# Patient Record
Sex: FEMALE | Race: WHITE | NOT HISPANIC OR LATINO | Employment: FULL TIME | URBAN - METROPOLITAN AREA
[De-identification: names, ages, dates, MRNs, and addresses within clinical notes are randomized per-mention and may not be internally consistent; named-entity substitution may affect disease eponyms.]

---

## 2016-06-30 LAB — HM PAP SMEAR: NEGATIVE

## 2016-07-20 LAB — HM MAMMOGRAM: NORMAL

## 2017-02-24 ENCOUNTER — OFFICE VISIT (OUTPATIENT)
Dept: ENDOCRINOLOGY | Age: 47
End: 2017-02-24

## 2017-02-24 VITALS
HEIGHT: 62 IN | DIASTOLIC BLOOD PRESSURE: 76 MMHG | BODY MASS INDEX: 40.19 KG/M2 | WEIGHT: 218.4 LBS | SYSTOLIC BLOOD PRESSURE: 124 MMHG

## 2017-02-24 DIAGNOSIS — E11.9 TYPE 2 DIABETES MELLITUS WITHOUT COMPLICATION, WITHOUT LONG-TERM CURRENT USE OF INSULIN (HCC): ICD-10-CM

## 2017-02-24 DIAGNOSIS — E04.9 GOITER, NON-TOXIC: Primary | ICD-10-CM

## 2017-02-24 DIAGNOSIS — I25.10 ARTERIOSCLEROSIS OF CORONARY ARTERY: ICD-10-CM

## 2017-02-24 DIAGNOSIS — E55.9 VITAMIN D DEFICIENCY: Primary | ICD-10-CM

## 2017-02-24 DIAGNOSIS — E78.2 MIXED HYPERLIPIDEMIA: ICD-10-CM

## 2017-02-24 DIAGNOSIS — E25.0: ICD-10-CM

## 2017-02-24 DIAGNOSIS — E27.8 ADRENAL NODULE (HCC): ICD-10-CM

## 2017-02-24 DIAGNOSIS — E55.9 VITAMIN D DEFICIENCY: ICD-10-CM

## 2017-02-24 DIAGNOSIS — E04.2 MULTINODULAR GOITER: ICD-10-CM

## 2017-02-24 DIAGNOSIS — I10 ESSENTIAL HYPERTENSION: ICD-10-CM

## 2017-02-24 PROBLEM — E27.9 ADRENAL NODULE: Status: ACTIVE | Noted: 2017-02-24

## 2017-02-24 PROCEDURE — 99214 OFFICE O/P EST MOD 30 MIN: CPT | Performed by: INTERNAL MEDICINE

## 2017-02-24 RX ORDER — CANAGLIFLOZIN 100 MG/1
TABLET, FILM COATED ORAL
Qty: 30 TABLET | Refills: 5 | Status: SHIPPED | OUTPATIENT
Start: 2017-02-24 | End: 2017-11-07

## 2017-02-24 RX ORDER — ERGOCALCIFEROL 1.25 MG/1
50000 CAPSULE ORAL 2 TIMES WEEKLY
Qty: 26 CAPSULE | Refills: 3 | Status: SHIPPED | OUTPATIENT
Start: 2017-02-27 | End: 2018-08-23 | Stop reason: SDUPTHER

## 2017-02-24 RX ORDER — ROSUVASTATIN CALCIUM 20 MG/1
20 TABLET, COATED ORAL NIGHTLY
Qty: 30 TABLET | Refills: 5 | Status: SHIPPED | OUTPATIENT
Start: 2017-02-24 | End: 2017-12-20 | Stop reason: SDUPTHER

## 2017-02-24 NOTE — PROGRESS NOTES
Subjective   Carina Zamudio is a 46 y.o. female is here for a follow-up for Prediabetes, goiter and hyperlipidemia. No lab review. Patient is concerned with exhaustion.     History of Present Illness this is a 46-year-old female known patient with the type II diabetes multinodular goiter left thyroid nodule dyslipidemia as well as known coronary and peripheral vascular disease and vitamin D deficiency.  She is complaining of pronounced fatigue however she has had no vitamin D for several weeks.    The following portions of the patient's history were reviewed and updated as appropriate: allergies, current medications, past family history, past medical history, past social history, past surgical history and problem list.    Review of Systems   Constitutional: Positive for fatigue.   HENT: Negative.    Eyes: Negative.    Respiratory: Negative.    Cardiovascular: Negative.    Gastrointestinal: Negative.    Endocrine: Negative.    Genitourinary: Negative.    Musculoskeletal: Negative.    Skin: Negative.    Allergic/Immunologic: Negative.    Neurological: Negative.    Hematological: Negative.    Psychiatric/Behavioral: Negative.        Objective   Physical Exam   Constitutional: She is oriented to person, place, and time. She appears well-developed and well-nourished. No distress.   Tired looking   HENT:   Head: Normocephalic and atraumatic.   Right Ear: External ear normal.   Left Ear: External ear normal.   Nose: Nose normal.   Mouth/Throat: Oropharynx is clear and moist. No oropharyngeal exudate.   Eyes: Conjunctivae and EOM are normal. Pupils are equal, round, and reactive to light. Right eye exhibits no discharge. Left eye exhibits no discharge. No scleral icterus.   Neck: Normal range of motion. Neck supple. No JVD present. No tracheal deviation present. Thyromegaly present.   Stable goiter.  It moves freely with swallowing and its nontender.   Cardiovascular: Normal rate, regular rhythm, normal heart sounds and  intact distal pulses.  Exam reveals no gallop and no friction rub.    No murmur heard.  Pulmonary/Chest: Effort normal and breath sounds normal. No stridor. No respiratory distress. She has no wheezes. She has no rales. She exhibits no tenderness.   Abdominal: Soft. Bowel sounds are normal. She exhibits no distension and no mass. There is no tenderness. There is no rebound and no guarding. No hernia.   Musculoskeletal: Normal range of motion. She exhibits no edema, tenderness or deformity.   Lymphadenopathy:     She has no cervical adenopathy.   Neurological: She is alert and oriented to person, place, and time. She has normal reflexes. She displays normal reflexes. No cranial nerve deficit. She exhibits normal muscle tone. Coordination normal.   Skin: Skin is warm and dry. No rash noted. She is not diaphoretic. No erythema. No pallor.   Psychiatric: She has a normal mood and affect. Her behavior is normal. Judgment and thought content normal.   Nursing note and vitals reviewed.        Assessment/Plan   Diagnoses and all orders for this visit:    Goiter, non-toxic  -     T3, Free  -     T4 & TSH ([facility])  -     T4, Free  -     Uric Acid  -     Vitamin D 25 Hydroxy  -     Thyroglobulin With Anti-TG  -     Comprehensive Metabolic Panel  -     C-Peptide  -     Follicle Stimulating Hormone  -     Hemoglobin A1c  -     Lipid Panel  -     Luteinizing Hormone  -     ACTH  -     Cortisol  -     DHEA-Sulfate  -     Estradiol  -     Prolactin  -     US Thyroid; Future    Multinodular goiter  -     T3, Free  -     T4 & TSH ([facility])  -     T4, Free  -     Uric Acid  -     Vitamin D 25 Hydroxy  -     Thyroglobulin With Anti-TG  -     Comprehensive Metabolic Panel  -     C-Peptide  -     Follicle Stimulating Hormone  -     Hemoglobin A1c  -     Lipid Panel  -     Luteinizing Hormone  -     ACTH  -     Cortisol  -     DHEA-Sulfate  -     Estradiol  -     Prolactin    Adrenal congenital hyperplasia  -     T3, Free  -     T4 & TSH  ([facility])  -     T4, Free  -     Uric Acid  -     Vitamin D 25 Hydroxy  -     Thyroglobulin With Anti-TG  -     Comprehensive Metabolic Panel  -     C-Peptide  -     Follicle Stimulating Hormone  -     Hemoglobin A1c  -     Lipid Panel  -     Luteinizing Hormone  -     ACTH  -     Cortisol  -     DHEA-Sulfate  -     Estradiol  -     Prolactin    Mixed hyperlipidemia  -     T3, Free  -     T4 & TSH ([facility])  -     T4, Free  -     Uric Acid  -     Vitamin D 25 Hydroxy  -     Thyroglobulin With Anti-TG  -     Comprehensive Metabolic Panel  -     C-Peptide  -     Follicle Stimulating Hormone  -     Hemoglobin A1c  -     Lipid Panel  -     Luteinizing Hormone  -     ACTH  -     Cortisol  -     DHEA-Sulfate  -     Estradiol  -     Prolactin    Essential hypertension  -     T3, Free  -     T4 & TSH ([facility])  -     T4, Free  -     Uric Acid  -     Vitamin D 25 Hydroxy  -     Thyroglobulin With Anti-TG  -     Comprehensive Metabolic Panel  -     C-Peptide  -     Follicle Stimulating Hormone  -     Hemoglobin A1c  -     Lipid Panel  -     Luteinizing Hormone  -     ACTH  -     Cortisol  -     DHEA-Sulfate  -     Estradiol  -     Prolactin    Arteriosclerosis of coronary artery  -     T3, Free  -     T4 & TSH ([facility])  -     T4, Free  -     Uric Acid  -     Vitamin D 25 Hydroxy  -     Thyroglobulin With Anti-TG  -     Comprehensive Metabolic Panel  -     C-Peptide  -     Follicle Stimulating Hormone  -     Hemoglobin A1c  -     Lipid Panel  -     Luteinizing Hormone  -     ACTH  -     Cortisol  -     DHEA-Sulfate  -     Estradiol  -     Prolactin    Type 2 diabetes mellitus without complication, without long-term current use of insulin  -     T3, Free  -     T4 & TSH ([facility])  -     T4, Free  -     Uric Acid  -     Vitamin D 25 Hydroxy  -     Thyroglobulin With Anti-TG  -     Comprehensive Metabolic Panel  -     C-Peptide  -     Follicle Stimulating Hormone  -     Hemoglobin A1c  -     Lipid Panel  -     Luteinizing  Hormone  -     ACTH  -     Cortisol  -     DHEA-Sulfate  -     Estradiol  -     Prolactin    Adrenal nodule  -     CT Abdomen With & Without Contrast; Future    Other orders  -     INVOKANA 100 MG tablet; 1 tablet every morning  -     vitamin D (ERGOCALCIFEROL) 50574 UNITS capsule capsule; Take 1 capsule by mouth 2 (Two) Times a Week.               In summary I saw and examined this the 46-year-old female for above-mentioned problems.  I reviewed her laboratory evaluations of last year and at this point we will go ahead and order an extensive laboratory evaluation as well as CT of her abdomen for follow-up of her adrenal nodule as well as an ultrasound of her thyroid.  As soon as the results come back we will go ahead and call for any possible modifications.  I will see her in 6 months or sooner if needed with laboratory evaluation prior to her office visit.

## 2017-02-25 LAB — 25(OH)D3+25(OH)D2 SERPL-MCNC: 18.7 NG/ML (ref 30–100)

## 2017-02-27 LAB
ACTH PLAS-MCNC: 10.1 PG/ML (ref 7.2–63.3)
ALBUMIN SERPL-MCNC: 4.2 G/DL (ref 3.5–5.2)
ALBUMIN/GLOB SERPL: 1.7 G/DL
ALP SERPL-CCNC: 47 U/L (ref 39–117)
ALT SERPL-CCNC: 19 U/L (ref 1–33)
AST SERPL-CCNC: 19 U/L (ref 1–32)
BILIRUB SERPL-MCNC: <0.2 MG/DL (ref 0.1–1.2)
BUN SERPL-MCNC: 12 MG/DL (ref 6–20)
BUN/CREAT SERPL: 18.8 (ref 7–25)
C PEPTIDE SERPL-MCNC: 7.5 NG/ML (ref 1.1–4.4)
CALCIUM SERPL-MCNC: 9.2 MG/DL (ref 8.6–10.5)
CHLORIDE SERPL-SCNC: 102 MMOL/L (ref 98–107)
CHOLEST SERPL-MCNC: 173 MG/DL (ref 0–200)
CO2 SERPL-SCNC: 26.4 MMOL/L (ref 22–29)
CORTIS SERPL-MCNC: 7.6 UG/DL
CREAT SERPL-MCNC: 0.64 MG/DL (ref 0.57–1)
DHEA-S SERPL-MCNC: 62 UG/DL (ref 41.2–243.7)
ESTRADIOL SERPL-MCNC: 37.6 PG/ML
FSH SERPL-ACNC: 37.6 MIU/ML
GLOBULIN SER CALC-MCNC: 2.5 GM/DL
GLUCOSE SERPL-MCNC: 103 MG/DL (ref 65–99)
HBA1C MFR BLD: 6.55 % (ref 4.8–5.6)
HDLC SERPL-MCNC: 54 MG/DL (ref 40–60)
LDLC SERPL CALC-MCNC: 89 MG/DL (ref 0–100)
LH SERPL-ACNC: 28 MIU/ML
POTASSIUM SERPL-SCNC: 4 MMOL/L (ref 3.5–5.2)
PROLACTIN SERPL-MCNC: 13.1 NG/ML (ref 4.8–23.3)
PROT SERPL-MCNC: 6.7 G/DL (ref 6–8.5)
SODIUM SERPL-SCNC: 142 MMOL/L (ref 136–145)
T3FREE SERPL-MCNC: 3 PG/ML (ref 2–4.4)
T4 FREE SERPL-MCNC: 1.27 NG/DL (ref 0.93–1.7)
T4 SERPL-MCNC: 7.98 MCG/DL (ref 4.5–11.7)
THYROGLOB AB SERPL-ACNC: <1 IU/ML (ref 0–0.9)
THYROGLOB SERPL-MCNC: 19.7 NG/ML (ref 1.5–38.5)
TRIGL SERPL-MCNC: 151 MG/DL (ref 0–150)
TSH SERPL DL<=0.005 MIU/L-ACNC: 1.42 MIU/ML (ref 0.27–4.2)
URATE SERPL-MCNC: 4.7 MG/DL (ref 2.4–5.7)
VLDLC SERPL CALC-MCNC: 30.2 MG/DL (ref 5–40)

## 2017-03-01 ENCOUNTER — TELEPHONE (OUTPATIENT)
Dept: ENDOCRINOLOGY | Age: 47
End: 2017-03-01

## 2017-03-02 ENCOUNTER — TELEPHONE (OUTPATIENT)
Dept: ENDOCRINOLOGY | Age: 47
End: 2017-03-02

## 2017-03-06 DIAGNOSIS — E27.8 ADRENAL NODULE (HCC): Primary | ICD-10-CM

## 2017-03-06 DIAGNOSIS — E25.0: ICD-10-CM

## 2017-03-07 ENCOUNTER — HOSPITAL ENCOUNTER (OUTPATIENT)
Dept: ULTRASOUND IMAGING | Facility: HOSPITAL | Age: 47
Discharge: HOME | End: 2017-03-07
Attending: INTERNAL MEDICINE | Admitting: INTERNAL MEDICINE

## 2017-03-07 ENCOUNTER — HOSPITAL ENCOUNTER (OUTPATIENT)
Dept: CT IMAGING | Facility: HOSPITAL | Age: 47
Discharge: HOME | End: 2017-03-07
Attending: INTERNAL MEDICINE

## 2017-03-07 DIAGNOSIS — E27.8 ADRENAL NODULE (HCC): ICD-10-CM

## 2017-03-07 DIAGNOSIS — E04.9 GOITER, NON-TOXIC: ICD-10-CM

## 2017-03-07 LAB — CREAT BLDA-MCNC: 0.6 MG/DL (ref 0.6–1.3)

## 2017-03-07 PROCEDURE — 74170 CT ABD WO CNTRST FLWD CNTRST: CPT

## 2017-03-07 PROCEDURE — 82565 ASSAY OF CREATININE: CPT

## 2017-03-07 PROCEDURE — 76536 US EXAM OF HEAD AND NECK: CPT

## 2017-03-07 PROCEDURE — 0 IOPAMIDOL 61 % SOLUTION: Performed by: INTERNAL MEDICINE

## 2017-03-07 RX ADMIN — IOPAMIDOL 85 ML: 612 INJECTION, SOLUTION INTRAVENOUS at 09:20

## 2017-04-10 RX ORDER — CARVEDILOL 3.12 MG/1
TABLET ORAL
Qty: 60 TABLET | Refills: 11 | Status: SHIPPED | OUTPATIENT
Start: 2017-04-10 | End: 2018-04-23 | Stop reason: SDUPTHER

## 2017-04-10 RX ORDER — LOSARTAN POTASSIUM 50 MG/1
TABLET ORAL
Qty: 30 TABLET | Refills: 11 | Status: SHIPPED | OUTPATIENT
Start: 2017-04-10 | End: 2017-11-08 | Stop reason: HOSPADM

## 2017-04-13 ENCOUNTER — TELEPHONE (OUTPATIENT)
Dept: INTERNAL MEDICINE | Facility: CLINIC | Age: 47
End: 2017-04-13

## 2017-04-13 NOTE — TELEPHONE ENCOUNTER
Ms. Zamudio wanted to know if she should take her Metformin along with her Invokana?    Please advise

## 2017-04-24 ENCOUNTER — TELEPHONE (OUTPATIENT)
Dept: INTERNAL MEDICINE | Facility: CLINIC | Age: 47
End: 2017-04-24

## 2017-04-24 ENCOUNTER — OFFICE VISIT (OUTPATIENT)
Dept: INTERNAL MEDICINE | Facility: CLINIC | Age: 47
End: 2017-04-24

## 2017-04-24 VITALS
DIASTOLIC BLOOD PRESSURE: 74 MMHG | BODY MASS INDEX: 39.38 KG/M2 | SYSTOLIC BLOOD PRESSURE: 128 MMHG | HEART RATE: 72 BPM | HEIGHT: 62 IN | WEIGHT: 214 LBS

## 2017-04-24 DIAGNOSIS — S80.811A CAT SCRATCH OF RIGHT LOWER LEG, INITIAL ENCOUNTER: Primary | ICD-10-CM
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
## 2017-04-24 PROCEDURE — 99212 OFFICE O/P EST SF 10 MIN: CPT | Performed by: INTERNAL MEDICINE

## 2017-04-24 RX ORDER — CIPROFLOXACIN 500 MG/1
500 TABLET, FILM COATED ORAL 2 TIMES DAILY
Qty: 20 TABLET | Refills: 0 | Status: SHIPPED | OUTPATIENT
Start: 2017-04-24 | End: 2017-05-02

## 2017-04-24 NOTE — TELEPHONE ENCOUNTER
Pt would like Rx for Keflex. On Friday pt was scratched by her cat on her Right thigh area and is a bit infected now.  Pt says she has cat scratch fever and does not feel great.  She will also need a refill on Diflucan as well.  Please send to [facility] [address] [place] [address] - [facility] [address] AT [facility] [address] & [address] [facility] [phone] [facility] [phone]    Thank you.

## 2017-04-24 NOTE — PROGRESS NOTES
Subjective   Carina Zamudio is a 46 y.o. female.     History of Present Illness she was scratched by her cat 3 days ago.  Today she noticed 2 small areas of erythema surrounding the 2 linear wound areas.  They are not painful or pruritic.  There has been no drainage.  She denies any fever or chills although she has been fatigued and having sweats at night.  She has not had any cough or dysuria.  She denied any diarrhea or vomiting although she has been somewhat nauseated.        Review of Systems   Constitutional: Positive for diaphoresis and fatigue. Negative for activity change, appetite change, chills and fever.   Respiratory: Negative for cough and shortness of breath.    Cardiovascular: Negative for chest pain and leg swelling.   Gastrointestinal: Negative for abdominal pain, diarrhea, nausea and vomiting.   Genitourinary: Negative for dysuria.   Musculoskeletal: Negative for arthralgias and myalgias.   Neurological: Negative for dizziness, weakness and headaches.       Objective   Physical Exam   Constitutional: She is oriented to person, place, and time. Vital signs are normal. She appears well-developed and well-nourished. She is active. She does not appear ill.   Eyes: Conjunctivae are normal.   Cardiovascular: Normal rate, regular rhythm, S1 normal and S2 normal.  Exam reveals no S3 and no S4.    No murmur heard.  Pulmonary/Chest: No tachypnea. No respiratory distress. She has no decreased breath sounds. She has no wheezes. She has no rhonchi. She has no rales.   Abdominal: Soft. Normal appearance and bowel sounds are normal. She exhibits no abdominal bruit and no mass. There is no hepatosplenomegaly. There is no tenderness.       Vascular Status -  Her exam exhibits no right foot edema. Her exam exhibits no left foot edema.  Lymphadenopathy:        Right: No inguinal adenopathy present.   Neurological: She is alert and oriented to person, place, and time. Gait normal.   Skin:        Psychiatric: She has  a normal mood and affect. Her speech is normal and behavior is normal. Judgment and thought content normal. Cognition and memory are normal.       Assessment/Plan assessment #1 cat scratch-nothing to suggest cat scratch fever although more mundane infection is always a possibility.    Plan #1 Cipro 500 mg by mouth twice a day for 10 days.  She will call if symptoms do not resolve.

## 2017-05-02 ENCOUNTER — OFFICE VISIT (OUTPATIENT)
Dept: INTERNAL MEDICINE | Facility: CLINIC | Age: 47
End: 2017-05-02

## 2017-05-02 ENCOUNTER — HOSPITAL ENCOUNTER (OUTPATIENT)
Dept: CT IMAGING | Facility: HOSPITAL | Age: 47
Discharge: HOME | End: 2017-05-02
Admitting: NURSE PRACTITIONER

## 2017-05-02 VITALS
BODY MASS INDEX: 39.87 KG/M2 | TEMPERATURE: 98.4 F | HEART RATE: 71 BPM | SYSTOLIC BLOOD PRESSURE: 136 MMHG | DIASTOLIC BLOOD PRESSURE: 90 MMHG | WEIGHT: 218 LBS | OXYGEN SATURATION: 97 %

## 2017-05-02 DIAGNOSIS — R11.0 NAUSEA: ICD-10-CM

## 2017-05-02 DIAGNOSIS — R10.31 RIGHT LOWER QUADRANT ABDOMINAL PAIN: Primary | ICD-10-CM

## 2017-05-02 LAB
ALBUMIN SERPL-MCNC: 3.52 G/DL (ref 3.4–4.6)
ALBUMIN/GLOB SERPL: 1.1 G/DL
ALP SERPL-CCNC: 51 U/L (ref 46–116)
ALT SERPL W P-5'-P-CCNC: 24 U/L (ref 14–59)
ANION GAP SERPL CALCULATED.3IONS-SCNC: 12 MMOL/L
AST SERPL-CCNC: 17 U/L (ref 7–37)
B-HCG UR QL: NEGATIVE
BACTERIA UR QL AUTO: ABNORMAL /HPF
BASOPHILS # BLD AUTO: 0.04 10*3/MM3 (ref 0–0.2)
BASOPHILS NFR BLD AUTO: 0.4 % (ref 0–2)
BILIRUB SERPL-MCNC: 0.3 MG/DL (ref 0.2–1)
BILIRUB UR QL STRIP: NEGATIVE
BUN BLD-MCNC: 12 MG/DL (ref 6–22)
BUN/CREAT SERPL: 19 (ref 7–25)
CALCIUM SPEC-SCNC: 8.4 MG/DL (ref 8.6–10.5)
CHLORIDE SERPL-SCNC: 103 MMOL/L (ref 95–107)
CLARITY UR: CLEAR
CO2 SERPL-SCNC: 26 MMOL/L (ref 23–32)
COLOR UR: YELLOW
CREAT BLD-MCNC: 0.63 MG/DL (ref 0.55–1.02)
CREAT BLDA-MCNC: 0.7 MG/DL (ref 0.6–1.3)
DEPRECATED RDW RBC AUTO: 42.7 FL (ref 37–54)
EOSINOPHIL # BLD AUTO: 0.08 10*3/MM3 (ref 0–0.7)
EOSINOPHIL NFR BLD AUTO: 0.8 % (ref 0–5)
ERYTHROCYTE [DISTWIDTH] IN BLOOD BY AUTOMATED COUNT: 12.8 % (ref 11.5–15)
GFR SERPL CREATININE-BSD FRML MDRD: 101 ML/MIN/1.73
GLOBULIN UR ELPH-MCNC: 3.2 GM/DL
GLUCOSE BLD-MCNC: 124 MG/DL (ref 70–100)
GLUCOSE UR STRIP-MCNC: ABNORMAL MG/DL
HCT VFR BLD AUTO: 48.3 % (ref 34.1–44.9)
HGB BLD-MCNC: 16.3 G/DL (ref 11.2–15.7)
HGB UR QL STRIP.AUTO: ABNORMAL
HYALINE CASTS UR QL AUTO: ABNORMAL /LPF
INTERNAL NEGATIVE CONTROL: NEGATIVE
INTERNAL POSITIVE CONTROL: POSITIVE
KETONES UR QL STRIP: NEGATIVE
LEUKOCYTE ESTERASE UR QL STRIP.AUTO: NEGATIVE
LYMPHOCYTES # BLD AUTO: 2.74 10*3/MM3 (ref 0.8–7)
LYMPHOCYTES NFR BLD AUTO: 26.9 % (ref 10–60)
Lab: NORMAL
MCH RBC QN AUTO: 31.5 PG (ref 26–34)
MCHC RBC AUTO-ENTMCNC: 33.7 G/DL (ref 31–37)
MCV RBC AUTO: 93.4 FL (ref 80–100)
MONOCYTES # BLD AUTO: 0.83 10*3/MM3 (ref 0–1)
MONOCYTES NFR BLD AUTO: 8.2 % (ref 0–13)
NEUTROPHILS # BLD AUTO: 6.49 10*3/MM3 (ref 1–11)
NEUTROPHILS NFR BLD AUTO: 63.7 % (ref 30–85)
NITRITE UR QL STRIP: NEGATIVE
PH UR STRIP.AUTO: 6.5 [PH] (ref 5–8)
PLATELET # BLD AUTO: 258 10*3/MM3 (ref 150–450)
PMV BLD AUTO: 9.4 FL (ref 6–12)
POTASSIUM BLD-SCNC: 4.2 MMOL/L (ref 3.3–5.3)
PROT SERPL-MCNC: 6.7 G/DL (ref 6.3–8.4)
PROT UR QL STRIP: ABNORMAL
RBC # BLD AUTO: 5.17 10*6/MM3 (ref 3.93–5.22)
RBC # UR: ABNORMAL /HPF
REF LAB TEST METHOD: ABNORMAL
SODIUM BLD-SCNC: 141 MMOL/L (ref 136–145)
SP GR UR STRIP: 1.02 (ref 1–1.03)
SQUAMOUS #/AREA URNS HPF: ABNORMAL /HPF
UROBILINOGEN UR QL STRIP: ABNORMAL
WBC NRBC COR # BLD: 10.18 10*3/MM3 (ref 5–10)
WBC UR QL AUTO: ABNORMAL /HPF

## 2017-05-02 PROCEDURE — 80053 COMPREHEN METABOLIC PANEL: CPT | Performed by: NURSE PRACTITIONER

## 2017-05-02 PROCEDURE — 81025 URINE PREGNANCY TEST: CPT | Performed by: NURSE PRACTITIONER

## 2017-05-02 PROCEDURE — 74177 CT ABD & PELVIS W/CONTRAST: CPT

## 2017-05-02 PROCEDURE — 82565 ASSAY OF CREATININE: CPT

## 2017-05-02 PROCEDURE — 0 IOPAMIDOL 61 % SOLUTION: Performed by: NURSE PRACTITIONER

## 2017-05-02 PROCEDURE — 81001 URINALYSIS AUTO W/SCOPE: CPT | Performed by: NURSE PRACTITIONER

## 2017-05-02 PROCEDURE — 85025 COMPLETE CBC W/AUTO DIFF WBC: CPT | Performed by: NURSE PRACTITIONER

## 2017-05-02 PROCEDURE — 99214 OFFICE O/P EST MOD 30 MIN: CPT | Performed by: NURSE PRACTITIONER

## 2017-05-02 RX ADMIN — IOPAMIDOL 85 ML: 612 INJECTION, SOLUTION INTRAVENOUS at 12:32

## 2017-08-10 DIAGNOSIS — E78.2 MIXED HYPERLIPIDEMIA: Primary | ICD-10-CM

## 2017-08-10 DIAGNOSIS — E11.9 TYPE 2 DIABETES MELLITUS WITHOUT COMPLICATION, WITHOUT LONG-TERM CURRENT USE OF INSULIN (HCC): ICD-10-CM

## 2017-08-10 DIAGNOSIS — E27.8 ADRENAL NODULE (HCC): ICD-10-CM

## 2017-08-10 DIAGNOSIS — E04.2 MULTINODULAR GOITER: ICD-10-CM

## 2017-09-10 ENCOUNTER — RESULTS ENCOUNTER (OUTPATIENT)
Dept: ENDOCRINOLOGY | Age: 47
End: 2017-09-10

## 2017-09-10 DIAGNOSIS — E04.2 MULTINODULAR GOITER: ICD-10-CM

## 2017-09-10 DIAGNOSIS — E27.8 ADRENAL NODULE (HCC): ICD-10-CM

## 2017-09-10 DIAGNOSIS — E11.9 TYPE 2 DIABETES MELLITUS WITHOUT COMPLICATION, WITHOUT LONG-TERM CURRENT USE OF INSULIN (HCC): ICD-10-CM

## 2017-09-10 DIAGNOSIS — E78.2 MIXED HYPERLIPIDEMIA: ICD-10-CM

## 2017-10-25 ENCOUNTER — OFFICE VISIT (OUTPATIENT)
Dept: CARDIOLOGY | Facility: CLINIC | Age: 47
End: 2017-10-25

## 2017-10-25 VITALS
WEIGHT: 223 LBS | HEIGHT: 62 IN | BODY MASS INDEX: 41.04 KG/M2 | DIASTOLIC BLOOD PRESSURE: 68 MMHG | SYSTOLIC BLOOD PRESSURE: 120 MMHG | HEART RATE: 73 BPM

## 2017-10-25 DIAGNOSIS — I25.119 CORONARY ARTERY DISEASE INVOLVING NATIVE CORONARY ARTERY OF NATIVE HEART WITH ANGINA PECTORIS (HCC): Primary | ICD-10-CM

## 2017-10-25 PROCEDURE — 93000 ELECTROCARDIOGRAM COMPLETE: CPT | Performed by: INTERNAL MEDICINE

## 2017-10-25 PROCEDURE — 99213 OFFICE O/P EST LOW 20 MIN: CPT | Performed by: INTERNAL MEDICINE

## 2017-10-25 NOTE — PROGRESS NOTES
Subjective:     Encounter Date:10/25/2017      Patient ID: Carina Zamudio is a 47 y.o. female.    Chief Complaint: CAD    History of Present Illness    Dear Dr. Null,     I had the pleasure of seeing your patient in cardiac followup today.  As you well know, she is a addie 47-year-old woman with history of smoking and coronary artery disease.  She has a past history of stroke.  We looked for a patent foramen ovale but could not find any.      She had an inferior STEMI in 2016 and was treated for a subtotally occluded circumflex.      She comes in for her yearly followup.  Since I have last seen her, she reports being under a lot of stress.  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  She has gained 20 pounds over the past year.  She is now using a CPAP which she uses faithfully.      She does not have any angina similar to her myocardial infarction pain.  She does have some epigastric tightness that she feels when she is under stress.      She is active but she is not doing routine exercise.          Review of Systems   All other systems reviewed and are negative.        ECG 12 Lead  Date/Time: 10/25/2017 11:57 AM  Performed by: NIMCO BROWN  Authorized by: NIMCO BROWN   Comparison: compared with previous ECG   Similar to previous ECG  Rhythm: sinus rhythm  BPM: 73  Clinical impression: normal ECG               Objective:     Physical Exam   Constitutional: She is oriented to person, place, and time. She appears well-developed and well-nourished.   HENT:   Head: Normocephalic and atraumatic.   Neck: Normal range of motion. Neck supple.   Cardiovascular: Normal rate, regular rhythm and normal heart sounds.    Pulmonary/Chest: Effort normal and breath sounds normal.   Abdominal: Soft. Bowel sounds are normal.   Musculoskeletal: Normal range of motion.   Neurological: She is alert and oriented to person, place, and time.   Skin: Skin is warm and dry.   Psychiatric: She has a normal mood and affect. Her  behavior is normal. Thought content normal.   Vitals reviewed.      Lab Review:       Assessment:          Diagnosis Plan   1. Coronary artery disease involving native coronary artery of native heart with angina pectoris            Plan:       It was a pleasure to see your patient in cardiac followup today.  She is doing well from the cardiac standpoint with a good medical regimen.  I have asked her to try to work on her stress as well as her diet, exercise and weight.  She knows she has to try to quit smoking but has not done so yet.      She does have some epigastric tightness which could represent an anginal equivalent.  I have asked her to perform an exercise nuclear stress test to assess this further.  She will see me again in one year or sooner if symptoms warrant.      Coronary Artery Disease  Assessment  • The patient has CCS class II - angina only during vigorous physical activity  • The patient has stable angina     Plan  • Lifestyle modifications discussed include adhering to a heart healthy diet, avoidance of tobacco products, maintenance of a healthy weight, medication compliance, regular exercise and regular monitoring of cholesterol and blood pressure    Subjective - Objective  • There has been a previous stent procedure using LIZZIE  • Current antiplatelet therapy includes aspirin 81 mg and clopidogrel 75 mg

## 2017-11-07 ENCOUNTER — APPOINTMENT (OUTPATIENT)
Dept: CARDIOLOGY | Facility: HOSPITAL | Age: 47
End: 2017-11-07
Attending: INTERNAL MEDICINE

## 2017-11-07 ENCOUNTER — APPOINTMENT (OUTPATIENT)
Dept: WOMENS IMAGING | Facility: HOSPITAL | Age: 47
End: 2017-11-07

## 2017-11-07 ENCOUNTER — APPOINTMENT (OUTPATIENT)
Dept: NUCLEAR MEDICINE | Facility: HOSPITAL | Age: 47
End: 2017-11-07

## 2017-11-07 ENCOUNTER — OFFICE VISIT (OUTPATIENT)
Dept: INTERNAL MEDICINE | Facility: CLINIC | Age: 47
End: 2017-11-07

## 2017-11-07 ENCOUNTER — HOSPITAL ENCOUNTER (OUTPATIENT)
Facility: HOSPITAL | Age: 47
Setting detail: OBSERVATION
Discharge: HOME | End: 2017-11-08
Attending: INTERNAL MEDICINE | Admitting: INTERNAL MEDICINE

## 2017-11-07 VITALS
BODY MASS INDEX: 41.96 KG/M2 | HEART RATE: 82 BPM | SYSTOLIC BLOOD PRESSURE: 130 MMHG | DIASTOLIC BLOOD PRESSURE: 76 MMHG | HEIGHT: 62 IN | WEIGHT: 228 LBS

## 2017-11-07 DIAGNOSIS — G47.33 OBSTRUCTIVE SLEEP APNEA SYNDROME: ICD-10-CM

## 2017-11-07 DIAGNOSIS — R06.09 DYSPNEA ON EXERTION: ICD-10-CM

## 2017-11-07 DIAGNOSIS — I10 ESSENTIAL HYPERTENSION: ICD-10-CM

## 2017-11-07 DIAGNOSIS — E78.2 MIXED HYPERLIPIDEMIA: ICD-10-CM

## 2017-11-07 DIAGNOSIS — E11.9 TYPE 2 DIABETES MELLITUS WITHOUT COMPLICATION, WITHOUT LONG-TERM CURRENT USE OF INSULIN (HCC): ICD-10-CM

## 2017-11-07 DIAGNOSIS — I25.10 ARTERIOSCLEROSIS OF CORONARY ARTERY: Primary | ICD-10-CM

## 2017-11-07 DIAGNOSIS — I63.9 CEREBELLAR INFARCTION (HCC): ICD-10-CM
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
## 2017-11-07 PROBLEM — S80.811A CAT SCRATCH OF RIGHT LOWER LEG: Status: RESOLVED | Noted: 2017-04-24 | Resolved: 2017-11-07

## 2017-11-07 LAB
ALBUMIN SERPL-MCNC: 3.8 G/DL (ref 3.5–5.2)
ALBUMIN/GLOB SERPL: 1.2 G/DL
ALP SERPL-CCNC: 53 U/L (ref 39–117)
ALT SERPL W P-5'-P-CCNC: 15 U/L (ref 1–33)
ANION GAP SERPL CALCULATED.3IONS-SCNC: 13.8 MMOL/L
APTT PPP: 24.8 SECONDS (ref 22.7–35.4)
AST SERPL-CCNC: 16 U/L (ref 1–32)
BACTERIA UR QL AUTO: ABNORMAL /HPF
BILIRUB SERPL-MCNC: 0.4 MG/DL (ref 0.1–1.2)
BILIRUB UR QL STRIP: NEGATIVE
BUN BLD-MCNC: 8 MG/DL (ref 6–20)
BUN/CREAT SERPL: 13.3 (ref 7–25)
CALCIUM SPEC-SCNC: 9.1 MG/DL (ref 8.6–10.5)
CHLORIDE SERPL-SCNC: 101 MMOL/L (ref 98–107)
CLARITY UR: CLEAR
CO2 SERPL-SCNC: 24.2 MMOL/L (ref 22–29)
COLOR UR: YELLOW
CREAT BLD-MCNC: 0.6 MG/DL (ref 0.57–1)
D DIMER PPP FEU-MCNC: 0.28 MCGFEU/ML (ref 0–0.49)
DEPRECATED RDW RBC AUTO: 45.7 FL (ref 37–54)
ERYTHROCYTE [DISTWIDTH] IN BLOOD BY AUTOMATED COUNT: 13.3 % (ref 11.7–13)
GFR SERPL CREATININE-BSD FRML MDRD: 107 ML/MIN/1.73
GLOBULIN UR ELPH-MCNC: 3.2 GM/DL
GLUCOSE BLD-MCNC: 121 MG/DL (ref 65–99)
GLUCOSE UR STRIP-MCNC: NEGATIVE MG/DL
HCT VFR BLD AUTO: 48.5 % (ref 35.6–45.5)
HGB BLD-MCNC: 16.3 G/DL (ref 11.9–15.5)
HGB UR QL STRIP.AUTO: NEGATIVE
HYALINE CASTS UR QL AUTO: ABNORMAL /LPF
INR PPP: 0.98 (ref 0.9–1.1)
KETONES UR QL STRIP: NEGATIVE
LEUKOCYTE ESTERASE UR QL STRIP.AUTO: NEGATIVE
MCH RBC QN AUTO: 32.1 PG (ref 26.9–32)
MCHC RBC AUTO-ENTMCNC: 33.6 G/DL (ref 32.4–36.3)
MCV RBC AUTO: 95.7 FL (ref 80.5–98.2)
NITRITE UR QL STRIP: NEGATIVE
NT-PROBNP SERPL-MCNC: 189.5 PG/ML (ref 5–450)
PH UR STRIP.AUTO: 6 [PH] (ref 5–8)
PLATELET # BLD AUTO: 201 10*3/MM3 (ref 140–500)
PMV BLD AUTO: 10 FL (ref 6–12)
POTASSIUM BLD-SCNC: 3.7 MMOL/L (ref 3.5–5.2)
PROT SERPL-MCNC: 7 G/DL (ref 6–8.5)
PROT UR QL STRIP: ABNORMAL
PROTHROMBIN TIME: 12.6 SECONDS (ref 11.7–14.2)
RBC # BLD AUTO: 5.07 10*6/MM3 (ref 3.9–5.2)
RBC # UR: ABNORMAL /HPF
REF LAB TEST METHOD: ABNORMAL
SODIUM BLD-SCNC: 139 MMOL/L (ref 136–145)
SP GR UR STRIP: 1.01 (ref 1–1.03)
SQUAMOUS #/AREA URNS HPF: ABNORMAL /HPF
TROPONIN T SERPL-MCNC: <0.01 NG/ML (ref 0–0.03)
UROBILINOGEN UR QL STRIP: ABNORMAL
WBC NRBC COR # BLD: 8.72 10*3/MM3 (ref 4.5–10.7)
WBC UR QL AUTO: ABNORMAL /HPF

## 2017-11-07 PROCEDURE — 25010000002 PERFLUTREN (DEFINITY) 8.476 MG IN SODIUM CHLORIDE 0.9 % 10 ML INJECTION: Performed by: INTERNAL MEDICINE

## 2017-11-07 PROCEDURE — 93005 ELECTROCARDIOGRAM TRACING: CPT | Performed by: INTERNAL MEDICINE

## 2017-11-07 PROCEDURE — 85027 COMPLETE CBC AUTOMATED: CPT | Performed by: INTERNAL MEDICINE

## 2017-11-07 PROCEDURE — 93010 ELECTROCARDIOGRAM REPORT: CPT | Performed by: INTERNAL MEDICINE

## 2017-11-07 PROCEDURE — 84484 ASSAY OF TROPONIN QUANT: CPT | Performed by: INTERNAL MEDICINE

## 2017-11-07 PROCEDURE — 93306 TTE W/DOPPLER COMPLETE: CPT | Performed by: INTERNAL MEDICINE

## 2017-11-07 PROCEDURE — 93306 TTE W/DOPPLER COMPLETE: CPT

## 2017-11-07 PROCEDURE — 71020 CHG CHEST X-RAY 2 VW: CPT | Performed by: RADIOLOGY

## 2017-11-07 PROCEDURE — G0378 HOSPITAL OBSERVATION PER HR: HCPCS

## 2017-11-07 PROCEDURE — 99220 PR INITIAL OBSERVATION CARE/DAY 70 MINUTES: CPT | Performed by: INTERNAL MEDICINE

## 2017-11-07 PROCEDURE — 80053 COMPREHEN METABOLIC PANEL: CPT | Performed by: INTERNAL MEDICINE

## 2017-11-07 PROCEDURE — 99214 OFFICE O/P EST MOD 30 MIN: CPT | Performed by: INTERNAL MEDICINE

## 2017-11-07 PROCEDURE — 81001 URINALYSIS AUTO W/SCOPE: CPT | Performed by: INTERNAL MEDICINE

## 2017-11-07 PROCEDURE — 71020 XR CHEST PA AND LATERAL: CPT | Performed by: INTERNAL MEDICINE

## 2017-11-07 PROCEDURE — 83880 ASSAY OF NATRIURETIC PEPTIDE: CPT | Performed by: INTERNAL MEDICINE

## 2017-11-07 PROCEDURE — 85379 FIBRIN DEGRADATION QUANT: CPT | Performed by: INTERNAL MEDICINE

## 2017-11-07 PROCEDURE — 85610 PROTHROMBIN TIME: CPT | Performed by: INTERNAL MEDICINE

## 2017-11-07 PROCEDURE — 85730 THROMBOPLASTIN TIME PARTIAL: CPT | Performed by: INTERNAL MEDICINE

## 2017-11-07 RX ORDER — ROSUVASTATIN CALCIUM 20 MG/1
20 TABLET, COATED ORAL NIGHTLY
Status: DISCONTINUED | OUTPATIENT
Start: 2017-11-07 | End: 2017-11-08 | Stop reason: HOSPADM

## 2017-11-07 RX ORDER — CLOPIDOGREL BISULFATE 75 MG/1
75 TABLET ORAL DAILY
Status: DISCONTINUED | OUTPATIENT
Start: 2017-11-07 | End: 2017-11-08 | Stop reason: HOSPADM

## 2017-11-07 RX ORDER — LOSARTAN POTASSIUM 50 MG/1
50 TABLET ORAL
Status: DISCONTINUED | OUTPATIENT
Start: 2017-11-07 | End: 2017-11-08

## 2017-11-07 RX ORDER — CARVEDILOL 3.12 MG/1
3.12 TABLET ORAL EVERY 12 HOURS SCHEDULED
Status: DISCONTINUED | OUTPATIENT
Start: 2017-11-07 | End: 2017-11-08 | Stop reason: HOSPADM

## 2017-11-07 RX ORDER — ASPIRIN 325 MG
162 TABLET ORAL DAILY
Status: DISCONTINUED | OUTPATIENT
Start: 2017-11-07 | End: 2017-11-08 | Stop reason: HOSPADM

## 2017-11-07 RX ADMIN — ROSUVASTATIN CALCIUM 20 MG: 20 TABLET, FILM COATED ORAL at 20:00

## 2017-11-07 RX ADMIN — ASPIRIN 162 MG: 325 TABLET ORAL at 20:00

## 2017-11-07 RX ADMIN — PERFLUTREN 3 ML: 6.52 INJECTION, SUSPENSION INTRAVENOUS at 19:56

## 2017-11-07 RX ADMIN — CARVEDILOL 3.12 MG: 3.12 TABLET, FILM COATED ORAL at 20:00

## 2017-11-07 NOTE — H&P
"Problem list #1 coronary artery disease-status post STEMI (August 2015), status post coronary artery balloon angioplasty with stent placement to circumflex artery #2 hypertension #3 hypercholesterolemia #4 adult-onset diabetes mellitus #5 obstructive sleep apnea #6 multinodular goiter #7 history of cerebellar infarct #8 cervical spine and lumbar spine degenerative joint and degenerative disc disease #9 congenital adrenal hyperplasia    Subjective -the patient sustained an inferior wall myocardial infarction in August 2015.  Emergent cardiac catheterization she was found to have a 99% circumflex stenosis which was relieved with angioplasty and stent placement.  At that time ejection fraction was normal at 65%.  She had done well until the last 5 days.  She began noticing dyspnea with relatively mild exertion as well as 2 episodes of PND and associated weight gain and swelling in the ankles.  For the past month she has had \"numbness\" below the left breast with episodes lasting anywhere from 1 minute to an hour.  There have been no associated symptoms.  She denied any radiation of the pain.  Due to this symptomatology she was admitted for further evaluation and treatment.    She has a history of hypertension recently treated with losartan as well as Coreg.  She has hypercholesterolemia treated with Crestor and diabetes mellitus treated with metformin.  She has obstructive sleep apnea and uses CPAP on a regular basis.  Unfortunately she is still smoking one half to one pack of cigarettes per day.  She is on Plavix as well as aspirin 81 mg by mouth daily since her angioplasty.  She has a remote history of cerebellar infarct without residual.    Review systems shows no recent headache, dizziness, or focal neurologic symptoms.  She denies any cough or wheezing and there has been no anginal quality chest pain.  She denies any abdominal pain, dysphagia, change in bowel habit, melanotic stool, rectal bleeding, or " dysuria.    Objective -she is afebrile.  Blood pressure 130/76   pulse 82  respiratory rate 16    Examination of the skin shows to be warm and dry without rash petechiae or ecchymoses.  Examination of the eye show the sclera to be clear and the conjunctiva to be pink.  Funduscopic exam showed sharp disks and normal vessels.  Examination of the neck showed thyromegaly with the plan one and a quarter times normal size.  There were no dominant nodules.  There were no carotid bruits.  Examination of oropharynx shows no buccal lingual or pharyngeal lesions.  Auscultation of the lungs showed them to be clear without rales rhonchi or wheezes.  Cardiac exam showed a regular rhythm.  S1 and S2 normal.  There was no S3-S4 rub click or murmur.  Examination of the abdomen shows to be soft and flat without masses again organomegaly tenderness or bruit.  Examination of the lower extremity shows no pretibial edema.  DP pulses are 2+ bilaterally.  Neurologic exam on mental status testing shows the patient to be alert cooperative appropriate and oriented.  Speech was normal to articulation and content.  Cranial nerves are grossly symmetric and intact.  Cerebellar exam shows normal gait.    In office chest x-ray showed borderline cardiomegaly unchanged.  EKG was normal.  O2 sat was 97% at rest and 95% post exertion.  Pulse did rise to 111 post exertion.    Assessment #1 coronary artery disease-the concern of course is that her dyspnea with relatively new and more severe onset is a anginal equivalent.  With a normal echocardiogram 2 years ago it is less likely that she has decreased LV function, diastolic dysfunction, or pulmonary hypertension.  Pulmonary embolism is also a consideration however.    Plan #1 CBC, CMP, urinalysis, TSH, lipids, hemoglobin A1c, PT, PTT, BMP, troponin #2 2-D echocardiogram #3 Lexiscan # 4 continue usual medications with the exception of metformin #5 d-dimer

## 2017-11-07 NOTE — PROGRESS NOTES
Gildardo Zamudio is a 47 y.o. female.     History of Present Illness she is here today for yearly visit which includes follow-up of hypertension, diabetes mellitus, coronary artery disease, and hypercholesterolemia.  Over the last month she has had intermittent left submammary no sensation lasting from minutes to an hour.  It does not occur nocturnally.  It is not associated with food ingestion.  There may be occasional nausea.  It is unclear whether or not it is associated with exertion.  She does have a sense of gaseousness and bloating especially postprandial over the last few months.  Over the last 5 days she relates swelling in the ankles as well as PND and shortness of breath with relatively mild to moderate exertion.  She has daily cough with sputum production occasionally yellow in nature which is chronic but it has increased recently.  She is smoking one half to one pack of cigarettes per day.  She has occasionally noticed some wheezing recently She denies any anginal quality chest pain.  She is using CPAP nightly.  She has not had any headache although occasionally she will have scotoma or dizziness which may last from a minute to an hour.  There have been no episodes of diplopia, dysphagia, dysarthria, paresthesias, or focal paresis.  She denied syncope.  She has not had any abdominal pain, melanoma, rectal bleeding, or change in bowel habit.  She has gained 20 pounds in the past year by her history.  She is only taking metformin once per day and she is not taking Invokana at all for her diabetes mellitus.         Review of Systems   Constitutional: Positive for fatigue. Negative for activity change, appetite change and diaphoresis.   HENT: Negative for trouble swallowing.    Respiratory: Positive for cough, shortness of breath and wheezing. Negative for chest tightness.    Cardiovascular: Positive for leg swelling. Negative for chest pain and palpitations.   Gastrointestinal: Negative for  abdominal pain, anal bleeding, blood in stool, constipation, diarrhea, nausea and vomiting.   Genitourinary: Negative for dysuria, flank pain, frequency and hematuria.   Neurological: Positive for dizziness. Negative for syncope, facial asymmetry, speech difficulty, weakness, numbness and headaches.       Objective   Physical Exam   Constitutional: She is oriented to person, place, and time. Vital signs are normal. She appears well-developed and well-nourished. She is active. She does not appear ill.   Eyes: Conjunctivae are normal.   Fundoscopic exam:       The right eye shows no AV nicking, no exudate and no hemorrhage.        The left eye shows no AV nicking, no exudate and no hemorrhage.   Neck: Carotid bruit is not present. Thyromegaly present.       Cardiovascular: Normal rate, regular rhythm, S1 normal and S2 normal.  Exam reveals no S3 and no S4.    No murmur heard.  Pulses:       Dorsalis pedis pulses are 2+ on the right side, and 2+ on the left side.   Pulmonary/Chest: No tachypnea. No respiratory distress. She has no decreased breath sounds. She has no wheezes. She has no rhonchi. She has no rales.   Abdominal: Soft. Normal appearance and bowel sounds are normal. She exhibits no abdominal bruit and no mass. There is no hepatosplenomegaly. There is no tenderness.       Vascular Status -  Her exam exhibits no right foot edema. Her exam exhibits no left foot edema.  Neurological: She is alert and oriented to person, place, and time.   Reflex Scores:       Bicep reflexes are 2+ on the right side.  Psychiatric: She has a normal mood and affect. Her speech is normal and behavior is normal. Judgment and thought content normal. Cognition and memory are normal.       Assessment/Plan cardiac catheterization in August 2015 showed a 99% circumflex stenosis resolved with angioplasty and stenting.  There was a 50% MARCIA stenosis.  Echocardiogram in 2015 showed an ejection fraction of 65-70% with normal right ventricular  pressures.    EKG today is normal.  X-ray is unchanged with borderline cardiomegaly.  O2 sat is 97% at rest and 95% post ambulation.  She is quite dyspneic with this and her pulse rises to 110.    Assessment #1 dyspnea on exertion with PND and weight gain-I am obviously concerned about her cardiac status and one wonders if she is not having anginal equivalent symptomatology.    Plan #1 admit for further evaluation with possible treatment pending results.

## 2017-11-07 NOTE — PLAN OF CARE
Problem: Patient Care Overview (Adult)  Goal: Plan of Care Review  Outcome: Ongoing (interventions implemented as appropriate)    11/07/17 1538   Coping/Psychosocial Response Interventions   Plan Of Care Reviewed With patient   Patient Care Overview   Progress no change   Outcome Evaluation   Outcome Summary/Follow up Plan Pt direct admit from Dr. Null's office with weight gain and soa. Cardiology consulted. Echo to be done tonight and stress test in the morning. No c/o pain or soa since admission.       Goal: Adult Individualization and Mutuality  Outcome: Ongoing (interventions implemented as appropriate)  Goal: Discharge Needs Assessment  Outcome: Ongoing (interventions implemented as appropriate)    Problem: Pain, Acute (Adult)  Goal: Identify Related Risk Factors and Signs and Symptoms  Outcome: Ongoing (interventions implemented as appropriate)  Goal: Acceptable Pain Control/Comfort Level  Outcome: Ongoing (interventions implemented as appropriate)

## 2017-11-07 NOTE — CONSULTS
Patient Name: Carina Zamduio  :1970  47 y.o.    Date of Admission: 2017  Date of Consultation:  17  Encounter Provider: Javier Alcazar MD  Place of Service: [facility] CARDIOLOGY  Referring Provider: Paco Null Jr., MD  Patient Care Team:  Paco Null Jr., MD as PCP - General (Family Medicine)  Paco Null Jr., MD as Consulting Physician (Internal Medicine)      Chief complaint: Dyspnea on exertion    Reason for Consult: SOA and weight gain    History of Present Illness:    Mrs Zamudio is a 47 year old patient of mine with a documented history of CAD with inferior  STEMI  In 2015 with LIZZIE stent placement to left circumflex coronary artery.  She also has a history of a stroke, diabetes, AMY - uses CPAP, and HTN.  She is also a smoker.  I recently saw her in office on 10/25/2017 for follow up and at that time she was complaining of some epigastric tightness that she thought was related to stress. I recommended a nuclear stress for her but per patients request it has not been scheduled.      She was in Dr Null's  office today for her 1 year follow up and had noticed some SOA with exertion, 5 days of LE edema, chronic cough.  She denies any chest pain or syncope.  She has noticed she has gained about 20 pounds in the last year.  She is continuing to smoke one half to one pack per day. He performed an EKG and Xray today in office.  According to his note the EKG was normal and the xray showed borderline cardiomegaly.  Due to her dyspnea on exertion he felt it would be best to admit her for further evaluation.  We have been asked to see her for cardiac recommendations.  A stress test and ECHO has been ordered.    Currently she denies any chest pain but does have occasional tightness in her chest but she thinks its related to her current cold and cough that she has had.  She has some numbness under her left breast that occurs randomly.  She says that  this feeling is much different than the pain she experienced with her MI.  Her main complaint is feeling of bloating and weight gain.  She said she has gained about 5 more pounds since she was seen in my office a few weeks ago.     Troponin is pending  Previous testing:  INDICATIONS FOR PROCEDURE: This is a 45-year-old lady with an inferior STEMI,  brought directly to the catheterization laboratory.      PERFORMING PHYSICIAN: Bharat Cunningham MD     DESCRIPTION OF PROCEDURE: After consent was obtained, access was gained in her  right radial artery using a micropuncture technique. A 6-Lebanese short sheath  was placed without difficulty. An intra-arterial cocktail was given and the  opening ACT was 120 seconds. We went up actually with a JR-4 guiding catheter,  injected the right coronary artery, but that was not the culprit. We then went  up with a JL-3.5 diagnostic catheter and then at the conclusion of the  intervention we did injections of the left ventriculography. That will all be  dictated as one.       Left heart catheterization: LV pressure was 120/20. There was normal LV  systolic function without segmental wall motion abnormality. No mitral  insufficiency or gradient across the aortic valve on pullback.      The left main coronary artery is normal.      The LAD has some mild luminal irregularities in the proximal portion that are  about 20%. The first diagonal has some mild luminal irregularities of 10% in  it.   The circumflex has a 99% blockage in the middle portion. There is an OM1 that  is subtotally occluded right in this lesion and the flow beyond that is CAROL-1.        The RCA is a dominant vessel with 30% proximal and mid disease, diffuse  aneurysmal change distally. The MARCIA has diffuse 50% disease in the proximal  portion of it, and the PDA has a 30% origin lesion in it.      STEMI. Due to this circumflex lesion we are going to proceed on with  intervention in it.       Heparin 9000 units was given.  The ACT was over 300 seconds. She had been  pretreated with 600 mg of Plavix. Initially, we went up with a Voda 3.0 guiding  catheter. I attempted to cross it with a BMW wire, but it would not,  switched  to a whisper wire and it easily crossed and then brought a 2.5 x 15 Trek  balloon down, inflated it at 14 atmospheres, predilating this and then we  brought a 2.75 x 18 Xience Alpine stent and deployed it at 14 atmospheres. I  postdilated the front end of that stent with a 3.0 x 8 NC Trek at 20  atmospheres. There was 0% residual and CAROL-3 flow. There was just a trickle of  flow in the OM1, but it is too small to go after or do anything.      At that point, balloons, wires, and guides were removed. The sheath was removed  on the table and a TR band was applied.      IMPRESSION: Successful angioplasty, drug-eluting stent to the midcircumflex for  an ST-elevation myocardial infarction.      RECOMMENDATIONS: Aggressive risk factor modification. Home in 48 hours.       ECHO from 08/31/2015  Transthoracic Echocardiogram     Indication:  mi  BP:           133/75  HR:                      Findings       Procedure Info:  A complete two dimensional transthoracic echocardiogram with color flow  and Doppler was performed. The study quality is good.       Left Ventricle:  The left ventricular chamber size is normal. There is no left  ventricular hypertrophy. There are multiple wall motion abnormalities.   There is normal left ventricular systolic function. The estimated  ejection fraction is 65-70%. The left ventricular diastolic filling  pattern is normal. The  basal anterior, basal anterolateral, mid  anterior, and  mid anterolateral wall segments are hypokinetic (score  2). Overall wallmotion score index is  1.25      Left Atrium:  The left atrial chamber size is normal. No atrial septal defect is  demonstrated by color Doppler.       Right Ventricle:  The right ventricular chamber size and systolic function are  within  normal limits.      Right Atrium:  The right atrial cavity size is normal.      Aortic Valve:  The aortic valve is trileaflet. There is no evidence of aortic stenosis.  There is no evidence of aortic regurgitation.      Mitral Valve:  The mitral valve structure is grossly normal. There is no evidence of  mitral stenosis. There is a trace of mitral regurgitation.      Tricuspid Valve:  The tricuspid valve appears normal in structure and function. There is a  trace tricuspid regurgitation.  The right ventricular systolic pressure  is calculated at 31 mmHg.       Pulmonic Valve:  The pulmonic valve appears normal in structure and function.      Pericardium:  There is no pericardial effusion.      Aorta:  The aorta appears normal.  There is no dilatation of the ascending  aorta.      Conclusions  The estimated ejection fraction is 65-70%.  The left ventricular diastolic filling pattern is normal.  Past Medical History:   Diagnosis Date   • Abdominal pain, RUQ    • 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     Sept-2012--Sees Dr Serra (Eye)--laser Rx's   • Acute conjunctivitis    • Adrenal cortical adenoma    • Adrenal hypertrophy    • Adrenal nodule    • Anemia    • Anxiety    • Benign essential hypertension    • CAD (coronary artery disease)    • Cellulitis    • Cerebellar infarct    • Chronic bronchitis    • Diabetes mellitus    • DJD (degenerative joint disease), cervical    • DJD (degenerative joint disease), lumbar    • Fatigue    • Health care maintenance    • HPV (human papilloma virus) infection    • Hyperglycemia     Dr. Xie   • Hyperlipemia    • Hypertension    • Liver nodule    • Morbid obesity    • Multiple thyroid nodules    • Myocardial infarction    • Nontoxic multinodular goiter    • AMY (obstructive sleep apnea)    • Preretinal hemorrhage of left eye    • Proteinuria    • Raynaud phenomenon    • Sleep apnea     Uses CPAP   • Stroke    • Superficial phlebitis of leg    • Vision,  loss, sudden    • Vitamin D deficiency        Past Surgical History:   Procedure Laterality Date   • CORONARY STENT PLACEMENT     • ENDOMETRIAL ABLATION  2012   • EYE SURGERY     • TUBAL ABDOMINAL LIGATION  2012         Prior to Admission medications    Medication Sig Start Date End Date Taking? Authorizing Provider   ALPRAZolam (XANAX) 0.5 MG tablet TAKE ONE TABLET BY MOUTH DAILY AS NEEDED 3/10/16  Yes Paco Null Jr., MD   aspirin 81 MG tablet Take 81 mg by mouth Daily. 4/24/13  Yes Historical Provider, MD   B Complex Vitamins (VITAMIN-B COMPLEX) tablet Take 1 tablet by mouth Daily. 4/24/13  Yes Historical Provider, MD   BIOTIN PO Take by mouth. 9/4/15  Yes Historical Provider, MD   carvedilol (COREG) 3.125 MG tablet TAKE ONE TABLET BY MOUTH TWICE A DAY 4/10/17  Yes Javier Alcazar MD   clopidogrel (PLAVIX) 75 MG tablet Take 1 tablet by mouth Daily. 10/24/16  Yes Javier Alcazar MD   Flaxseed, Linseed, (FLAX SEED OIL PO) Take 1 tablet by mouth Daily. 4/24/13  Yes Historical Provider, MD   losartan (COZAAR) 50 MG tablet TAKE ONE TABLET BY MOUTH DAILY 4/10/17  Yes Javier Alcazar MD   metFORMIN (GLUCOPHAGE) 1000 MG tablet Take 1 tablet by mouth 2 (two) times a day. 9/23/16  Yes Paco Null Jr., MD   Multiple Vitamin tablet Take 1 tablet by mouth Daily. 4/24/13  Yes Historical Provider, MD   rosuvastatin (CRESTOR) 20 MG tablet Take 1 tablet by mouth Every Night. 2/24/17 2/24/18 Yes Keagan Fields MD   vitamin D (ERGOCALCIFEROL) 85342 UNITS capsule capsule Take 1 capsule by mouth 2 (Two) Times a Week. 2/27/17   Keagan Fields MD   INVOKANA 100 MG tablet 1 tablet every morning 2/24/17 11/7/17  Keagan Fields MD       No Known Allergies    Social History     Social History   • Marital status:      Spouse name: N/A   • Number of children: N/A   • Years of education: N/A     Social History Main Topics   • Smoking status: Current Every Day Smoker     Packs/day: 1.00   • Smokeless tobacco: Never Used      Comment: caffine  "use   • Alcohol use Yes      Comment: socially   • Drug use: No   • Sexual activity: Yes     Partners: Male     Birth control/ protection: Surgical      Comment: ablation/BTL     Other Topics Concern   • None     Social History Narrative       Family History   Problem Relation Age of Onset   • Lymphoma Father    • Lung cancer Maternal Grandfather    • Heart attack Maternal Grandfather    • Diabetes Maternal Grandfather    • Heart attack Paternal Grandfather    • Diabetes Paternal Grandfather    • Diabetes Paternal Grandmother    • Diabetes Maternal Grandmother    • Breast cancer Neg Hx    • Ovarian cancer Neg Hx    • Uterine cancer Neg Hx    • Colon cancer Neg Hx        REVIEW OF SYSTEMS:   All systems reviewed.  Pertinent positives identified in HPI.  All other systems are negative.      Objective:     Vitals:    11/07/17 1257 11/07/17 2000 11/07/17 2300 11/08/17 0710   BP: 159/89 148/80 162/90 163/91   BP Location: Right arm Right arm Right arm Right arm   Patient Position: Lying Lying Lying Sitting   Pulse: 76 73 66 73   Resp: 16 18 18 20   Temp: 98.2 °F (36.8 °C) 98.6 °F (37 °C) 98.6 °F (37 °C) 98 °F (36.7 °C)   TempSrc: Oral Oral Oral Oral   SpO2: 96% 93% 91%    Weight: 225 lb 12.8 oz (102 kg)      Height: 62\" (157.5 cm)        Body mass index is 41.3 kg/(m^2).    Physical Exam:   Physical Exam   Constitutional: She is oriented to person, place, and time. She appears well-developed and well-nourished.   HENT:   Head: Normocephalic and atraumatic.   Neck: Normal range of motion. Neck supple.   Cardiovascular: Normal rate, regular rhythm and normal heart sounds.    Pulmonary/Chest: Effort normal and breath sounds normal.   Abdominal: Soft. Bowel sounds are normal.   Musculoskeletal: Normal range of motion.   Neurological: She is alert and oriented to person, place, and time.   Skin: Skin is warm and dry.   Psychiatric: She has a normal mood and affect. Her behavior is normal. Thought content normal.   Vitals " reviewed.    Lab Review:       Results from last 7 days  Lab Units 11/07/17  1351   SODIUM mmol/L 139   POTASSIUM mmol/L 3.7   CHLORIDE mmol/L 101   CO2 mmol/L 24.2   BUN mg/dL 8   CREATININE mg/dL 0.60   CALCIUM mg/dL 9.1   BILIRUBIN mg/dL 0.4   ALK PHOS U/L 53   ALT (SGPT) U/L 15   AST (SGOT) U/L 16   GLUCOSE mg/dL 121*       Results from last 7 days  Lab Units 11/08/17  0352 11/07/17  1351   TROPONIN T ng/mL <0.010 <0.010       Results from last 7 days  Lab Units 11/07/17  1351   WBC 10*3/mm3 8.72   HEMOGLOBIN g/dL 16.3*   HEMATOCRIT % 48.5*   PLATELETS 10*3/mm3 201       Results from last 7 days  Lab Units 11/07/17  1351   INR  0.98   APTT seconds 24.8       Telemetry      Results from last 7 days  Lab Units 11/08/17  0352   CHOLESTEROL mg/dL 160   TRIGLYCERIDES mg/dL 97   HDL CHOL mg/dL 51       EKG pending for today        I personally viewed and interpreted the patient's EKG/Telemetry data.        Assessment and Plan:       CAD with prior STEMI.  I ordered a stress test on a recent office visit a few weeks ago.  She now presents with swelling, weight gain, and exertional dyspnea.  Sounds like acute diastolic CHF.  Her BP has been up a bit.    I will check echo and stress test.  David.    Javier Alcazar MD  11/7/17

## 2017-11-08 ENCOUNTER — APPOINTMENT (OUTPATIENT)
Dept: NUCLEAR MEDICINE | Facility: HOSPITAL | Age: 47
End: 2017-11-08

## 2017-11-08 VITALS
HEIGHT: 62 IN | TEMPERATURE: 98 F | BODY MASS INDEX: 41.55 KG/M2 | SYSTOLIC BLOOD PRESSURE: 163 MMHG | RESPIRATION RATE: 20 BRPM | HEART RATE: 73 BPM | WEIGHT: 225.8 LBS | DIASTOLIC BLOOD PRESSURE: 91 MMHG | OXYGEN SATURATION: 91 %

## 2017-11-08 PROBLEM — R06.00 DYSPNEA: Status: ACTIVE | Noted: 2017-11-08

## 2017-11-08 PROBLEM — R06.00 DYSPNEA: Status: RESOLVED | Noted: 2017-11-08 | Resolved: 2017-11-08

## 2017-11-08 LAB
BH CV ECHO MEAS - ACS: 2 CM
BH CV ECHO MEAS - AO MAX PG: 6 MMHG
BH CV ECHO MEAS - AO MEAN PG (FULL): 1 MMHG
BH CV ECHO MEAS - AO MEAN PG: 3 MMHG
BH CV ECHO MEAS - AO ROOT AREA (BSA CORRECTED): 1.5
BH CV ECHO MEAS - AO ROOT AREA: 7.5 CM^2
BH CV ECHO MEAS - AO ROOT DIAM: 3.1 CM
BH CV ECHO MEAS - AO V2 MAX: 121 CM/SEC
BH CV ECHO MEAS - AO V2 MEAN: 79.4 CM/SEC
BH CV ECHO MEAS - AO V2 VTI: 25.9 CM
BH CV ECHO MEAS - AVA(I,A): 3.1 CM^2
BH CV ECHO MEAS - AVA(I,D): 3.1 CM^2
BH CV ECHO MEAS - BSA(HAYCOCK): 2.2 M^2
BH CV ECHO MEAS - BSA: 2 M^2
BH CV ECHO MEAS - BZI_BMI: 41.2 KILOGRAMS/M^2
BH CV ECHO MEAS - BZI_METRIC_HEIGHT: 157.5 CM
BH CV ECHO MEAS - BZI_METRIC_WEIGHT: 102.1 KG
BH CV ECHO MEAS - CONTRAST EF (2CH): 73.2 ML/M^2
BH CV ECHO MEAS - CONTRAST EF 4CH: 56.9 ML/M^2
BH CV ECHO MEAS - EDV(CUBED): 103.8 ML
BH CV ECHO MEAS - EDV(MOD-SP2): 82 ML
BH CV ECHO MEAS - EDV(MOD-SP4): 72 ML
BH CV ECHO MEAS - EDV(TEICH): 102.4 ML
BH CV ECHO MEAS - EF(CUBED): 65.4 %
BH CV ECHO MEAS - EF(MOD-SP2): 73.2 %
BH CV ECHO MEAS - EF(MOD-SP4): 56.9 %
BH CV ECHO MEAS - EF(TEICH): 56.9 %
BH CV ECHO MEAS - ESV(CUBED): 35.9 ML
BH CV ECHO MEAS - ESV(MOD-SP2): 22 ML
BH CV ECHO MEAS - ESV(MOD-SP4): 31 ML
BH CV ECHO MEAS - ESV(TEICH): 44.1 ML
BH CV ECHO MEAS - FS: 29.8 %
BH CV ECHO MEAS - IVS/LVPW: 1
BH CV ECHO MEAS - IVSD: 1.1 CM
BH CV ECHO MEAS - LAT PEAK E' VEL: 7 CM/SEC
BH CV ECHO MEAS - LV DIASTOLIC VOL/BSA (35-75): 35.8 ML/M^2
BH CV ECHO MEAS - LV MASS(C)D: 187.5 GRAMS
BH CV ECHO MEAS - LV MASS(C)DI: 93.3 GRAMS/M^2
BH CV ECHO MEAS - LV MEAN PG: 2 MMHG
BH CV ECHO MEAS - LV SYSTOLIC VOL/BSA (12-30): 15.4 ML/M^2
BH CV ECHO MEAS - LV V1 MEAN: 66.8 CM/SEC
BH CV ECHO MEAS - LV V1 VTI: 23.2 CM
BH CV ECHO MEAS - LVIDD: 4.7 CM
BH CV ECHO MEAS - LVIDS: 3.3 CM
BH CV ECHO MEAS - LVLD AP2: 8.2 CM
BH CV ECHO MEAS - LVLD AP4: 8.2 CM
BH CV ECHO MEAS - LVLS AP2: 5.9 CM
BH CV ECHO MEAS - LVLS AP4: 6.3 CM
BH CV ECHO MEAS - LVOT AREA (M): 3.5 CM^2
BH CV ECHO MEAS - LVOT AREA: 3.5 CM^2
BH CV ECHO MEAS - LVOT DIAM: 2.1 CM
BH CV ECHO MEAS - LVPWD: 1.1 CM
BH CV ECHO MEAS - MED PEAK E' VEL: 5 CM/SEC
BH CV ECHO MEAS - MR MAX PG: 18.8 MMHG
BH CV ECHO MEAS - MR MAX VEL: 217 CM/SEC
BH CV ECHO MEAS - MV A DUR: 99 SEC
BH CV ECHO MEAS - MV A MAX VEL: 103 CM/SEC
BH CV ECHO MEAS - MV DEC SLOPE: 250 CM/SEC^2
BH CV ECHO MEAS - MV DEC TIME: 222 SEC
BH CV ECHO MEAS - MV E MAX VEL: 106 CM/SEC
BH CV ECHO MEAS - MV E/A: 1
BH CV ECHO MEAS - MV MEAN PG: 2 MMHG
BH CV ECHO MEAS - MV P1/2T MAX VEL: 98.9 CM/SEC
BH CV ECHO MEAS - MV P1/2T: 115.9 MSEC
BH CV ECHO MEAS - MV V2 MEAN: 74.2 CM/SEC
BH CV ECHO MEAS - MV V2 VTI: 31.1 CM
BH CV ECHO MEAS - MVA P1/2T LCG: 2.2 CM^2
BH CV ECHO MEAS - MVA(P1/2T): 1.9 CM^2
BH CV ECHO MEAS - MVA(VTI): 2.6 CM^2
BH CV ECHO MEAS - PA ACC SLOPE: 885 CM/SEC^2
BH CV ECHO MEAS - PA ACC TIME: 0.11 SEC
BH CV ECHO MEAS - PA MAX PG (FULL): 0 MMHG
BH CV ECHO MEAS - PA MAX PG: 4 MMHG
BH CV ECHO MEAS - PA PR(ACCEL): 27.7 MMHG
BH CV ECHO MEAS - PA V2 MAX: 100 CM/SEC
BH CV ECHO MEAS - PULM A REVS DUR: 165 SEC
BH CV ECHO MEAS - PULM A REVS VEL: 36.5 CM/SEC
BH CV ECHO MEAS - PULM DIAS VEL: 38.5 CM/SEC
BH CV ECHO MEAS - PULM S/D: 1.5
BH CV ECHO MEAS - PULM SYS VEL: 58.7 CM/SEC
BH CV ECHO MEAS - PVA(V,A): 2 CM^2
BH CV ECHO MEAS - PVA(V,D): 2 CM^2
BH CV ECHO MEAS - QP/QS: 0.5
BH CV ECHO MEAS - RAP SYSTOLE: 8 MMHG
BH CV ECHO MEAS - RV MAX PG: 4 MMHG
BH CV ECHO MEAS - RV MEAN PG: 2 MMHG
BH CV ECHO MEAS - RV V1 MAX: 100 CM/SEC
BH CV ECHO MEAS - RV V1 MEAN: 66.9 CM/SEC
BH CV ECHO MEAS - RV V1 VTI: 20 CM
BH CV ECHO MEAS - RVOT AREA: 2 CM^2
BH CV ECHO MEAS - RVOT DIAM: 1.6 CM
BH CV ECHO MEAS - RVSP: 28.6 MMHG
BH CV ECHO MEAS - SI(AO): 97.3 ML/M^2
BH CV ECHO MEAS - SI(CUBED): 33.8 ML/M^2
BH CV ECHO MEAS - SI(LVOT): 40 ML/M^2
BH CV ECHO MEAS - SI(MOD-SP2): 29.9 ML/M^2
BH CV ECHO MEAS - SI(MOD-SP4): 20.4 ML/M^2
BH CV ECHO MEAS - SI(TEICH): 29 ML/M^2
BH CV ECHO MEAS - SV(AO): 195.5 ML
BH CV ECHO MEAS - SV(CUBED): 67.9 ML
BH CV ECHO MEAS - SV(LVOT): 80.4 ML
BH CV ECHO MEAS - SV(MOD-SP2): 60 ML
BH CV ECHO MEAS - SV(MOD-SP4): 41 ML
BH CV ECHO MEAS - SV(RVOT): 40.2 ML
BH CV ECHO MEAS - SV(TEICH): 58.2 ML
BH CV ECHO MEAS - TAPSE (>1.6): 2 CM2
BH CV ECHO MEAS - TR MAX VEL: 227 CM/SEC
BH CV STRESS BP STAGE 1: NORMAL
BH CV STRESS BP STAGE 2: NORMAL
BH CV STRESS BP STAGE 3: NORMAL
BH CV STRESS DURATION MIN STAGE 1: 3
BH CV STRESS DURATION MIN STAGE 2: 3
BH CV STRESS DURATION MIN STAGE 3: 2
BH CV STRESS DURATION SEC STAGE 1: 0
BH CV STRESS DURATION SEC STAGE 2: 0
BH CV STRESS DURATION SEC STAGE 3: 40
BH CV STRESS GRADE STAGE 1: 10
BH CV STRESS GRADE STAGE 2: 12
BH CV STRESS GRADE STAGE 3: 14
BH CV STRESS HR STAGE 1: 105
BH CV STRESS HR STAGE 2: 123
BH CV STRESS HR STAGE 3: 154
BH CV STRESS METS STAGE 1: 5
BH CV STRESS METS STAGE 2: 7.5
BH CV STRESS METS STAGE 3: 10
BH CV STRESS PROTOCOL 1: NORMAL
BH CV STRESS RECOVERY BP: NORMAL MMHG
BH CV STRESS RECOVERY HR: 91 BPM
BH CV STRESS SPEED STAGE 1: 1.7
BH CV STRESS SPEED STAGE 2: 2.5
BH CV STRESS SPEED STAGE 3: 3.4
BH CV STRESS STAGE 1: 1
BH CV STRESS STAGE 2: 2
BH CV STRESS STAGE 3: 3
BH CV VAS BP RIGHT ARM: NORMAL MMHG
BH CV XLRA - RV BASE: 2.8 CM
BH CV XLRA - TDI S': 12 CM/SEC
CHOLEST SERPL-MCNC: 160 MG/DL (ref 0–200)
E/E' RATIO: 19
HBA1C MFR BLD: 7.47 % (ref 4.8–5.6)
HDLC SERPL-MCNC: 51 MG/DL (ref 40–60)
LDLC SERPL CALC-MCNC: 90 MG/DL (ref 0–100)
LDLC/HDLC SERPL: 1.76 {RATIO}
LEFT ATRIUM VOLUME INDEX: 18 ML/M2
LV EF 2D ECHO EST: 57 %
LV EF NUC BP: 64 %
MAXIMAL PREDICTED HEART RATE: 172 BPM
MAXIMAL PREDICTED HEART RATE: 172 BPM
PERCENT MAX PREDICTED HR: 89.53 %
STRESS BASELINE BP: NORMAL MMHG
STRESS BASELINE HR: 69 BPM
STRESS PERCENT HR: 105 %
STRESS POST ESTIMATED WORKLOAD: 10 METS
STRESS POST EXERCISE DUR MIN: 8 MIN
STRESS POST EXERCISE DUR SEC: 40 SEC
STRESS POST PEAK BP: NORMAL MMHG
STRESS POST PEAK HR: 154 BPM
STRESS TARGET HR: 146 BPM
STRESS TARGET HR: 146 BPM
TRIGL SERPL-MCNC: 97 MG/DL (ref 0–150)
TROPONIN T SERPL-MCNC: <0.01 NG/ML (ref 0–0.03)
TSH SERPL DL<=0.05 MIU/L-ACNC: 1.96 MIU/ML (ref 0.27–4.2)
VLDLC SERPL-MCNC: 19.4 MG/DL (ref 5–40)

## 2017-11-08 PROCEDURE — G0378 HOSPITAL OBSERVATION PER HR: HCPCS

## 2017-11-08 PROCEDURE — 78452 HT MUSCLE IMAGE SPECT MULT: CPT

## 2017-11-08 PROCEDURE — A9500 TC99M SESTAMIBI: HCPCS | Performed by: INTERNAL MEDICINE

## 2017-11-08 PROCEDURE — 84484 ASSAY OF TROPONIN QUANT: CPT | Performed by: INTERNAL MEDICINE

## 2017-11-08 PROCEDURE — 93016 CV STRESS TEST SUPVJ ONLY: CPT | Performed by: INTERNAL MEDICINE

## 2017-11-08 PROCEDURE — 93017 CV STRESS TEST TRACING ONLY: CPT

## 2017-11-08 PROCEDURE — 93018 CV STRESS TEST I&R ONLY: CPT | Performed by: INTERNAL MEDICINE

## 2017-11-08 PROCEDURE — 84443 ASSAY THYROID STIM HORMONE: CPT | Performed by: INTERNAL MEDICINE

## 2017-11-08 PROCEDURE — 99213 OFFICE O/P EST LOW 20 MIN: CPT | Performed by: INTERNAL MEDICINE

## 2017-11-08 PROCEDURE — 99217 PR OBSERVATION CARE DISCHARGE MANAGEMENT: CPT | Performed by: INTERNAL MEDICINE

## 2017-11-08 PROCEDURE — 0 TECHNETIUM SESTAMIBI: Performed by: INTERNAL MEDICINE

## 2017-11-08 PROCEDURE — 78452 HT MUSCLE IMAGE SPECT MULT: CPT | Performed by: INTERNAL MEDICINE

## 2017-11-08 PROCEDURE — 83036 HEMOGLOBIN GLYCOSYLATED A1C: CPT | Performed by: INTERNAL MEDICINE

## 2017-11-08 PROCEDURE — 80061 LIPID PANEL: CPT | Performed by: INTERNAL MEDICINE

## 2017-11-08 RX ORDER — LOSARTAN POTASSIUM 100 MG/1
100 TABLET ORAL
Status: DISCONTINUED | OUTPATIENT
Start: 2017-11-08 | End: 2017-11-08 | Stop reason: HOSPADM

## 2017-11-08 RX ORDER — LOSARTAN POTASSIUM 100 MG/1
100 TABLET ORAL
Qty: 90 TABLET | Refills: 3 | Status: SHIPPED | OUTPATIENT
Start: 2017-11-09 | End: 2018-05-15 | Stop reason: SDUPTHER

## 2017-11-08 RX ORDER — HYDROCHLOROTHIAZIDE 12.5 MG/1
12.5 CAPSULE, GELATIN COATED ORAL
Qty: 90 CAPSULE | Refills: 3 | Status: SHIPPED | OUTPATIENT
Start: 2017-11-09 | End: 2018-05-15 | Stop reason: SDUPTHER

## 2017-11-08 RX ORDER — METFORMIN HYDROCHLORIDE 500 MG/1
1000 TABLET, EXTENDED RELEASE ORAL
Status: DISCONTINUED | OUTPATIENT
Start: 2017-11-08 | End: 2017-11-08 | Stop reason: HOSPADM

## 2017-11-08 RX ORDER — HYDROCHLOROTHIAZIDE 12.5 MG/1
12.5 CAPSULE, GELATIN COATED ORAL
Status: DISCONTINUED | OUTPATIENT
Start: 2017-11-08 | End: 2017-11-08 | Stop reason: HOSPADM

## 2017-11-08 RX ORDER — METFORMIN HYDROCHLORIDE EXTENDED-RELEASE TABLETS 1000 MG/1
1000 TABLET, FILM COATED, EXTENDED RELEASE ORAL
Qty: 90 TABLET | Refills: 3 | Status: SHIPPED | OUTPATIENT
Start: 2017-11-09 | End: 2018-08-02

## 2017-11-08 RX ADMIN — METFORMIN HYDROCHLORIDE 1000 MG: 500 TABLET, EXTENDED RELEASE ORAL at 09:34

## 2017-11-08 RX ADMIN — TECHNETIUM TC-99M SESTAMIBI 1 DOSE: 1 INJECTION INTRAVENOUS at 09:00

## 2017-11-08 RX ADMIN — CLOPIDOGREL 75 MG: 75 TABLET, FILM COATED ORAL at 09:35

## 2017-11-08 RX ADMIN — TECHNETIUM TC-99M SESTAMIBI 1 DOSE: 1 INJECTION INTRAVENOUS at 06:20

## 2017-11-08 RX ADMIN — HYDROCHLOROTHIAZIDE 12.5 MG: 12.5 CAPSULE, GELATIN COATED ORAL at 09:34

## 2017-11-08 RX ADMIN — LOSARTAN POTASSIUM 100 MG: 100 TABLET ORAL at 09:34

## 2017-11-08 RX ADMIN — CARVEDILOL 3.12 MG: 3.12 TABLET, FILM COATED ORAL at 09:35

## 2017-11-08 NOTE — PLAN OF CARE
Problem: Patient Care Overview (Adult)  Goal: Plan of Care Review  Outcome: Ongoing (interventions implemented as appropriate)    11/08/17 0522   Coping/Psychosocial Response Interventions   Plan Of Care Reviewed With patient   Patient Care Overview   Progress no change   Outcome Evaluation   Outcome Summary/Follow up Plan Echo completed this shift. NPO since midnight for stress test this AM. Pt rested well. No complaints. VSS, will continue to monitor.       Goal: Adult Individualization and Mutuality  Outcome: Ongoing (interventions implemented as appropriate)  Goal: Discharge Needs Assessment  Outcome: Ongoing (interventions implemented as appropriate)    Problem: Pain, Acute (Adult)  Goal: Identify Related Risk Factors and Signs and Symptoms  Outcome: Outcome(s) achieved Date Met:  11/08/17  Goal: Acceptable Pain Control/Comfort Level  Outcome: Ongoing (interventions implemented as appropriate)

## 2017-11-08 NOTE — PLAN OF CARE
Problem: Patient Care Overview (Adult)  Goal: Plan of Care Review  Outcome: Outcome(s) achieved Date Met:  11/08/17 11/08/17 1203   Coping/Psychosocial Response Interventions   Plan Of Care Reviewed With patient   Patient Care Overview   Progress improving   Outcome Evaluation   Outcome Summary/Follow up Plan VSS, BP on high side, MD aware, added HCTZ; stress test normal; will d/c home, see orders       Goal: Adult Individualization and Mutuality  Outcome: Outcome(s) achieved Date Met:  11/08/17  Goal: Discharge Needs Assessment  Outcome: Outcome(s) achieved Date Met:  11/08/17    Problem: Pain, Acute (Adult)  Goal: Acceptable Pain Control/Comfort Level  Outcome: Outcome(s) achieved Date Met:  11/08/17

## 2017-11-08 NOTE — DISCHARGE SUMMARY
Problem list #1 coronary artery disease-status post inferior wall myocardial infarction with emergent coronary artery balloon angioplasty and stent placement to the circumflex artery (August 2015) #2 hypertension #3 adult-onset diabetes mellitus #4 hypercholesterolemia #5 obstructive sleep apnea #6 cervical and lumbar spine degenerative joint and degenerative disc disease #7 history of cerebellar infarction #8 congenital adrenal hyperplasia    Subjective -this pleasant 47-year-old woman presented with an inferior wall myocardial infarction in August 2015.  She had emergent angioplasty and stent placement for a 99% circumflex stenosis.  Since that time she had done relatively well without recurrent symptoms.  However over the 5 days prior to this admission she had increasing dyspnea with relatively mild exertion as well as 2 episodes of PND and swelling in the ankles.  She denied any chest discomfort.  Due to concern for left ventricular dysfunction and recurrent coronary artery disease symptomatology she was admitted for further evaluation and treatment.    Past medical history shows that she has diabetes mellitus for which he takes metformin 500 mg once daily although it is ordered twice daily.  She is on Crestor for hypercholesterolemia as well as Coreg and losartan for hypertension.  She has obstructive sleep apnea and she does use CPAP nightly.  She has a remote history of cerebellar infarct fortunately without residual symptoms.  Unfortunately she continues to smoke one half to one pack of cigarettes per day.    Objective -physical exam admission for the patient be well-nourished well-developed pleasant white female in no acute distress.  Blood pressure on admission 159/89   pulse 68   respiratory rate 16 and she was afebrile.  Examination of the skin shows her to be warm and dry without rash petechiae or ecchymoses.  Examination of the eye show the sclera to be clear and the conjunctiva to be pink.   Funduscopic exam showed sharp disks and normal vessels.  Examination of the oropharynx showed no buccal lingual or pharyngeal lesions.  Examination of the neck showed no carotid bruits.  There was no thyromegaly or thyroid nodules.  Auscultation of the lungs showed them to be clear without rales rhonchi or wheezes.  Cardiac exam showed a regular rhythm.  S1 and S2 are normal.  There was no S3-S4 rub click or murmur.  Abdominal exam shows a soft flat abdomen without masses again organomegaly tenderness or bruit.  Examination of lower extremity shows no pretibial edema.  DP pulses were 2+ bilaterally.  Neurologic exam on mental status testing shows the patient be alert cooperative appropriate and oriented.  Speech was normal as to articulation and content.  Motor exam shows 4 over 4 hand grasp bilaterally.  Cranial nerves are grossly symmetric and intact.  Cerebellar exam shows normal gait.  In office chest x-ray was normal.  In office EKG was normal.    On admission all laboratory data proved normal with the exception of hemoglobin A1c.  It was elevated at 7.47.  BNP was normal at 189 and troponin was negative ×2.  Sodium was 139 potassium 3.7 glucose 121 calcium 9.1 BUN 8 creatinine 0.6.  Liver enzymes as well as PT and PTT were normal.  Hematocrit 48.5 hemoglobin 16.3 white count 8000.  D-dimer negative.  Total cholesterol 160 triglycerides 97 HDL cholesterol 51 LDL cholesterol 90.  Echocardiogram showed an ejection fraction of 57% with an RVP of less than 35 mmHg.  The patient underwent Cardiolite stress test which was negative for ischemia.    Assessment #1 dyspnea on exertion-likely related to excessive salt ingestion and fortunately without sign of left ventricular dysfunction or diastolic dysfunction.  There is no sign of renewed significant cardiac ischemia.  #2 hypertension-needs better control #3 diabetes mellitus-needs to improve compliance #4 smoking!    Plan #1 begin hydrochlorothiazide 12.5 mg by mouth  daily #2 increase losartan to 100 mg by mouth daily #3 change metformin to 1000 mg extended release once daily #4 continue aspirin 81 mg by mouth daily, Plavix 75 mg by mouth daily, Coreg 3.125 mg by mouth daily, Crestor 20 mg by mouth daily, Xanax 0.25 mg by mouth daily when necessary anxiety.  Routine follow-up with cardiology in one year and routine follow-up with me in 3 months.  Discussion was undertaken at length about her smoking as well as decreasing salt intake, losing weight, exercising regular, and being more compliant.

## 2017-11-08 NOTE — PROGRESS NOTES
LOS: 0 days   Patient Care Team:  Paco Null Jr., MD as PCP - General (Family Medicine)  Paco Null Jr., MD as Consulting Physician (Internal Medicine)    Chief Complaint: acute diastolic CHF       Interval History: She looks much better this morning.  Swelling has reduced after diuresis.  Breathing is comfortable at rest.  ECHO shows normal EF.  She is scheduled for stress test today.      Objective   Vital Signs  Temp:  [98 °F (36.7 °C)-98.6 °F (37 °C)] 98 °F (36.7 °C)  Heart Rate:  [66-82] 73  Resp:  [16-20] 20  BP: (130-163)/(76-91) 163/91    Intake/Output Summary (Last 24 hours) at 11/08/17 0821  Last data filed at 11/07/17 1440   Gross per 24 hour   Intake                0 ml   Output              500 ml   Net             -500 ml       Comfortable NAD  PERRL, conjunctiva clear  Neck supple, no JVD or thyromegaly appreciated  S1/S2 RRR, no m/r/g  Lungs CTA B, normal effort  Abdomen S/NT/ND (+) BS, no HSM appreciated  Extremities warm, no clubbing, cyanosis, or edema  Normal gait  No visible or palpable skin lesions  A/Ox4, mood and affect appropriate    Results Review:        Results from last 7 days  Lab Units 11/07/17  1351   SODIUM mmol/L 139   POTASSIUM mmol/L 3.7   CHLORIDE mmol/L 101   CO2 mmol/L 24.2   BUN mg/dL 8   CREATININE mg/dL 0.60   GLUCOSE mg/dL 121*   CALCIUM mg/dL 9.1       Results from last 7 days  Lab Units 11/08/17  0352 11/07/17  1351   TROPONIN T ng/mL <0.010 <0.010       Results from last 7 days  Lab Units 11/07/17  1351   WBC 10*3/mm3 8.72   HEMOGLOBIN g/dL 16.3*   HEMATOCRIT % 48.5*   PLATELETS 10*3/mm3 201       Results from last 7 days  Lab Units 11/07/17  1351   INR  0.98   APTT seconds 24.8       Results from last 7 days  Lab Units 11/08/17  0352   CHOLESTEROL mg/dL 160           Results from last 7 days  Lab Units 11/08/17  0352   CHOLESTEROL mg/dL 160   TRIGLYCERIDES mg/dL 97   HDL CHOL mg/dL 51       I reviewed the patient's new clinical results.  I personally viewed  and interpreted the patient's EKG/Telemetry data        Medication Review:     aspirin 162 mg Oral Daily   carvedilol 3.125 mg Oral Q12H   clopidogrel 75 mg Oral Daily   hydrochlorothiazide 12.5 mg Oral Q24H   losartan 100 mg Oral Q24H   metFORMIN ER 1,000 mg Oral Daily With Breakfast   rosuvastatin 20 mg Oral Nightly            Assessment/Plan     Active Problems:    Dyspnea    Acute diastolic CHF.  HCTZ added by Dr. Null.  I would consider beta-blocker as next BP medication if additional BP control is needed.    Javier Alcazar MD  11/08/17  8:21 AM

## 2017-11-08 NOTE — PROGRESS NOTES
Problem list #1 coronary artery disease-status post STEMI and coronary artery balloon angioplasty #2 hypertension #3 diabetes mellitus #4 dyspnea on exertion    Subjective -she is without dyspnea or chest pain this morning.  She still has intermittent numb sensations below the left breast.  There occur both at rest and with mild exertion.  She denies any abdominal pain or overt reflux.    Objective -blood pressure 163/91   pulse 66  respiratory rate 20   she is afebrile    Auscultation of the lungs showed them to be clear without rales rhonchi or wheezes.  Cardiac exam shows a regular rhythm.  S1 and S2 are normal.  There is no S3-S4 rub click or murmur.  Examination of the lower extremity shows no pretibial edema.    2-D echocardiogram shows an ejection fraction of 57% and an RVP of less than 35 mm.  Hemoglobin A1c 7.47.  Total cholesterol 160 triglycerides 97 HDL cholesterol 51 LDL cholesterol 90.  Troponin negative ×2.  PT and PTT normal.  Hematocrit 48.5 hemoglobin 16.3 white count 8000 platelet count 201,000.  Sodium 139 potassium 3.7 glucose 121 calcium 9.1 BUN 8 creatinine 0.6.  Liver enzymes normal.  Urinalysis essentially normal.    Assessment #1 dyspnea on exertion-improved and question related to temporary salt and water overload but without sign of congestive heart failure #2 hypertension-inadequate control #3 diabetes mellitus-inadequate control    Plan #1 Cardiolite stress test today #2 increase metformin to 1000 mg extended release each morning #3 begin hydrochlorothiazide 12.5 mg by mouth daily.

## 2017-11-09 NOTE — PROGRESS NOTES
Case Management Discharge Note         Discharge Placement     No information found        Other:  (Car )    Discharge Codes: 01  Discharge to home

## 2017-11-10 ENCOUNTER — DOCUMENTATION (OUTPATIENT)
Dept: INTERNAL MEDICINE | Facility: CLINIC | Age: 47
End: 2017-11-10

## 2017-11-10 NOTE — PROGRESS NOTES
Prior authorization for Metformin 1000 mg Sig. 1 tab po qam  was started on 11/8/17. PA is currently still in a pending status w/ [employer]

## 2017-11-10 NOTE — TELEPHONE ENCOUNTER
Insurance is requiring Ms. Zamudio to try Glucophage before they will pay for the Fortamet.      Please advise

## 2017-11-17 ENCOUNTER — OFFICE VISIT (OUTPATIENT)
Dept: INTERNAL MEDICINE | Facility: CLINIC | Age: 47
End: 2017-11-17

## 2017-11-17 VITALS
BODY MASS INDEX: 41.22 KG/M2 | HEIGHT: 62 IN | HEART RATE: 96 BPM | WEIGHT: 224 LBS | SYSTOLIC BLOOD PRESSURE: 98 MMHG | DIASTOLIC BLOOD PRESSURE: 50 MMHG

## 2017-11-17 DIAGNOSIS — E11.9 TYPE 2 DIABETES MELLITUS WITHOUT COMPLICATION, WITHOUT LONG-TERM CURRENT USE OF INSULIN (HCC): Primary | ICD-10-CM

## 2017-11-17 DIAGNOSIS — K21.9 GASTROESOPHAGEAL REFLUX DISEASE WITHOUT ESOPHAGITIS: ICD-10-CM

## 2017-11-17 PROCEDURE — 99213 OFFICE O/P EST LOW 20 MIN: CPT | Performed by: INTERNAL MEDICINE

## 2017-11-17 RX ORDER — LANSOPRAZOLE 30 MG/1
30 CAPSULE, DELAYED RELEASE ORAL DAILY
Qty: 90 CAPSULE | Refills: 3 | Status: SHIPPED | OUTPATIENT
Start: 2017-11-17 | End: 2018-05-15 | Stop reason: SDUPTHER

## 2017-11-17 NOTE — PROGRESS NOTES
Subjective   Carina Zamudio is a 47 y.o. female.     History of Present Illness she is here today for follow-up of her recent hospitalization for dyspnea on exertion as well as diabetes mellitus, hypercholesterolemia, and upper abdominal pain.  She has had no further difficulty with dyspnea on exertion or edema.  She has been following her diet much more closely and she is using metformin 500 mg twice a day as directed.  She also has initiated an attempt to stop smoking.  Her only real complaint today is a 2 week history of crampy gaseous upper abdominal pain extending from the left upper quadrant to the right upper quadrant.  It does not awaken her from sleep.  It may last several hours.  It often decreases after food ingestion.  There is no associated nausea and vomiting.  She denies any change in bowel habit.  There has been no melanotic stool or rectal bleeding.        Review of Systems   Constitutional: Negative for activity change, appetite change and fatigue.   HENT: Negative for trouble swallowing.    Respiratory: Negative for cough, chest tightness, shortness of breath and wheezing.    Cardiovascular: Negative for chest pain, palpitations and leg swelling.   Gastrointestinal: Positive for abdominal pain. Negative for anal bleeding, blood in stool, constipation, diarrhea, nausea and vomiting.   Genitourinary: Negative for dysuria, flank pain and hematuria.   Neurological: Negative for dizziness, syncope, weakness and headaches.       Objective   Physical Exam   Constitutional: She is oriented to person, place, and time. She appears well-developed and well-nourished. She is active. She does not appear ill.   Eyes: Conjunctivae are normal.   Cardiovascular: Normal rate, regular rhythm, S1 normal and S2 normal.  Exam reveals no S3 and no S4.    No murmur heard.  Pulmonary/Chest: No tachypnea. No respiratory distress. She has no decreased breath sounds. She has no wheezes. She has no rhonchi. She has no rales.    Abdominal: Soft. Normal appearance and bowel sounds are normal. She exhibits no abdominal bruit and no mass. There is no hepatosplenomegaly. There is tenderness in the right upper quadrant, epigastric area and left upper quadrant.           Vascular Status -  Her exam exhibits no right foot edema. Her exam exhibits no left foot edema.  Neurological: She is alert and oriented to person, place, and time. Gait normal.   Psychiatric: She has a normal mood and affect. Her speech is normal and behavior is normal. Judgment and thought content normal. Cognition and memory are normal.       Assessment/Plan during recent hospitalization Cardiolite stress test was negative for ischemia.  Ejection fraction normal.  Lipids normal.  Hemoglobin A1c 7.4.  Renal function normal.  Gallbladder ultrasound negative in 2014.  CT scan of the abdomen and pelvis negative in 2017.    Assessment #1 coronary artery disease-quiescent #2 hypercholesterolemia-controlled #3 diabetes mellitus-better control necessary and she has undertaken steps to do so #4 upper abdominal pain-question significance.    Plan #1 begin Prevacid 30 mg by mouth each morning.  If symptoms persist I would recommend HIDA scan as well as EGD.

## 2017-11-19 RX ORDER — CLOPIDOGREL BISULFATE 75 MG/1
TABLET ORAL
Qty: 90 TABLET | Refills: 1 | Status: SHIPPED | OUTPATIENT
Start: 2017-11-19 | End: 2018-05-15 | Stop reason: SDUPTHER

## 2017-12-20 DIAGNOSIS — E78.2 MIXED HYPERLIPIDEMIA: ICD-10-CM

## 2017-12-20 RX ORDER — ROSUVASTATIN CALCIUM 20 MG/1
TABLET, COATED ORAL
Qty: 30 TABLET | Refills: 4 | Status: SHIPPED | OUTPATIENT
Start: 2017-12-20 | End: 2018-05-15 | Stop reason: SDUPTHER

## 2018-01-04 ENCOUNTER — TELEPHONE (OUTPATIENT)
Dept: INTERNAL MEDICINE | Facility: CLINIC | Age: 48
End: 2018-01-04

## 2018-01-04 RX ORDER — OSELTAMIVIR PHOSPHATE 75 MG/1
75 CAPSULE ORAL 2 TIMES DAILY
Qty: 10 CAPSULE | Refills: 0 | Status: SHIPPED | OUTPATIENT
Start: 2018-01-04 | End: 2018-02-16

## 2018-01-04 RX ORDER — AZITHROMYCIN 250 MG/1
TABLET, FILM COATED ORAL
Qty: 6 TABLET | Refills: 0 | Status: SHIPPED | OUTPATIENT
Start: 2018-01-04 | End: 2018-02-16

## 2018-01-04 NOTE — TELEPHONE ENCOUNTER
Patient called stating she is in need of a zpak. States she has severe congestion, ear fullness, Low grade fever of 100 mostly at night time. Coughing keeping her awake at night, no taste, no shortness of air yet but chest feels very tight and full. Sputum and mucus yellow. Patient has been using robitussin, ibuprofen, sudafed and benadryl. Hydrating with lots of fluids and water on going for 6 days with no relief. Please advise.    [facility]: [facility]

## 2018-01-28 DIAGNOSIS — R73.03 PRE-DIABETES: ICD-10-CM

## 2018-02-16 ENCOUNTER — OFFICE VISIT (OUTPATIENT)
Dept: INTERNAL MEDICINE | Facility: CLINIC | Age: 48
End: 2018-02-16

## 2018-02-16 VITALS
TEMPERATURE: 98.2 F | WEIGHT: 227 LBS | BODY MASS INDEX: 41.52 KG/M2 | SYSTOLIC BLOOD PRESSURE: 118 MMHG | DIASTOLIC BLOOD PRESSURE: 78 MMHG

## 2018-02-16 DIAGNOSIS — J06.9 ACUTE URI: ICD-10-CM

## 2018-02-16 DIAGNOSIS — H60.502 ACUTE OTITIS EXTERNA OF LEFT EAR, UNSPECIFIED TYPE: Primary | ICD-10-CM

## 2018-02-16 PROCEDURE — 99213 OFFICE O/P EST LOW 20 MIN: CPT | Performed by: NURSE PRACTITIONER

## 2018-02-16 RX ORDER — GUAIFENESIN 600 MG/1
600 TABLET, EXTENDED RELEASE ORAL EVERY 12 HOURS SCHEDULED
Qty: 14 TABLET | Refills: 0
Start: 2018-02-16 | End: 2018-02-23

## 2018-02-16 RX ORDER — FLUTICASONE PROPIONATE 50 MCG
2 SPRAY, SUSPENSION (ML) NASAL DAILY
Qty: 1 BOTTLE | Refills: 1 | Status: SHIPPED | OUTPATIENT
Start: 2018-02-16 | End: 2018-03-18

## 2018-02-16 RX ORDER — DOXYCYCLINE HYCLATE 100 MG/1
100 CAPSULE ORAL 2 TIMES DAILY
Qty: 14 CAPSULE | Refills: 0 | Status: SHIPPED | OUTPATIENT
Start: 2018-02-16 | End: 2018-02-23

## 2018-02-16 NOTE — PATIENT INSTRUCTIONS
"Upper Respiratory Infection, Adult  Most upper respiratory infections (URIs) are a viral infection of the air passages leading to the lungs. A URI affects the nose, throat, and upper air passages. The most common type of URI is nasopharyngitis and is typically referred to as \"the common cold.\"  URIs run their course and usually go away on their own. Most of the time, a URI does not require medical attention, but sometimes a bacterial infection in the upper airways can follow a viral infection. This is called a secondary infection. Sinus and middle ear infections are common types of secondary upper respiratory infections.  Bacterial pneumonia can also complicate a URI. A URI can worsen asthma and chronic obstructive pulmonary disease (COPD). Sometimes, these complications can require emergency medical care and may be life threatening.  What are the causes?  Almost all URIs are caused by viruses. A virus is a type of germ and can spread from one person to another.  What increases the risk?  You may be at risk for a URI if:  · You smoke.  · You have chronic heart or lung disease.  · You have a weakened defense (immune) system.  · You are very young or very old.  · You have nasal allergies or asthma.  · You work in crowded or poorly ventilated areas.  · You work in health care facilities or schools.  What are the signs or symptoms?  Symptoms typically develop 2-3 days after you come in contact with a cold virus. Most viral URIs last 7-10 days. However, viral URIs from the influenza virus (flu virus) can last 14-18 days and are typically more severe. Symptoms may include:  · Runny or stuffy (congested) nose.  · Sneezing.  · Cough.  · Sore throat.  · Headache.  · Fatigue.  · Fever.  · Loss of appetite.  · Pain in your forehead, behind your eyes, and over your cheekbones (sinus pain).  · Muscle aches.  How is this diagnosed?  Your health care provider may diagnose a URI by:  · Physical exam.  · Tests to check that your " symptoms are not due to another condition such as:  ¨ Strep throat.  ¨ Sinusitis.  ¨ Pneumonia.  ¨ Asthma.  How is this treated?  A URI goes away on its own with time. It cannot be cured with medicines, but medicines may be prescribed or recommended to relieve symptoms. Medicines may help:  · Reduce your fever.  · Reduce your cough.  · Relieve nasal congestion.  Follow these instructions at home:  · Take medicines only as directed by your health care provider.  · Gargle warm saltwater or take cough drops to comfort your throat as directed by your health care provider.  · Use a warm mist humidifier or inhale steam from a shower to increase air moisture. This may make it easier to breathe.  · Drink enough fluid to keep your urine clear or pale yellow.  · Eat soups and other clear broths and maintain good nutrition.  · Rest as needed.  · Return to work when your temperature has returned to normal or as your health care provider advises. You may need to stay home longer to avoid infecting others. You can also use a face mask and careful hand washing to prevent spread of the virus.  · Increase the usage of your inhaler if you have asthma.  · Do not use any tobacco products, including cigarettes, chewing tobacco, or electronic cigarettes. If you need help quitting, ask your health care provider.  How is this prevented?  The best way to protect yourself from getting a cold is to practice good hygiene.  · Avoid oral or hand contact with people with cold symptoms.  · Wash your hands often if contact occurs.  There is no clear evidence that vitamin C, vitamin E, echinacea, or exercise reduces the chance of developing a cold. However, it is always recommended to get plenty of rest, exercise, and practice good nutrition.  Contact a health care provider if:  · You are getting worse rather than better.  · Your symptoms are not controlled by medicine.  · You have chills.  · You have worsening shortness of breath.  · You have brown  "or red mucus.  · You have yellow or brown nasal discharge.  · You have pain in your face, especially when you bend forward.  · You have a fever.  · You have swollen neck glands.  · You have pain while swallowing.  · You have white areas in the back of your throat.  Get help right away if:  · You have severe or persistent:  ¨ Headache.  ¨ Ear pain.  ¨ Sinus pain.  ¨ Chest pain.  · You have chronic lung disease and any of the following:  ¨ Wheezing.  ¨ Prolonged cough.  ¨ Coughing up blood.  ¨ A change in your usual mucus.  · You have a stiff neck.  · You have changes in your:  ¨ Vision.  ¨ Hearing.  ¨ Thinking.  ¨ Mood.  This information is not intended to replace advice given to you by your health care provider. Make sure you discuss any questions you have with your health care provider.  Document Released: 06/13/2002 Document Revised: 08/20/2017 Document Reviewed: 03/25/2015  [employer] Interactive Patient Education © 2017 [employer]  Otitis Externa  Otitis externa is an infection of the outer ear canal. The outer ear canal is the area between the outside of the ear and the eardrum. Otitis externa is sometimes called \"swimmer's ear.\"  What are the causes?  This condition may be caused by:  · Swimming in dirty water.  · Moisture in the ear.  · An injury to the inside of the ear.  · An object stuck in the ear.  · A cut or scrape on the outside of the ear.  What increases the risk?  This condition is more likely to develop in swimmers.  What are the signs or symptoms?  The first symptom of this condition is often itching in the ear. Later signs and symptoms include:  · Swelling of the ear.  · Redness in the ear.  · Ear pain. The pain may get worse when you pull on your ear.  · Pus coming from the ear.  How is this diagnosed?  This condition may be diagnosed by examining the ear and testing fluid from the ear for bacteria and funguses.  How is this treated?  This condition may be treated with:  · Antibiotic ear drops. " These are often given for 10-14 days.  · Medicine to reduce itching and swelling.  Follow these instructions at home:  · If you were prescribed antibiotic ear drops, apply them as told by your health care provider. Do not stop using the antibiotic even if your condition improves.  · Take over-the-counter and prescription medicines only as told by your health care provider.  · Keep all follow-up visits as told by your health care provider. This is important.  How is this prevented?  · Keep your ear dry. Use the corner of a towel to dry your ear after you swim or bathe.  · Avoid scratching or putting things in your ear. Doing these things can damage the ear canal or remove the protective wax that lines it, which makes it easier for bacteria and funguses to grow.  · Avoid swimming in lakes, polluted water, or pools that may not have the right amount of chlorine.  · Consider making ear drops and putting 3 or 4 drops in each ear after you swim. Ask your health care provider about how you can make ear drops.  Contact a health care provider if:  · You have a fever.  · After 3 days your ear is still red, swollen, painful, or draining pus.  · Your redness, swelling, or pain gets worse.  · You have a severe headache.  · You have redness, swelling, pain, or tenderness in the area behind your ear.  This information is not intended to replace advice given to you by your health care provider. Make sure you discuss any questions you have with your health care provider.  Document Released: 12/18/2006 Document Revised: 01/24/2017 Document Reviewed: 09/26/2016  [employer] Interactive Patient Education © 2017 [employer]

## 2018-02-16 NOTE — PROGRESS NOTES
"Gildardo Zamudio is a 47 y.o. female.     Earache    There is pain in the left ear. The current episode started 1 to 4 weeks ago. The problem occurs constantly. The problem has been gradually worsening. There has been no fever. The pain is at a severity of 0/10 (aching, burning and stabbing ). The patient is experiencing no pain. Associated symptoms include coughing (varies in production ), ear discharge (\"? feels like it \"), headaches and rhinorrhea (intermittent ). Pertinent negatives include no abdominal pain, hearing loss, sore throat or vomiting. Treatments tried: sudafed, ibuprofen  The treatment provided mild relief.   URI    This is a new problem. The current episode started 1 to 4 weeks ago. There has been no fever. Associated symptoms include congestion, coughing (varies in production ), ear pain, headaches, rhinorrhea (intermittent ), sinus pain and sneezing. Pertinent negatives include no abdominal pain, chest pain, sore throat, vomiting or wheezing. Treatments tried: sudafed, ibuprofen         The following portions of the patient's history were reviewed and updated as appropriate: allergies, current medications, past family history, past medical history, past social history, past surgical history and problem list.    Review of Systems   HENT: Positive for congestion, ear discharge (\"? feels like it \"), ear pain, rhinorrhea (intermittent ), sinus pain and sneezing. Negative for hearing loss and sore throat.    Respiratory: Positive for cough (varies in production ). Negative for wheezing.    Cardiovascular: Negative for chest pain.   Gastrointestinal: Negative for abdominal pain and vomiting.   Allergic/Immunologic: Positive for environmental allergies (seasonal ).   Neurological: Positive for dizziness and headaches.       Objective   Physical Exam   Constitutional: She appears well-developed and well-nourished. She is cooperative. She does not have a sickly appearance. She does not appear " ill.   HENT:   Head: Normocephalic.   Right Ear: Hearing, tympanic membrane and external ear normal. No drainage, swelling or tenderness. No mastoid tenderness. Tympanic membrane is not injected, not scarred, not erythematous and not bulging. Tympanic membrane mobility is normal. No middle ear effusion. No decreased hearing is noted.   Left Ear: Hearing and external ear normal. There is tenderness. No drainage or swelling. No mastoid tenderness. Tympanic membrane is erythematous and bulging. Tympanic membrane is not injected and not scarred. Tympanic membrane mobility is normal. A middle ear effusion is present. No decreased hearing is noted.   Nose: No mucosal edema, rhinorrhea, sinus tenderness or nasal deformity. Right sinus exhibits maxillary sinus tenderness. Right sinus exhibits no frontal sinus tenderness. Left sinus exhibits maxillary sinus tenderness. Left sinus exhibits no frontal sinus tenderness.   Mouth/Throat: Mucous membranes are normal. Normal dentition. Posterior oropharyngeal erythema (mild ) present. No tonsillar exudate.   Eyes: Conjunctivae and lids are normal. Pupils are equal, round, and reactive to light. Right eye exhibits no discharge and no exudate. Left eye exhibits no discharge and no exudate.   Neck: Trachea normal and normal range of motion. No edema present. No thyroid mass and no thyromegaly present.   Cardiovascular: Regular rhythm, normal heart sounds and normal pulses.    No murmur heard.  Pulmonary/Chest: Breath sounds normal. No respiratory distress. She has no decreased breath sounds. She has no wheezes. She has no rhonchi. She has no rales.   Lymphadenopathy:        Head (right side): No submental, no submandibular, no tonsillar, no preauricular (tenderness with palpation ), no posterior auricular and no occipital adenopathy present.        Head (left side): No submental, no submandibular, no tonsillar, no preauricular, no posterior auricular and no occipital adenopathy  present.     She has no cervical adenopathy.   Neurological: She is alert.   Skin: Skin is warm, dry and intact. No cyanosis. Nails show no clubbing.       Assessment/Plan   Carina was seen today for earache and headache.    Diagnoses and all orders for this visit:    Acute otitis externa of left ear, unspecified type  -     neomycin-polymyxin-hydrocortisone (CORTISPORIN) 3.5-63490-4 otic solution; Administer 3 drops into the left ear 4 (Four) Times a Day for 7 days.    Acute URI  Comments:  drink plenty of water; may add flonase and/or mucinex   Orders:  -     doxycycline (VIBRAMYCIN) 100 MG capsule; Take 1 capsule by mouth 2 (Two) Times a Day for 7 days.  -     guaiFENesin (MUCINEX) 600 MG 12 hr tablet; Take 1 tablet by mouth Every 12 (Twelve) Hours for 7 days.  -     fluticasone (FLONASE) 50 MCG/ACT nasal spray; 2 sprays into each nostril Daily for 30 days. Administer 2 sprays in each nostril for each dose.    She will return if worsening of symptoms.

## 2018-04-23 RX ORDER — CARVEDILOL 3.12 MG/1
TABLET ORAL
Qty: 60 TABLET | Refills: 10 | Status: SHIPPED | OUTPATIENT
Start: 2018-04-23 | End: 2018-05-15 | Stop reason: SDUPTHER

## 2018-05-15 DIAGNOSIS — E78.2 MIXED HYPERLIPIDEMIA: ICD-10-CM

## 2018-05-15 RX ORDER — ROSUVASTATIN CALCIUM 20 MG/1
20 TABLET, COATED ORAL DAILY
Qty: 90 TABLET | Refills: 3 | Status: SHIPPED | OUTPATIENT
Start: 2018-05-15 | End: 2018-08-23 | Stop reason: SDUPTHER

## 2018-05-15 RX ORDER — CARVEDILOL 3.12 MG/1
3.12 TABLET ORAL 2 TIMES DAILY
Qty: 180 TABLET | Refills: 3 | Status: SHIPPED | OUTPATIENT
Start: 2018-05-15 | End: 2018-08-23 | Stop reason: SDUPTHER

## 2018-05-15 RX ORDER — ALPRAZOLAM 0.5 MG/1
0.5 TABLET ORAL DAILY PRN
Qty: 30 TABLET | Refills: 4 | OUTPATIENT
Start: 2018-05-15 | End: 2020-04-17 | Stop reason: SDUPTHER

## 2018-05-15 RX ORDER — HYDROCHLOROTHIAZIDE 12.5 MG/1
12.5 CAPSULE, GELATIN COATED ORAL
Qty: 90 CAPSULE | Refills: 3 | Status: SHIPPED | OUTPATIENT
Start: 2018-05-15 | End: 2018-08-23 | Stop reason: SDUPTHER

## 2018-05-15 RX ORDER — CLOPIDOGREL BISULFATE 75 MG/1
75 TABLET ORAL DAILY
Qty: 90 TABLET | Refills: 3 | Status: SHIPPED | OUTPATIENT
Start: 2018-05-15 | End: 2018-08-23 | Stop reason: SDUPTHER

## 2018-05-15 RX ORDER — LANSOPRAZOLE 30 MG/1
30 CAPSULE, DELAYED RELEASE ORAL DAILY
Qty: 90 CAPSULE | Refills: 3 | Status: SHIPPED | OUTPATIENT
Start: 2018-05-15 | End: 2018-08-23 | Stop reason: SDUPTHER

## 2018-05-15 RX ORDER — LOSARTAN POTASSIUM 100 MG/1
100 TABLET ORAL
Qty: 90 TABLET | Refills: 3 | Status: SHIPPED | OUTPATIENT
Start: 2018-05-15 | End: 2018-08-23 | Stop reason: SDUPTHER

## 2018-05-15 NOTE — TELEPHONE ENCOUNTER
Pt asking for prescription for Xanax 0.5 mg, said she has spoken to you about this and is quitting smoking tomorrow.  Please advise.  I will order Karthik and place on your desk.

## 2018-07-06 ENCOUNTER — OFFICE VISIT (OUTPATIENT)
Dept: OBSTETRICS AND GYNECOLOGY | Age: 48
End: 2018-07-06

## 2018-07-06 VITALS
DIASTOLIC BLOOD PRESSURE: 82 MMHG | HEIGHT: 62 IN | SYSTOLIC BLOOD PRESSURE: 124 MMHG | WEIGHT: 221 LBS | BODY MASS INDEX: 40.67 KG/M2

## 2018-07-06 DIAGNOSIS — Z12.11 COLON CANCER SCREENING: ICD-10-CM

## 2018-07-06 DIAGNOSIS — Z13.89 SCREENING FOR BLOOD OR PROTEIN IN URINE: ICD-10-CM

## 2018-07-06 DIAGNOSIS — Z12.4 SCREENING FOR MALIGNANT NEOPLASM OF CERVIX: ICD-10-CM

## 2018-07-06 DIAGNOSIS — Z01.419 ENCOUNTER FOR GYNECOLOGICAL EXAMINATION WITHOUT ABNORMAL FINDING: Primary | ICD-10-CM

## 2018-07-06 DIAGNOSIS — N94.9 VAGINAL DISCOMFORT: ICD-10-CM

## 2018-07-06 DIAGNOSIS — Z12.31 ENCOUNTER FOR SCREENING MAMMOGRAM FOR BREAST CANCER: ICD-10-CM

## 2018-07-06 LAB
BILIRUB BLD-MCNC: NEGATIVE MG/DL
GLUCOSE UR STRIP-MCNC: NEGATIVE MG/DL
KETONES UR QL: NEGATIVE
LEUKOCYTE EST, POC: NEGATIVE
NITRITE UR-MCNC: NEGATIVE MG/ML
PH UR: 6 [PH] (ref 5–8)
PROT UR STRIP-MCNC: ABNORMAL MG/DL
RBC # UR STRIP: NEGATIVE /UL
SP GR UR: 1.01 (ref 1–1.03)
UROBILINOGEN UR QL: NORMAL

## 2018-07-06 PROCEDURE — 99396 PREV VISIT EST AGE 40-64: CPT | Performed by: OBSTETRICS & GYNECOLOGY

## 2018-07-06 PROCEDURE — 81002 URINALYSIS NONAUTO W/O SCOPE: CPT | Performed by: OBSTETRICS & GYNECOLOGY

## 2018-07-06 NOTE — PROGRESS NOTES
"Subjective   Carina Zamudio is a 48 y.o. female annual exam 06/30/2016 neg, mmg 07/20/16 neg pt wants her mmg at [facility] - had ablation in 2012 - no periods since - wondering if she has BV - c/o vag discomfort    History of Present Illness    The following portions of the patient's history were reviewed and updated as appropriate: allergies, current medications, past family history, past medical history, past social history, past surgical history and problem list.    Review of Systems   Constitutional: Negative for chills and fever.   Gastrointestinal: Negative for abdominal distention and abdominal pain.   Genitourinary: Negative for dyspareunia, dysuria, menstrual problem, pelvic pain, vaginal bleeding, vaginal discharge and vaginal pain.        + vaginal discomfort    All other systems reviewed and are negative.    /82   Ht 157.5 cm (62\")   Wt 100 kg (221 lb)   Breastfeeding? No   BMI 40.42 kg/m²     Objective   Physical Exam   Constitutional: She is oriented to person, place, and time. She appears well-developed and well-nourished.   Neck: Normal range of motion. Neck supple. No thyromegaly present.   Cardiovascular: Normal rate and regular rhythm.    Pulmonary/Chest: Effort normal and breath sounds normal. Right breast exhibits no mass, no nipple discharge, no skin change and no tenderness. Left breast exhibits no mass, no nipple discharge, no skin change and no tenderness.   Abdominal: Soft. Bowel sounds are normal. She exhibits no distension. There is no tenderness.   Genitourinary: Vagina normal and uterus normal. Pelvic exam was performed with patient supine. Uterus is not tender. Cervix exhibits no friability. Right adnexum displays no mass and no tenderness. Left adnexum displays no mass and no tenderness. No vaginal discharge found.   Musculoskeletal: Normal range of motion. She exhibits no edema.   Neurological: She is alert and oriented to person, place, and time.   Skin: Skin is warm and dry. " No rash noted.   Psychiatric: She has a normal mood and affect. Her behavior is normal.   Nursing note and vitals reviewed.        Assessment/Plan   Carina was seen today for gynecologic exam.    Diagnoses and all orders for this visit:    Encounter for gynecological examination without abnormal finding    Screening for blood or protein in urine  -     POC Urinalysis Dipstick    Screening for malignant neoplasm of cervix  -     IgP, Aptima HPV    Encounter for screening mammogram for breast cancer  -     Mammo Screening Bilateral With CAD; Future    Colon cancer screening  -     Ambulatory Referral For Screening Colonoscopy      Counseling was given to patient for the following topics: self-breast exams .

## 2018-07-10 LAB
CYTOLOGIST CVX/VAG CYTO: NORMAL
CYTOLOGY CVX/VAG DOC THIN PREP: NORMAL
DX ICD CODE: NORMAL
HIV 1 & 2 AB SER-IMP: NORMAL
HPV I/H RISK 4 DNA CVX QL PROBE+SIG AMP: NEGATIVE
OTHER STN SPEC: NORMAL
PATH REPORT.FINAL DX SPEC: NORMAL
STAT OF ADQ CVX/VAG CYTO-IMP: NORMAL

## 2018-07-11 RX ORDER — NICOTINE 21 MG/24HR
1 PATCH, TRANSDERMAL 24 HOURS TRANSDERMAL EVERY 24 HOURS
Qty: 21 EACH | Refills: 3 | Status: SHIPPED | OUTPATIENT
Start: 2018-07-11 | End: 2019-10-29

## 2018-07-13 ENCOUNTER — TELEPHONE (OUTPATIENT)
Dept: OBSTETRICS AND GYNECOLOGY | Age: 48
End: 2018-07-13

## 2018-07-13 LAB
A VAGINAE DNA VAG QL NAA+PROBE: NORMAL SCORE
BVAB2 DNA VAG QL NAA+PROBE: NORMAL SCORE
C ALBICANS DNA VAG QL NAA+PROBE: NEGATIVE
C GLABRATA DNA VAG QL NAA+PROBE: NEGATIVE
MEGA1 DNA VAG QL NAA+PROBE: NORMAL SCORE

## 2018-07-13 NOTE — TELEPHONE ENCOUNTER
----- Message from Perla Sullivan MD sent at 7/13/2018  2:49 PM EDT -----  Please call patient and notify of normal results of pap smear and vaginal swab

## 2018-08-02 ENCOUNTER — OFFICE VISIT (OUTPATIENT)
Dept: INTERNAL MEDICINE | Facility: CLINIC | Age: 48
End: 2018-08-02

## 2018-08-02 VITALS
SYSTOLIC BLOOD PRESSURE: 112 MMHG | WEIGHT: 222 LBS | BODY MASS INDEX: 40.6 KG/M2 | DIASTOLIC BLOOD PRESSURE: 78 MMHG | TEMPERATURE: 98.6 F

## 2018-08-02 DIAGNOSIS — R30.0 DYSURIA: Primary | ICD-10-CM

## 2018-08-02 DIAGNOSIS — N76.0 ABSCESS OF VAGINA: ICD-10-CM

## 2018-08-02 LAB
BACTERIA UR QL AUTO: ABNORMAL /HPF
BILIRUB UR QL STRIP: NEGATIVE
CLARITY UR: ABNORMAL
COLOR UR: YELLOW
GLUCOSE UR STRIP-MCNC: NEGATIVE MG/DL
HGB UR QL STRIP.AUTO: NEGATIVE
HYALINE CASTS UR QL AUTO: ABNORMAL /LPF
KETONES UR QL STRIP: NEGATIVE
LEUKOCYTE ESTERASE UR QL STRIP.AUTO: NEGATIVE
MUCOUS THREADS URNS QL MICRO: ABNORMAL /HPF
NITRITE UR QL STRIP: NEGATIVE
PH UR STRIP.AUTO: 7 [PH] (ref 5–8)
PROT UR QL STRIP: ABNORMAL
RBC # UR: ABNORMAL /HPF
REF LAB TEST METHOD: ABNORMAL
SP GR UR STRIP: 1.01 (ref 1–1.03)
SQUAMOUS #/AREA URNS HPF: ABNORMAL /HPF
UROBILINOGEN UR QL STRIP: ABNORMAL
WBC UR QL AUTO: ABNORMAL /HPF

## 2018-08-02 PROCEDURE — 99213 OFFICE O/P EST LOW 20 MIN: CPT | Performed by: NURSE PRACTITIONER

## 2018-08-02 PROCEDURE — 87077 CULTURE AEROBIC IDENTIFY: CPT | Performed by: NURSE PRACTITIONER

## 2018-08-02 PROCEDURE — 87186 SC STD MICRODIL/AGAR DIL: CPT | Performed by: NURSE PRACTITIONER

## 2018-08-02 PROCEDURE — 87086 URINE CULTURE/COLONY COUNT: CPT | Performed by: NURSE PRACTITIONER

## 2018-08-02 PROCEDURE — 81001 URINALYSIS AUTO W/SCOPE: CPT | Performed by: NURSE PRACTITIONER

## 2018-08-02 RX ORDER — CEPHALEXIN 500 MG/1
500 CAPSULE ORAL 3 TIMES DAILY
Qty: 21 CAPSULE | Refills: 0 | Status: SHIPPED | OUTPATIENT
Start: 2018-08-02 | End: 2018-08-09

## 2018-08-02 NOTE — PATIENT INSTRUCTIONS
Dysuria  Dysuria is pain or discomfort while urinating. The pain or discomfort may be felt in the tube that carries urine out of the bladder (urethra) or in the surrounding tissue of the genitals. The pain may also be felt in the groin area, lower abdomen, and lower back. You may have to urinate frequently or have the sudden feeling that you have to urinate (urgency). Dysuria can affect both men and women, but is more common in women.  Dysuria can be caused by many different things, including:  · Urinary tract infection in women.  · Infection of the kidney or bladder.  · Kidney stones or bladder stones.  · Certain sexually transmitted infections (STIs), such as chlamydia.  · Dehydration.  · Inflammation of the vagina.  · Use of certain medicines.  · Use of certain soaps or scented products that cause irritation.    Follow these instructions at home:  Watch your dysuria for any changes. The following actions may help to reduce any discomfort you are feeling:  · Drink enough fluid to keep your urine clear or pale yellow.  · Empty your bladder often. Avoid holding urine for long periods of time.  · After a bowel movement or urination, women should cleanse from front to back, using each tissue only once.  · Empty your bladder after sexual intercourse.  · Take medicines only as directed by your health care provider.  · If you were prescribed an antibiotic medicine, finish it all even if you start to feel better.  · Avoid caffeine, tea, and alcohol. They can irritate the bladder and make dysuria worse. In men, alcohol may irritate the prostate.  · Keep all follow-up visits as directed by your health care provider. This is important.  · If you had any tests done to find the cause of dysuria, it is your responsibility to obtain your test results. Ask the lab or department performing the test when and how you will get your results. Talk with your health care provider if you have any questions about your results.    Contact a  health care provider if:  · You develop pain in your back or sides.  · You have a fever.  · You have nausea or vomiting.  · You have blood in your urine.  · You are not urinating as often as you usually do.  Get help right away if:  · You pain is severe and not relieved with medicines.  · You are unable to hold down any fluids.  · You or someone else notices a change in your mental function.  · You have a rapid heartbeat at rest.  · You have shaking or chills.  · You feel extremely weak.  This information is not intended to replace advice given to you by your health care provider. Make sure you discuss any questions you have with your health care provider.  Document Released: 09/15/2005 Document Revised: 05/25/2017 Document Reviewed: 08/13/2015  [employer] Interactive Patient Education © 2018 [employer]

## 2018-08-02 NOTE — PROGRESS NOTES
Subjective   Carina Zamudio is a 48 y.o. female.     She started with symptoms about 2 weeks ago. She had pap smear and vaginal smear for BV and normal. She has recently returned from vacation. She doesn't check her blood sugar routinely at home.       Difficulty Urinating   This is a new problem. The current episode started 1 to 4 weeks ago. The problem occurs intermittently. The problem has been waxing and waning. Associated symptoms include urinary symptoms (urine odor, urgency, frequency, burning ). Pertinent negatives include no abdominal pain, fatigue, fever, nausea or vomiting. Associated symptoms comments: 3 boils in perianal area, previously had then   No vaginal discharge  No vaginal disorder   Low back pain, chronic . Treatments tried: increased water intake         The following portions of the patient's history were reviewed and updated as appropriate: allergies, current medications, past social history and problem list.    Review of Systems   Constitutional: Negative for activity change, appetite change, fatigue and fever.   Gastrointestinal: Negative for abdominal pain, blood in stool, nausea and vomiting.   Genitourinary: Positive for difficulty urinating and dysuria. Negative for frequency, urgency, vaginal bleeding and vaginal discharge.        Vaginal boils (started about 2 weeks ago, has tried warm compresses and no relief); states she gets them in past and typically resolve with conservative treatment   Musculoskeletal: Positive for back pain (chronic ).       Objective   Physical Exam   Constitutional: She is oriented to person, place, and time. She appears well-developed and well-nourished.   HENT:   Head: Normocephalic.   Nose: Nose normal.   Cardiovascular: Regular rhythm and normal heart sounds.  Exam reveals no S3 and no S4.    No murmur heard.  Pulmonary/Chest: Effort normal and breath sounds normal. She has no decreased breath sounds. She has no wheezes. She has no rhonchi. She has no  rales.   Abdominal: Normal appearance and bowel sounds are normal. There is no tenderness. There is no CVA tenderness.   Genitourinary:   Genitourinary Comments: Declines exam    Musculoskeletal: She exhibits no edema.   Neurological: She is alert and oriented to person, place, and time. Gait normal.   Skin: Skin is warm and dry.   Psychiatric: She has a normal mood and affect.       Assessment/Plan   Carina was seen today for difficulty urinating.    Diagnoses and all orders for this visit:    Dysuria  -     Urinalysis With Microscopic If Indicated (No Culture) - Urine, Clean Catch; Future  -     Urinalysis With Microscopic If Indicated (No Culture) - Urine, Clean Catch  -     Urinalysis, Microscopic Only - Urine, Clean Catch; Future  -     Urinalysis, Microscopic Only - Urine, Clean Catch  -     Urine Culture - Urine, Urine, Clean Catch; Future  -     Urine Culture - Urine, Urine, Clean Catch    Abscess of vagina  Comments:  continue with warm compresses   Orders:  -     cephalexin (KEFLEX) 500 MG capsule; Take 1 capsule by mouth 3 (Three) Times a Day for 7 days.    Urine with few wbc and bacteria. Will obtain culture.

## 2018-08-04 LAB — BACTERIA SPEC AEROBE CULT: ABNORMAL

## 2018-08-07 ENCOUNTER — APPOINTMENT (OUTPATIENT)
Dept: WOMENS IMAGING | Facility: HOSPITAL | Age: 48
End: 2018-08-07

## 2018-08-07 PROCEDURE — 77067 SCR MAMMO BI INCL CAD: CPT | Performed by: RADIOLOGY

## 2018-08-07 PROCEDURE — 77063 BREAST TOMOSYNTHESIS BI: CPT | Performed by: RADIOLOGY

## 2018-08-16 ENCOUNTER — TELEPHONE (OUTPATIENT)
Dept: OBSTETRICS AND GYNECOLOGY | Age: 48
End: 2018-08-16

## 2018-08-22 ENCOUNTER — OFFICE VISIT (OUTPATIENT)
Dept: INTERNAL MEDICINE | Facility: CLINIC | Age: 48
End: 2018-08-22

## 2018-08-22 DIAGNOSIS — I25.10 ARTERIOSCLEROSIS OF CORONARY ARTERY: Primary | ICD-10-CM

## 2018-08-23 ENCOUNTER — LAB (OUTPATIENT)
Dept: INTERNAL MEDICINE | Facility: CLINIC | Age: 48
End: 2018-08-23

## 2018-08-23 DIAGNOSIS — E78.2 MIXED HYPERLIPIDEMIA: ICD-10-CM

## 2018-08-23 DIAGNOSIS — E55.9 VITAMIN D DEFICIENCY: ICD-10-CM

## 2018-08-23 DIAGNOSIS — E78.2 MIXED HYPERLIPIDEMIA: Primary | ICD-10-CM

## 2018-08-23 DIAGNOSIS — E11.9 TYPE 2 DIABETES MELLITUS WITHOUT COMPLICATION, WITHOUT LONG-TERM CURRENT USE OF INSULIN (HCC): ICD-10-CM

## 2018-08-23 DIAGNOSIS — I10 ESSENTIAL HYPERTENSION: ICD-10-CM

## 2018-08-23 DIAGNOSIS — R73.03 PRE-DIABETES: ICD-10-CM

## 2018-08-23 DIAGNOSIS — E04.2 MULTINODULAR GOITER: ICD-10-CM

## 2018-08-23 LAB
25(OH)D3 SERPL-MCNC: 19.6 NG/ML (ref 30–100)
ALBUMIN SERPL-MCNC: 4.1 G/DL (ref 3.5–5.2)
ALBUMIN/GLOB SERPL: 1.2 G/DL
ALP SERPL-CCNC: 58 U/L (ref 39–117)
ALT SERPL W P-5'-P-CCNC: 18 U/L (ref 1–33)
ANION GAP SERPL CALCULATED.3IONS-SCNC: 13.8 MMOL/L
AST SERPL-CCNC: 15 U/L (ref 1–32)
BILIRUB SERPL-MCNC: 0.3 MG/DL (ref 0.1–1.2)
BUN BLD-MCNC: 14 MG/DL (ref 6–20)
BUN/CREAT SERPL: 19.2 (ref 7–25)
CALCIUM SPEC-SCNC: 9.3 MG/DL (ref 8.6–10.5)
CHLORIDE SERPL-SCNC: 98 MMOL/L (ref 98–107)
CHOLEST SERPL-MCNC: 166 MG/DL (ref 0–200)
CO2 SERPL-SCNC: 26.2 MMOL/L (ref 22–29)
CREAT BLD-MCNC: 0.73 MG/DL (ref 0.57–1)
DEPRECATED RDW RBC AUTO: 40.3 FL (ref 37–54)
ERYTHROCYTE [DISTWIDTH] IN BLOOD BY AUTOMATED COUNT: 12.3 % (ref 11.5–15)
GFR SERPL CREATININE-BSD FRML MDRD: 85 ML/MIN/1.73
GLOBULIN UR ELPH-MCNC: 3.4 GM/DL
GLUCOSE BLD-MCNC: 249 MG/DL (ref 65–99)
HBA1C MFR BLD: 9.34 % (ref 4.8–5.6)
HCT VFR BLD AUTO: 44.5 % (ref 34.1–44.9)
HDLC SERPL-MCNC: 43 MG/DL (ref 40–60)
HGB BLD-MCNC: 15.5 G/DL (ref 11.2–15.7)
LDLC SERPL CALC-MCNC: 102 MG/DL (ref 0–100)
LDLC/HDLC SERPL: 2.36 {RATIO}
MCH RBC QN AUTO: 31.8 PG (ref 26–34)
MCHC RBC AUTO-ENTMCNC: 34.8 G/DL (ref 31–37)
MCV RBC AUTO: 91.4 FL (ref 80–100)
PLATELET # BLD AUTO: 233 10*3/MM3 (ref 150–450)
PMV BLD AUTO: 10.1 FL (ref 6–12)
POTASSIUM BLD-SCNC: 4.1 MMOL/L (ref 3.5–5.2)
PROT SERPL-MCNC: 7.5 G/DL (ref 6–8.5)
RBC # BLD AUTO: 4.87 10*6/MM3 (ref 3.93–5.22)
SODIUM BLD-SCNC: 138 MMOL/L (ref 136–145)
T3FREE SERPL-MCNC: 3.36 PG/ML (ref 2–4.4)
T4 FREE SERPL-MCNC: 1.37 NG/DL (ref 0.93–1.7)
TRIGL SERPL-MCNC: 107 MG/DL (ref 0–150)
TSH SERPL DL<=0.05 MIU/L-ACNC: 1.37 MIU/ML (ref 0.27–4.2)
VLDLC SERPL-MCNC: 21.4 MG/DL (ref 5–40)
WBC NRBC COR # BLD: 8.57 10*3/MM3 (ref 5–10)

## 2018-08-23 PROCEDURE — 83036 HEMOGLOBIN GLYCOSYLATED A1C: CPT | Performed by: INTERNAL MEDICINE

## 2018-08-23 PROCEDURE — 80053 COMPREHEN METABOLIC PANEL: CPT | Performed by: INTERNAL MEDICINE

## 2018-08-23 PROCEDURE — 84443 ASSAY THYROID STIM HORMONE: CPT | Performed by: INTERNAL MEDICINE

## 2018-08-23 PROCEDURE — 84481 FREE ASSAY (FT-3): CPT | Performed by: INTERNAL MEDICINE

## 2018-08-23 PROCEDURE — 82306 VITAMIN D 25 HYDROXY: CPT | Performed by: INTERNAL MEDICINE

## 2018-08-23 PROCEDURE — 84439 ASSAY OF FREE THYROXINE: CPT | Performed by: INTERNAL MEDICINE

## 2018-08-23 PROCEDURE — 80061 LIPID PANEL: CPT | Performed by: INTERNAL MEDICINE

## 2018-08-23 PROCEDURE — 85027 COMPLETE CBC AUTOMATED: CPT | Performed by: INTERNAL MEDICINE

## 2018-08-23 RX ORDER — LOSARTAN POTASSIUM 100 MG/1
100 TABLET ORAL
Qty: 90 TABLET | Refills: 3 | Status: SHIPPED | OUTPATIENT
Start: 2018-08-23 | End: 2019-01-15 | Stop reason: SDUPTHER

## 2018-08-23 RX ORDER — ERGOCALCIFEROL 1.25 MG/1
50000 CAPSULE ORAL 2 TIMES WEEKLY
Qty: 26 CAPSULE | Refills: 3 | Status: SHIPPED | OUTPATIENT
Start: 2018-08-23 | End: 2020-06-15 | Stop reason: SDUPTHER

## 2018-08-23 RX ORDER — HYDROCHLOROTHIAZIDE 12.5 MG/1
12.5 CAPSULE, GELATIN COATED ORAL
Qty: 90 CAPSULE | Refills: 3 | Status: SHIPPED | OUTPATIENT
Start: 2018-08-23 | End: 2018-12-19 | Stop reason: SDUPTHER

## 2018-08-23 RX ORDER — CLOPIDOGREL BISULFATE 75 MG/1
75 TABLET ORAL DAILY
Qty: 90 TABLET | Refills: 3 | Status: SHIPPED | OUTPATIENT
Start: 2018-08-23 | End: 2018-11-21 | Stop reason: SDUPTHER

## 2018-08-23 RX ORDER — ROSUVASTATIN CALCIUM 20 MG/1
20 TABLET, COATED ORAL DAILY
Qty: 90 TABLET | Refills: 3 | Status: SHIPPED | OUTPATIENT
Start: 2018-08-23 | End: 2018-10-26

## 2018-08-23 RX ORDER — LANSOPRAZOLE 30 MG/1
30 CAPSULE, DELAYED RELEASE ORAL DAILY
Qty: 90 CAPSULE | Refills: 3 | Status: SHIPPED | OUTPATIENT
Start: 2018-08-23 | End: 2018-12-19 | Stop reason: SDUPTHER

## 2018-08-23 RX ORDER — CARVEDILOL 3.12 MG/1
3.12 TABLET ORAL 2 TIMES DAILY
Qty: 180 TABLET | Refills: 3 | Status: SHIPPED | OUTPATIENT
Start: 2018-08-23 | End: 2018-11-21 | Stop reason: SDUPTHER

## 2018-08-23 RX ORDER — ROSUVASTATIN CALCIUM 40 MG/1
40 TABLET, COATED ORAL DAILY
Qty: 90 TABLET | Refills: 3 | Status: SHIPPED | OUTPATIENT
Start: 2018-08-23 | End: 2018-12-19 | Stop reason: SDUPTHER

## 2018-08-23 NOTE — PROGRESS NOTES
Subjective   Carina Zamudio is a 48 y.o. female.     History of Present Illness  Patient re-scheduled.    Review of Systems    Objective   Physical Exam      Assessment/Plan

## 2018-08-29 ENCOUNTER — OFFICE VISIT (OUTPATIENT)
Dept: INTERNAL MEDICINE | Facility: CLINIC | Age: 48
End: 2018-08-29

## 2018-08-29 VITALS
HEIGHT: 62 IN | BODY MASS INDEX: 40.3 KG/M2 | SYSTOLIC BLOOD PRESSURE: 108 MMHG | WEIGHT: 219 LBS | DIASTOLIC BLOOD PRESSURE: 64 MMHG

## 2018-08-29 DIAGNOSIS — E78.2 MIXED HYPERLIPIDEMIA: ICD-10-CM

## 2018-08-29 DIAGNOSIS — I10 ESSENTIAL HYPERTENSION: ICD-10-CM

## 2018-08-29 DIAGNOSIS — E11.9 TYPE 2 DIABETES MELLITUS WITHOUT COMPLICATION, WITHOUT LONG-TERM CURRENT USE OF INSULIN (HCC): Primary | ICD-10-CM

## 2018-08-29 DIAGNOSIS — I25.10 ARTERIOSCLEROSIS OF CORONARY ARTERY: ICD-10-CM

## 2018-08-29 PROBLEM — E27.9 ADRENAL NODULE: Status: RESOLVED | Noted: 2017-02-24 | Resolved: 2018-08-29

## 2018-08-29 PROBLEM — E27.8 ADRENAL NODULE: Status: RESOLVED | Noted: 2017-02-24 | Resolved: 2018-08-29

## 2018-08-29 PROCEDURE — 99213 OFFICE O/P EST LOW 20 MIN: CPT | Performed by: INTERNAL MEDICINE

## 2018-08-29 NOTE — PROGRESS NOTES
Subjective   Carina Zamudio is a 48 y.o. female.     History of Present Illness she is here today for follow-up of hypertension, hypercholesterolemia, diabetes mellitus, and coronary artery disease.  She stopped taking metformin a few months ago secondary to GI side effects.  Diarrhea and abdominal pain were most noteworthy.  Over the last week or so she has started on a new diet.  Her postprandial blood sugar this morning was 141.  She has decreased her smoking to less than a pack of cigarettes per day.  She has also begun low-impact exercising.  She denies any anginal quality chest pain, PND, ROLON, or swelling in the ankles.  She has not had any dizziness, syncope, or focal neurologic symptoms.  She does occasionally have rapid heart beat episodes and she has follow-up with cardiology scheduled in the near future.        Review of Systems   Constitutional: Negative for activity change, appetite change and fatigue.   HENT: Negative for trouble swallowing.    Respiratory: Negative for cough, chest tightness, shortness of breath and wheezing.    Cardiovascular: Negative for chest pain, palpitations and leg swelling.   Gastrointestinal: Negative for abdominal pain.   Neurological: Negative for dizziness, syncope, facial asymmetry, speech difficulty, weakness, numbness and headache.       Objective   Physical Exam   Constitutional: She is oriented to person, place, and time. She appears well-developed and well-nourished. She is active. She does not appear ill.   Eyes: Conjunctivae are normal.   Neck: Carotid bruit is not present.   Cardiovascular: Normal rate, regular rhythm, S1 normal and S2 normal.  Exam reveals no S3 and no S4.    No murmur heard.  Pulmonary/Chest: No tachypnea. No respiratory distress. She has no decreased breath sounds. She has no wheezes. She has no rhonchi. She has no rales.   Abdominal: Soft. Normal appearance and bowel sounds are normal. She exhibits no abdominal bruit and no mass. There is  no hepatosplenomegaly. There is no tenderness.     Vascular Status -  Her right foot exhibits no edema. Her left foot exhibits no edema.  Neurological: She is alert and oriented to person, place, and time. Gait normal.   Psychiatric: She has a normal mood and affect. Her speech is normal and behavior is normal. Judgment and thought content normal. Cognition and memory are normal.         Assessment/Plan recent lab showed normal urinalysis, CBC, CMP, free T3.  Hemoglobin A1c 9.3.  Total cholesterol 166 triglycerides 107 HDL cholesterol 43 LDL cholesterol 102    Assessment #1 hypertension-good control #2 hypercholesterolemia-decent control but in view of her coronary artery disease we would like to see her LDL cholesterol below 100 #3 diabetes mellitus-hopefully control with improved with improvement in diet and exercise #4 coronary artery disease-quiescent    Plan #1 restart metformin but at 250 mg twice a day.  Routine follow-up in 3 months.

## 2018-10-03 ENCOUNTER — CLINICAL SUPPORT (OUTPATIENT)
Dept: INTERNAL MEDICINE | Facility: CLINIC | Age: 48
End: 2018-10-03

## 2018-10-03 DIAGNOSIS — Z23 NEED FOR HEPATITIS A VACCINATION: Primary | ICD-10-CM

## 2018-10-26 ENCOUNTER — OFFICE VISIT (OUTPATIENT)
Dept: CARDIOLOGY | Facility: CLINIC | Age: 48
End: 2018-10-26

## 2018-10-26 VITALS
DIASTOLIC BLOOD PRESSURE: 76 MMHG | HEART RATE: 72 BPM | HEIGHT: 62 IN | WEIGHT: 218 LBS | SYSTOLIC BLOOD PRESSURE: 120 MMHG | BODY MASS INDEX: 40.12 KG/M2

## 2018-10-26 DIAGNOSIS — I50.30 DIASTOLIC HEART FAILURE, UNSPECIFIED HF CHRONICITY (HCC): ICD-10-CM

## 2018-10-26 DIAGNOSIS — I10 ESSENTIAL HYPERTENSION: ICD-10-CM

## 2018-10-26 DIAGNOSIS — E78.2 MIXED HYPERLIPIDEMIA: ICD-10-CM

## 2018-10-26 DIAGNOSIS — I21.11 ST ELEVATION MYOCARDIAL INFARCTION INVOLVING RIGHT CORONARY ARTERY (HCC): ICD-10-CM

## 2018-10-26 DIAGNOSIS — I25.119 CORONARY ARTERY DISEASE INVOLVING NATIVE CORONARY ARTERY OF NATIVE HEART WITH ANGINA PECTORIS (HCC): Primary | ICD-10-CM

## 2018-10-26 DIAGNOSIS — Z72.0 TOBACCO ABUSE: ICD-10-CM

## 2018-10-26 DIAGNOSIS — E11.9 TYPE 2 DIABETES MELLITUS WITHOUT COMPLICATION, WITHOUT LONG-TERM CURRENT USE OF INSULIN (HCC): ICD-10-CM

## 2018-10-26 DIAGNOSIS — E66.01 CLASS 3 SEVERE OBESITY WITH SERIOUS COMORBIDITY AND BODY MASS INDEX (BMI) OF 40.0 TO 44.9 IN ADULT, UNSPECIFIED OBESITY TYPE (HCC): ICD-10-CM

## 2018-10-26 PROBLEM — E66.813 CLASS 3 SEVERE OBESITY WITH SERIOUS COMORBIDITY IN ADULT: Status: ACTIVE | Noted: 2018-10-26

## 2018-10-26 PROCEDURE — 93000 ELECTROCARDIOGRAM COMPLETE: CPT | Performed by: NURSE PRACTITIONER

## 2018-10-26 PROCEDURE — 99214 OFFICE O/P EST MOD 30 MIN: CPT | Performed by: NURSE PRACTITIONER

## 2018-10-26 NOTE — PROGRESS NOTES
Date of Office Visit: 10/26/2018  Encounter Provider: VITOR Vaughn  Place of Service: [facility] CARDIOLOGY  Patient Name: Carina Zamudio  :1970      Subjective:     Chief Complaint:  CAD s/p Inferior STEMI, HTN, Diastolic HF    History of Present Illness:  Carina Zamudio is a pleasant 48 y.o. female who is new to me.  Outside records have been obtained and reviewed by me.  This patient has a significant past medical history as listed below.    The patient was last evaluated by Dr. Alcazar in consultation in the hospital on 17.  He had evaluted her the a couple weeks prior in the office and she was complaining of some epigastric tightness that she thought was related to stress.  Dr. Alcazar recommending nuclear stress test but the patient's request it did not get scheduled at that time.  She was then evaluated in Dr. Giraldo's office for her one-year follow-up in notice some shortness of air with exertion, 5 days of lower extremity edema and a chronic cough.  She denied any chest pain or syncope but she did notice she gained 20 pounds in the last year.  She was continuing to smoke one half to one pack per day.  He performed an EKG and x-ray in the office.  Her EKG was normal but the x-ray showed some borderline cardiomegaly.  Due to her dyspnea on exertion he felt it was best to admit her for further evaluation.  Dr. Alcazar was asked to see her for cardiac recommendations.  Stress test and echo have been ordered.  She reported occasional tightness in her chest but thought it was related to the current cold and cough that she was experienced.  She also reported some tingling and no numbness that occurred in her left breast.  She stated that she is feeling much different than she had when she experienced her MI in the past.  Her main complaint is feeling bloated and awake.  Per her report she did not 5 more pounds since she was last seen in the office by Dr. Alcazar a few weeks ago.   Dr. Alcazar felt she was experiencing acute diastolic heart failure especially since her blood pressure had been up a bit.  Her swelling had reduced after diuresis and her breathing was much more comfortable.  Her echo showed normal EF.  HCTZ was added by her PCP Dr. Giraldo.  Dr. Alcazar recommended continuing the beta blocker as BP medication if additional blood pressure control was needed.    On August 2018 labs by her PCP revealed her diabetes was not under control and meds were changed.  Her cholesterol was also not controlled and meds were up titrated.  Her kidney function and LFTs were normal.    She is here today for follow-up.  Overall she is doing very well.  She denies any chest pain, shortness of breath, swelling of the legs (unless she is doing prolonged standing or has had noticeably more salt).  Report her weight has remained stable and she is actually lost about 5-10 pounds recently doing her weight watcher's.  She denies any dizziness, lightheadedness, syncope or near-syncope.  She denies any fatigue issues.  She denies PND or orthopnea.  She is compliant with her CPAP machine.  She states that she does do some exercising but this is mostly just planks and core exercises for muscle strengthening.  She does not have any chest pain and shortness of breath or palpitations with her exercises.  She does report that she can tell when she is having some PVCs that she can feel the bottom part of her heart near her left ventricle kind of skipping beats.  She apparently has had these symptoms before for some time and has had a Holter monitor which detected PVCs.  I do not see these Holter results.  She does not have any other symptoms with these skipped beats such as chest pain, shortness of breath, lightheadedness, dizziness or syncope.  She said this can occur for a few seconds and can happen several times per week.  She notices this when she is more stressed out.  She continues to smoke.  She reports her blood  pressures generally well controlled.  Today it is 124/76.  Her heart rate is 72.  He continues on aspirin 80 mg, Plavix 75 mg daily, carvedilol 3.125 mg twice a day, HCTZ 12.5 mg daily,  losartan 100 mg daily and Crestor 40 mg daily. She is the  for her PCP Dr. Gómez.  She will be leaving the country in a couple weeks to travel with her mom.      Prior testing/procedures:  11/8/17 Stress test:  Findings consistent with a normal ECG stress test.  Left ventricular ejection fraction is normal (Calculated EF = 64%).  There is a fixed defect in the lateral wall which I suspect is artifact. Consider a PET stress test or stress echo if future imaging is needed to exclude ischemia.    11/7/17 Echo:  · Left ventricular wall thickness is consistent with borderline concentric hypertrophy.  · Left ventricular systolic function is normal. Estimated EF = 57%.  · All left ventricular wall segments contract normally. Left ventricular wall thickness is consistent with borderline concentric hypertrophy. Left ventricular diastolic function is normal.       8/31/15 Cardiac Catherization:  INDICATIONS FOR PROCEDURE: This is a 45-year-old lady with an inferior STEMI,  brought directly to the catheterization laboratory.      PERFORMING PHYSICIAN: Bharat Cunningham MD     DESCRIPTION OF PROCEDURE: After consent was obtained, access was gained in her  right radial artery using a micropuncture technique. A 6-Mohawk short sheath  was placed without difficulty. An intra-arterial cocktail was given and the  opening ACT was 120 seconds. We went up actually with a JR-4 guiding catheter,  injected the right coronary artery, but that was not the culprit. We then went  up with a JL-3.5 diagnostic catheter and then at the conclusion of the  intervention we did injections of the left ventriculography. That will all be  dictated as one.       Left heart catheterization: LV pressure was 120/20. There was normal LV  systolic function without  segmental wall motion abnormality. No mitral  insufficiency or gradient across the aortic valve on pullback.      The left main coronary artery is normal.      The LAD has some mild luminal irregularities in the proximal portion that are  about 20%. The first diagonal has some mild luminal irregularities of 10% in  it.   The circumflex has a 99% blockage in the middle portion. There is an OM1 that  is subtotally occluded right in this lesion and the flow beyond that is CAROL-1.        The RCA is a dominant vessel with 30% proximal and mid disease, diffuse  aneurysmal change distally. The MARCIA has diffuse 50% disease in the proximal  portion of it, and the PDA has a 30% origin lesion in it.      STEMI. Due to this circumflex lesion we are going to proceed on with  intervention in it.       Heparin 9000 units was given. The ACT was over 300 seconds. She had been  pretreated with 600 mg of Plavix. Initially, we went up with a Voda 3.0 guiding  catheter. I attempted to cross it with a BMW wire, but it would not,  switched  to a whisper wire and it easily crossed and then brought a 2.5 x 15 Trek  balloon down, inflated it at 14 atmospheres, predilating this and then we  brought a 2.75 x 18 Xience Alpine stent and deployed it at 14 atmospheres. I  postdilated the front end of that stent with a 3.0 x 8 NC Trek at 20  atmospheres. There was 0% residual and CAROL-3 flow. There was just a trickle of  flow in the OM1, but it is too small to go after or do anything.      At that point, balloons, wires, and guides were removed. The sheath was removed  on the table and a TR band was applied.      IMPRESSION: Successful angioplasty, drug-eluting stent to the midcircumflex for  an ST-elevation myocardial infarction.      RECOMMENDATIONS: Aggressive risk factor modification. Home in 48 hours.           Past Medical History:   Diagnosis Date   • Abdominal pain, RUQ    • 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      Sept-2012--Sees Dr Serra (Eye)--laser Rx's   • Acute conjunctivitis    • Adrenal cortical adenoma    • Adrenal hypertrophy (CMS/HCC)    • Adrenal nodule (CMS/HCC)    • Anemia    • Anxiety    • Benign essential hypertension    • CAD (coronary artery disease)    • Cellulitis    • Cerebellar infarct (CMS/HCC)    • Chronic bronchitis (CMS/HCC)    • Diabetes mellitus (CMS/HCC)    • DJD (degenerative joint disease), cervical    • DJD (degenerative joint disease), lumbar    • Fatigue    • Gestational diabetes    • Health care maintenance    • HPV (human papilloma virus) infection    • Hyperglycemia     Dr. Xie   • Hyperlipemia    • Hypertension    • Liver nodule    • Morbid obesity (CMS/HCC)    • Multiple thyroid nodules    • Myocardial infarction (CMS/HCC)    • Nontoxic multinodular goiter    • AMY (obstructive sleep apnea)    • Preretinal hemorrhage of left eye    • Proteinuria    • Raynaud phenomenon    • Sleep apnea     Uses CPAP   • Stroke (CMS/HCC)    • Superficial phlebitis of leg    • Vision, loss, sudden    • Vitamin D deficiency      Past Surgical History:   Procedure Laterality Date   • CORONARY STENT PLACEMENT     • ENDOMETRIAL ABLATION  2012   • EYE SURGERY     • TUBAL ABDOMINAL LIGATION  2012     Outpatient Medications Prior to Visit   Medication Sig Dispense Refill   • ALPRAZolam (XANAX) 0.5 MG tablet Take 1 tablet by mouth Daily As Needed for Anxiety. 30 tablet 4   • aspirin 81 MG tablet Take 81 mg by mouth Daily.     • B Complex Vitamins (VITAMIN-B COMPLEX) tablet Take 1 tablet by mouth Daily.     • carvedilol (COREG) 3.125 MG tablet Take 1 tablet by mouth 2 (Two) Times a Day. 180 tablet 3   • clopidogrel (PLAVIX) 75 MG tablet Take 1 tablet by mouth Daily. 90 tablet 3   • Flaxseed, Linseed, (FLAX SEED OIL PO) Take 1 tablet by mouth Daily.     • hydrochlorothiazide (MICROZIDE) 12.5 MG capsule Take 1 capsule by mouth Daily. 90 capsule 3   • lansoprazole (PREVACID) 30 MG capsule Take 1 capsule by mouth  Daily. 90 capsule 3   • losartan (COZAAR) 100 MG tablet Take 1 tablet by mouth Daily. 90 tablet 3   • metFORMIN (GLUCOPHAGE) 500 MG tablet TAKE HALF A TABLET BY MOUTH TWICE A DAY. 180 tablet 3   • Multiple Vitamin tablet Take 1 tablet by mouth Daily.     • nicotine (NICODERM CQ) 21 MG/24HR patch Place 1 patch on the skin Daily. 21 each 3   • rosuvastatin (CRESTOR) 40 MG tablet Take 1 tablet by mouth Daily. 90 tablet 3   • vitamin D (ERGOCALCIFEROL) 24040 units capsule capsule Take 1 capsule by mouth 2 (Two) Times a Week. 26 capsule 3   • BIOTIN PO Take  by mouth Daily.     • rosuvastatin (CRESTOR) 20 MG tablet Take 1 tablet by mouth Daily. 90 tablet 3     No facility-administered medications prior to visit.        Allergies as of 10/26/2018   • (No Known Allergies)     Social History     Social History   • Marital status:      Spouse name: N/A   • Number of children: N/A   • Years of education: N/A     Occupational History   • Not on file.     Social History Main Topics   • Smoking status: Current Every Day Smoker     Packs/day: 1.00   • Smokeless tobacco: Never Used      Comment: caffine use   • Alcohol use Yes      Comment: socially   • Drug use: No   • Sexual activity: Yes     Partners: Male     Birth control/ protection: Surgical      Comment: ablation/BTL     Other Topics Concern   • Not on file     Social History Narrative   • No narrative on file     Family History   Problem Relation Age of Onset   • Lymphoma Father    • Lung cancer Maternal Grandfather    • Heart attack Maternal Grandfather    • Diabetes Maternal Grandfather    • Heart attack Paternal Grandfather    • Diabetes Paternal Grandfather    • Diabetes Paternal Grandmother    • Diabetes Maternal Grandmother    • Breast cancer Neg Hx    • Ovarian cancer Neg Hx    • Uterine cancer Neg Hx    • Colon cancer Neg Hx      Review of Systems   Constitution: Negative for chills, fever and malaise/fatigue.   HENT: Negative for ear pain, hearing loss,  "nosebleeds and sore throat.    Eyes: Negative for double vision, pain, vision loss in left eye and vision loss in right eye.   Cardiovascular: Positive for irregular heartbeat (reports its feels like her previous PVCs). Negative for chest pain, claudication, dyspnea on exertion, leg swelling, near-syncope, orthopnea, palpitations, paroxysmal nocturnal dyspnea and syncope.   Respiratory: Negative for cough, shortness of breath, snoring and wheezing.    Endocrine: Negative for cold intolerance and heat intolerance.   Hematologic/Lymphatic: Negative for bleeding problem.   Skin: Negative for color change, itching, rash and unusual hair distribution.   Musculoskeletal: Negative for joint pain and joint swelling.   Gastrointestinal: Negative for abdominal pain, diarrhea, hematochezia, melena, nausea and vomiting.   Genitourinary: Negative for decreased libido, frequency, hematuria, hesitancy and incomplete emptying.   Neurological: Negative for excessive daytime sleepiness, dizziness, headaches, light-headedness, loss of balance, numbness, paresthesias and seizures.   Psychiatric/Behavioral: Negative for depression.          Objective:     Vitals:    10/26/18 1020   BP: 120/76   BP Location: Left arm   Patient Position: Sitting   Pulse: 72   Weight: 98.9 kg (218 lb)   Height: 157.5 cm (62\")     Body mass index is 39.87 kg/m².    PHYSICAL EXAM:  Physical Exam   Constitutional: She is oriented to person, place, and time. She appears well-developed and well-nourished. No distress.   HENT:   Head: Normocephalic and atraumatic.   Eyes: Pupils are equal, round, and reactive to light.   Neck: Neck supple. No JVD present. Carotid bruit is not present.   Cardiovascular: Normal rate, regular rhythm, normal heart sounds and intact distal pulses.    No murmur heard.  Pulses:       Radial pulses are 2+ on the right side, and 2+ on the left side.        Posterior tibial pulses are 2+ on the right side, and 2+ on the left side. "   Pulmonary/Chest: Effort normal and breath sounds normal. No accessory muscle usage. No respiratory distress. She has no decreased breath sounds. She has no wheezes. She has no rhonchi. She has no rales.   Abdominal: Soft. Bowel sounds are normal. She exhibits no distension. There is no splenomegaly or hepatomegaly. There is no tenderness.   Obese   Musculoskeletal: Normal range of motion. She exhibits no edema.   Neurological: She is alert and oriented to person, place, and time.   Skin: Skin is warm, dry and intact. She is not diaphoretic. No erythema.   Psychiatric: She has a normal mood and affect. Her speech is normal and behavior is normal. Judgment and thought content normal. Cognition and memory are normal.   Nursing note and vitals reviewed.        ECG 12 Lead  Date/Time: 10/26/2018 10:34 AM  Performed by: ALEX VELARDE  Authorized by: ALEX VELARDE   Comparison: compared with previous ECG from 11/7/2018  Rhythm: sinus rhythm  Rate: normal  BPM: 72  T flattening: V1  QRS axis: normal  Comments: Non-specific ST abnormalities  Otherwise Normal ECG  Indication: CAD            Assessment:       Diagnosis Plan   1. Coronary artery disease involving native coronary artery of native heart with angina pectoris (CMS/[facility])  ECG 12 Lead   2. ST elevation myocardial infarction involving right coronary artery (CMS/[facility])  ECG 12 Lead   3. Essential hypertension  ECG 12 Lead   4. Mixed hyperlipidemia  ECG 12 Lead   5. Class 3 severe obesity with serious comorbidity and body mass index (BMI) of 40.0 to 44.9 in adult, unspecified obesity type (CMS/[facility])  ECG 12 Lead   6. Type 2 diabetes mellitus without complication, without long-term current use of insulin (CMS/[facility])  ECG 12 Lead   7. Diastolic heart failure, unspecified HF chronicity (CMS/[facility])  ECG 12 Lead   8. Tobacco abuse  ECG 12 Lead       Plan:     1.  Coronary Artery Disease  Assessment  • The patient has no angina  • Patient has and a prior CVA    Plan  •  Lifestyle modifications discussed include adhering to a heart healthy diet, avoidance of tobacco products, maintenance of a healthy weight, medication compliance, regular exercise and regular monitoring of cholesterol and blood pressure    Subjective - Objective  • There is a history of past MI  • There has been a previous stent procedure using LIZZIE  • Current antiplatelet therapy includes aspirin 81 mg and clopidogrel 75 mg    2.   Heart Failure  Assessment  • NYHA class I - There is no limitation of physical activity. Physical activity does not cause fatigue, palpitations or shortness of breath.  • Beta blocker prescribed  • Diuretics prescribed  • The most recent ejection fraction is 57%  • Left ventricular function is normal by qualitative assessment  • The left ventricle was last assessed on 11/7/2017    Plan  • The heart failure care plan was discussed with the patient today including: continuing the current program    Subjective/Objective    • Physical exam findings negative for rales, elevated JVP and hepatomegaly.      Diastolic HF: Controlled at this time.  Continue current regimen.  Continue daily weights and to monitor for swelling.    3. Hypertension: The patient is on losartan 100 mg daily, Coreg 3.125 mg as daily, and HCTZ 12.5 mg daily. B/P well controlled controlled today at 124/76.  4.   Hyperlipidemia: On Crestor 40 mg (recently uptitrated by Dr. Null for uncontrolled LDL in August 2018).  Managed by new PCP Dr. Gómez.  5.   Obesity: We discussed the importance of weight loss. Patient reports she has lost about 10 lbs recently.   6.  Diabetes: Uncontrolled on August 2018 labs. Meds adjusted. Managed by PCP Dr. Null. Now following with Dr. Gómez after Dr. Null retired.  Has recheck labs in December 2018.   7. Skipped heart beats: Patient reports of December to when she was told she had PVCs.  I advised that she could try to take an additional 3.125 mg of her Coreg (and monitor her b/p and HR  when doing so) when she has worsening symptoms to see if this helps.  I advised her that if this continues we could do another Holter monitor or check another echocardiogram. She did not want to proceed with those tests at this time but demonstrated understanding.   8. Tobacco Abuse: Continues to smoke (sometimes 1 PPD). We discussed the importance of quitting. The patient does have nicotine patches to help aid her in cessation.       This patient has multiple uncontrolled risk factors for cardiovascular disease. Meds recently changed by her PCP for HLD and Diabetes.  I advised on the importance of good blood pressure, blood sugar and cholesterol control, as well as regular exercise and weight loss and avoidance of tobacco for cardiovascular disease risk factor modification.     Follow up with Dr. Alcazar in 1 year, unless otherwise needed sooner.  I advised the patient to contact our office with any questions or concerns.         Your medication list          Accurate as of 10/26/18 11:19 AM. If you have any questions, ask your nurse or doctor.               CHANGE how you take these medications      Instructions Last Dose Given Next Dose Due   rosuvastatin 40 MG tablet  Commonly known as:  CRESTOR  What changed:  Another medication with the same name was removed. Continue taking this medication, and follow the directions you see here.  Changed by:  VITOR Vaughn      Take 1 tablet by mouth Daily.          CONTINUE taking these medications      Instructions Last Dose Given Next Dose Due   ALPRAZolam 0.5 MG tablet  Commonly known as:  XANAX      Take 1 tablet by mouth Daily As Needed for Anxiety.       aspirin 81 MG tablet      Take 81 mg by mouth Daily.       carvedilol 3.125 MG tablet  Commonly known as:  COREG      Take 1 tablet by mouth 2 (Two) Times a Day.       clopidogrel 75 MG tablet  Commonly known as:  PLAVIX      Take 1 tablet by mouth Daily.       FLAX SEED OIL PO      Take 1 tablet by mouth Daily.        hydrochlorothiazide 12.5 MG capsule  Commonly known as:  MICROZIDE      Take 1 capsule by mouth Daily.       lansoprazole 30 MG capsule  Commonly known as:  PREVACID      Take 1 capsule by mouth Daily.       losartan 100 MG tablet  Commonly known as:  COZAAR      Take 1 tablet by mouth Daily.       metFORMIN 500 MG tablet  Commonly known as:  GLUCOPHAGE      TAKE HALF A TABLET BY MOUTH TWICE A DAY.       Multiple Vitamin tablet      Take 1 tablet by mouth Daily.       nicotine 21 MG/24HR patch  Commonly known as:  NICODERM CQ      Place 1 patch on the skin Daily.       vitamin D 86872 units capsule capsule  Commonly known as:  ERGOCALCIFEROL      Take 1 capsule by mouth 2 (Two) Times a Week.       Vitamin-B Complex tablet      Take 1 tablet by mouth Daily.          STOP taking these medications    BIOTIN PO  Stopped by:  VITOR Vaughn               The above medication changes may not have been made by this provider.  Medication list was updated to reflect medications patient is currently taking including medication changes and discontinuations made by other healthcare providers.     It has been a pleasure to participate in this patient's care. Please feel free to contact me with any questions or concerns.     VITOR Vaughn  10/26/2018       Dictated utilizing Dragon Dictation System.

## 2018-11-14 ENCOUNTER — OFFICE VISIT (OUTPATIENT)
Dept: INTERNAL MEDICINE | Facility: CLINIC | Age: 48
End: 2018-11-14

## 2018-11-14 VITALS
OXYGEN SATURATION: 96 % | DIASTOLIC BLOOD PRESSURE: 64 MMHG | HEART RATE: 81 BPM | BODY MASS INDEX: 39.2 KG/M2 | RESPIRATION RATE: 18 BRPM | SYSTOLIC BLOOD PRESSURE: 118 MMHG | TEMPERATURE: 98.6 F | HEIGHT: 62 IN | WEIGHT: 213 LBS

## 2018-11-14 DIAGNOSIS — H10.33 ACUTE BACTERIAL CONJUNCTIVITIS OF BOTH EYES: ICD-10-CM

## 2018-11-14 DIAGNOSIS — J06.9 ACUTE URI: Primary | ICD-10-CM

## 2018-11-14 PROCEDURE — 99213 OFFICE O/P EST LOW 20 MIN: CPT | Performed by: NURSE PRACTITIONER

## 2018-11-14 RX ORDER — DEXTROMETHORPHAN HYDROBROMIDE AND PROMETHAZINE HYDROCHLORIDE 15; 6.25 MG/5ML; MG/5ML
5 SYRUP ORAL 4 TIMES DAILY PRN
Qty: 118 ML | Refills: 0 | Status: SHIPPED | OUTPATIENT
Start: 2018-11-14 | End: 2019-05-02

## 2018-11-14 RX ORDER — DOXYCYCLINE HYCLATE 100 MG/1
100 CAPSULE ORAL 2 TIMES DAILY
Qty: 14 CAPSULE | Refills: 0 | Status: SHIPPED | OUTPATIENT
Start: 2018-11-14 | End: 2018-11-21

## 2018-11-14 RX ORDER — OFLOXACIN 3 MG/ML
2 SOLUTION/ DROPS OPHTHALMIC 4 TIMES DAILY
Qty: 3 ML | Refills: 0 | Status: SHIPPED | OUTPATIENT
Start: 2018-11-14 | End: 2018-11-21

## 2018-11-14 NOTE — PATIENT INSTRUCTIONS
Allergic Conjunctivitis  A clear membrane (conjunctiva) covers the white part of your eye and the inner surface of your eyelid. Allergic conjunctivitis happens when this membrane has inflammation. This is caused by allergies. Common causes of allergic reactions (allergens)include:  · Outdoor allergens, such as:  ? Pollen.  ? Grass and weeds.  ? Mold spores.  · Indoor allergens, such as:  ? Dust.  ? Smoke.  ? Mold.  ? Pet dander.  ? Animal hair.    This condition can make your eye red or pink. It can also make your eye feel itchy. This condition cannot be spread from one person to another person (is not contagious).  Follow these instructions at home:  · Try not to be around things that you are allergic to.  · Take or apply over-the-counter and prescription medicines only as told by your doctor. These include any eye drops.  · Place a cool, clean washcloth on your eye for 10-20 minutes. Do this 3-4 times a day.  · Do not touch or rub your eyes.  · Do not wear contact lenses until the inflammation is gone. Wear glasses instead.  · Do not wear eye makeup until the inflammation is gone.  · Keep all follow-up visits as told by your doctor. This is important.  Contact a doctor if:  · Your symptoms get worse.  · Your symptoms do not get better with treatment.  · You have mild eye pain.  · You are sensitive to light,  · You have spots or blisters on your eyes.  · You have pus coming from your eye.  · You have a fever.  Get help right away if:  · You have redness, swelling, or other symptoms in only one eye.  · Your vision is blurry.  · You have vision changes.  · You have very bad eye pain.  Summary  · Allergic conjunctivitis is caused by allergies. It can make your eye red or pink, and it can make your eye feel itchy.  · This condition cannot be spread from one person to another person (is not contagious).  · Try not to be around things that you are allergic to.  · Take or apply over-the-counter and prescription medicines  "only as told by your doctor. These include any eye drops.  · Contact your doctor if your symptoms get worse or they do not get better with treatment.  This information is not intended to replace advice given to you by your health care provider. Make sure you discuss any questions you have with your health care provider.  Document Released: 06/07/2011 Document Revised: 08/11/2017 Document Reviewed: 08/11/2017  [employer] Interactive Patient Education © 2017 [employer]  Upper Respiratory Infection, Adult  Most upper respiratory infections (URIs) are caused by a virus. A URI affects the nose, throat, and upper air passages. The most common type of URI is often called \"the common cold.\"  Follow these instructions at home:  · Take medicines only as told by your doctor.  · Gargle warm saltwater or take cough drops to comfort your throat as told by your doctor.  · Use a warm mist humidifier or inhale steam from a shower to increase air moisture. This may make it easier to breathe.  · Drink enough fluid to keep your pee (urine) clear or pale yellow.  · Eat soups and other clear broths.  · Have a healthy diet.  · Rest as needed.  · Go back to work when your fever is gone or your doctor says it is okay.  ? You may need to stay home longer to avoid giving your URI to others.  ? You can also wear a face mask and wash your hands often to prevent spread of the virus.  · Use your inhaler more if you have asthma.  · Do not use any tobacco products, including cigarettes, chewing tobacco, or electronic cigarettes. If you need help quitting, ask your doctor.  Contact a doctor if:  · You are getting worse, not better.  · Your symptoms are not helped by medicine.  · You have chills.  · You are getting more short of breath.  · You have brown or red mucus.  · You have yellow or brown discharge from your nose.  · You have pain in your face, especially when you bend forward.  · You have a fever.  · You have puffy (swollen) neck " glands.  · You have pain while swallowing.  · You have white areas in the back of your throat.  Get help right away if:  · You have very bad or constant:  ? Headache.  ? Ear pain.  ? Pain in your forehead, behind your eyes, and over your cheekbones (sinus pain).  ? Chest pain.  · You have long-lasting (chronic) lung disease and any of the following:  ? Wheezing.  ? Long-lasting cough.  ? Coughing up blood.  ? A change in your usual mucus.  · You have a stiff neck.  · You have changes in your:  ? Vision.  ? Hearing.  ? Thinking.  ? Mood.  This information is not intended to replace advice given to you by your health care provider. Make sure you discuss any questions you have with your health care provider.  Document Released: 06/05/2009 Document Revised: 08/20/2017 Document Reviewed: 03/25/2015  [employer] Interactive Patient Education © 2018 [employer]

## 2018-11-14 NOTE — PROGRESS NOTES
Subjective   Carina Zamudio is a 48 y.o. female.     She did have recent travel.       Conjunctivitis    The current episode started 3 to 5 days ago. The problem has been gradually worsening. The problem is mild. Associated symptoms include eye itching, congestion, headaches, rhinorrhea, sore throat, cough, URI, eye discharge and eye redness. Pertinent negatives include no fever, no decreased vision, no double vision, no photophobia, no abdominal pain, no diarrhea, no nausea, no vomiting, no ear discharge, no ear pain, no hearing loss, no wheezing and no eye pain.   URI    This is a new problem. The current episode started 1 to 4 weeks ago. The problem has been gradually worsening. There has been no fever. Associated symptoms include congestion, coughing, headaches, a plugged ear sensation, rhinorrhea, sinus pain, sneezing and a sore throat. Pertinent negatives include no abdominal pain, chest pain, diarrhea, ear pain, nausea, vomiting or wheezing. Associated symptoms comments: Back pain with cough . Treatments tried: robitussin, amoxicillin, sudafed, benadryl  The treatment provided mild relief.        The following portions of the patient's history were reviewed and updated as appropriate: allergies, current medications, past family history, past medical history, past social history, past surgical history and problem list.    Review of Systems   Constitutional: Negative for appetite change, chills, fatigue and fever.   HENT: Positive for congestion, postnasal drip, rhinorrhea, sinus pressure, sinus pain, sneezing and sore throat. Negative for ear discharge, ear pain, facial swelling, hearing loss and tinnitus.    Eyes: Positive for discharge, redness and itching. Negative for double vision, photophobia, pain and visual disturbance.   Respiratory: Positive for cough. Negative for chest tightness, shortness of breath and wheezing.    Cardiovascular: Negative for chest pain, palpitations and leg swelling.    Gastrointestinal: Negative for abdominal pain, diarrhea, nausea and vomiting.   Allergic/Immunologic: Positive for environmental allergies.   Neurological: Positive for headaches.   Hematological: Negative for adenopathy.       Objective   Physical Exam   Constitutional: She appears well-developed and well-nourished. She is cooperative. She does not have a sickly appearance. She does not appear ill.   HENT:   Head: Normocephalic.   Right Ear: Hearing and external ear normal. No drainage, swelling or tenderness. No mastoid tenderness. Tympanic membrane is bulging. Tympanic membrane is not injected, not scarred and not erythematous. Tympanic membrane mobility is normal. No middle ear effusion. No decreased hearing is noted.   Left Ear: Hearing and external ear normal. No drainage, swelling or tenderness. No mastoid tenderness. Tympanic membrane is bulging. Tympanic membrane is not injected, not scarred and not erythematous. Tympanic membrane mobility is normal.  No middle ear effusion. No decreased hearing is noted.   Nose: No mucosal edema, rhinorrhea, sinus tenderness or nasal deformity. Right sinus exhibits maxillary sinus tenderness. Right sinus exhibits no frontal sinus tenderness. Left sinus exhibits maxillary sinus tenderness. Left sinus exhibits no frontal sinus tenderness.   Mouth/Throat: Mucous membranes are normal. Normal dentition. Posterior oropharyngeal erythema (clear hypersecretions ) present.   Eyes: EOM and lids are normal. Pupils are equal, round, and reactive to light. Right eye exhibits discharge. Right eye exhibits no exudate. Left eye exhibits discharge. Left eye exhibits no exudate. Right conjunctiva is injected. Left conjunctiva is injected.   Neck: Trachea normal and normal range of motion. No edema present. No thyroid mass and no thyromegaly present.   Cardiovascular: Regular rhythm, normal heart sounds and normal pulses.   No murmur heard.  Pulmonary/Chest: Breath sounds normal. No  respiratory distress. She has no decreased breath sounds. She has no wheezes. She has no rhonchi. She has no rales.   Dry cough    Lymphadenopathy:        Head (right side): No submental, no submandibular, no tonsillar, no preauricular, no posterior auricular and no occipital adenopathy present.        Head (left side): No submental, no submandibular, no tonsillar, no preauricular, no posterior auricular and no occipital adenopathy present.     She has no cervical adenopathy.   Neurological: She is alert.   Skin: Skin is warm, dry and intact. No cyanosis. Nails show no clubbing.       Assessment/Plan   Carina was seen today for eye problem and uri.    Diagnoses and all orders for this visit:    Acute URI  -     doxycycline (VIBRAMYCIN) 100 MG capsule; Take 1 capsule by mouth 2 (Two) Times a Day for 7 days.  -     promethazine-dextromethorphan (PROMETHAZINE-DM) 6.25-15 MG/5ML syrup; Take 5 mL by mouth 4 (Four) Times a Day As Needed for Cough (1 tsp (5ml)).    Acute bacterial conjunctivitis of both eyes  -     ofloxacin (OCUFLOX) 0.3 % ophthalmic solution; Administer 2 drops to both eyes 4 (Four) Times a Day for 7 days.    She will return for worsening of symptoms.

## 2018-11-21 RX ORDER — CARVEDILOL 3.12 MG/1
3.12 TABLET ORAL 2 TIMES DAILY
Qty: 180 TABLET | Refills: 1 | Status: SHIPPED | OUTPATIENT
Start: 2018-11-21 | End: 2019-05-30 | Stop reason: SDUPTHER

## 2018-11-21 RX ORDER — CLOPIDOGREL BISULFATE 75 MG/1
75 TABLET ORAL DAILY
Qty: 90 TABLET | Refills: 1 | Status: SHIPPED | OUTPATIENT
Start: 2018-11-21 | End: 2019-05-30 | Stop reason: SDUPTHER

## 2018-12-19 RX ORDER — ROSUVASTATIN CALCIUM 40 MG/1
40 TABLET, COATED ORAL DAILY
Qty: 90 TABLET | Refills: 3 | Status: SHIPPED | OUTPATIENT
Start: 2018-12-19 | End: 2019-12-24 | Stop reason: SDUPTHER

## 2018-12-19 RX ORDER — LANSOPRAZOLE 30 MG/1
30 CAPSULE, DELAYED RELEASE ORAL DAILY
Qty: 90 CAPSULE | Refills: 3 | Status: SHIPPED | OUTPATIENT
Start: 2018-12-19 | End: 2019-10-16

## 2018-12-19 RX ORDER — HYDROCHLOROTHIAZIDE 12.5 MG/1
12.5 CAPSULE, GELATIN COATED ORAL
Qty: 90 CAPSULE | Refills: 3 | Status: SHIPPED | OUTPATIENT
Start: 2018-12-19 | End: 2019-12-24 | Stop reason: SDUPTHER

## 2019-01-15 RX ORDER — LOSARTAN POTASSIUM 100 MG/1
100 TABLET ORAL
Qty: 90 TABLET | Refills: 3 | Status: SHIPPED | OUTPATIENT
Start: 2019-01-15 | End: 2020-01-23 | Stop reason: SDUPTHER

## 2019-01-21 ENCOUNTER — OFFICE VISIT (OUTPATIENT)
Dept: INTERNAL MEDICINE | Facility: CLINIC | Age: 49
End: 2019-01-21

## 2019-01-21 ENCOUNTER — APPOINTMENT (OUTPATIENT)
Dept: WOMENS IMAGING | Facility: HOSPITAL | Age: 49
End: 2019-01-21

## 2019-01-21 VITALS
DIASTOLIC BLOOD PRESSURE: 82 MMHG | SYSTOLIC BLOOD PRESSURE: 120 MMHG | BODY MASS INDEX: 37.54 KG/M2 | TEMPERATURE: 98.4 F | WEIGHT: 204 LBS | HEART RATE: 81 BPM | OXYGEN SATURATION: 98 % | HEIGHT: 62 IN

## 2019-01-21 DIAGNOSIS — T14.8XXA BRUISING: ICD-10-CM

## 2019-01-21 DIAGNOSIS — R05.9 COUGH: Primary | ICD-10-CM

## 2019-01-21 DIAGNOSIS — J40 BRONCHITIS: ICD-10-CM

## 2019-01-21 PROCEDURE — 71046 X-RAY EXAM CHEST 2 VIEWS: CPT | Performed by: NURSE PRACTITIONER

## 2019-01-21 PROCEDURE — 71046 X-RAY EXAM CHEST 2 VIEWS: CPT | Performed by: RADIOLOGY

## 2019-01-21 PROCEDURE — 99213 OFFICE O/P EST LOW 20 MIN: CPT | Performed by: NURSE PRACTITIONER

## 2019-01-21 RX ORDER — FLUCONAZOLE 150 MG/1
150 TABLET ORAL ONCE
Qty: 2 TABLET | Refills: 0 | Status: SHIPPED | OUTPATIENT
Start: 2019-01-21 | End: 2019-01-23

## 2019-01-21 RX ORDER — BUDESONIDE AND FORMOTEROL FUMARATE DIHYDRATE 80; 4.5 UG/1; UG/1
2 AEROSOL RESPIRATORY (INHALATION)
Qty: 6.9 G | Refills: 0 | COMMUNITY
Start: 2019-01-21 | End: 2021-06-10

## 2019-01-21 RX ORDER — AZITHROMYCIN 250 MG/1
TABLET, FILM COATED ORAL
Qty: 6 TABLET | Refills: 0 | Status: SHIPPED | OUTPATIENT
Start: 2019-01-21 | End: 2019-02-13

## 2019-01-21 NOTE — PROGRESS NOTES
Subjective   Carina Zamudio is a 48 y.o. female.     She returned in November from [place]. Her coworker has been sick. She did receive flu shot. She has been having cough but reports this weekend it got worst after cleaning in her aunts home. There was lots of dust.       Cough   This is a new problem. The current episode started 1 to 4 weeks ago. The problem has been gradually worsening (worst over the weekend ). The cough is non-productive. Associated symptoms include chills, postnasal drip, rhinorrhea and wheezing. Pertinent negatives include no chest pain, ear congestion, ear pain, fever, headaches, heartburn, hemoptysis, sore throat or shortness of breath. The symptoms are aggravated by lying down and dust (hot ). She has tried nothing for the symptoms. Her past medical history is significant for bronchitis, environmental allergies and pneumonia. There is no history of asthma or COPD.        The following portions of the patient's history were reviewed and updated as appropriate: allergies, current medications, past social history and problem list.    Review of Systems   Constitutional: Positive for chills. Negative for appetite change, fatigue and fever.   HENT: Positive for postnasal drip and rhinorrhea. Negative for congestion, ear discharge, ear pain, facial swelling, hearing loss, sinus pressure, sneezing, sore throat and tinnitus.    Respiratory: Positive for cough and wheezing. Negative for hemoptysis, chest tightness and shortness of breath.    Cardiovascular: Negative for chest pain, palpitations and leg swelling.   Gastrointestinal: Negative for abdominal pain, diarrhea, heartburn, nausea and vomiting.   Musculoskeletal: Negative for neck pain and neck stiffness.   Allergic/Immunologic: Positive for environmental allergies.   Neurological: Negative for headaches.   Hematological: Negative for adenopathy. Bruises/bleeds easily (increased ).       Objective   Physical Exam   Constitutional: She appears  well-developed and well-nourished. She is cooperative. She does not have a sickly appearance. She does not appear ill.   HENT:   Head: Normocephalic.   Right Ear: Hearing, tympanic membrane and external ear normal. No drainage, swelling or tenderness. No mastoid tenderness. Tympanic membrane is not injected, not scarred, not erythematous and not bulging. Tympanic membrane mobility is normal. No middle ear effusion. No decreased hearing is noted.   Left Ear: Hearing, tympanic membrane and external ear normal. No drainage, swelling or tenderness. No mastoid tenderness. Tympanic membrane is not injected, not scarred, not erythematous and not bulging. Tympanic membrane mobility is normal.  No middle ear effusion. No decreased hearing is noted.   Nose: Nose normal. No mucosal edema, rhinorrhea, sinus tenderness or nasal deformity. Right sinus exhibits no maxillary sinus tenderness and no frontal sinus tenderness. Left sinus exhibits no maxillary sinus tenderness and no frontal sinus tenderness.   Mouth/Throat: Mucous membranes are normal. Normal dentition. Posterior oropharyngeal erythema (mild ) present.   Eyes: Conjunctivae and lids are normal. Pupils are equal, round, and reactive to light. Right eye exhibits no discharge and no exudate. Left eye exhibits no discharge and no exudate.   Neck: Trachea normal and normal range of motion. No edema present. No thyroid mass and no thyromegaly present.   Cardiovascular: Regular rhythm, normal heart sounds and normal pulses.   No murmur heard.  Pulmonary/Chest: No respiratory distress. She has no decreased breath sounds. She has wheezes (anterior ) in the left upper field. She has no rhonchi. She has no rales.   Barking cough    Lymphadenopathy:        Head (right side): No submental, no submandibular, no tonsillar, no preauricular, no posterior auricular and no occipital adenopathy present.        Head (left side): No submental, no submandibular, no tonsillar, no  preauricular, no posterior auricular and no occipital adenopathy present.   Neurological: She is alert.   Skin: Skin is warm, dry and intact. Bruising (left thigh, left chest, right shoulder ) noted. No cyanosis. Nails show no clubbing.       Assessment/Plan   Carina was seen today for cough.    Diagnoses and all orders for this visit:    Cough  -     XR Chest 2 View    Bronchitis  -     budesonide-formoterol (SYMBICORT) 80-4.5 MCG/ACT inhaler; Inhale 2 puffs 2 (Two) Times a Day. Rinse mouth with water after each use  -     azithromycin (ZITHROMAX Z-SOPHIA) 250 MG tablet; Take 2 tablets the first day, then 1 tablet daily for 4 days.  -     fluconazole (DIFLUCAN) 150 MG tablet; Take 1 tablet by mouth 1 (One) Time for 1 dose. May repeat in 7 days if needed    Bruising  Comments:  due to have labs this week; will need platelet     CXR with no acute findings. Sent for final review.

## 2019-02-13 ENCOUNTER — TELEPHONE (OUTPATIENT)
Dept: INTERNAL MEDICINE | Facility: CLINIC | Age: 49
End: 2019-02-13

## 2019-02-13 ENCOUNTER — OFFICE VISIT (OUTPATIENT)
Dept: INTERNAL MEDICINE | Facility: CLINIC | Age: 49
End: 2019-02-13

## 2019-02-13 VITALS
BODY MASS INDEX: 37.17 KG/M2 | OXYGEN SATURATION: 98 % | TEMPERATURE: 97.7 F | HEIGHT: 62 IN | HEART RATE: 64 BPM | WEIGHT: 202 LBS | SYSTOLIC BLOOD PRESSURE: 120 MMHG | DIASTOLIC BLOOD PRESSURE: 84 MMHG

## 2019-02-13 DIAGNOSIS — R10.30 LOWER ABDOMINAL PAIN: Primary | ICD-10-CM

## 2019-02-13 LAB
ALBUMIN SERPL-MCNC: 4.1 G/DL (ref 3.5–5.2)
ALBUMIN/GLOB SERPL: 1.3 G/DL
ALP SERPL-CCNC: 45 U/L (ref 39–117)
ALT SERPL W P-5'-P-CCNC: 20 U/L (ref 1–33)
ANION GAP SERPL CALCULATED.3IONS-SCNC: 11.6 MMOL/L
AST SERPL-CCNC: 15 U/L (ref 1–32)
BILIRUB SERPL-MCNC: 0.4 MG/DL (ref 0.1–1.2)
BILIRUB UR QL STRIP: NEGATIVE
BUN BLD-MCNC: 7 MG/DL (ref 6–20)
BUN/CREAT SERPL: 9.5 (ref 7–25)
CALCIUM SPEC-SCNC: 9.7 MG/DL (ref 8.6–10.5)
CHLORIDE SERPL-SCNC: 97 MMOL/L (ref 98–107)
CLARITY UR: CLEAR
CO2 SERPL-SCNC: 30.4 MMOL/L (ref 22–29)
COLOR UR: YELLOW
CREAT BLD-MCNC: 0.74 MG/DL (ref 0.57–1)
DEPRECATED RDW RBC AUTO: 44.5 FL (ref 37–54)
ERYTHROCYTE [DISTWIDTH] IN BLOOD BY AUTOMATED COUNT: 13.4 % (ref 12.3–15.4)
GFR SERPL CREATININE-BSD FRML MDRD: 83 ML/MIN/1.73
GLOBULIN UR ELPH-MCNC: 3.1 GM/DL
GLUCOSE BLD-MCNC: 157 MG/DL (ref 65–99)
GLUCOSE UR STRIP-MCNC: NEGATIVE MG/DL
HCT VFR BLD AUTO: 46.9 % (ref 34–46.6)
HGB BLD-MCNC: 15.7 G/DL (ref 12–15.9)
HGB UR QL STRIP.AUTO: NEGATIVE
KETONES UR QL STRIP: NEGATIVE
LEUKOCYTE ESTERASE UR QL STRIP.AUTO: NEGATIVE
MCH RBC QN AUTO: 31.2 PG (ref 26.6–33)
MCHC RBC AUTO-ENTMCNC: 33.5 G/DL (ref 31.5–35.7)
MCV RBC AUTO: 93.2 FL (ref 79–97)
NITRITE UR QL STRIP: NEGATIVE
PH UR STRIP.AUTO: 6.5 [PH] (ref 5–8)
PLATELET # BLD AUTO: 230 10*3/MM3 (ref 140–450)
PMV BLD AUTO: 10.1 FL (ref 6–12)
POTASSIUM BLD-SCNC: 4.1 MMOL/L (ref 3.5–5.2)
PROT SERPL-MCNC: 7.2 G/DL (ref 6–8.5)
PROT UR QL STRIP: NEGATIVE
RBC # BLD AUTO: 5.03 10*6/MM3 (ref 3.77–5.28)
SODIUM BLD-SCNC: 139 MMOL/L (ref 136–145)
SP GR UR STRIP: 1.01 (ref 1–1.03)
UROBILINOGEN UR QL STRIP: NORMAL
WBC NRBC COR # BLD: 8.71 10*3/MM3 (ref 3.4–10.8)

## 2019-02-13 PROCEDURE — 81003 URINALYSIS AUTO W/O SCOPE: CPT | Performed by: NURSE PRACTITIONER

## 2019-02-13 PROCEDURE — 80053 COMPREHEN METABOLIC PANEL: CPT | Performed by: NURSE PRACTITIONER

## 2019-02-13 PROCEDURE — 99213 OFFICE O/P EST LOW 20 MIN: CPT | Performed by: NURSE PRACTITIONER

## 2019-02-13 PROCEDURE — 85027 COMPLETE CBC AUTOMATED: CPT | Performed by: NURSE PRACTITIONER

## 2019-02-13 RX ORDER — METRONIDAZOLE 500 MG/1
500 TABLET ORAL 3 TIMES DAILY
Qty: 21 TABLET | Refills: 0 | Status: SHIPPED | OUTPATIENT
Start: 2019-02-13 | End: 2022-08-11 | Stop reason: SDUPTHER

## 2019-02-13 RX ORDER — FLUCONAZOLE 150 MG/1
150 TABLET ORAL ONCE
Qty: 2 TABLET | Refills: 0 | Status: SHIPPED | OUTPATIENT
Start: 2019-02-13 | End: 2019-02-20

## 2019-02-13 NOTE — PATIENT INSTRUCTIONS
Diverticulitis  Diverticulitis is infection or inflammation of small pouches (diverticula) in the colon that form due to a condition called diverticulosis. Diverticula can trap stool (feces) and bacteria, causing infection and inflammation.  Diverticulitis may cause severe stomach pain and diarrhea. It may lead to tissue damage in the colon that causes bleeding. The diverticula may also burst (rupture) and cause infected stool to enter other areas of the abdomen.  Complications of diverticulitis can include:  · Bleeding.  · Severe infection.  · Severe pain.  · Rupture (perforation) of the colon.  · Blockage (obstruction) of the colon.    What are the causes?  This condition is caused by stool becoming trapped in the diverticula, which allows bacteria to grow in the diverticula. This leads to inflammation and infection.  What increases the risk?  You are more likely to develop this condition if:  · You have diverticulosis. The risk for diverticulosis increases if:  ? You are overweight or obese.  ? You use tobacco products.  ? You do not get enough exercise.  · You eat a diet that does not include enough fiber. High-fiber foods include fruits, vegetables, beans, nuts, and whole grains.    What are the signs or symptoms?  Symptoms of this condition may include:  · Pain and tenderness in the abdomen. The pain is normally located on the left side of the abdomen, but it may occur in other areas.  · Fever and chills.  · Bloating.  · Cramping.  · Nausea.  · Vomiting.  · Changes in bowel routines.  · Blood in your stool.    How is this diagnosed?  This condition is diagnosed based on:  · Your medical history.  · A physical exam.  · Tests to make sure there is nothing else causing your condition. These tests may include:  ? Blood tests.  ? Urine tests.  ? Imaging tests of the abdomen, including X-rays, ultrasounds, MRIs, or CT scans.    How is this treated?  Most cases of this condition are mild and can be treated at home.  Treatment may include:  · Taking over-the-counter pain medicines.  · Following a clear liquid diet.  · Taking antibiotic medicines by mouth.  · Rest.    More severe cases may need to be treated at a hospital. Treatment may include:  · Not eating or drinking.  · Taking prescription pain medicine.  · Receiving antibiotic medicines through an IV tube.  · Receiving fluids and nutrition through an IV tube.  · Surgery.    When your condition is under control, your health care provider may recommend that you have a colonoscopy. This is an exam to look at the entire large intestine. During the exam, a lubricated, bendable tube is inserted into the anus and then passed into the rectum, colon, and other parts of the large intestine. A colonoscopy can show how severe your diverticula are and whether something else may be causing your symptoms.  Follow these instructions at home:  Medicines  · Take over-the-counter and prescription medicines only as told by your health care provider. These include fiber supplements, probiotics, and stool softeners.  · If you were prescribed an antibiotic medicine, take it as told by your health care provider. Do not stop taking the antibiotic even if you start to feel better.  · Do not drive or use heavy machinery while taking prescription pain medicine.  General instructions  · Follow a full liquid diet or another diet as directed by your health care provider. After your symptoms improve, your health care provider may tell you to change your diet. He or she may recommend that you eat a diet that contains at least 25 g (25 grams) of fiber daily. Fiber makes it easier to pass stool. Healthy sources of fiber include:  ? Berries. One cup contains 4-8 grams of fiber.  ? Beans or lentils. One half cup contains 5-8 grams of fiber.  ? Green vegetables. One cup contains 4 grams of fiber.  · Exercise for at least 30 minutes, 3 times each week. You should exercise hard enough to raise your heart rate and  break a sweat.  · Keep all follow-up visits as told by your health care provider. This is important. You may need a colonoscopy.  Contact a health care provider if:  · Your pain does not improve.  · You have a hard time drinking or eating food.  · Your bowel movements do not return to normal.  Get help right away if:  · Your pain gets worse.  · Your symptoms do not get better with treatment.  · Your symptoms suddenly get worse.  · You have a fever.  · You vomit more than one time.  · You have stools that are bloody, black, or tarry.  Summary  · Diverticulitis is infection or inflammation of small pouches (diverticula) in the colon that form due to a condition called diverticulosis. Diverticula can trap stool (feces) and bacteria, causing infection and inflammation.  · You are at higher risk for this condition if you have diverticulosis and you eat a diet that does not include enough fiber.  · Most cases of this condition are mild and can be treated at home. More severe cases may need to be treated at a hospital.  · When your condition is under control, your health care provider may recommend that you have an exam called a colonoscopy. This exam can show how severe your diverticula are and whether something else may be causing your symptoms.  This information is not intended to replace advice given to you by your health care provider. Make sure you discuss any questions you have with your health care provider.  Document Released: 09/27/2006 Document Revised: 01/20/2018 Document Reviewed: 01/20/2018  [employer] Interactive Patient Education © 2018 [employer]

## 2019-02-13 NOTE — TELEPHONE ENCOUNTER
----- Message from Linda Fraire sent at 2/13/2019  9:09 AM EST -----  Patient has a history of diverticulosis and past 2 days she is having diverticulitis symptoms. Likely secondary to corn from a [employer]. Lower abdominal discomfort accompanied by bloating, constipation and diarrhea. No fever. Pain tolerable but very present. Drinking water and bland diet. Patient requesting refills on flagyl and the second antibiotic. Would prefer keflex instead of Cipro which is typically used but whatever you think is appropriate. Will see you if necessary but patient confident this is a diverticulitis flare up as she knows these symptoms. Thank you.

## 2019-02-13 NOTE — PROGRESS NOTES
Subjective   Carina Zamudio is a 48 y.o. female.     She reports 2 days she started with abdominal discomfort after eating a veggie lasanga. She has a history of diverticulosis/diverticulitis.       Abdominal Pain   This is a new problem. The current episode started in the past 7 days. The problem occurs constantly. The problem has been gradually improving. Pain location: lower abdominal  The pain is at a severity of 4/10. The pain is mild. The quality of the pain is a sensation of fullness (fullness ). Associated symptoms include constipation, diarrhea and myalgias. Pertinent negatives include no anorexia, belching, dysuria, fever, flatus, frequency, hematuria, melena, nausea or vomiting. Associated symptoms comments: Bloating . Treatments tried: bland diet and pushing fluids. The treatment provided mild relief.        The following portions of the patient's history were reviewed and updated as appropriate: allergies, current medications, past family history, past medical history, past social history, past surgical history and problem list.    Review of Systems   Constitutional: Negative for activity change, appetite change, fatigue and fever.   Respiratory: Negative for cough, shortness of breath and wheezing.    Cardiovascular: Negative for chest pain, palpitations and leg swelling.   Gastrointestinal: Positive for abdominal distention, abdominal pain, constipation and diarrhea. Negative for anal bleeding, anorexia, blood in stool, flatus, melena, nausea and vomiting.   Genitourinary: Negative for dysuria, frequency, hematuria and urgency.   Musculoskeletal: Positive for myalgias.       Objective   Physical Exam   Constitutional: She is oriented to person, place, and time. She appears well-developed and well-nourished.   HENT:   Head: Normocephalic.   Nose: Nose normal.   Neck: Carotid bruit is not present. No thyroid mass and no thyromegaly present.   Cardiovascular: Regular rhythm and normal heart sounds.  Exam reveals no S3 and no S4.   No murmur heard.  Pulmonary/Chest: Effort normal and breath sounds normal. She has no decreased breath sounds. She has no wheezes. She has no rhonchi. She has no rales.   Abdominal: Normal appearance and bowel sounds are normal. There is no hepatosplenomegaly. There is tenderness in the right lower quadrant.   Musculoskeletal: She exhibits no edema.   Neurological: She is alert and oriented to person, place, and time. Gait normal.   Skin: Skin is warm and dry.   Psychiatric: She has a normal mood and affect.       Assessment/Plan   Carina was seen today for abdominal pain.    Diagnoses and all orders for this visit:    Lower abdominal pain  -     Urinalysis With Microscopic If Indicated (No Culture) - Urine, Clean Catch; Future  -     Urinalysis With Microscopic If Indicated (No Culture) - Urine, Clean Catch  -     metroNIDAZOLE (FLAGYL) 500 MG tablet; Take 1 tablet by mouth 3 (Three) Times a Day for 7 days.  -     fluconazole (DIFLUCAN) 150 MG tablet; Take 1 tablet by mouth 1 (One) Time for 1 dose. May repeat in 7 days if needed  -     CBC (No Diff); Future  -     Comprehensive Metabolic Panel; Future  -     CBC (No Diff)  -     Comprehensive Metabolic Panel      Urine normal. Will treat for presumed diverticulitis. We did discuss the need for colonoscopy. If symptoms worsen will need to obtain imaging.   She has been advised no alcohol with flagyl. Also advised to clear liquid diet advance as tolerated.

## 2019-02-25 ENCOUNTER — APPOINTMENT (OUTPATIENT)
Dept: WOMENS IMAGING | Facility: HOSPITAL | Age: 49
End: 2019-02-25

## 2019-02-25 ENCOUNTER — OFFICE VISIT (OUTPATIENT)
Dept: INTERNAL MEDICINE | Facility: CLINIC | Age: 49
End: 2019-02-25

## 2019-02-25 VITALS
DIASTOLIC BLOOD PRESSURE: 70 MMHG | WEIGHT: 201 LBS | SYSTOLIC BLOOD PRESSURE: 118 MMHG | BODY MASS INDEX: 36.99 KG/M2 | HEIGHT: 62 IN

## 2019-02-25 DIAGNOSIS — I10 ESSENTIAL HYPERTENSION: Chronic | ICD-10-CM

## 2019-02-25 DIAGNOSIS — E78.2 MIXED HYPERLIPIDEMIA: Primary | Chronic | ICD-10-CM

## 2019-02-25 DIAGNOSIS — M25.561 ACUTE PAIN OF RIGHT KNEE: ICD-10-CM

## 2019-02-25 DIAGNOSIS — R60.0 LEG EDEMA, RIGHT: ICD-10-CM

## 2019-02-25 DIAGNOSIS — E11.9 TYPE 2 DIABETES MELLITUS WITHOUT COMPLICATION, WITHOUT LONG-TERM CURRENT USE OF INSULIN (HCC): Chronic | ICD-10-CM

## 2019-02-25 PROCEDURE — 73560 X-RAY EXAM OF KNEE 1 OR 2: CPT | Performed by: RADIOLOGY

## 2019-02-25 PROCEDURE — 99214 OFFICE O/P EST MOD 30 MIN: CPT | Performed by: INTERNAL MEDICINE

## 2019-02-25 PROCEDURE — 73560 X-RAY EXAM OF KNEE 1 OR 2: CPT | Performed by: INTERNAL MEDICINE

## 2019-02-25 RX ORDER — CELECOXIB 400 MG/1
400 CAPSULE ORAL DAILY
Qty: 30 CAPSULE | Refills: 3 | Status: SHIPPED | OUTPATIENT
Start: 2019-02-25 | End: 2019-07-05

## 2019-02-25 RX ORDER — CELECOXIB 400 MG/1
400 CAPSULE ORAL DAILY
Qty: 30 CAPSULE | Refills: 3 | Status: SHIPPED | OUTPATIENT
Start: 2019-02-25 | End: 2019-02-25 | Stop reason: SDUPTHER

## 2019-02-25 NOTE — PROGRESS NOTES
Subjective   Carina Zamudio is a 48 y.o. female.  Patient presents with acute onset right knee pain and right lower extremity edema for the last several days.  Prior to that she had difficulty with left ankle pain has a history of chronic low back pain.  He does not light any specific injury to her knee noticed a sharp pain in the popliteal fossa with pain radiating down her leg and some amount of edema of her calf.  Concurrently she has a significant medical history for diabetes mellitus type 2, coronary artery disease, hypertension, chronic recurrent low back pain.  Discussed her best options and I recommended that she get an x-ray of her knee today and see an orthopedic surgeon for possible steroid injection of her knee if it is appropriate.  I am also concerned about the possibility of a Baker's cyst versus a DVT of her right lower extremity.  Does have an unusual vascular history that is possibly indicative of a low-grade clotting disorder.  Also when to call in Celebrex 200 mg twice daily to help her with her arthritic pain.    History of Present Illness recent onset knee pain with right lower extremity edema    The following portions of the patient's history were reviewed and updated as appropriate: allergies, current medications, past family history, past medical history, past social history, past surgical history and problem list.    Review of Systems   Constitutional: Negative.    HENT: Negative.    Eyes: Negative.    Respiratory: Negative.    Cardiovascular: Positive for leg swelling.   Gastrointestinal: Negative.    Endocrine: Negative.    Genitourinary: Negative.    Musculoskeletal: Positive for arthralgias.   Skin: Negative.    Allergic/Immunologic: Negative.    Neurological: Negative.    Hematological: Negative.    Psychiatric/Behavioral: Negative.        Objective   Physical Exam   Constitutional: She appears well-developed and well-nourished.   HENT:   Head: Normocephalic and atraumatic.   Eyes:  EOM are normal. Pupils are equal, round, and reactive to light.   Neck: Normal range of motion. Neck supple.   Cardiovascular: Normal rate, regular rhythm and normal heart sounds.   Pulmonary/Chest: Effort normal and breath sounds normal.   Abdominal: Soft. Bowel sounds are normal.   Musculoskeletal:   Pain in the popliteal fossa   Neurological: She is alert.   Skin: Skin is warm and dry.   Psychiatric: She has a normal mood and affect. Her behavior is normal. Judgment and thought content normal.   Nursing note and vitals reviewed.        Assessment/Plan   Carina was seen today for knee pain and ankle pain.    Diagnoses and all orders for this visit:    Mixed hyperlipidemia  Comments:  no change in therapy    Essential hypertension  Comments:  well controlled    Type 2 diabetes mellitus without complication, without long-term current use of insulin (CMS/[facility])  Comments:  no change in therapy    Leg edema, right  Comments:  veinous dopplers  Orders:  -     Duplex Venous Lower Extremity - Right CAR; Future    Acute pain of right knee  Comments:  orthopedic follow up  Orders:  -     Duplex Venous Lower Extremity - Right CAR; Future  -     Ambulatory Referral to Orthopedic Surgery

## 2019-02-26 ENCOUNTER — HOSPITAL ENCOUNTER (OUTPATIENT)
Dept: CARDIOLOGY | Facility: HOSPITAL | Age: 49
Discharge: HOME | End: 2019-02-26
Admitting: INTERNAL MEDICINE

## 2019-02-26 DIAGNOSIS — M25.561 ACUTE PAIN OF RIGHT KNEE: ICD-10-CM

## 2019-02-26 DIAGNOSIS — R60.0 LEG EDEMA, RIGHT: ICD-10-CM

## 2019-02-26 LAB
BH CV LOWER VASCULAR LEFT COMMON FEMORAL AUGMENT: NORMAL
BH CV LOWER VASCULAR LEFT COMMON FEMORAL COMPETENT: NORMAL
BH CV LOWER VASCULAR LEFT COMMON FEMORAL COMPRESS: NORMAL
BH CV LOWER VASCULAR LEFT COMMON FEMORAL PHASIC: NORMAL
BH CV LOWER VASCULAR LEFT COMMON FEMORAL SPONT: NORMAL
BH CV LOWER VASCULAR RIGHT COMMON FEMORAL AUGMENT: NORMAL
BH CV LOWER VASCULAR RIGHT COMMON FEMORAL COMPETENT: NORMAL
BH CV LOWER VASCULAR RIGHT COMMON FEMORAL COMPRESS: NORMAL
BH CV LOWER VASCULAR RIGHT COMMON FEMORAL PHASIC: NORMAL
BH CV LOWER VASCULAR RIGHT COMMON FEMORAL SPONT: NORMAL
BH CV LOWER VASCULAR RIGHT DISTAL FEMORAL COMPRESS: NORMAL
BH CV LOWER VASCULAR RIGHT GASTRONEMIUS COMPRESS: NORMAL
BH CV LOWER VASCULAR RIGHT GREATER SAPH AK COMPRESS: NORMAL
BH CV LOWER VASCULAR RIGHT GREATER SAPH BK COMPRESS: NORMAL
BH CV LOWER VASCULAR RIGHT LESSER SAPH COMPRESS: NORMAL
BH CV LOWER VASCULAR RIGHT MID FEMORAL AUGMENT: NORMAL
BH CV LOWER VASCULAR RIGHT MID FEMORAL COMPETENT: NORMAL
BH CV LOWER VASCULAR RIGHT MID FEMORAL COMPRESS: NORMAL
BH CV LOWER VASCULAR RIGHT MID FEMORAL PHASIC: NORMAL
BH CV LOWER VASCULAR RIGHT MID FEMORAL SPONT: NORMAL
BH CV LOWER VASCULAR RIGHT PERONEAL COMPRESS: NORMAL
BH CV LOWER VASCULAR RIGHT POPLITEAL AUGMENT: NORMAL
BH CV LOWER VASCULAR RIGHT POPLITEAL COMPETENT: NORMAL
BH CV LOWER VASCULAR RIGHT POPLITEAL COMPRESS: NORMAL
BH CV LOWER VASCULAR RIGHT POPLITEAL PHASIC: NORMAL
BH CV LOWER VASCULAR RIGHT POPLITEAL SPONT: NORMAL
BH CV LOWER VASCULAR RIGHT POSTERIOR TIBIAL COMPRESS: NORMAL
BH CV LOWER VASCULAR RIGHT PROXIMAL FEMORAL COMPRESS: NORMAL
BH CV LOWER VASCULAR RIGHT SAPHENOFEMORAL JUNCTION AUGMENT: NORMAL
BH CV LOWER VASCULAR RIGHT SAPHENOFEMORAL JUNCTION COMPETENT: NORMAL
BH CV LOWER VASCULAR RIGHT SAPHENOFEMORAL JUNCTION COMPRESS: NORMAL
BH CV LOWER VASCULAR RIGHT SAPHENOFEMORAL JUNCTION PHASIC: NORMAL
BH CV LOWER VASCULAR RIGHT SAPHENOFEMORAL JUNCTION SPONT: NORMAL

## 2019-02-26 PROCEDURE — 93971 EXTREMITY STUDY: CPT

## 2019-03-04 ENCOUNTER — OFFICE VISIT (OUTPATIENT)
Dept: ORTHOPEDIC SURGERY | Facility: CLINIC | Age: 49
End: 2019-03-04

## 2019-03-04 VITALS — HEIGHT: 62 IN | TEMPERATURE: 97.9 F | BODY MASS INDEX: 37.17 KG/M2 | WEIGHT: 202 LBS

## 2019-03-04 DIAGNOSIS — M17.11 ARTHRITIS OF RIGHT KNEE: Primary | ICD-10-CM

## 2019-03-04 PROCEDURE — 99243 OFF/OP CNSLTJ NEW/EST LOW 30: CPT | Performed by: ORTHOPAEDIC SURGERY

## 2019-03-04 NOTE — PROGRESS NOTES
Patient Name: Carina Zamudio   YOB: 1970  Referring Primary Care Physician: Mariana Gómez MD  BMI: Body mass index is 36.95 kg/m².    Chief Complaint:    Chief Complaint   Patient presents with   • Right Knee - Establish Care, Pain        HPI:     Carina Zamudio is a 48 y.o. female who presents today for evaluation of   Chief Complaint   Patient presents with   • Right Knee - Establish Care, Pain   .  Patient is seen today complaining of right near knee pain.  Is been quite severe and acute going on over the last 2 weeks or so.  There is no trauma that she can remember.  She says that she has anterior tibial tendinitis on the left and perhaps she was favoring it is stiff she had Dopplers etc. and showed no blood clots.  She is the  at [employer].    This problem is new to this examiner.     Subjective   Medications:   Home Medications:  Current Outpatient Medications on File Prior to Visit   Medication Sig   • ALPRAZolam (XANAX) 0.5 MG tablet Take 1 tablet by mouth Daily As Needed for Anxiety.   • aspirin 81 MG tablet Take 81 mg by mouth Daily.   • B Complex Vitamins (VITAMIN-B COMPLEX) tablet Take 1 tablet by mouth Daily.   • budesonide-formoterol (SYMBICORT) 80-4.5 MCG/ACT inhaler Inhale 2 puffs 2 (Two) Times a Day. Rinse mouth with water after each use   • carvedilol (COREG) 3.125 MG tablet Take 1 tablet by mouth 2 (Two) Times a Day.   • celecoxib (CeleBREX) 400 MG capsule Take 1 capsule by mouth Daily.   • clopidogrel (PLAVIX) 75 MG tablet Take 1 tablet by mouth Daily.   • Flaxseed, Linseed, (FLAX SEED OIL PO) Take 1 tablet by mouth Daily.   • hydrochlorothiazide (MICROZIDE) 12.5 MG capsule Take 1 capsule by mouth Daily.   • lansoprazole (PREVACID) 30 MG capsule Take 1 capsule by mouth Daily.   • losartan (COZAAR) 100 MG tablet Take 1 tablet by mouth Daily.   • metFORMIN (GLUCOPHAGE) 500 MG tablet TAKE HALF A TABLET BY MOUTH TWICE A DAY.   • Multiple Vitamin tablet  Take 1 tablet by mouth Daily.   • nicotine (NICODERM CQ) 21 MG/24HR patch Place 1 patch on the skin Daily.   • promethazine-dextromethorphan (PROMETHAZINE-DM) 6.25-15 MG/5ML syrup Take 5 mL by mouth 4 (Four) Times a Day As Needed for Cough (1 tsp (5ml)).   • rosuvastatin (CRESTOR) 40 MG tablet Take 1 tablet by mouth Daily.   • vitamin D (ERGOCALCIFEROL) 66830 units capsule capsule Take 1 capsule by mouth 2 (Two) Times a Week.     No current facility-administered medications on file prior to visit.      Current Medications:  Scheduled Meds:  Continuous Infusions:  No current facility-administered medications for this visit.   PRN Meds:.    I have reviewed the patient's medical history in detail and updated the computerized patient record.  Review and summarization of old records includes:    Past Medical History:   Diagnosis Date   • Abdominal pain, RUQ    • 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     Sept-2012--Sees Dr Serra (Eye)--laser Rx's   • Acute conjunctivitis    • Adrenal cortical adenoma    • Adrenal hypertrophy (CMS/HCC)    • Adrenal nodule (CMS/HCC)    • Anemia    • Anxiety    • Benign essential hypertension    • CAD (coronary artery disease)    • Cellulitis    • Cerebellar infarct (CMS/HCC)    • Chronic bronchitis (CMS/HCC)    • Diabetes mellitus (CMS/HCC)    • DJD (degenerative joint disease), cervical    • DJD (degenerative joint disease), lumbar    • Fatigue    • Gestational diabetes    • Health care maintenance    • HPV (human papilloma virus) infection    • Hyperglycemia     Dr. Xie   • Hyperlipemia    • Hypertension    • Liver nodule    • Morbid obesity (CMS/HCC)    • Multiple thyroid nodules    • Myocardial infarction (CMS/HCC)    • Nontoxic multinodular goiter    • AMY (obstructive sleep apnea)    • Preretinal hemorrhage of left eye    • Proteinuria    • Raynaud phenomenon    • Sleep apnea     Uses CPAP   • Stroke (CMS/HCC)    • Superficial phlebitis of leg    • Vision, loss,  sudden    • Vitamin D deficiency         Past Surgical History:   Procedure Laterality Date   • CORONARY STENT PLACEMENT     • ENDOMETRIAL ABLATION  2012   • EYE SURGERY     • TUBAL ABDOMINAL LIGATION  2012        Social History     Occupational History   • Not on file   Tobacco Use   • Smoking status: Current Every Day Smoker     Packs/day: 1.00   • Smokeless tobacco: Never Used   • Tobacco comment: caffine use   Substance and Sexual Activity   • Alcohol use: Yes     Comment: socially   • Drug use: No   • Sexual activity: Yes     Partners: Male     Birth control/protection: Surgical     Comment: ablation/BTL      Social History     Social History Narrative   • Not on file        Family History   Problem Relation Age of Onset   • Lymphoma Father    • Lung cancer Maternal Grandfather    • Heart attack Maternal Grandfather    • Diabetes Maternal Grandfather    • Heart attack Paternal Grandfather    • Diabetes Paternal Grandfather    • Diabetes Paternal Grandmother    • Diabetes Maternal Grandmother    • Breast cancer Neg Hx    • Ovarian cancer Neg Hx    • Uterine cancer Neg Hx    • Colon cancer Neg Hx        ROS: 14 point review of systems was performed and all other systems were reviewed and are negative except for documented findings in HPI and today's encounter.     Allergies: No Known Allergies  Constitutional:  Denies fever, shaking or chills   Eyes:  Denies change in visual acuity   HENT:  Denies nasal congestion or sore throat   Respiratory:  Denies cough or shortness of breath   Cardiovascular:  Denies chest pain or severe LE edema   GI:  Denies abdominal pain, nausea, vomiting, bloody stools or diarrhea   Musculoskeletal:  Numbness, tingling, pain, or loss of motor function only as noted above in history of present illness.  : Denies painful urination or hematuria  Integument:  Denies rash, lesion or ulceration   Neurologic:  Denies headache or focal weakness  Endocrine:  Denies lymphadenopathy  Psych:   "Denies confusion or change in mental status   Hem:  Denies active bleeding    OBJECTIVE:  Physical Exam:   Temp 97.9 °F (36.6 °C) (Temporal)   Ht 157.5 cm (62\")   Wt 91.6 kg (202 lb)   BMI 36.95 kg/m²     General Appearance:    Alert, cooperative, in no acute distress                  Eyes: conjunctiva clear  ENT: external ears and nose atraumatic  CV: no peripheral edema  Resp: normal respiratory effort  Skin: no rashes or wounds; normal turgor  Psych: mood and affect appropriate  Lymph: no nodes appreciated  Neuro: gross sensation intact  Vascular:  Palpable peripheral pulse in noted extremity  Musculoskeletal Extremities: Emanation today shows swelling crepitation synovitis there is not a Baker's cyst her calf is soft she indicates that she had had posterior pain on the right Mateo's is negative ligamentous exam shows some pseudolaxity hip has decreased internal rotation but noncontributory    Radiology:   Nonweightbearing AP and lateral x-rays of the right knee taken at the hospital do show fairly advanced arthritic changes is hard to evaluate the joint space since her nonweightbearing    Assessment:     ICD-10-CM ICD-9-CM   1. Arthritis of right knee M17.11 716.96        Procedures       Plan: Biomechanics of pertinent body area discussed.  Risks, benefits, alternatives, comparisons, and complications of accepted medicines, injections, recommendations, surgical procedures, and therapies explained and education provided in laymen's terms. Natural history and expected course of this patient's diagnosis discussed along with evaluation of therapies. Questions answered. When appropriate I also discussed proper use of cane, walker, trekking poles.   BMI:  The concept of BMI body mass index and its importance and implications discussed.  BMI suggested to be < 40 or as low as possible. Lifestyle measures for weight loss and how this affects orthopedic condition.  EXERCISES:  Advice on benefits of, and types of " regular/moderate exercise including biomechanical forces involved as it pertains to this complaint.  MEDICATIONS:  Prescription, OTC and Monitoring of Medications per orders to address ortho complaints; Evaluation and discussion of safety, precautions, side effects, and warnings given especially of long term NSAID or steroid therapy.    CONSULT: This Consult is done at the request of a referring provider to whom I will send this report with this rendered opinion.  MEDICAL RECORDS reviewed from other provider(s) for past and current medical history pertinent to this complaint.   To do physical therapy and/or injections should her pain recur      3/4/2019  Ignacio Martinez MD

## 2019-03-07 ENCOUNTER — CLINICAL SUPPORT (OUTPATIENT)
Dept: ORTHOPEDIC SURGERY | Facility: CLINIC | Age: 49
End: 2019-03-07

## 2019-03-07 VITALS — BODY MASS INDEX: 37.17 KG/M2 | TEMPERATURE: 97.8 F | WEIGHT: 202 LBS | HEIGHT: 62 IN

## 2019-03-07 DIAGNOSIS — M17.11 ARTHRITIS OF RIGHT KNEE: ICD-10-CM

## 2019-03-07 DIAGNOSIS — M25.561 ACUTE PAIN OF RIGHT KNEE: Primary | ICD-10-CM

## 2019-03-07 DIAGNOSIS — M76.822 POSTERIOR TIBIAL TENDONITIS, LEFT: ICD-10-CM

## 2019-03-07 PROCEDURE — 73560 X-RAY EXAM OF KNEE 1 OR 2: CPT | Performed by: ORTHOPAEDIC SURGERY

## 2019-03-07 PROCEDURE — 99213 OFFICE O/P EST LOW 20 MIN: CPT | Performed by: ORTHOPAEDIC SURGERY

## 2019-03-07 PROCEDURE — 20610 DRAIN/INJ JOINT/BURSA W/O US: CPT | Performed by: ORTHOPAEDIC SURGERY

## 2019-03-07 RX ORDER — METHYLPREDNISOLONE ACETATE 80 MG/ML
80 INJECTION, SUSPENSION INTRA-ARTICULAR; INTRALESIONAL; INTRAMUSCULAR; SOFT TISSUE
Status: COMPLETED | OUTPATIENT
Start: 2019-03-07 | End: 2019-03-07

## 2019-03-07 RX ORDER — LIDOCAINE HYDROCHLORIDE 20 MG/ML
4 INJECTION, SOLUTION EPIDURAL; INFILTRATION; INTRACAUDAL; PERINEURAL
Status: COMPLETED | OUTPATIENT
Start: 2019-03-07 | End: 2019-03-07

## 2019-03-07 RX ADMIN — METHYLPREDNISOLONE ACETATE 80 MG: 80 INJECTION, SUSPENSION INTRA-ARTICULAR; INTRALESIONAL; INTRAMUSCULAR; SOFT TISSUE at 08:48

## 2019-03-07 RX ADMIN — LIDOCAINE HYDROCHLORIDE 4 ML: 20 INJECTION, SOLUTION EPIDURAL; INFILTRATION; INTRACAUDAL; PERINEURAL at 08:48

## 2019-03-07 NOTE — PROGRESS NOTES
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     Allergies: No Known Allergies    Medications:   Home Medications:  Current Outpatient Medications on File Prior to Visit   Medication Sig   • ALPRAZolam (XANAX) 0.5 MG tablet Take 1 tablet by mouth Daily As Needed for Anxiety.   • aspirin 81 MG tablet Take 81 mg by mouth Daily.   • B Complex Vitamins (VITAMIN-B COMPLEX) tablet Take 1 tablet by mouth Daily.   • budesonide-formoterol (SYMBICORT) 80-4.5 MCG/ACT inhaler Inhale 2 puffs 2 (Two) Times a Day. Rinse mouth with water after each use   • carvedilol (COREG) 3.125 MG tablet Take 1 tablet by mouth 2 (Two) Times a Day.   • celecoxib (CeleBREX) 400 MG capsule Take 1 capsule by mouth Daily.   • clopidogrel (PLAVIX) 75 MG tablet Take 1 tablet by mouth Daily.   • Flaxseed, Linseed, (FLAX SEED OIL PO) Take 1 tablet by mouth Daily.   • hydrochlorothiazide (MICROZIDE) 12.5 MG capsule Take 1 capsule by mouth Daily.   • lansoprazole (PREVACID) 30 MG capsule Take 1 capsule by mouth Daily.   • losartan (COZAAR) 100 MG tablet Take 1 tablet by mouth Daily.   • metFORMIN (GLUCOPHAGE) 500 MG tablet TAKE HALF A TABLET BY MOUTH TWICE A DAY.   • Multiple Vitamin tablet Take 1 tablet by mouth Daily.   • nicotine (NICODERM CQ) 21 MG/24HR patch Place 1 patch on the skin Daily.   • promethazine-dextromethorphan (PROMETHAZINE-DM) 6.25-15 MG/5ML syrup Take 5 mL by mouth 4 (Four) Times a Day As Needed for Cough (1 tsp (5ml)).   • rosuvastatin (CRESTOR) 40 MG tablet Take 1 tablet by mouth Daily.   • vitamin D (ERGOCALCIFEROL) 47314 units capsule capsule Take 1 capsule by mouth 2  (Two) Times a Week.     No current facility-administered medications on file prior to visit.      Current Medications:  Scheduled Meds:  Continuous Infusions:  No current facility-administered medications for this visit.   PRN Meds:.    I have reviewed the patient's medical history in detail and updated the computerized patient record.  Review and summarization of old records include:    Past Medical History:   Diagnosis Date   • Abdominal pain, RUQ    • 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     Sept-2012--Sees Dr Serra (Eye)--laser Rx's   • Acute conjunctivitis    • Adrenal cortical adenoma    • Adrenal hypertrophy (CMS/HCC)    • Adrenal nodule (CMS/HCC)    • Anemia    • Anxiety    • Benign essential hypertension    • CAD (coronary artery disease)    • Cellulitis    • Cerebellar infarct (CMS/HCC)    • Chronic bronchitis (CMS/HCC)    • Diabetes mellitus (CMS/HCC)    • DJD (degenerative joint disease), cervical    • DJD (degenerative joint disease), lumbar    • Fatigue    • Gestational diabetes    • Health care maintenance    • HPV (human papilloma virus) infection    • Hyperglycemia     Dr. Xie   • Hyperlipemia    • Hypertension    • Liver nodule    • Morbid obesity (CMS/HCC)    • Multiple thyroid nodules    • Myocardial infarction (CMS/HCC)    • Nontoxic multinodular goiter    • AMY (obstructive sleep apnea)    • Preretinal hemorrhage of left eye    • Proteinuria    • Raynaud phenomenon    • Sleep apnea     Uses CPAP   • Stroke (CMS/HCC)    • Superficial phlebitis of leg    • Vision, loss, sudden    • Vitamin D deficiency         Past Surgical History:   Procedure Laterality Date   • CORONARY STENT PLACEMENT     • ENDOMETRIAL ABLATION  2012   • EYE SURGERY     • TUBAL ABDOMINAL LIGATION  2012        Social History     Occupational History   • Not on file   Tobacco Use   • Smoking status: Current Every Day Smoker     Packs/day: 1.00   • Smokeless tobacco: Never Used   • Tobacco comment: caffine  "use   Substance and Sexual Activity   • Alcohol use: Yes     Comment: socially   • Drug use: No   • Sexual activity: Yes     Partners: Male     Birth control/protection: Surgical     Comment: ablation/BTL    Social History     Social History Narrative   • Not on file        Family History   Problem Relation Age of Onset   • Lymphoma Father    • Lung cancer Maternal Grandfather    • Heart attack Maternal Grandfather    • Diabetes Maternal Grandfather    • Heart attack Paternal Grandfather    • Diabetes Paternal Grandfather    • Diabetes Paternal Grandmother    • Diabetes Maternal Grandmother    • Breast cancer Neg Hx    • Ovarian cancer Neg Hx    • Uterine cancer Neg Hx    • Colon cancer Neg Hx        ROS: 14 point review of systems was performed and was negative except for documented findings in HPI and today's encounter.     Allergies: No Known Allergies  Constitutional:  Denies fever, shaking or chills   Eyes:  Denies change in visual acuity   HENT:  Denies nasal congestion or sore throat   Respiratory:  Denies cough or shortness of breath   Cardiovascular:  Denies chest pain or severe LE edema   GI:  Denies abdominal pain, nausea, vomiting, bloody stools or diarrhea   Musculoskeletal:  Numbness, tingling, or loss of motor function only as noted above in history of present illness.  : Denies painful urination or hematuria  Integument:  Denies rash, lesion or ulceration   Neurologic:  Denies headache or focal weakness  Endocrine:  Denies lymphadenopathy  Psych:  Denies confusion or change in mental status   Hem:  Denies active bleeding    Physical Exam:  Wt Readings from Last 3 Encounters:   03/07/19 91.6 kg (202 lb)   03/04/19 91.6 kg (202 lb)   02/25/19 91.2 kg (201 lb)     Ht Readings from Last 3 Encounters:   03/07/19 157.5 cm (62\")   03/04/19 157.5 cm (62\")   02/25/19 157.5 cm (62.01\")     Body mass index is 36.95 kg/m².  Facility age limit for growth percentiles is 20 years.  Vitals:    03/07/19 0846 "   Temp: 97.8 °F (36.6 °C)     Vital Signs:  reviewed  Constitutional: Awake alert and oriented x3, well developed, no acute distress, non-toxic appearance.  EYES: symmetric, sclera clear  ENT:  Normocephalic, Atraumatic.   Respiratory:  No respiratory distress, No wheezing  CV: pulse regular, no palpitations or pallor.  GI:  Abdomen soft, non-tender.   Vascular:  Intact distal pulses, No cyanosis, no signs or symptoms of DVT.  Neurologic: Sensation grossly intact to the involved extremity, No focal deficits noted.   Neck: No tenderness, Supple.  Integument: warm, dry, no ulcerations.   Psychiatric:  Oriented, no pathological affect.  Musculoskeletal:    Affected knee(s):  Painful gait with a subtle limp, positive for synovitis, swelling, joint effusion with crepitation.  Lachman negative  Posterior drawer negative  Mateo's negative  Patellofemoral grind +  Sensation grossly intact to light touch throughout the lower extremity  Skin is intact  Distal pulses are palpable  No signs or symptoms of DVT        Diagnostic Data:     Imaging was done today and discussed with the patient:    Indication: pain related symptoms,  Views: 3V AP, LAT & 40 degree PA right knee(s)   Findings: advanced arthritis  Comparison views: viewed last xray done in the hospital.     Procedure:  Large Joint Arthrocentesis: R knee  Date/Time: 3/7/2019 8:48 AM  Consent given by: patient  Site marked: site marked  Timeout: Immediately prior to procedure a time out was called to verify the correct patient, procedure, equipment, support staff and site/side marked as required   Supporting Documentation  Indications: pain and joint swelling   Procedure Details  Location: knee - R knee  Preparation: Patient was prepped and draped in the usual sterile fashion  Needle size: 22 G  Approach: anterolateral  Medications administered: 80 mg methylPREDNISolone acetate 80 MG/ML; 4 mL lidocaine PF 2% 2 %  Patient tolerance: patient tolerated the procedure well  with no immediate complications          Assessment:     ICD-10-CM ICD-9-CM   1. Acute pain of right knee M25.561 719.46           Plan: Is to proceed with injection  Follow up as indicated.  Ice, elevate, and rest as needed.  Additional interventions include: Biomechanics of pertinent body area discussed.  Risks, benefits, alternatives, comparisons, and complications of accepted medicines, injections, recommendations, surgical procedures, and therapies explained and education provided in laymen's terms. Natural history and expected course of this patient's diagnosis discussed along with evaluation of therapies. Questions answered. When appropriate I also discussed proper use of cane, walker, trekking poles.   Cortisone Injection. See procedure note.    3/7/2019

## 2019-03-07 NOTE — PATIENT INSTRUCTIONS
Achilles Tendinitis With Rehab  Achilles tendinitis is a disorder of the Achilles tendon. The Achilles tendon connects the large calf muscles (Gastrocnemius and Soleus) to the heel bone (calcaneus). This tendon is sometimes called the heel cord. It is important for pushing-off and standing on your toes and is important for walking, running, or jumping. Tendinitis is often caused by overuse and repetitive microtrauma.  SYMPTOMS  · Pain, tenderness, swelling, warmth, and redness may occur over the Achilles tendon even at rest.  · Pain with pushing off, or flexing or extending the ankle.  · Pain that is worsened after or during activity.  CAUSES   · Overuse sometimes seen with rapid increase in exercise programs or in sports requiring running and jumping.  · Poor physical conditioning (strength and flexibility or endurance).  · Running sports, especially training running down hills.  · Inadequate warm-up before practice or play or failure to stretch before participation.  · Injury to the tendon.  PREVENTION   · Warm up and stretch before practice or competition.  · Allow time for adequate rest and recovery between practices and competition.  · Keep up conditioning.    Keep up ankle and leg flexibility.    Improve or keep muscle strength and endurance.    Improve cardiovascular fitness.  · Use proper technique.  · Use proper equipment (shoes, skates).  · To help prevent recurrence, taping, protective strapping, or an adhesive bandage may be recommended for several weeks after healing is complete.  PROGNOSIS   · Recovery may take weeks to several months to heal.  · Longer recovery is expected if symptoms have been prolonged.  · Recovery is usually quicker if the inflammation is due to a direct blow as compared with overuse or sudden strain.  RELATED COMPLICATIONS   · Healing time will be prolonged if the condition is not correctly treated. The injury must be given plenty of time to heal.  · Symptoms can reoccur if  activity is resumed too soon.  · Untreated, tendinitis may increase the risk of tendon rupture requiring additional time for recovery and possibly surgery.  TREATMENT   · The first treatment consists of rest anti-inflammatory medication, and ice to relieve the pain.  · Stretching and strengthening exercises after resolution of pain will likely help reduce the risk of recurrence. Referral to a physical therapist or  for further evaluation and treatment may be helpful.  · A walking boot or cast may be recommended to rest the Achilles tendon. This can help break the cycle of inflammation and microtrauma.  · Arch supports (orthotics) may be prescribed or recommended by your caregiver as an adjunct to therapy and rest.  · Surgery to remove the inflamed tendon lining or degenerated tendon tissue is rarely necessary and has shown less than predictable results.  MEDICATION   · Nonsteroidal anti-inflammatory medications, such as aspirin and ibuprofen, may be used for pain and inflammation relief. Do not take within 7 days before surgery. Take these as directed by your caregiver. Contact your caregiver immediately if any bleeding, stomach upset, or signs of allergic reaction occur. Other minor pain relievers, such as acetaminophen, may also be used.  · Pain relievers may be prescribed as necessary by your caregiver. Do not take prescription pain medication for longer than 4 to 7 days. Use only as directed and only as much as you need.  · Cortisone injections are rarely indicated. Cortisone injections may weaken tendons and predispose to rupture. It is better to give the condition more time to heal than to use them.  HEAT AND COLD  · Cold is used to relieve pain and reduce inflammation for acute and chronic Achilles tendinitis. Cold should be applied for 10 to 15 minutes every 2 to 3 hours for inflammation and pain and immediately after any activity that aggravates your symptoms. Use ice packs or an ice  massage.  · Heat may be used before performing stretching and strengthening activities prescribed by your caregiver. Use a heat pack or a warm soak.  SEEK MEDICAL CARE IF:  · Symptoms get worse or do not improve in 2 weeks despite treatment.  · New, unexplained symptoms develop. Drugs used in treatment may produce side effects.  EXERCISES  RANGE OF MOTION (ROM) AND STRETCHING EXERCISES - Achilles Tendinitis   These exercises may help you when beginning to rehabilitate your injury. Your symptoms may resolve with or without further involvement from your physician, physical therapist or . While completing these exercises, remember:   · Restoring tissue flexibility helps normal motion to return to the joints. This allows healthier, less painful movement and activity.  · An effective stretch should be held for at least 30 seconds.  · A stretch should never be painful. You should only feel a gentle lengthening or release in the stretched tissue.  STRETCH - Gastroc, Standing   · Place hands on wall.  · Extend right / left leg, keeping the front knee somewhat bent.  · Slightly point your toes inward on your back foot.  · Keeping your right / left heel on the floor and your knee straight, shift your weight toward the wall, not allowing your back to arch.  · You should feel a gentle stretch in the right / left calf. Hold this position for __________ seconds.  Repeat __________ times. Complete this stretch __________ times per day.  STRETCH - Soleus, Standing   · Place hands on wall.  · Extend right / left leg, keeping the other knee somewhat bent.  · Slightly point your toes inward on your back foot.  · Keep your right / left heel on the floor, bend your back knee, and slightly shift your weight over the back leg so that you feel a gentle stretch deep in your back calf.  · Hold this position for __________ seconds.  Repeat __________ times. Complete this stretch __________ times per day.  STRETCH -  Gastrocsoleus, Standing   Note: This exercise can place a lot of stress on your foot and ankle. Please complete this exercise only if specifically instructed by your caregiver.   · Place the ball of your right / left foot on a step, keeping your other foot firmly on the same step.  · Hold on to the wall or a rail for balance.  · Slowly lift your other foot, allowing your body weight to press your heel down over the edge of the step.  · You should feel a stretch in your right / left calf.  · Hold this position for __________ seconds.  · Repeat this exercise with a slight bend in your knee.  Repeat __________ times. Complete this stretch __________ times per day.   STRENGTHENING EXERCISES - Achilles Tendinitis  These exercises may help you when beginning to rehabilitate your injury. They may resolve your symptoms with or without further involvement from your physician, physical therapist or . While completing these exercises, remember:   · Muscles can gain both the endurance and the strength needed for everyday activities through controlled exercises.  · Complete these exercises as instructed by your physician, physical therapist or . Progress the resistance and repetitions only as guided.  · You may experience muscle soreness or fatigue, but the pain or discomfort you are trying to eliminate should never worsen during these exercises. If this pain does worsen, stop and make certain you are following the directions exactly. If the pain is still present after adjustments, discontinue the exercise until you can discuss the trouble with your clinician.  STRENGTH - Plantar-flexors   · Sit with your right / left leg extended. Holding onto both ends of a rubber exercise band/tubing, loop it around the ball of your foot. Keep a slight tension in the band.  · Slowly push your toes away from you, pointing them downward.  · Hold this position for __________ seconds. Return slowly, controlling the  tension in the band/tubing.  Repeat __________ times. Complete this exercise __________ times per day.   STRENGTH - Plantar-flexors   · Stand with your feet shoulder width apart. Steady yourself with a wall or table using as little support as needed.  · Keeping your weight evenly spread over the width of your feet, rise up on your toes.*  · Hold this position for __________ seconds.  Repeat __________ times. Complete this exercise __________ times per day.   *If this is too easy, shift your weight toward your right / left leg until you feel challenged. Ultimately, you may be asked to do this exercise with your right / left foot only.  STRENGTH - Plantar-flexors, Eccentric   Note: This exercise can place a lot of stress on your foot and ankle. Please complete this exercise only if specifically instructed by your caregiver.   · Place the balls of your feet on a step. With your hands, use only enough support from a wall or rail to keep your balance.  · Keep your knees straight and rise up on your toes.  · Slowly shift your weight entirely to your right / left toes and  your opposite foot. Gently and with controlled movement, lower your weight through your right / left foot so that your heel drops below the level of the step. You will feel a slight stretch in the back of your calf at the end position.  · Use the healthy leg to help rise up onto the balls of both feet, then lower weight only on the right / left leg again. Build up to 15 repetitions. Then progress to 3 consecutive sets of 15 repetitions.*  · After completing the above exercise, complete the same exercise with a slight knee bend (about 30 degrees). Again, build up to 15 repetitions. Then progress to 3 consecutive sets of 15 repetitions.*  Perform this exercise __________ times per day.   *When you easily complete 3 sets of 15, your physician, physical therapist or  may advise you to add resistance by wearing a backpack filled with  additional weight.  STRENGTH - Plantar Flexors, Seated   · Sit on a chair that allows your feet to rest flat on the ground. If necessary, sit at the edge of the chair.  · Keeping your toes firmly on the ground, lift your right / left heel as far as you can without increasing any discomfort in your ankle.  Repeat __________ times. Complete this exercise __________ times a day.  *If instructed by your physician, physical therapist or , you may add ____________________ of resistance by placing a weighted object on your right / left knee.     This information is not intended to replace advice given to you by your health care provider. Make sure you discuss any questions you have with your health care provider.     Document Released: 07/19/2006 Document Revised: 01/08/2016 Document Reviewed: 08/23/2016  [employer]vier Interactive Patient Education ©2017 [employer]

## 2019-03-21 ENCOUNTER — TELEPHONE (OUTPATIENT)
Dept: ORTHOPEDIC SURGERY | Facility: CLINIC | Age: 49
End: 2019-03-21

## 2019-03-21 DIAGNOSIS — M25.561 ACUTE PAIN OF RIGHT KNEE: Primary | ICD-10-CM

## 2019-03-21 NOTE — TELEPHONE ENCOUNTER
Patient was seen 3/04/19 & 3/07/19 for RT KNEE pain, given MARCOS INJ on 3/07/19 - patent asking if MRI can be ordered for RT Knee or if patient needs to be seen again 1st? Thanks /srh

## 2019-03-26 ENCOUNTER — TELEPHONE (OUTPATIENT)
Dept: ORTHOPEDIC SURGERY | Facility: CLINIC | Age: 49
End: 2019-03-26

## 2019-03-29 RX ORDER — CEPHALEXIN 500 MG/1
500 CAPSULE ORAL 4 TIMES DAILY
Qty: 40 CAPSULE | Refills: 0 | Status: SHIPPED | OUTPATIENT
Start: 2019-03-29 | End: 2019-05-02

## 2019-04-04 ENCOUNTER — HOSPITAL ENCOUNTER (OUTPATIENT)
Dept: MRI IMAGING | Facility: HOSPITAL | Age: 49
Discharge: HOME | End: 2019-04-04
Admitting: NURSE PRACTITIONER

## 2019-04-04 ENCOUNTER — TELEPHONE (OUTPATIENT)
Dept: OBSTETRICS AND GYNECOLOGY | Age: 49
End: 2019-04-04

## 2019-04-04 ENCOUNTER — OFFICE VISIT (OUTPATIENT)
Dept: OBSTETRICS AND GYNECOLOGY | Age: 49
End: 2019-04-04

## 2019-04-04 VITALS
WEIGHT: 196 LBS | HEIGHT: 62 IN | SYSTOLIC BLOOD PRESSURE: 130 MMHG | BODY MASS INDEX: 36.07 KG/M2 | DIASTOLIC BLOOD PRESSURE: 80 MMHG

## 2019-04-04 DIAGNOSIS — N76.0 ACUTE VAGINITIS: ICD-10-CM

## 2019-04-04 DIAGNOSIS — M25.561 ACUTE PAIN OF RIGHT KNEE: ICD-10-CM

## 2019-04-04 DIAGNOSIS — Z77.21 EXPOSURE TO BLOOD OR BODY FLUID: ICD-10-CM

## 2019-04-04 DIAGNOSIS — N90.7 LABIAL CYST: Primary | ICD-10-CM

## 2019-04-04 PROCEDURE — 99213 OFFICE O/P EST LOW 20 MIN: CPT | Performed by: NURSE PRACTITIONER

## 2019-04-04 PROCEDURE — 73721 MRI JNT OF LWR EXTRE W/O DYE: CPT

## 2019-04-04 NOTE — PROGRESS NOTES
"Gildardo Zamudio is a 48 y.o. female is being seen today for   Chief Complaint   Patient presents with   • Follow-up     Pt c/o vulvar lesion   .    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  She has felt a hard area in her labia the past few days and has a history of HPV.  She wanted to make sure she was not having an outbreak.  No change in discharge or odor    The following portions of the patient's history were reviewed and updated as appropriate: allergies, current medications, past family history, past medical history, past social history, past surgical history and problem list.    /80   Ht 157.5 cm (62\")   Wt 88.9 kg (196 lb)   BMI 35.85 kg/m²       Review of Systems   Constitutional: Negative.    HENT: Negative.    Eyes: Negative.    Respiratory: Negative.    Cardiovascular: Negative.    Gastrointestinal: Negative.    Endocrine: Negative.    Genitourinary: Negative.         Vaginal irritation   Musculoskeletal: Negative.    Skin: Negative.    Allergic/Immunologic: Negative.    Neurological: Negative.    Hematological: Negative.    Psychiatric/Behavioral: Negative.        Objective   Physical Exam   Constitutional: She is oriented to person, place, and time. She appears well-developed and well-nourished.   Genitourinary: Vagina normal and uterus normal.       Uterus is not tender. Cervix exhibits no motion tenderness, no discharge and no friability.   Genitourinary Comments: Small sebaceous cyst in right labia, pea sized.  No redness or pain.   No drainage.    Area on inner right thigh with slight redness and swelling, patient states it is much improved.   Neurological: She is " alert and oriented to person, place, and time.   Skin: Skin is warm and dry.   Psychiatric: She has a normal mood and affect.         Assessment/Plan   Carina was seen today for follow-up.    Diagnoses and all orders for this visit:    Labial cyst    Exposure to blood or body fluid  -     NuSwab VG+ - Swab, Vagina    Acute vaginitis  -     NuSwab VG+ - Swab, Vagina        No sign of HPV/HSV.  Encouraged completion of abx for boil on inner thigh.  Warm compresses to labial cyst and boil QID.  Call with any increase in symptoms or concerns.  Vaginal culture sent as well.

## 2019-04-04 NOTE — TELEPHONE ENCOUNTER
Patient wants to be seen for vaginal irritation and vulvar lesion.Please advise. She can be reached @ [phone].

## 2019-04-08 ENCOUNTER — OFFICE VISIT (OUTPATIENT)
Dept: ORTHOPEDIC SURGERY | Facility: CLINIC | Age: 49
End: 2019-04-08

## 2019-04-08 ENCOUNTER — TELEPHONE (OUTPATIENT)
Dept: OBSTETRICS AND GYNECOLOGY | Age: 49
End: 2019-04-08

## 2019-04-08 VITALS — TEMPERATURE: 97.6 F | WEIGHT: 198 LBS | HEIGHT: 62 IN | BODY MASS INDEX: 36.44 KG/M2

## 2019-04-08 DIAGNOSIS — M17.11 ARTHRITIS OF RIGHT KNEE: ICD-10-CM

## 2019-04-08 DIAGNOSIS — S83.241A ACUTE MEDIAL MENISCUS TEAR OF RIGHT KNEE, INITIAL ENCOUNTER: Primary | ICD-10-CM

## 2019-04-08 LAB
A VAGINAE DNA VAG QL NAA+PROBE: NORMAL SCORE
BVAB2 DNA VAG QL NAA+PROBE: NORMAL SCORE
C ALBICANS DNA VAG QL NAA+PROBE: NEGATIVE
C GLABRATA DNA VAG QL NAA+PROBE: NEGATIVE
C TRACH RRNA SPEC QL NAA+PROBE: NEGATIVE
MEGA1 DNA VAG QL NAA+PROBE: NORMAL SCORE
N GONORRHOEA RRNA SPEC QL NAA+PROBE: NEGATIVE
T VAGINALIS RRNA SPEC QL NAA+PROBE: NEGATIVE

## 2019-04-08 PROCEDURE — 99214 OFFICE O/P EST MOD 30 MIN: CPT | Performed by: ORTHOPAEDIC SURGERY

## 2019-04-08 NOTE — TELEPHONE ENCOUNTER
----- Message from Bridget Connelly MA sent at 4/8/2019  4:32 PM EDT -----      ----- Message -----  From: Snehal Kennedy PA  Sent: 4/8/2019  11:06 AM  To: Kyleigh Buchanan MA    Let her know her vaginal swab is neg for BV/yeast and STD's

## 2019-04-08 NOTE — PROGRESS NOTES
"Patient Name: Carina Zamudio   YOB: 1970  Referring Primary Care Physician: Mariana Gómez MD  BMI: Body mass index is 36.21 kg/m².    Chief Complaint:    Chief Complaint   Patient presents with   • Right Knee - Follow-up, Pain, Results        HPI:     Carina Zamudio is a 48 y.o. female who presents today for evaluation of   Chief Complaint   Patient presents with   • Right Knee - Follow-up, Pain, Results   .  Rosa follows up today on MRI of her right knee.  Her knee is \"buckling\" and it continues to hurt and is stiff and swollen we injected it about 3 weeks ago and it is somewhat better but it is hard to walk on it he had an MRI    This problem is not new to this examiner.     Subjective   Medications:   Home Medications:  Current Outpatient Medications on File Prior to Visit   Medication Sig   • ALPRAZolam (XANAX) 0.5 MG tablet Take 1 tablet by mouth Daily As Needed for Anxiety.   • aspirin 81 MG tablet Take 81 mg by mouth Daily. INSTRUCTED PT FOLLOW MD INSTRUCTIONS   • B Complex Vitamins (VITAMIN-B COMPLEX) tablet Take 1 tablet by mouth Daily. HOLD PRIOR TO SURG   • budesonide-formoterol (SYMBICORT) 80-4.5 MCG/ACT inhaler Inhale 2 puffs 2 (Two) Times a Day. Rinse mouth with water after each use (Patient taking differently: Inhale 2 puffs 2 (Two) Times a Day As Needed. Rinse mouth with water after each use)   • carvedilol (COREG) 3.125 MG tablet Take 1 tablet by mouth 2 (Two) Times a Day.   • celecoxib (CeleBREX) 400 MG capsule Take 1 capsule by mouth Daily. (Patient taking differently: Take 400 mg by mouth Daily. INSTRUCTED PT TO FOLLOW MD INSTRUCTIONS)   • clopidogrel (PLAVIX) 75 MG tablet Take 1 tablet by mouth Daily. (Patient taking differently: Take 75 mg by mouth Daily. PT HOLDING 5-7 DAYS PER MD ORDER)   • Flaxseed, Linseed, (FLAX SEED OIL PO) Take 1 tablet by mouth 2 (Two) Times a Day. HOLD PRIOR TO SURG   • hydrochlorothiazide (MICROZIDE) 12.5 MG capsule Take 1 capsule by mouth " Daily.   • lansoprazole (PREVACID) 30 MG capsule Take 1 capsule by mouth Daily.   • losartan (COZAAR) 100 MG tablet Take 1 tablet by mouth Daily.   • metFORMIN (GLUCOPHAGE) 500 MG tablet TAKE HALF A TABLET BY MOUTH TWICE A DAY.   • Multiple Vitamin tablet Take 1 tablet by mouth Daily. HOLD PRIOR TO SURG   • nicotine (NICODERM CQ) 21 MG/24HR patch Place 1 patch on the skin Daily.   • rosuvastatin (CRESTOR) 40 MG tablet Take 1 tablet by mouth Daily.   • vitamin D (ERGOCALCIFEROL) 47809 units capsule capsule Take 1 capsule by mouth 2 (Two) Times a Week.     No current facility-administered medications on file prior to visit.      Current Medications:  Scheduled Meds:  Continuous Infusions:  No current facility-administered medications for this visit.   PRN Meds:.    I have reviewed the patient's medical history in detail and updated the computerized patient record.  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 Xie   • Hyperlipemia    • Hypertension    • Liver nodule    • Morbid obesity (CMS/HCC)    • Multiple thyroid nodules    • Myocardial infarction (CMS/HCC) 2015   • Nontoxic multinodular goiter    • Preretinal hemorrhage of left eye    • Proteinuria     CHRONIC   • Raynaud phenomenon    • Right knee pain    • Sleep apnea      Uses CPAP   • Stroke (CMS/HCC)    • Vitamin D deficiency         Past Surgical History:   Procedure Laterality Date   • CATARACT EXTRACTION, BILATERAL     • CORONARY STENT PLACEMENT     • ENDOMETRIAL ABLATION  2012   • EYE SURGERY     • TUBAL ABDOMINAL LIGATION  2012        Social History     Occupational History   • Not on file   Tobacco Use   • Smoking status: Current Every Day Smoker     Packs/day: 1.00   • Smokeless tobacco: Never Used   Substance and Sexual Activity   • Alcohol use: Yes     Comment: socially   • Drug use: No   • Sexual activity: Not on file      Social History     Social History Narrative   • Not on file        Family History   Problem Relation Age of Onset   • Lymphoma Father    • Lung cancer Maternal Grandfather    • Heart attack Maternal Grandfather    • Diabetes Maternal Grandfather    • Heart attack Paternal Grandfather    • Diabetes Paternal Grandfather    • Diabetes Paternal Grandmother    • Diabetes Maternal Grandmother    • Breast cancer Neg Hx    • Ovarian cancer Neg Hx    • Uterine cancer Neg Hx    • Colon cancer Neg Hx    • Malig Hyperthermia Neg Hx        ROS: 14 point review of systems was performed and all other systems were reviewed and are negative except for documented findings in HPI and today's encounter.     Allergies: No Known Allergies  Constitutional:  Denies fever, shaking or chills   Eyes:  Denies change in visual acuity   HENT:  Denies nasal congestion or sore throat   Respiratory:  Denies cough or shortness of breath   Cardiovascular:  Denies chest pain or severe LE edema   GI:  Denies abdominal pain, nausea, vomiting, bloody stools or diarrhea   Musculoskeletal:  Numbness, tingling, pain, or loss of motor function only as noted above in history of present illness.  : Denies painful urination or hematuria  Integument:  Denies rash, lesion or ulceration   Neurologic:  Denies headache or focal weakness  Endocrine:  Denies lymphadenopathy  Psych:  Denies confusion or  "change in mental status   Hem:  Denies active bleeding    OBJECTIVE:  Physical Exam:   Temp 97.6 °F (36.4 °C) (Temporal)   Ht 157.5 cm (62\")   Wt 89.8 kg (198 lb)   BMI 36.21 kg/m²     General Appearance:    Alert, cooperative, in no acute distress                  Eyes: conjunctiva clear  ENT: external ears and nose atraumatic  CV: no peripheral edema  Resp: normal respiratory effort  Skin: no rashes or wounds; normal turgor  Psych: mood and affect appropriate  Lymph: no nodes appreciated  Neuro: gross sensation intact  Vascular:  Palpable peripheral pulse in noted extremity  Musculoskeletal Extremities: Hand today shows medial joint line tenderness some synovitis and swelling Mateo's causes some discomfort    Radiology:   AP lateral 4 degree PA x-rays for knee pain  THAT WERE DONE BEFORE IN THE OFFICE before show moderate arthritis and narrowing medially she had an MRI of her right knee which show some degenerative changes the medial and patellofemoral compartments and a large complex medial meniscus tear involving the body and posterior horn joint effusion small Baker's cyst and synovial thickening no subcortical stress fracture seen    Assessment:     ICD-10-CM ICD-9-CM   1. Acute medial meniscus tear of right knee, initial encounter S83.241A 836.0   2. Arthritis of right knee M17.11 716.96        Procedures       Plan: Biomechanics of pertinent body area discussed.  Risks, benefits, alternatives, comparisons, and complications of accepted medicines, injections, recommendations, surgical procedures, and therapies explained and education provided in laymen's terms. Natural history and expected course of this patient's diagnosis discussed along with evaluation of therapies. Questions answered. When appropriate I also discussed proper use of cane, walker, trekking poles. I went over the different options with her and she is hesitant to try surgery.  I explained to her that it may help her symptoms especially " with the large meniscal tear over there is a chance she could still have some pain from the arthritic change present the knee she wants to give it some more time and see how that does I explained to her that the buckling could either be catching or locking from the meniscus or could be some weakness from the inflammation suggest that while she is thinking we try physical therapy and encourage her to do a home program with it where she is arthroscopy we could set it up discussed with her now before some of the risk benefits and complications if she wanted to be seen first we could certainly see her back thank you      5/10/2019

## 2019-04-22 ENCOUNTER — TELEPHONE (OUTPATIENT)
Dept: ORTHOPEDIC SURGERY | Facility: CLINIC | Age: 49
End: 2019-04-22

## 2019-04-22 ENCOUNTER — PREP FOR SURGERY (OUTPATIENT)
Dept: OTHER | Facility: HOSPITAL | Age: 49
End: 2019-04-22

## 2019-04-22 DIAGNOSIS — S83.241D ACUTE MEDIAL MENISCUS TEAR OF RIGHT KNEE, SUBSEQUENT ENCOUNTER: Primary | ICD-10-CM

## 2019-04-22 RX ORDER — CEFAZOLIN SODIUM 2 G/100ML
2 INJECTION, SOLUTION INTRAVENOUS ONCE
Status: CANCELLED | OUTPATIENT
Start: 2019-04-22 | End: 2019-04-22

## 2019-04-22 NOTE — TELEPHONE ENCOUNTER
PT CALLED SAID INSTEAD OF DOING PHYSICAL THERAPY SHE WANTS TO GO AHEAD AND DO SURGERY AND I TOLD HER I WOULD HAVE ABDULKADIR CALL HER [phone] OR HER WORK NUMBER -[id number]

## 2019-04-25 PROBLEM — S83.241A ACUTE MEDIAL MENISCUS TEAR OF RIGHT KNEE: Status: ACTIVE | Noted: 2019-04-25

## 2019-05-02 ENCOUNTER — APPOINTMENT (OUTPATIENT)
Dept: PREADMISSION TESTING | Facility: HOSPITAL | Age: 49
End: 2019-05-02

## 2019-05-02 VITALS
RESPIRATION RATE: 16 BRPM | DIASTOLIC BLOOD PRESSURE: 81 MMHG | WEIGHT: 196 LBS | TEMPERATURE: 97.9 F | HEART RATE: 91 BPM | BODY MASS INDEX: 36.07 KG/M2 | SYSTOLIC BLOOD PRESSURE: 115 MMHG | OXYGEN SATURATION: 96 % | HEIGHT: 62 IN

## 2019-05-02 LAB
ANION GAP SERPL CALCULATED.3IONS-SCNC: 11.4 MMOL/L
BUN BLD-MCNC: 12 MG/DL (ref 6–20)
BUN/CREAT SERPL: 19.4 (ref 7–25)
CALCIUM SPEC-SCNC: 9.6 MG/DL (ref 8.6–10.5)
CHLORIDE SERPL-SCNC: 100 MMOL/L (ref 98–107)
CO2 SERPL-SCNC: 26.6 MMOL/L (ref 22–29)
CREAT BLD-MCNC: 0.62 MG/DL (ref 0.57–1)
DEPRECATED RDW RBC AUTO: 43.8 FL (ref 37–54)
ERYTHROCYTE [DISTWIDTH] IN BLOOD BY AUTOMATED COUNT: 13.1 % (ref 12.3–15.4)
GFR SERPL CREATININE-BSD FRML MDRD: 102 ML/MIN/1.73
GLUCOSE BLD-MCNC: 132 MG/DL (ref 65–99)
HCT VFR BLD AUTO: 45.7 % (ref 34–46.6)
HGB BLD-MCNC: 15.4 G/DL (ref 12–15.9)
MCH RBC QN AUTO: 31 PG (ref 26.6–33)
MCHC RBC AUTO-ENTMCNC: 33.7 G/DL (ref 31.5–35.7)
MCV RBC AUTO: 92.1 FL (ref 79–97)
PLATELET # BLD AUTO: 261 10*3/MM3 (ref 140–450)
PMV BLD AUTO: 10.1 FL (ref 6–12)
POTASSIUM BLD-SCNC: 3.2 MMOL/L (ref 3.5–5.2)
RBC # BLD AUTO: 4.96 10*6/MM3 (ref 3.77–5.28)
SODIUM BLD-SCNC: 138 MMOL/L (ref 136–145)
WBC NRBC COR # BLD: 12.64 10*3/MM3 (ref 3.4–10.8)

## 2019-05-02 PROCEDURE — 80048 BASIC METABOLIC PNL TOTAL CA: CPT | Performed by: ORTHOPAEDIC SURGERY

## 2019-05-02 PROCEDURE — 93010 ELECTROCARDIOGRAM REPORT: CPT | Performed by: INTERNAL MEDICINE

## 2019-05-02 PROCEDURE — 93005 ELECTROCARDIOGRAM TRACING: CPT

## 2019-05-02 PROCEDURE — 36415 COLL VENOUS BLD VENIPUNCTURE: CPT

## 2019-05-02 PROCEDURE — 85027 COMPLETE CBC AUTOMATED: CPT | Performed by: ORTHOPAEDIC SURGERY

## 2019-05-02 NOTE — DISCHARGE INSTRUCTIONS
Take the following medications the morning of surgery with a small sip of water:  CARVEDILOL,LOSARTAN AND PREVACID  BRING INHALER      General Instructions:  • Do not eat solid food after midnight the night before surgery.  • You may drink clear liquids day of surgery but must stop at least one hour before your hospital arrival time.  • It is beneficial for you to have a clear drink that contains carbohydrates the day of surgery.  We suggest a 12 to 20 ounce bottle of Gatorade or Powerade for non-diabetic patients or a 12 to 20 ounce bottle of G2 or Powerade Zero for diabetic patients. (Pediatric patients, are not advised to drink a 12 to 20 ounce carbohydrate drink)    Clear liquids are liquids you can see through.  Nothing red in color.     Plain water                               Sports drinks  Sodas                                   Gelatin (Jell-O)  Fruit juices without pulp such as white grape juice and apple juice  Popsicles that contain no fruit or yogurt  Tea or coffee (no cream or milk added)  Gatorade / Powerade  G2 / Powerade Zero    • Infants may have breast milk up to four hours before surgery.  • Infants drinking formula may drink formula up to six hours before surgery.   • Patients who avoid smoking, chewing tobacco and alcohol for 4 weeks prior to surgery have a reduced risk of post-operative complications.  Quit smoking as many days before surgery as you can.  • Do not smoke, use chewing tobacco or drink alcohol the day of surgery.   • If applicable bring your C-PAP/ BI-PAP machine.  • Bring any papers given to you in the doctor’s office.  • Wear clean comfortable clothes and socks.  • Do not wear contact lenses, false eyelashes or make-up.  Bring a case for your glasses.   • Bring crutches or walker if applicable.  • Remove all piercings.  Leave jewelry and any other valuables at home.  • Hair extensions with metal clips must be removed prior to surgery.  • The Pre-Admission Testing nurse will  instruct you to bring medications if unable to obtain an accurate list in Pre-Admission Testing.        If you were given a blood bank ID arm band remember to bring it with you the day of surgery.    Preventing a Surgical Site Infection:  • For 2 to 3 days before surgery, avoid shaving with a razor because the razor can irritate skin and make it easier to develop an infection.    • Any areas of open skin can increase the risk of a post-operative wound infection by allowing bacteria to enter and travel throughout the body.  Notify your surgeon if you have any skin wounds / rashes even if it is not near the expected surgical site.  The area will need assessed to determine if surgery should be delayed until it is healed.  • The night prior to surgery sleep in a clean bed with clean clothing.  Do not allow pets to sleep with you.  • Shower on the morning of surgery using a fresh bar of anti-bacterial soap (such as Dial) and clean washcloth.  Dry with a clean towel and dress in clean clothing.  • Ask your surgeon if you will be receiving antibiotics prior to surgery.  • Make sure you, your family, and all healthcare providers clean their hands with soap and water or an alcohol based hand  before caring for you or your wound.    Day of surgery: 5/10/2019 ARRIVAL TIME 10:30 AM  Upon arrival, a Pre-op nurse and Anesthesiologist will review your health history, obtain vital signs, and answer questions you may have.  The only belongings needed at this time will be your home medications and if applicable your C-PAP/BI-PAP machine.  If you are staying overnight your family can leave the rest of your belongings in the car and bring them to your room later.  A Pre-op nurse will start an IV and you may receive medication in preparation for surgery, including something to help you relax.  Your family will be able to see you in the Pre-op area.  While you are in surgery your family should notify the waiting room   if they leave the waiting room area and provide a contact phone number.    Please be aware that surgery does come with discomfort.  We want to make every effort to control your discomfort so please discuss any uncontrolled symptoms with your nurse.   Your doctor will most likely have prescribed pain medications.      If you are going home after surgery you will receive individualized written care instructions before being discharged.  A responsible adult must drive you to and from the hospital on the day of your surgery and stay with you for 24 hours.    If you are staying overnight following surgery, you will be transported to your hospital room following the recovery period.  [facility] has all private rooms.    You have received a list of surgical assistants for your reference.  If you have any questions please call Pre-Admission Testing at [phone].  Deductibles and co-payments are collected on the day of service. Please be prepared to pay the required co-pay, deductible or deposit on the day of service as defined by your plan.

## 2019-05-06 ENCOUNTER — PREP FOR SURGERY (OUTPATIENT)
Dept: OTHER | Facility: HOSPITAL | Age: 49
End: 2019-05-06

## 2019-05-06 ENCOUNTER — TELEPHONE (OUTPATIENT)
Dept: ORTHOPEDIC SURGERY | Facility: CLINIC | Age: 49
End: 2019-05-06

## 2019-05-06 NOTE — TELEPHONE ENCOUNTER
Regarding: Complaint  Contact: [phone]  ----- Message from Mychart, Generic sent at 5/4/2019 12:28 AM EDT -----    I never had xrays on this visit. Let alone, it is a right knee issue and not foot or ankle. Likely wrong patient. Please make sure my insurance wasn't bill. Thank you!     I have a question about XR ANKLE 2 VW RIGHT resulted on 4/8/19 at 9:38 AM.

## 2019-05-06 NOTE — TELEPHONE ENCOUNTER
In review of her preadmission testing patient serum potassium level was slightly low at 3.2.  She is on her chlorothiazide however it does not appear that she is on any potassium supplements.  Patient's white blood cell count was also slightly elevated at 12.64 however the remainder of her CBC is within normal limits.  In comparison with previous labs her white count for the last year has been normal at 8.5 and 8.72 years ago it was slightly elevated at 10.18.  Have left a message for the patient to contact me at the office

## 2019-05-06 NOTE — TELEPHONE ENCOUNTER
Patient returned my call.  We discussed her abnormal labs.  She does work for her PCP and will discuss with her about potassium supplements between now and the time of surgery.  She also denies any recent illness or use of steroids that could account for her elevated white blood cell count.  Patient has had recent flareup of her allergies however has remained afebrile.  It is possible that this could account for her elevated white count.  Have advised her that we will check her potassium and her white blood cell count in the morning of surgery to make sure they are improving.  As long as the patient remains afebrile and has no increased congestion or cough and her labs do not appear to look worse then will go ahead and proceed with her surgery as scheduled

## 2019-05-06 NOTE — TELEPHONE ENCOUNTER
----- Message from VITOR Shah sent at 5/6/2019 12:17 PM EDT -----  Regarding: pre arthroscopy labs  Showed K 3.2 5/2 and elevated wbc  recheck labs preop? What do you think?  Thanks MJR  ----- Message -----  From: Lab, Background User  Sent: 5/2/2019   3:15 PM  To: Ignacio Martinez MD

## 2019-05-10 ENCOUNTER — ANESTHESIA (OUTPATIENT)
Dept: PERIOP | Facility: HOSPITAL | Age: 49
End: 2019-05-10

## 2019-05-10 ENCOUNTER — ANESTHESIA EVENT (OUTPATIENT)
Dept: PERIOP | Facility: HOSPITAL | Age: 49
End: 2019-05-10

## 2019-05-10 ENCOUNTER — HOSPITAL ENCOUNTER (OUTPATIENT)
Facility: HOSPITAL | Age: 49
Setting detail: HOSPITAL OUTPATIENT SURGERY
Discharge: HOME | End: 2019-05-10
Attending: ORTHOPAEDIC SURGERY | Admitting: ORTHOPAEDIC SURGERY

## 2019-05-10 VITALS
HEART RATE: 67 BPM | OXYGEN SATURATION: 98 % | DIASTOLIC BLOOD PRESSURE: 66 MMHG | RESPIRATION RATE: 16 BRPM | TEMPERATURE: 97 F | SYSTOLIC BLOOD PRESSURE: 106 MMHG

## 2019-05-10 DIAGNOSIS — S83.241D ACUTE MEDIAL MENISCUS TEAR OF RIGHT KNEE, SUBSEQUENT ENCOUNTER: ICD-10-CM

## 2019-05-10 PROBLEM — M17.11 ARTHRITIS OF RIGHT KNEE: Status: ACTIVE | Noted: 2019-05-10

## 2019-05-10 LAB
ALBUMIN SERPL-MCNC: 3.5 G/DL (ref 3.5–5.2)
ALBUMIN/GLOB SERPL: 1.3 G/DL
ALP SERPL-CCNC: 47 U/L (ref 39–117)
ALT SERPL W P-5'-P-CCNC: 16 U/L (ref 1–33)
ANION GAP SERPL CALCULATED.3IONS-SCNC: 11.5 MMOL/L
AST SERPL-CCNC: 19 U/L (ref 1–32)
BASOPHILS # BLD AUTO: 0.03 10*3/MM3 (ref 0–0.2)
BASOPHILS NFR BLD AUTO: 0.3 % (ref 0–1.5)
BILIRUB SERPL-MCNC: 0.3 MG/DL (ref 0.2–1.2)
BUN BLD-MCNC: 7 MG/DL (ref 6–20)
BUN/CREAT SERPL: 14 (ref 7–25)
CALCIUM SPEC-SCNC: 8.7 MG/DL (ref 8.6–10.5)
CHLORIDE SERPL-SCNC: 102 MMOL/L (ref 98–107)
CO2 SERPL-SCNC: 23.5 MMOL/L (ref 22–29)
CREAT BLD-MCNC: 0.5 MG/DL (ref 0.57–1)
DEPRECATED RDW RBC AUTO: 44.9 FL (ref 37–54)
EOSINOPHIL # BLD AUTO: 0.1 10*3/MM3 (ref 0–0.4)
EOSINOPHIL NFR BLD AUTO: 1.1 % (ref 0.3–6.2)
ERYTHROCYTE [DISTWIDTH] IN BLOOD BY AUTOMATED COUNT: 13.2 % (ref 12.3–15.4)
GFR SERPL CREATININE-BSD FRML MDRD: 131 ML/MIN/1.73
GLOBULIN UR ELPH-MCNC: 2.8 GM/DL
GLUCOSE BLD-MCNC: 144 MG/DL (ref 65–99)
HCT VFR BLD AUTO: 44.3 % (ref 34–46.6)
HGB BLD-MCNC: 15 G/DL (ref 12–15.9)
IMM GRANULOCYTES # BLD AUTO: 0.04 10*3/MM3 (ref 0–0.05)
IMM GRANULOCYTES NFR BLD AUTO: 0.4 % (ref 0–0.5)
LYMPHOCYTES # BLD AUTO: 2.27 10*3/MM3 (ref 0.7–3.1)
LYMPHOCYTES NFR BLD AUTO: 24.1 % (ref 19.6–45.3)
MCH RBC QN AUTO: 31.6 PG (ref 26.6–33)
MCHC RBC AUTO-ENTMCNC: 33.9 G/DL (ref 31.5–35.7)
MCV RBC AUTO: 93.3 FL (ref 79–97)
MONOCYTES # BLD AUTO: 0.82 10*3/MM3 (ref 0.1–0.9)
MONOCYTES NFR BLD AUTO: 8.7 % (ref 5–12)
NEUTROPHILS # BLD AUTO: 6.17 10*3/MM3 (ref 1.7–7)
NEUTROPHILS NFR BLD AUTO: 65.4 % (ref 42.7–76)
NRBC BLD AUTO-RTO: 0 /100 WBC (ref 0–0.2)
PLATELET # BLD AUTO: 223 10*3/MM3 (ref 140–450)
PMV BLD AUTO: 9.9 FL (ref 6–12)
POTASSIUM BLD-SCNC: 3.5 MMOL/L (ref 3.5–5.2)
PROT SERPL-MCNC: 6.3 G/DL (ref 6–8.5)
RBC # BLD AUTO: 4.75 10*6/MM3 (ref 3.77–5.28)
SODIUM BLD-SCNC: 137 MMOL/L (ref 136–145)
WBC NRBC COR # BLD: 9.43 10*3/MM3 (ref 3.4–10.8)

## 2019-05-10 PROCEDURE — 25010000003 CEFAZOLIN IN DEXTROSE 2-4 GM/100ML-% SOLUTION: Performed by: ORTHOPAEDIC SURGERY

## 2019-05-10 PROCEDURE — 25010000002 FENTANYL CITRATE (PF) 100 MCG/2ML SOLUTION: Performed by: ANESTHESIOLOGY

## 2019-05-10 PROCEDURE — 25010000002 FENTANYL CITRATE (PF) 100 MCG/2ML SOLUTION: Performed by: NURSE ANESTHETIST, CERTIFIED REGISTERED

## 2019-05-10 PROCEDURE — 85025 COMPLETE CBC W/AUTO DIFF WBC: CPT | Performed by: NURSE PRACTITIONER

## 2019-05-10 PROCEDURE — 25010000002 EPINEPHRINE PER 0.1 MG: Performed by: ORTHOPAEDIC SURGERY

## 2019-05-10 PROCEDURE — 29880 ARTHRS KNE SRG MNISECTMY M&L: CPT | Performed by: ORTHOPAEDIC SURGERY

## 2019-05-10 PROCEDURE — 25010000002 PROPOFOL 10 MG/ML EMULSION: Performed by: NURSE ANESTHETIST, CERTIFIED REGISTERED

## 2019-05-10 PROCEDURE — 80053 COMPREHEN METABOLIC PANEL: CPT | Performed by: ORTHOPAEDIC SURGERY

## 2019-05-10 PROCEDURE — 25010000002 MIDAZOLAM PER 1 MG: Performed by: ANESTHESIOLOGY

## 2019-05-10 PROCEDURE — 25010000002 ONDANSETRON PER 1 MG: Performed by: NURSE ANESTHETIST, CERTIFIED REGISTERED

## 2019-05-10 PROCEDURE — S0260 H&P FOR SURGERY: HCPCS | Performed by: ORTHOPAEDIC SURGERY

## 2019-05-10 RX ORDER — MIDAZOLAM HYDROCHLORIDE 1 MG/ML
2 INJECTION INTRAMUSCULAR; INTRAVENOUS
Status: DISCONTINUED | OUTPATIENT
Start: 2019-05-10 | End: 2019-05-10 | Stop reason: HOSPADM

## 2019-05-10 RX ORDER — PROMETHAZINE HYDROCHLORIDE 25 MG/1
25 TABLET ORAL ONCE AS NEEDED
Status: DISCONTINUED | OUTPATIENT
Start: 2019-05-10 | End: 2019-05-10 | Stop reason: HOSPADM

## 2019-05-10 RX ORDER — PROMETHAZINE HYDROCHLORIDE 25 MG/1
25 SUPPOSITORY RECTAL ONCE AS NEEDED
Status: DISCONTINUED | OUTPATIENT
Start: 2019-05-10 | End: 2019-05-10 | Stop reason: HOSPADM

## 2019-05-10 RX ORDER — PROMETHAZINE HYDROCHLORIDE 12.5 MG/1
12.5 TABLET ORAL EVERY 6 HOURS PRN
Qty: 30 TABLET | Refills: 0 | Status: SHIPPED | OUTPATIENT
Start: 2019-05-10 | End: 2019-07-05

## 2019-05-10 RX ORDER — PROMETHAZINE HYDROCHLORIDE 25 MG/ML
6.25 INJECTION, SOLUTION INTRAMUSCULAR; INTRAVENOUS ONCE AS NEEDED
Status: DISCONTINUED | OUTPATIENT
Start: 2019-05-10 | End: 2019-05-10 | Stop reason: HOSPADM

## 2019-05-10 RX ORDER — MIDAZOLAM HYDROCHLORIDE 1 MG/ML
1 INJECTION INTRAMUSCULAR; INTRAVENOUS
Status: DISCONTINUED | OUTPATIENT
Start: 2019-05-10 | End: 2019-05-10 | Stop reason: HOSPADM

## 2019-05-10 RX ORDER — FENTANYL CITRATE 50 UG/ML
INJECTION, SOLUTION INTRAMUSCULAR; INTRAVENOUS
Status: DISCONTINUED
Start: 2019-05-10 | End: 2019-05-10 | Stop reason: HOSPADM

## 2019-05-10 RX ORDER — SODIUM CHLORIDE 0.9 % (FLUSH) 0.9 %
1-10 SYRINGE (ML) INJECTION AS NEEDED
Status: DISCONTINUED | OUTPATIENT
Start: 2019-05-10 | End: 2019-05-10 | Stop reason: HOSPADM

## 2019-05-10 RX ORDER — SODIUM CHLORIDE 0.9 % (FLUSH) 0.9 %
3 SYRINGE (ML) INJECTION EVERY 12 HOURS SCHEDULED
Status: DISCONTINUED | OUTPATIENT
Start: 2019-05-10 | End: 2019-05-10 | Stop reason: HOSPADM

## 2019-05-10 RX ORDER — DIPHENHYDRAMINE HYDROCHLORIDE 50 MG/ML
12.5 INJECTION INTRAMUSCULAR; INTRAVENOUS
Status: DISCONTINUED | OUTPATIENT
Start: 2019-05-10 | End: 2019-05-10 | Stop reason: HOSPADM

## 2019-05-10 RX ORDER — LIDOCAINE HYDROCHLORIDE 20 MG/ML
INJECTION, SOLUTION INFILTRATION; PERINEURAL AS NEEDED
Status: DISCONTINUED | OUTPATIENT
Start: 2019-05-10 | End: 2019-05-10 | Stop reason: SURG

## 2019-05-10 RX ORDER — ACETAMINOPHEN 325 MG/1
650 TABLET ORAL ONCE AS NEEDED
Status: DISCONTINUED | OUTPATIENT
Start: 2019-05-10 | End: 2019-05-10 | Stop reason: HOSPADM

## 2019-05-10 RX ORDER — PROMETHAZINE HYDROCHLORIDE 25 MG/1
12.5 TABLET ORAL ONCE AS NEEDED
Status: DISCONTINUED | OUTPATIENT
Start: 2019-05-10 | End: 2019-05-10 | Stop reason: HOSPADM

## 2019-05-10 RX ORDER — LIDOCAINE HYDROCHLORIDE 10 MG/ML
0.5 INJECTION, SOLUTION EPIDURAL; INFILTRATION; INTRACAUDAL; PERINEURAL ONCE AS NEEDED
Status: DISCONTINUED | OUTPATIENT
Start: 2019-05-10 | End: 2019-05-10 | Stop reason: HOSPADM

## 2019-05-10 RX ORDER — ACETAMINOPHEN 650 MG/1
650 SUPPOSITORY RECTAL ONCE AS NEEDED
Status: DISCONTINUED | OUTPATIENT
Start: 2019-05-10 | End: 2019-05-10 | Stop reason: HOSPADM

## 2019-05-10 RX ORDER — HYDRALAZINE HYDROCHLORIDE 20 MG/ML
5 INJECTION INTRAMUSCULAR; INTRAVENOUS
Status: DISCONTINUED | OUTPATIENT
Start: 2019-05-10 | End: 2019-05-10 | Stop reason: HOSPADM

## 2019-05-10 RX ORDER — ACETAMINOPHEN 500 MG
500 TABLET ORAL ONCE
Status: COMPLETED | OUTPATIENT
Start: 2019-05-10 | End: 2019-05-10

## 2019-05-10 RX ORDER — NALOXONE HCL 0.4 MG/ML
0.4 VIAL (ML) INJECTION AS NEEDED
Status: DISCONTINUED | OUTPATIENT
Start: 2019-05-10 | End: 2019-05-10 | Stop reason: HOSPADM

## 2019-05-10 RX ORDER — CEFAZOLIN SODIUM 2 G/100ML
2 INJECTION, SOLUTION INTRAVENOUS ONCE
Status: COMPLETED | OUTPATIENT
Start: 2019-05-10 | End: 2019-05-10

## 2019-05-10 RX ORDER — HYDROCODONE BITARTRATE AND ACETAMINOPHEN 5; 325 MG/1; MG/1
1 TABLET ORAL ONCE AS NEEDED
Status: DISCONTINUED | OUTPATIENT
Start: 2019-05-10 | End: 2019-05-10 | Stop reason: HOSPADM

## 2019-05-10 RX ORDER — SODIUM CHLORIDE, SODIUM LACTATE, POTASSIUM CHLORIDE, CALCIUM CHLORIDE 600; 310; 30; 20 MG/100ML; MG/100ML; MG/100ML; MG/100ML
9 INJECTION, SOLUTION INTRAVENOUS CONTINUOUS
Status: DISCONTINUED | OUTPATIENT
Start: 2019-05-10 | End: 2019-05-10 | Stop reason: HOSPADM

## 2019-05-10 RX ORDER — DEXAMETHASONE SODIUM PHOSPHATE 10 MG/ML
INJECTION INTRAMUSCULAR; INTRAVENOUS AS NEEDED
Status: DISCONTINUED | OUTPATIENT
Start: 2019-05-10 | End: 2019-05-10

## 2019-05-10 RX ORDER — ONDANSETRON 2 MG/ML
INJECTION INTRAMUSCULAR; INTRAVENOUS AS NEEDED
Status: DISCONTINUED | OUTPATIENT
Start: 2019-05-10 | End: 2019-05-10 | Stop reason: SURG

## 2019-05-10 RX ORDER — HYDROCODONE BITARTRATE AND ACETAMINOPHEN 7.5; 325 MG/1; MG/1
1 TABLET ORAL ONCE AS NEEDED
Status: DISCONTINUED | OUTPATIENT
Start: 2019-05-10 | End: 2019-05-10 | Stop reason: HOSPADM

## 2019-05-10 RX ORDER — FENTANYL CITRATE 50 UG/ML
50 INJECTION, SOLUTION INTRAMUSCULAR; INTRAVENOUS
Status: DISCONTINUED | OUTPATIENT
Start: 2019-05-10 | End: 2019-05-10 | Stop reason: HOSPADM

## 2019-05-10 RX ORDER — BUPIVACAINE HYDROCHLORIDE AND EPINEPHRINE 5; 5 MG/ML; UG/ML
INJECTION, SOLUTION PERINEURAL AS NEEDED
Status: DISCONTINUED | OUTPATIENT
Start: 2019-05-10 | End: 2019-05-10 | Stop reason: HOSPADM

## 2019-05-10 RX ORDER — ONDANSETRON 4 MG/1
4 TABLET, FILM COATED ORAL ONCE AS NEEDED
Status: DISCONTINUED | OUTPATIENT
Start: 2019-05-10 | End: 2019-05-10 | Stop reason: HOSPADM

## 2019-05-10 RX ORDER — HYDROMORPHONE HYDROCHLORIDE 1 MG/ML
0.5 INJECTION, SOLUTION INTRAMUSCULAR; INTRAVENOUS; SUBCUTANEOUS
Status: DISCONTINUED | OUTPATIENT
Start: 2019-05-10 | End: 2019-05-10 | Stop reason: HOSPADM

## 2019-05-10 RX ORDER — FENTANYL CITRATE 50 UG/ML
INJECTION, SOLUTION INTRAMUSCULAR; INTRAVENOUS AS NEEDED
Status: DISCONTINUED | OUTPATIENT
Start: 2019-05-10 | End: 2019-05-10 | Stop reason: SURG

## 2019-05-10 RX ORDER — HYDROCODONE BITARTRATE AND ACETAMINOPHEN 7.5; 325 MG/1; MG/1
1 TABLET ORAL EVERY 4 HOURS PRN
Qty: 42 TABLET | Refills: 0 | Status: SHIPPED | OUTPATIENT
Start: 2019-05-10 | End: 2019-07-05

## 2019-05-10 RX ORDER — PROPOFOL 10 MG/ML
VIAL (ML) INTRAVENOUS AS NEEDED
Status: DISCONTINUED | OUTPATIENT
Start: 2019-05-10 | End: 2019-05-10 | Stop reason: SURG

## 2019-05-10 RX ADMIN — FENTANYL CITRATE 50 MCG: 50 INJECTION, SOLUTION INTRAMUSCULAR; INTRAVENOUS at 14:07

## 2019-05-10 RX ADMIN — FENTANYL CITRATE 50 MCG: 50 INJECTION INTRAMUSCULAR; INTRAVENOUS at 12:42

## 2019-05-10 RX ADMIN — LIDOCAINE HYDROCHLORIDE 60 MG: 20 INJECTION, SOLUTION INFILTRATION; PERINEURAL at 12:42

## 2019-05-10 RX ADMIN — ACETAMINOPHEN 500 MG: 500 TABLET, FILM COATED ORAL at 11:42

## 2019-05-10 RX ADMIN — MIDAZOLAM 1 MG: 1 INJECTION INTRAMUSCULAR; INTRAVENOUS at 11:43

## 2019-05-10 RX ADMIN — ONDANSETRON 4 MG: 2 INJECTION INTRAMUSCULAR; INTRAVENOUS at 12:50

## 2019-05-10 RX ADMIN — SODIUM CHLORIDE, POTASSIUM CHLORIDE, SODIUM LACTATE AND CALCIUM CHLORIDE: 600; 310; 30; 20 INJECTION, SOLUTION INTRAVENOUS at 12:42

## 2019-05-10 RX ADMIN — HYDROCODONE BITARTRATE AND ACETAMINOPHEN 1 TABLET: 7.5; 325 TABLET ORAL at 14:22

## 2019-05-10 RX ADMIN — CEFAZOLIN SODIUM 2 G: 2 INJECTION, SOLUTION INTRAVENOUS at 12:42

## 2019-05-10 RX ADMIN — FENTANYL CITRATE 50 MCG: 50 INJECTION, SOLUTION INTRAMUSCULAR; INTRAVENOUS at 14:22

## 2019-05-10 RX ADMIN — SODIUM CHLORIDE, POTASSIUM CHLORIDE, SODIUM LACTATE AND CALCIUM CHLORIDE 9 ML/HR: 600; 310; 30; 20 INJECTION, SOLUTION INTRAVENOUS at 11:20

## 2019-05-10 RX ADMIN — PROPOFOL 200 MG: 10 INJECTION, EMULSION INTRAVENOUS at 12:42

## 2019-05-10 NOTE — ANESTHESIA PROCEDURE NOTES
Airway  Urgency: elective    Airway not difficult    General Information and Staff    Patient location during procedure: OR  Anesthesiologist: Kadeem Narvaez MD  CRNA: Flora Verde CRNA    Indications and Patient Condition  Indications for airway management: airway protection    Preoxygenated: yes  MILS not maintained throughout  Mask difficulty assessment: 1 - vent by mask    Final Airway Details  Final airway type: supraglottic airway      Successful airway: classic  Size 4    Number of attempts at approach: 1    Additional Comments  Preoxygenation FEO2 >85, SIVI, LMA placed with ease, teeth/lips as preop. Secured and placement confirmed.

## 2019-05-10 NOTE — OP NOTE
Operative Note    Name: Carina Zamudio  YOB: 1970  MRN: [id number]  BMI:  There is no height or weight on file to calculate BMI.  No height and weight on file for this encounter.      DATE OF SURGERY: 5/10/2019    PREOPERATIVE DIAGNOSIS: right knee medial meniscus tear, arthritis    POSTOPERATIVE DIAGNOSIS: medial meniscus tear and lateral meniscus tear with degenerative meniscus tear, posterior horn, chondromalacia femoral condyle surface, chondromalacia tibia surface and chondromalacia patellofemoral     PROCEDURE PERFORMED: right knee arthroscopy with partial  medial and lateral meniscectomy and chondroplasty of patellofemoral joint, extensive debridement and plica excision    SURGEON: Ignacio Martinez M.D.    Anesthesia: Gen. and local  Estimated Blood Loss: minimal  Specimens : none  Complications: none  Drains: none  22 Modifier:  none    Description:     Carina Zamudio was taken into the operating room and placed in a supine position on operative table. The operative leg was examined.  Surgical time out was performed. After adequate induction of anesthesia, the leg was placed in a padded leg morel was then prepped using sterile prep techniques. It was draped with sterile drapes using sterile technique.     Inferior lateral and inferior medial portals were established.  Arthroscopy was begun.  She had a large straw-colored effusion.  She had a complex posterior horn the medial meniscus tear that was unstable to probing she also had advanced chondromalacia and arthritis in both the tibial and femoral surfaces.  The ACL and PCL did not appear normal but that did not appear torn or stretch.  The lateral compartment showed softening of the cartilage probing and fraying of the anterior margin of the meniscus.  She had a Baker's cyst in this area was probed.  She had chondromalacia and advanced arthritic change in patellofemoral joint she had large thickened plica and large thickened  synovium.    Using hand instruments and motorized shaver a synovectomy was performed also excised the thickened chronic plica medial compartment was addressed and using a thermal device in a pulselike fashion handsprings motorized shaver partial medial meniscectomy was performed back to a stable edge.  It was a subtotal posterior horn as the tissue was nonviable.  The lateral compartment had tubal the inner unstable frayed margin back to a stable edge.  Patellofemoral joint underwent chondroplasty to remove loose osteochondral flaps to a smooth edge.  Knee was inspected and there was no additional pathology identified in the gutters or in the remainder of the joint.  It was irrigated and injected with 30 cc of local anesthetic.  Incisions were closed with nylon and a bulky sterile dressing was applied.  Sponge needle and estimate counts reported be correct and no apparent complications.  She will be given prescriptions for pain medicine and antiemetics instructions to follow-up in the office..    5/10/2019  Ignacio Martinez MD

## 2019-05-10 NOTE — ANESTHESIA PREPROCEDURE EVALUATION
Anesthesia Evaluation     no history of anesthetic complications:  NPO Solid Status: > 8 hours             Airway   Mallampati: II  TM distance: >3 FB  Neck ROM: full  No difficulty expected  Dental - normal exam     Pulmonary - normal exam   (+) sleep apnea on CPAP,   Cardiovascular   Exercise tolerance: good (4-7 METS)    ECG reviewed  Rhythm: regular    (+) hypertension 2 medications or greater, past MI  6-12 months, CAD, cardiac stents more than 12 months ago angina, hyperlipidemia,   (-) murmur, carotid bruits      Neuro/Psych  (+) CVA, numbness,       ROS Comment: No residual sx, was an incidental finding  GI/Hepatic/Renal/Endo    (+) obesity,  GERD,  diabetes mellitus type 2,     Musculoskeletal     Abdominal    Substance History      OB/GYN          Other                        Anesthesia Plan    ASA 3     general   (  D/W R&B of GA including but not limited to: heart, lung, liver, kidney, neurologic problems, positioning injuries, dental damage, corneal abrasion and TMJ.  .)  intravenous induction   Anesthetic plan, all risks, benefits, and alternatives have been provided, discussed and informed consent has been obtained with: patient.

## 2019-05-10 NOTE — ANESTHESIA POSTPROCEDURE EVALUATION
Patient: Carina Zamudio    Procedure Summary     Date:  05/10/19 Room / Location:   [facility]  [facility]    Anesthesia Start:  1240 Anesthesia Stop:      Procedure:  RIGHT KNEE ARTHROSCOPY BWITH PART. MEDIAL AND LATERAL MENISECTOMY, PATELLA, CHONDROPLASTY, AND DEBRIDMENT (Right Knee) Diagnosis:       Acute medial meniscus tear of right knee, subsequent encounter      (Acute medial meniscus tear of right knee, subsequent encounter [S80.782C])    Surgeon:  Ignacio Martinez MD Provider:  Kadeem Narvaez MD    Anesthesia Type:  general ASA Status:  3          Anesthesia Type: general  Last vitals  BP   113/79 (05/10/19 1416)   Temp   36.8 °C (98.2 °F) (05/10/19 1304)   Pulse   68 (05/10/19 1416)   Resp   18 (05/10/19 1416)     SpO2   97 % (05/10/19 1416)     Post Anesthesia Care and Evaluation    Patient location during evaluation: bedside  Patient participation: complete - patient participated  Level of consciousness: awake and alert  Pain management: adequate  Airway patency: patent  Anesthetic complications: No anesthetic complications    Cardiovascular status: acceptable  Respiratory status: acceptable  Hydration status: acceptable    Comments: /79   Pulse 68   Temp 36.8 °C (98.2 °F) (Temporal)   Resp 18   SpO2 97%

## 2019-05-10 NOTE — H&P
"   History & Physical       Patient: Carina Zamudio    Proposed Surgery and date: Procedure(s) (LRB):  RIGHT KNEE ARTHROSCOPY (Right)     YOB: 1970    Medical Record Number: [id number]  Wt Readings from Last 3 Encounters:   05/02/19 88.9 kg (196 lb)   04/08/19 89.8 kg (198 lb)   04/04/19 88.9 kg (196 lb)     Ht Readings from Last 3 Encounters:   05/02/19 157.5 cm (62\")   04/08/19 157.5 cm (62\")   04/04/19 157.5 cm (62\")     Wt 89.8 kg (198 lb)   BMI 36.21 kg/m²         Surgeon:  Dr. Ignacio Martinez        Chief Complaints: right Knee pain    History of Present Illness: 48 y.o. female presents with   Right Knee - Follow-up, Pain, Results   .  Rosa follows up today on for arthroscopy of her right knee.  Her knee is \"buckling\" and it continues to hurt and is stiff and swollen we injected it about 9 weeks ago and it is somewhat better but it is hard to walk on it he had an MRI    Allergies: No Known Allergies    Medications:   Home Medications:  No current facility-administered medications on file prior to encounter.      Current Outpatient Medications on File Prior to Encounter   Medication Sig   • ALPRAZolam (XANAX) 0.5 MG tablet Take 1 tablet by mouth Daily As Needed for Anxiety.   • carvedilol (COREG) 3.125 MG tablet Take 1 tablet by mouth 2 (Two) Times a Day.   • lansoprazole (PREVACID) 30 MG capsule Take 1 capsule by mouth Daily.   • losartan (COZAAR) 100 MG tablet Take 1 tablet by mouth Daily.   • rosuvastatin (CRESTOR) 40 MG tablet Take 1 tablet by mouth Daily.   • aspirin 81 MG tablet Take 81 mg by mouth Daily. INSTRUCTED PT FOLLOW MD INSTRUCTIONS   • B Complex Vitamins (VITAMIN-B COMPLEX) tablet Take 1 tablet by mouth Daily. HOLD PRIOR TO SURG   • budesonide-formoterol (SYMBICORT) 80-4.5 MCG/ACT inhaler Inhale 2 puffs 2 (Two) Times a Day. Rinse mouth with water after each use (Patient taking differently: Inhale 2 puffs 2 (Two) Times a Day As Needed. Rinse mouth with water after each use)   • " celecoxib (CeleBREX) 400 MG capsule Take 1 capsule by mouth Daily. (Patient taking differently: Take 400 mg by mouth Daily. INSTRUCTED PT TO FOLLOW MD INSTRUCTIONS)   • clopidogrel (PLAVIX) 75 MG tablet Take 1 tablet by mouth Daily. (Patient taking differently: Take 75 mg by mouth Daily. PT HOLDING 5-7 DAYS PER MD ORDER)   • Flaxseed, Linseed, (FLAX SEED OIL PO) Take 1 tablet by mouth 2 (Two) Times a Day. HOLD PRIOR TO SURG   • hydrochlorothiazide (MICROZIDE) 12.5 MG capsule Take 1 capsule by mouth Daily.   • metFORMIN (GLUCOPHAGE) 500 MG tablet TAKE HALF A TABLET BY MOUTH TWICE A DAY.   • Multiple Vitamin tablet Take 1 tablet by mouth Daily. HOLD PRIOR TO SURG   • nicotine (NICODERM CQ) 21 MG/24HR patch Place 1 patch on the skin Daily.   • vitamin D (ERGOCALCIFEROL) 47054 units capsule capsule Take 1 capsule by mouth 2 (Two) Times a Week.     Current Medications:  Scheduled Meds:  Continuous Infusions:    lactated ringers 9 mL/hr Last Rate: 9 mL/hr (05/10/19 1120)     PRN Meds:.    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 Xie   • Hyperlipemia    • Hypertension    • Liver nodule    • Morbid obesity (CMS/HCC)    • Multiple thyroid nodules    • Myocardial infarction (CMS/HCC) 2015   • Nontoxic multinodular goiter    • Preretinal hemorrhage of left eye    • Proteinuria      CHRONIC   • Raynaud phenomenon    • Right knee pain    • Sleep apnea     Uses CPAP   • Vitamin D deficiency         Past Surgical History:   Procedure Laterality Date   • CATARACT EXTRACTION, BILATERAL     • CORONARY STENT PLACEMENT     • ENDOMETRIAL ABLATION  2012   • EYE SURGERY     • TUBAL ABDOMINAL LIGATION  2012        Social History     Occupational History   • Not on file   Tobacco Use   • Smoking status: Current Every Day Smoker     Packs/day: 1.00   • Smokeless tobacco: Never Used   Substance and Sexual Activity   • Alcohol use: Yes     Comment: socially   • Drug use: No   • Sexual activity: Not on file      Social History     Social History Narrative   • Not on file        Family History   Problem Relation Age of Onset   • Lymphoma Father    • Lung cancer Maternal Grandfather    • Heart attack Maternal Grandfather    • Diabetes Maternal Grandfather    • Heart attack Paternal Grandfather    • Diabetes Paternal Grandfather    • Diabetes Paternal Grandmother    • Diabetes Maternal Grandmother    • Breast cancer Neg Hx    • Ovarian cancer Neg Hx    • Uterine cancer Neg Hx    • Colon cancer Neg Hx    • Malig Hyperthermia Neg Hx          Review of Systems: 14 point review of systems was performed and was negative except as documented in the history of present illness.      Physical Exam: 48 y.o. female    Vital signs reviewed.    General Appearance:    Alert, cooperative, in no acute distress                  General: alert, oriented  Eyes: conjunctiva clear  ENT: external ears and nose atraumatic  CV: no peripheral edema  Resp: normal respiratory effort  Skin: no rashes or wounds; normal turgor  Psych: mood and affect appropriate  Lymph: no nodes appreciated  Neuro: gross sensation intact  Vascular:  Palpable peripheral pulse in noted extremity  Musculoskeletal Extremities: medial joint line tenderness some synovitis and swelling Mateo's causes some discomfort          Diagnostic Tests:  Admission on  05/10/2019   Component Date Value Ref Range Status   • WBC 05/10/2019 9.43  3.40 - 10.80 10*3/mm3 Final   • RBC 05/10/2019 4.75  3.77 - 5.28 10*6/mm3 Final   • Hemoglobin 05/10/2019 15.0  12.0 - 15.9 g/dL Final   • Hematocrit 05/10/2019 44.3  34.0 - 46.6 % Final   • MCV 05/10/2019 93.3  79.0 - 97.0 fL Final   • MCH 05/10/2019 31.6  26.6 - 33.0 pg Final   • MCHC 05/10/2019 33.9  31.5 - 35.7 g/dL Final   • RDW 05/10/2019 13.2  12.3 - 15.4 % Final   • RDW-SD 05/10/2019 44.9  37.0 - 54.0 fl Final   • MPV 05/10/2019 9.9  6.0 - 12.0 fL Final   • Platelets 05/10/2019 223  140 - 450 10*3/mm3 Final   • Neutrophil % 05/10/2019 65.4  42.7 - 76.0 % Final   • Lymphocyte % 05/10/2019 24.1  19.6 - 45.3 % Final   • Monocyte % 05/10/2019 8.7  5.0 - 12.0 % Final   • Eosinophil % 05/10/2019 1.1  0.3 - 6.2 % Final   • Basophil % 05/10/2019 0.3  0.0 - 1.5 % Final   • Immature Grans % 05/10/2019 0.4  0.0 - 0.5 % Final   • Neutrophils, Absolute 05/10/2019 6.17  1.70 - 7.00 10*3/mm3 Final   • Lymphocytes, Absolute 05/10/2019 2.27  0.70 - 3.10 10*3/mm3 Final   • Monocytes, Absolute 05/10/2019 0.82  0.10 - 0.90 10*3/mm3 Final   • Eosinophils, Absolute 05/10/2019 0.10  0.00 - 0.40 10*3/mm3 Final   • Basophils, Absolute 05/10/2019 0.03  0.00 - 0.20 10*3/mm3 Final   • Immature Grans, Absolute 05/10/2019 0.04  0.00 - 0.05 10*3/mm3 Final   • nRBC 05/10/2019 0.0  0.0 - 0.2 /100 WBC Final   Appointment on 05/02/2019   Component Date Value Ref Range Status   • Glucose 05/02/2019 132* 65 - 99 mg/dL Final   • BUN 05/02/2019 12  6 - 20 mg/dL Final   • Creatinine 05/02/2019 0.62  0.57 - 1.00 mg/dL Final   • Sodium 05/02/2019 138  136 - 145 mmol/L Final   • Potassium 05/02/2019 3.2* 3.5 - 5.2 mmol/L Final   • Chloride 05/02/2019 100  98 - 107 mmol/L Final   • CO2 05/02/2019 26.6  22.0 - 29.0 mmol/L Final   • Calcium 05/02/2019 9.6  8.6 - 10.5 mg/dL Final   • eGFR Non African Amer 05/02/2019 102  >60 mL/min/1.73 Final   • BUN/Creatinine Ratio 05/02/2019  19.4  7.0 - 25.0 Final   • Anion Gap 05/02/2019 11.4  mmol/L Final   • WBC 05/02/2019 12.64* 3.40 - 10.80 10*3/mm3 Final   • RBC 05/02/2019 4.96  3.77 - 5.28 10*6/mm3 Final   • Hemoglobin 05/02/2019 15.4  12.0 - 15.9 g/dL Final   • Hematocrit 05/02/2019 45.7  34.0 - 46.6 % Final   • MCV 05/02/2019 92.1  79.0 - 97.0 fL Final   • MCH 05/02/2019 31.0  26.6 - 33.0 pg Final   • MCHC 05/02/2019 33.7  31.5 - 35.7 g/dL Final   • RDW 05/02/2019 13.1  12.3 - 15.4 % Final   • RDW-SD 05/02/2019 43.8  37.0 - 54.0 fl Final   • MPV 05/02/2019 10.1  6.0 - 12.0 fL Final   • Platelets 05/02/2019 261  140 - 450 10*3/mm3 Final       Radiology:   AP lateral 4 degree PA x-rays for knee pain  THAT WERE DONE BEFORE IN THE OFFICE before show moderate arthritis and narrowing medially she had an MRI of her right knee which show some degenerative changes the medial and patellofemoral compartments and a large complex medial meniscus tear involving the body and posterior horn joint effusion small Baker's cyst and synovial thickening no subcortical stress fracture seen    Assessment: right knee medial meniscus tear.    Plan:  We will plan on proceeding with surgery at patient's request for a knee arthroscopy, meniscal and cartilage surgery as indicated.  Dr. Ignacio Martinez reviewed details of procedure with patient today and discussed risks, benefits, alternatives, and limitations of the procedure in laymen's terms with the risks including but not limited to:  neurovascular damage, bleeding, infection, chronic pain, worsening of pain, chondrolysis, recurrent meniscal tear, swelling, loss of motion, weakness, stiffness, instability, DVT, pulmonary embolus, death, stroke, complex regional pain syndrome, and need for additional procedures.  No guarantees were given regarding results of surgery.     Patient was given the opportunity to ask and have all questions answered.  The patient voiced understanding of the risks, benefits, and alternative  forms of treatment that were discussed and the patient consents to proceed with surgery.     Discharge Plan: Home with f/u in 1 week in the office with Dr. Ignacio Martinez    Date: 5/10/2019  Ignacio Martinez MD

## 2019-05-15 ENCOUNTER — TELEPHONE (OUTPATIENT)
Dept: ORTHOPEDIC SURGERY | Facility: CLINIC | Age: 49
End: 2019-05-15

## 2019-05-15 NOTE — TELEPHONE ENCOUNTER
Yes, we will get this ordered for you when we see you on Friday.  Hope you are doing well. Take care.  VITOR Flood

## 2019-05-15 NOTE — TELEPHONE ENCOUNTER
Regarding: Visit Follow-Up Question  Contact: [phone]  ----- Message from Mychart, Generic sent at 5/15/2019  8:36 AM EDT -----    Good morning! I have a post op visit with Dr. Martinez on Friday the 17th for right knee arthroscopic done on 5/10. Wondering if I will have PT? I did not recieve any stretching or exercise info at discharge. Using crutches. This May all be a part of follow up on Friday. Thank you!

## 2019-05-17 ENCOUNTER — OFFICE VISIT (OUTPATIENT)
Dept: ORTHOPEDIC SURGERY | Facility: CLINIC | Age: 49
End: 2019-05-17

## 2019-05-17 VITALS — WEIGHT: 196 LBS | BODY MASS INDEX: 36.07 KG/M2 | HEIGHT: 62 IN | TEMPERATURE: 98.4 F

## 2019-05-17 DIAGNOSIS — Z98.890 S/P RIGHT KNEE ARTHROSCOPY: Primary | ICD-10-CM

## 2019-05-17 PROCEDURE — 99024 POSTOP FOLLOW-UP VISIT: CPT | Performed by: NURSE PRACTITIONER

## 2019-05-17 NOTE — PROGRESS NOTES
Carina Zamudio : 1970 MRN: [id number] DATE: 2019  Body mass index is 35.85 kg/m².  Vitals:    19 0955   Temp: 98.4 °F (36.9 °C)       DIAGNOSIS: 1 weeks follow up right knee arthroscopy     SUBJECTIVE:Patient returns today for follow up of knee arthroscopy. Patient reports doing well with no unusual complaints. Appears to be progressing appropriately.    OBJECTIVE:   Exam:.  The incision is healing appropriately. No sign of infection. Range of motion is progressing as expected. The calf is soft and nontender with a negative Homans sign. Moderate swelling and stiffness in operative knee. Distal pulse intact.     DIAGNOSTIC STUDIES Pictures from surgery were reviewed with the patient and questions were answered.    ASSESSMENT: status post knee arthroscopy expected healing.    PLAN: 1) Stitches removed and steri strips applied-ok to remove when they fall off.   2) Order given for PT    3) Continue ice PRN   4) Follow up in 4 weeks.        Alma Delia Chopra, APRN  2019

## 2019-05-17 NOTE — PATIENT INSTRUCTIONS
DIAGNOSIS: 1 weeks follow up right knee arthroscopy     SUBJECTIVE:Patient returns today for follow up of knee arthroscopy. Patient reports doing well with no unusual complaints. Appears to be progressing appropriately.    OBJECTIVE:   Exam:.  The incision is healing appropriately. No sign of infection. Range of motion is progressing as expected. The calf is soft and nontender with a negative Homans sign. Moderate swelling and stiffness in operative knee. Distal pulse intact.     DIAGNOSTIC STUDIES Pictures from surgery were reviewed with the patient and questions were answered.    ASSESSMENT: status post knee arthroscopy expected healing.    PLAN: 1) Stitches removed and steri strips applied-ok to remove when they fall off.   2) Order given for PT    3) Continue ice PRN   4) Follow up in 4 weeks.        Alma Delia Chopra, APRN  5/17/2019

## 2019-05-21 ENCOUNTER — HOSPITAL ENCOUNTER (OUTPATIENT)
Dept: PHYSICAL THERAPY | Facility: HOSPITAL | Age: 49
Setting detail: THERAPIES SERIES
Discharge: HOME | End: 2019-05-21

## 2019-05-21 DIAGNOSIS — M25.561 RIGHT KNEE PAIN, UNSPECIFIED CHRONICITY: Primary | ICD-10-CM

## 2019-05-21 DIAGNOSIS — Z98.890 S/P RIGHT KNEE ARTHROSCOPY: ICD-10-CM

## 2019-05-21 PROCEDURE — 97110 THERAPEUTIC EXERCISES: CPT | Performed by: PHYSICAL THERAPIST

## 2019-05-21 PROCEDURE — 97161 PT EVAL LOW COMPLEX 20 MIN: CPT | Performed by: PHYSICAL THERAPIST

## 2019-05-21 NOTE — THERAPY EVALUATION
Outpatient Physical Therapy Ortho Initial Evaluation  [facility]     Patient Name: Carina Zamudio  : 1970  MRN: [id number]  Today's Date: 2019      Visit Date: 2019    Patient Active Problem List   Diagnosis   • Adrenal congenital hyperplasia (CMS/HCC)   • Cerebellar infarction (CMS/HCC)   • Coronary artery disease involving native coronary artery of native heart with angina pectoris (CMS/HCC)   • Osteoarthritis of spine   • Diverticulitis of colon   • Essential hypertension   • Hyperlipidemia   • Multinodular goiter   • Myocardial infarction (CMS/HCC)   • Obstructive sleep apnea syndrome   • Proteinuria   • Retinal hemorrhage   • S/P coronary angioplasty   • Cervical radiculopathy due to degenerative joint disease of spine   • Spondylosis of lumbar region without myelopathy or radiculopathy   • Type 2 diabetes mellitus without complication, without long-term current use of insulin (CMS/HCC)   • Gastroesophageal reflux disease without esophagitis   • Class 3 severe obesity with serious comorbidity in adult (CMS/HCC)   • Diastolic heart failure (CMS/HCC)   • Tobacco abuse   • Leg edema, right   • Acute pain of right knee   • Acute medial meniscus tear of right knee   • Arthritis of right knee   • S/P right knee arthroscopy        Past Medical History:   Diagnosis Date   • 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 Xie   • Hyperlipemia    • Hypertension    • Liver nodule    • Morbid obesity (CMS/HCC)    • Multiple thyroid nodules    • Myocardial infarction (CMS/HCC) 2015   • Nontoxic multinodular goiter    • Preretinal hemorrhage of left eye    • Proteinuria     CHRONIC   • Raynaud phenomenon    • Right knee pain    • Sleep apnea     Uses CPAP   • Vitamin D deficiency         Past Surgical History:   Procedure Laterality Date   • CATARACT EXTRACTION, BILATERAL     • CORONARY STENT PLACEMENT     • ENDOMETRIAL ABLATION  2012   • EYE SURGERY     • KNEE ARTHROSCOPY Right 5/10/2019    Procedure: RIGHT KNEE ARTHROSCOPY BWITH PART. MEDIAL AND LATERAL MENISECTOMY, PATELLA, CHONDROPLASTY, AND DEBRIDMENT;  Surgeon: Ignacio Martinez MD;  Location: [facility] OR [facility];  Service: Orthopedics   • TUBAL ABDOMINAL LIGATION  2012       Visit Dx:     ICD-10-CM ICD-9-CM   1. Right knee pain, unspecified chronicity M25.561 719.46   2. S/P right knee arthroscopy Z98.890 V45.89         Patient History     Row Name 05/21/19 0900             History    Chief Complaint  Difficulty Walking;Difficulty with daily activities;Joint swelling;Muscle weakness;Pain  -      Date Current Problem(s) Began  01/21/19  -      Brief Description of Current Complaint  Pt started having R knee pain about 4 months ago. She was out with friends and her knee HE. Had pain and swelling in the am. She got an injection, but then started having weakness and buckling. MRI showed tear of med meniscus, Baker's cyst, OA.   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  stairs, walking, standing, squatting  -      What Performance Factors Make the Current Problem(s) BETTER?  ice, tylenol, rest  -LH      Is your sleep disturbed?  No  -LH         Fall Risk Assessment    Any falls in the past year:  No  -LH         Services    Prior Rehab/Home Health Experiences  Yes  -         Daily Activities    Primary Language  English  -      How does patient learn best?  Listening  -      Teaching needs identified  Home Exercise Program;Management of Condition  -      Patient is concerned about/has problems with  Climbing Stairs;Performing home management (household chores, shopping, care of dependents);Standing;Walking  -      Does patient have problems with the following?  None  -      Barriers to learning  None  -      Pt Participated in POC and Goals  Yes  -         Safety    Are you being hurt, hit, or frightened by anyone at home or in your life?  No  -LH      Are you being neglected by a caregiver  No  -        User Key  (r) = Recorded By, (t) = Taken By, (c) = Cosigned By    Initials Name Provider Type     Ame Rinaldi PT Physical Therapist          PT Ortho     Row Name 05/21/19 1000       Posture/Observations    Observations  Incision healing;Edema  -    Posture/Observations Comments  stands with decreased TKE  -       Knee Palpation    Medial Joint Line  Right:;Tender;Swollen  -    Lateral Joint Line  Right:;Tender;Swollen  -LH       Right Lower Ext    Rt Knee Extension/Flexion AROM  ext=10 deg, flex=110 deg  -       Left Lower Ext    Lt Knee Extension/Flexion AROM  ext=5 deg of HE, flex=120 deg  -       MMT Right Lower Ext    Rt Hip Flexion MMT, Gross Movement  (4-/5) good minus  -    Rt Hip ABduction MMT, Gross Movement  (4-/5) good minus  -    Rt Knee Extension MMT, Gross Movement   (4/5) good  -LH    Rt Knee Flexion MMT, Gross Movement  (4-/5) good minus  -LH       MMT Left Lower Ext    Lt Hip Flexion MMT, Gross Movement  (4+/5) good plus  -LH    Lt Hip ABduction MMT, Gross Movement  (4+/5) good plus  -LH    Lt Knee Extension MMT, Gross Movement  (5/5) normal  -LH    Lt Knee Flexion MMT, Gross Movement  (5/5) normal  -LH       Lower Extremity Flexibility    Hamstrings  Right:;Moderately limited  -    Gastrocnemius  Right:;Mildly limited  -LH       Transfers    Comment (Transfers)  dec weight shift over the R LE with sit to stand  -       Gait/Stairs Assessment/Training    Comment (Gait/Stairs)  mild antalgia with decreased TKE on the R LE, glute med shift  -      User Key  (r) = Recorded By, (t) = Taken By, (c) = Cosigned By    Initials Name Provider Type     Ame Rinaldi, PT Physical Therapist                      Therapy Education  Given: HEP, Symptoms/condition management, Pain management, Mobility training, Edema management  Program: New  How Provided: Verbal, Demonstration, Written  Provided to: Patient  Level of Understanding: Teach back education performed, Demonstrated, Verbalized     PT OP Goals     Row Name 05/21/19 1200          PT Short Term Goals    STG 1  Patient will be independent with education for symptom management, joint protection and strategies to minimize stress on affected tissues  -     STG 1 Progress  New  -     STG 2  Pt to improve knee ROM to ext=4 deg and flex=115 deg for improved gait pattern  -     STG 2 Progress  New  [facility]     STG 3  Patient will report decreased pain rating on VAS from  4/10 to  1/10 with ADLs, work,  and household activities.  -     STG 3 Progress  New  -        Long Term Goals    LTG 1  Pt to improve knee ROM to ext=3 deg of HE to 120 deg for improved gait pattern  -     LTG 1 Progress  New  -     LTG 2  Patient will increase knee and hip strength to 4+ to 5/5 to improve functional mobility  -     LTG 2 Progress   New  -     LTG 3  Pt to improve score on Knee Outcome Survey from 66% to 85% for overall functional improvement  -     LTG 3 Progress  New  -     LTG 4  Patient will demonstrate an independent HEP for core and knee strength and flexibility/ROM.  -     LTG 4 Progress  New  -        Time Calculation    PT Goal Re-Cert Due Date  08/21/19  -       User Key  (r) = Recorded By, (t) = Taken By, (c) = Cosigned By    Initials Name Provider Type     Ame Rinaldi PT Physical Therapist          PT Assessment/Plan     Row Name 05/21/19 1200          PT Assessment    Functional Limitations  Impaired gait;Limitation in home management;Limitations in community activities;Limitations in functional capacity and performance;Performance in leisure activities  -     Impairments  Edema;Gait;Impaired flexibility;Muscle strength;Pain;Range of motion  -     Assessment Comments  Carina Zamudio is a 48 y.o. year-old female referred to physical therapy for s/p R knee scope. She presents with a evolving clinical presentation.  She has no comorbidities  And/or personal factors that may affect her progress in the plan of care.  Her incisions are healing well with mild edema and tenderness over the joint line. She is ambulating with decreased stance time, TKE, and a glute med shift over the R LE, decreased knee ROM  deg, decreased hip and knee strength, and decreased flexibility of the HS and calf muscles. Pt would benefit from therapy to help improve her ability to walk, perform stairs, and return to her active lifestyle.  -     Please refer to paper survey for additional self-reported information  Yes  -     Rehab Potential  Excellent  -     Patient/caregiver participated in establishment of treatment plan and goals  Yes  -     Patient would benefit from skilled therapy intervention  Yes  -        PT Plan    PT Frequency  2x/week  -     Predicted Duration of Therapy Intervention (Therapy Eval)  4-6  weeks  -LH     Planned CPT's?  PT EVAL LOW COMPLEXITY: 62142;PT THER PROC EA 15 MIN: 03208;PT MANUAL THERAPY EA 15 MIN: 76806;PT NEUROMUSC RE-EDUCATION EA 15 MIN: 64632;PT GAIT TRAINING EA 15 MIN: 91902;PT HOT/COLD PACK WC NONMCARE: 92583  -     Physical Therapy Interventions (Optional Details)  balance training;gait training;home exercise program;joint mobilization;manual therapy techniques;modalities;neuromuscular re-education;patient/family education;ROM (Range of Motion);stair training;strengthening;stretching  -     PT Plan Comments  plan to see pt 2x week for skilled therapy s/p knee scope  -LH       User Key  (r) = Recorded By, (t) = Taken By, (c) = Cosigned By    Initials Name Provider Type     Ame Rinaldi, PT Physical Therapist          Modalities     Row Name 05/21/19 0900             Ice    Ice Applied  Yes  -LH      Location  R knee in supine  -LH      Rx Minutes  15 mins  -LH      Ice S/P Rx  Yes  -LH        User Key  (r) = Recorded By, (t) = Taken By, (c) = Cosigned By    Initials Name Provider Type     Ame Rinaldi, PT Physical Therapist        Exercises     Row Name 05/21/19 1000             Total Minutes    36203 - PT Therapeutic Exercise Minutes  20  -LH         Exercise 1    Exercise Name 1  QS  -LH      Sets 1  1  -LH      Reps 1  10  -LH      Time 1  5 sec  -LH         Exercise 2    Exercise Name 2  SLR and SL hip abd  -LH      Sets 2  1  -LH      Reps 2  10  -LH      Time 2  5 sec  -LH         Exercise 3    Exercise Name 3  hip add iso with pillow  -LH      Sets 3  1  -LH      Reps 3  10  -LH      Time 3  5 sec  -LH         Exercise 4    Exercise Name 4  seated HS curls  -LH      Sets 4  1  -LH      Reps 4  10  -LH      Time 4  red  -LH         Exercise 5    Exercise Name 5  seated HS and calf stretching  -LH      Sets 5  1  -LH      Reps 5  3  -LH      Time 5  20 sec  -LH        User Key  (r) = Recorded By, (t) = Taken By, (c) = Cosigned By    Initials Name Provider Type      Ame Rinaldi, PT Physical Therapist                        Outcome Measure Options: Knee Outcome Score- ADL  Knee Outcome Score  Knee Outcome Score Comments: 66%      Time Calculation:     Start Time: 0945  Stop Time: 1045  Time Calculation (min): 60 min  Total Timed Code Minutes- PT: 45 minute(s)     Therapy Charges for Today     Code Description Service Date Service Provider Modifiers Qty    58569753297  PT THER PROC EA 15 MIN 5/21/2019 Ame Rinaldi, PT GP 1    [id number]  PT EVAL LOW COMPLEXITY 2 5/21/2019 Ame Rinaldi, PT GP 1          PT G-Codes  Outcome Measure Options: Knee Outcome Score- ADL         Ame Rinaldi, PT  5/21/2019

## 2019-05-23 ENCOUNTER — TELEPHONE (OUTPATIENT)
Dept: ORTHOPEDIC SURGERY | Facility: CLINIC | Age: 49
End: 2019-05-23

## 2019-05-23 ENCOUNTER — HOSPITAL ENCOUNTER (OUTPATIENT)
Dept: PHYSICAL THERAPY | Facility: HOSPITAL | Age: 49
Setting detail: THERAPIES SERIES
Discharge: HOME | End: 2019-05-23

## 2019-05-23 DIAGNOSIS — Z98.890 S/P RIGHT KNEE ARTHROSCOPY: ICD-10-CM

## 2019-05-23 DIAGNOSIS — M25.561 RIGHT KNEE PAIN, UNSPECIFIED CHRONICITY: Primary | ICD-10-CM

## 2019-05-23 PROCEDURE — 97110 THERAPEUTIC EXERCISES: CPT | Performed by: PHYSICAL THERAPIST

## 2019-05-23 PROCEDURE — 97140 MANUAL THERAPY 1/> REGIONS: CPT | Performed by: PHYSICAL THERAPIST

## 2019-05-23 NOTE — TELEPHONE ENCOUNTER
Regarding: Non-Urgent Medical Question  Contact: [phone]  ----- Message from Mychart, Generic sent at 5/23/2019 11:36 AM EDT -----  MY CHART MESSAGE:    Kemal Flannery,    I started PT this week. Just what I needed but kicking my butt in a good way. I would like to return to work next Wednesday, May 29th. I will need a note to return and for FMLA because it has me off until June 7th. Continuing PT of course in your building which is close to office in [facility] building. If easier for you, if you dictate a letter into my chart, I can print it. Or by email...[email or web address]. Thank you very much! Have a great 3 day weekend!     SUZY Zamudio

## 2019-05-23 NOTE — TELEPHONE ENCOUNTER
Please let her know that I have written the work release for wed may 29th.  I will also forward a copy of this communication to Rebeca for the [facility] paperwork.  Take care and continue with the physical therapy exercises!

## 2019-05-23 NOTE — THERAPY TREATMENT NOTE
Outpatient Physical Therapy Ortho Treatment Note  [facility]     Patient Name: Carina Zamudio  : 1970  MRN: [id number]  Today's Date: 2019      Visit Date: 2019    Visit Dx:    ICD-10-CM ICD-9-CM   1. Right knee pain, unspecified chronicity M25.561 719.46   2. S/P right knee arthroscopy Z98.890 V45.89       Patient Active Problem List   Diagnosis   • Adrenal congenital hyperplasia (CMS/HCC)   • Cerebellar infarction (CMS/HCC)   • Coronary artery disease involving native coronary artery of native heart with angina pectoris (CMS/HCC)   • Osteoarthritis of spine   • Diverticulitis of colon   • Essential hypertension   • Hyperlipidemia   • Multinodular goiter   • Myocardial infarction (CMS/HCC)   • Obstructive sleep apnea syndrome   • Proteinuria   • Retinal hemorrhage   • S/P coronary angioplasty   • Cervical radiculopathy due to degenerative joint disease of spine   • Spondylosis of lumbar region without myelopathy or radiculopathy   • Type 2 diabetes mellitus without complication, without long-term current use of insulin (CMS/HCC)   • Gastroesophageal reflux disease without esophagitis   • Class 3 severe obesity with serious comorbidity in adult (CMS/HCC)   • Diastolic heart failure (CMS/HCC)   • Tobacco abuse   • Leg edema, right   • Acute pain of right knee   • Acute medial meniscus tear of right knee   • Arthritis of right knee   • S/P right knee arthroscopy        Past Medical History:   Diagnosis Date   • 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     2012--Sees Dr Serra (Eye)--laser Rx's   • Adrenal abnormality (CMS/[facility])     ENLARGEMENT   • Adrenal cortical adenoma    • Adrenal hypertrophy (CMS/HCC)    • Anemia    • Anxiety    • Benign essential hypertension    • CAD (coronary artery disease)    • Cellulitis     4 MONTHS AGO   • Cerebellar infarct (CMS/HCC)    • Chronic bronchitis (CMS/HCC)    • Diabetes mellitus (CMS/HCC)    • DJD (degenerative joint disease),  cervical    • DJD (degenerative joint disease), lumbar    • Fatigue    • Gestational diabetes    • HPV (human papilloma virus) infection    • Hyperglycemia     Dr. Xie   • Hyperlipemia    • Hypertension    • Liver nodule    • Morbid obesity (CMS/HCC)    • Multiple thyroid nodules    • Myocardial infarction (CMS/HCC) 2015   • Nontoxic multinodular goiter    • Preretinal hemorrhage of left eye    • Proteinuria     CHRONIC   • Raynaud phenomenon    • Right knee pain    • Sleep apnea     Uses CPAP   • Vitamin D deficiency         Past Surgical History:   Procedure Laterality Date   • CATARACT EXTRACTION, BILATERAL     • CORONARY STENT PLACEMENT     • ENDOMETRIAL ABLATION  2012   • EYE SURGERY     • KNEE ARTHROSCOPY Right 5/10/2019    Procedure: RIGHT KNEE ARTHROSCOPY BWITH PART. MEDIAL AND LATERAL MENISECTOMY, PATELLA, CHONDROPLASTY, AND DEBRIDMENT;  Surgeon: Ignacio Martinez MD;  Location: [facility] OR [facility] [place];  Service: Orthopedics   • TUBAL ABDOMINAL LIGATION  2012       PT Ortho     Row Name 05/23/19 0800       Subjective Comments    Subjective Comments  sore from starting the exercises  -LH       Right Lower Ext    Rt Knee Extension/Flexion AROM  ext= 2 deg  -LH    Row Name 05/21/19 1000       Posture/Observations    Observations  Incision healing;Edema  -LH    Posture/Observations Comments  stands with decreased TKE  -       Knee Palpation    Medial Joint Line  Right:;Tender;Swollen  -    Lateral Joint Line  Right:;Tender;Swollen  -LH       Right Lower Ext    Rt Knee Extension/Flexion AROM  ext=10 deg, flex=110 deg  -LH       Left Lower Ext    Lt Knee Extension/Flexion AROM  ext=5 deg of HE, flex=120 deg  -LH       MMT Right Lower Ext    Rt Hip Flexion MMT, Gross Movement  (4-/5) good minus  -LH    Rt Hip ABduction MMT, Gross Movement  (4-/5) good minus  -LH    Rt Knee Extension MMT, Gross Movement  (4/5) good  -LH    Rt Knee Flexion MMT, Gross Movement  (4-/5) good minus  -LH       MMT Left Lower Ext    Lt Hip  Flexion MMT, Gross Movement  (4+/5) good plus  -LH    Lt Hip ABduction MMT, Gross Movement  (4+/5) good plus  -    Lt Knee Extension MMT, Gross Movement  (5/5) normal  -    Lt Knee Flexion MMT, Gross Movement  (5/5) normal  -       Lower Extremity Flexibility    Hamstrings  Right:;Moderately limited  -LH    Gastrocnemius  Right:;Mildly limited  -LH       Transfers    Comment (Transfers)  dec weight shift over the R LE with sit to stand  -       Gait/Stairs Assessment/Training    Comment (Gait/Stairs)  mild antalgia with decreased TKE on the R LE, glute med shift  -      User Key  (r) = Recorded By, (t) = Taken By, (c) = Cosigned By    Initials Name Provider Type     Ame Rinaldi, PT Physical Therapist                      PT Assessment/Plan     Row Name 05/23/19 0921          PT Assessment    Assessment Comments  Pt has been a little sore in her leg muscles since starting the HEP. She did improve her knee ext from 10 deg to 2 deg from full ext over the past 2 days.   -        PT Plan    PT Plan Comments  cont  -       User Key  (r) = Recorded By, (t) = Taken By, (c) = Cosigned By    Initials Name Provider Type     Ame Rinaldi, PT Physical Therapist            Exercises     Row Name 05/23/19 0900 05/23/19 0800          Subjective Comments    Subjective Comments  --  sore from starting the exercises  -        Subjective Pain    Able to rate subjective pain?  --  yes  -     Pre-Treatment Pain Level  --  4  -LH        Total Minutes    07633 - PT Therapeutic Exercise Minutes  --  30  -LH     84293 - PT Manual Therapy Minutes  10  -LH  --        Exercise 1    Exercise Name 1  --  QS  -LH     Sets 1  --  2  -LH     Reps 1  --  10  -LH     Time 1  --  5 sec  -        Exercise 2    Exercise Name 2  --  SLR and SL hip abd  -     Sets 2  --  2  -LH     Reps 2  --  10  -LH        Exercise 3    Exercise Name 3  --  hip add iso with pillow  -     Sets 3  --  1  -LH     Reps 3  --  10  -LH      Time 3  --  5 sec  -LH        Exercise 4    Exercise Name 4  --  seated HS curls  -LH     Sets 4  --  2  -LH     Reps 4  --  10  -LH     Time 4  --  red  -LH        Exercise 5    Exercise Name 5  --  seated HS and calf stretching  -LH     Sets 5  --  1  -LH     Reps 5  --  3  -LH     Time 5  --  20 sec  -LH        Exercise 6    Exercise Name 6  --  hip abd/ER with band  -LH     Sets 6  --  2  -LH     Reps 6  --  10  -LH     Time 6  --  green  -LH       User Key  (r) = Recorded By, (t) = Taken By, (c) = Cosigned By    Initials Name Provider Type     Ame Rinaldi PT Physical Therapist                      Manual Rx (last 36 hours)      Manual Treatments     Row Name 05/23/19 0900             Total Minutes    18334 - PT Manual Therapy Minutes  10  -LH         Manual Rx 1    Manual Rx 1 Location  R knee  -LH      Manual Rx 1 Type  light retrograde massage for pain control and edema  -LH        User Key  (r) = Recorded By, (t) = Taken By, (c) = Cosigned By    Initials Name Provider Type     Ame Rinaldi PT Physical Therapist              Therapy Education  Given: HEP, Symptoms/condition management, Pain management, Edema management  Program: New, Reinforced  How Provided: Verbal, Written  Provided to: Patient  Level of Understanding: Teach back education performed              Time Calculation:   Start Time: 0815  Stop Time: 0900  Time Calculation (min): 45 min  Total Timed Code Minutes- PT: 45 minute(s)  Therapy Charges for Today     Code Description Service Date Service Provider Modifiers Qty    [id number]  PT THER PROC EA 15 MIN 5/23/2019 Ame Rinaldi, PT GP 2    [id number]  PT MANUAL THERAPY EA 15 MIN 5/23/2019 Ame Rinaldi, PT GP 1                    Ame Rinaldi PT  5/23/2019

## 2019-05-30 ENCOUNTER — HOSPITAL ENCOUNTER (OUTPATIENT)
Dept: PHYSICAL THERAPY | Facility: HOSPITAL | Age: 49
Setting detail: THERAPIES SERIES
Discharge: HOME | End: 2019-05-30

## 2019-05-30 DIAGNOSIS — Z98.890 S/P RIGHT KNEE ARTHROSCOPY: ICD-10-CM

## 2019-05-30 DIAGNOSIS — M25.561 RIGHT KNEE PAIN, UNSPECIFIED CHRONICITY: Primary | ICD-10-CM

## 2019-05-30 PROCEDURE — 97140 MANUAL THERAPY 1/> REGIONS: CPT | Performed by: PHYSICAL THERAPIST

## 2019-05-30 PROCEDURE — 97110 THERAPEUTIC EXERCISES: CPT | Performed by: PHYSICAL THERAPIST

## 2019-05-30 RX ORDER — CLOPIDOGREL BISULFATE 75 MG/1
75 TABLET ORAL DAILY
Qty: 90 TABLET | Refills: 1 | Status: SHIPPED | OUTPATIENT
Start: 2019-05-30 | End: 2019-12-30 | Stop reason: SDUPTHER

## 2019-05-30 RX ORDER — CARVEDILOL 3.12 MG/1
3.12 TABLET ORAL 2 TIMES DAILY
Qty: 180 TABLET | Refills: 1 | Status: SHIPPED | OUTPATIENT
Start: 2019-05-30 | End: 2019-09-06 | Stop reason: SDUPTHER

## 2019-05-30 NOTE — THERAPY TREATMENT NOTE
Outpatient Physical Therapy Ortho Treatment Note  [facility]     Patient Name: Carina Zamudio  : 1970  MRN: [id number]  Today's Date: 2019      Visit Date: 2019    Visit Dx:    ICD-10-CM ICD-9-CM   1. Right knee pain, unspecified chronicity M25.561 719.46   2. S/P right knee arthroscopy Z98.890 V45.89       Patient Active Problem List   Diagnosis   • Adrenal congenital hyperplasia (CMS/HCC)   • Cerebellar infarction (CMS/HCC)   • Coronary artery disease involving native coronary artery of native heart with angina pectoris (CMS/HCC)   • Osteoarthritis of spine   • Diverticulitis of colon   • Essential hypertension   • Hyperlipidemia   • Multinodular goiter   • Myocardial infarction (CMS/HCC)   • Obstructive sleep apnea syndrome   • Proteinuria   • Retinal hemorrhage   • S/P coronary angioplasty   • Cervical radiculopathy due to degenerative joint disease of spine   • Spondylosis of lumbar region without myelopathy or radiculopathy   • Type 2 diabetes mellitus without complication, without long-term current use of insulin (CMS/HCC)   • Gastroesophageal reflux disease without esophagitis   • Class 3 severe obesity with serious comorbidity in adult (CMS/HCC)   • Diastolic heart failure (CMS/HCC)   • Tobacco abuse   • Leg edema, right   • Acute pain of right knee   • Acute medial meniscus tear of right knee   • Arthritis of right knee   • S/P right knee arthroscopy        Past Medical History:   Diagnosis Date   • 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     2012--Sees Dr Serra (Eye)--laser Rx's   • Adrenal abnormality (CMS/[facility])     ENLARGEMENT   • Adrenal cortical adenoma    • Adrenal hypertrophy (CMS/HCC)    • Anemia    • Anxiety    • Benign essential hypertension    • CAD (coronary artery disease)    • Cellulitis     4 MONTHS AGO   • Cerebellar infarct (CMS/HCC)    • Chronic bronchitis (CMS/HCC)    • Diabetes mellitus (CMS/HCC)    • DJD (degenerative joint disease),  cervical    • DJD (degenerative joint disease), lumbar    • Fatigue    • Gestational diabetes    • HPV (human papilloma virus) infection    • Hyperglycemia     Dr. Xie   • Hyperlipemia    • Hypertension    • Liver nodule    • Morbid obesity (CMS/HCC)    • Multiple thyroid nodules    • Myocardial infarction (CMS/HCC) 2015   • Nontoxic multinodular goiter    • Preretinal hemorrhage of left eye    • Proteinuria     CHRONIC   • Raynaud phenomenon    • Right knee pain    • Sleep apnea     Uses CPAP   • Vitamin D deficiency         Past Surgical History:   Procedure Laterality Date   • CATARACT EXTRACTION, BILATERAL     • CORONARY STENT PLACEMENT     • ENDOMETRIAL ABLATION  2012   • EYE SURGERY     • KNEE ARTHROSCOPY Right 5/10/2019    Procedure: RIGHT KNEE ARTHROSCOPY BWITH PART. MEDIAL AND LATERAL MENISECTOMY, PATELLA, CHONDROPLASTY, AND DEBRIDMENT;  Surgeon: Ignacio Martinez MD;  Location: [facility] OR [facility] [place];  Service: Orthopedics   • TUBAL ABDOMINAL LIGATION  2012                       PT Assessment/Plan     Row Name 05/30/19 0914          PT Assessment    Assessment Comments  Pt started back to work this week and has been a little tired and sore by the end of the work day. She still is walking with mild antalgia with a glute med shift of over the R LE. Edema is very mild, incisions look great  -        PT Plan    PT Plan Comments  cont  -       User Key  (r) = Recorded By, (t) = Taken By, (c) = Cosigned By    Initials Name Provider Type     Ame Rinaldi, PT Physical Therapist            Exercises     Row Name 05/30/19 0900 05/30/19 0800          Subjective Comments    Subjective Comments  --  been doing better. Tired at the end of the work day.   -        Subjective Pain    Able to rate subjective pain?  --  yes  -     Pre-Treatment Pain Level  --  2  -        Total Minutes    98182 - PT Therapeutic Exercise Minutes  --  30  -LH     20816 - PT Manual Therapy Minutes  9  -LH  --        Exercise 1     Exercise Name 1  --  QS  -LH     Sets 1  --  2  -LH     Reps 1  --  10  -LH     Time 1  --  5 sec  -LH        Exercise 2    Exercise Name 2  --  SLR and SL hip abd  -LH     Sets 2  --  2  -LH     Reps 2  --  10  -LH        Exercise 3    Exercise Name 3  --  hip add iso with pillow  -LH     Sets 3  --  2  -LH     Reps 3  --  10  -LH     Time 3  --  5 sec  -LH        Exercise 4    Exercise Name 4  --  seated HS curls  -LH     Sets 4  --  2  -LH     Reps 4  --  10  -LH     Time 4  --  red  -LH        Exercise 5    Exercise Name 5  --  seated HS and calf stretching  -LH     Sets 5  --  1  -LH     Reps 5  --  3  -LH     Time 5  --  20 sec  -LH        Exercise 6    Exercise Name 6  --  hip abd/ER with band  -LH     Sets 6  --  2  -LH     Reps 6  --  10  -LH     Time 6  --  green  -LH        Exercise 7    Exercise Name 7  --  bridge  -LH     Sets 7  --  1  -LH     Reps 7  --  20  -LH       User Key  (r) = Recorded By, (t) = Taken By, (c) = Cosigned By    Initials Name Provider Type     Ame Rinaldi, PT Physical Therapist                      Manual Rx (last 36 hours)      Manual Treatments     Row Name 05/30/19 0900             Total Minutes    38870 - PT Manual Therapy Minutes  9  -LH         Manual Rx 1    Manual Rx 1 Location  R knee  -LH      Manual Rx 1 Type  light retrograde massage for pain control and edema  -LH        User Key  (r) = Recorded By, (t) = Taken By, (c) = Cosigned By    Initials Name Provider Type     Ame Rinaldi PT Physical Therapist              Therapy Education  Given: HEP, Symptoms/condition management, Pain management  Program: Reinforced, New  How Provided: Verbal  Provided to: Patient  Level of Understanding: Teach back education performed              Time Calculation:   Start Time: 0825  Stop Time: 0905  Time Calculation (min): 40 min  Total Timed Code Minutes- PT: 40 minute(s)  Therapy Charges for Today     Code Description Service Date Service Provider Modifiers Qty     68016409264  PT THER PROC EA 15 MIN 5/30/2019 Ame Rinaldi, PT GP 2    [id number]  PT MANUAL THERAPY EA 15 MIN 5/30/2019 Ame Rinaldi, PT GP 1                    Ame Rinaldi, PT  5/30/2019

## 2019-06-04 ENCOUNTER — HOSPITAL ENCOUNTER (OUTPATIENT)
Dept: PHYSICAL THERAPY | Facility: HOSPITAL | Age: 49
Setting detail: THERAPIES SERIES
Discharge: HOME | End: 2019-06-04

## 2019-06-04 DIAGNOSIS — Z98.890 S/P RIGHT KNEE ARTHROSCOPY: ICD-10-CM

## 2019-06-04 DIAGNOSIS — M25.561 RIGHT KNEE PAIN, UNSPECIFIED CHRONICITY: Primary | ICD-10-CM

## 2019-06-04 PROCEDURE — 97110 THERAPEUTIC EXERCISES: CPT | Performed by: PHYSICAL THERAPIST

## 2019-06-04 PROCEDURE — 97140 MANUAL THERAPY 1/> REGIONS: CPT | Performed by: PHYSICAL THERAPIST

## 2019-06-04 NOTE — THERAPY TREATMENT NOTE
Outpatient Physical Therapy Ortho Treatment Note  [facility]     Patient Name: Carina Zamudio  : 1970  MRN: [id number]  Today's Date: 2019      Visit Date: 2019    Visit Dx:    ICD-10-CM ICD-9-CM   1. Right knee pain, unspecified chronicity M25.561 719.46   2. S/P right knee arthroscopy Z98.890 V45.89       Patient Active Problem List   Diagnosis   • Adrenal congenital hyperplasia (CMS/HCC)   • Cerebellar infarction (CMS/HCC)   • Coronary artery disease involving native coronary artery of native heart with angina pectoris (CMS/HCC)   • Osteoarthritis of spine   • Diverticulitis of colon   • Essential hypertension   • Hyperlipidemia   • Multinodular goiter   • Myocardial infarction (CMS/HCC)   • Obstructive sleep apnea syndrome   • Proteinuria   • Retinal hemorrhage   • S/P coronary angioplasty   • Cervical radiculopathy due to degenerative joint disease of spine   • Spondylosis of lumbar region without myelopathy or radiculopathy   • Type 2 diabetes mellitus without complication, without long-term current use of insulin (CMS/HCC)   • Gastroesophageal reflux disease without esophagitis   • Class 3 severe obesity with serious comorbidity in adult (CMS/HCC)   • Diastolic heart failure (CMS/HCC)   • Tobacco abuse   • Leg edema, right   • Acute pain of right knee   • Acute medial meniscus tear of right knee   • Arthritis of right knee   • S/P right knee arthroscopy        Past Medical History:   Diagnosis Date   • 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     2012--Sees Dr Serra (Eye)--laser Rx's   • Adrenal abnormality (CMS/[facility])     ENLARGEMENT   • Adrenal cortical adenoma    • Adrenal hypertrophy (CMS/HCC)    • Anemia    • Anxiety    • Benign essential hypertension    • CAD (coronary artery disease)    • Cellulitis     4 MONTHS AGO   • Cerebellar infarct (CMS/HCC)    • Chronic bronchitis (CMS/HCC)    • Diabetes mellitus (CMS/HCC)    • DJD (degenerative joint disease),  cervical    • DJD (degenerative joint disease), lumbar    • Fatigue    • Gestational diabetes    • HPV (human papilloma virus) infection    • Hyperglycemia     Dr. Xie   • Hyperlipemia    • Hypertension    • Liver nodule    • Morbid obesity (CMS/HCC)    • Multiple thyroid nodules    • Myocardial infarction (CMS/HCC) 2015   • Nontoxic multinodular goiter    • Preretinal hemorrhage of left eye    • Proteinuria     CHRONIC   • Raynaud phenomenon    • Right knee pain    • Sleep apnea     Uses CPAP   • Vitamin D deficiency         Past Surgical History:   Procedure Laterality Date   • CATARACT EXTRACTION, BILATERAL     • CORONARY STENT PLACEMENT     • ENDOMETRIAL ABLATION  2012   • EYE SURGERY     • KNEE ARTHROSCOPY Right 5/10/2019    Procedure: RIGHT KNEE ARTHROSCOPY BWITH PART. MEDIAL AND LATERAL MENISECTOMY, PATELLA, CHONDROPLASTY, AND DEBRIDMENT;  Surgeon: Ignacio Martinez MD;  [place] [facility] [place] [facility] [place]: [place]opedics   • TUBAL ABDOMINAL LIGATION  2012                       PT Assessment/Plan     Row Name 06/04/19 0901          PT Assessment    Assessment Comments  pt is still having soreness over the medial joint line and compartment, especially with walking/standing activities. She has not been putting her OTC orthotics back in her shoes yet. Pt does have a moderate pes planus B. She is going to bring in her orthotics for me to look at next session  -        PT Plan    PT Plan Comments  cont  -       User Key  (r) = Recorded By, (t) = Taken By, (c) = Cosigned By    Initials Name Provider Type     Ame Rinaldi, PT Physical Therapist            Exercises     Row Name 06/04/19 0900 06/04/19 0800          Subjective Comments    Subjective Comments  --  it has been hurting a little on the inside of the knee  -        Subjective Pain    Able to rate subjective pain?  --  yes  -     Pre-Treatment Pain Level  --  2  -        Total Minutes    24869 - PT Therapeutic Exercise Minutes  --  33  -      18607 - PT Manual Therapy Minutes  10  -LH  --        Exercise 1    Exercise Name 1  --  QS  -LH     Sets 1  --  2  -LH     Reps 1  --  10  -LH     Time 1  --  5 sec  -LH        Exercise 2    Exercise Name 2  --  SLR and SL hip abd  -LH     Sets 2  --  2  -LH     Reps 2  --  10  -LH     Time 2  --  1 lb  -LH        Exercise 3    Exercise Name 3  --  hip add iso with pillow  -LH     Sets 3  --  2  -LH     Reps 3  --  10  -LH     Time 3  --  5 sec  -LH        Exercise 4    Exercise Name 4  --  seated HS curls  -LH     Sets 4  --  2  -LH     Reps 4  --  10  -LH     Time 4  --  green  -LH        Exercise 5    Exercise Name 5  --  seated HS and calf stretching  -LH     Sets 5  --  1  -LH     Reps 5  --  3  -LH     Time 5  --  20 sec  -LH        Exercise 6    Exercise Name 6  --  hip abd/ER with band  -LH     Sets 6  --  2  -LH     Reps 6  --  10  -LH     Time 6  --  green  -LH        Exercise 7    Exercise Name 7  --  bridge  -LH     Sets 7  --  1  -LH     Reps 7  --  20  -LH        Exercise 8    Exercise Name 8  --  NuStep seat 7  -LH     Time 8  --  5 min  -LH       User Key  (r) = Recorded By, (t) = Taken By, (c) = Cosigned By    Initials Name Provider Type    Ame Urbina PT Physical Therapist                      Manual Rx (last 36 hours)      Manual Treatments     Row Name 06/04/19 0900             Total Minutes    16988 - PT Manual Therapy Minutes  10  -LH         Manual Rx 1    Manual Rx 1 Location  R knee  -LH      Manual Rx 1 Type  light retrograde massage for pain control and edema  -LH        User Key  (r) = Recorded By, (t) = Taken By, (c) = Cosigned By    Initials Name Provider Type    Ame Urbina PT Physical Therapist              Therapy Education  Given: HEP, Symptoms/condition management, Pain management  Program: Progressed, Reinforced  How Provided: Verbal  Provided to: Patient  Level of Understanding: Teach back education performed              Time Calculation:   Start Time:  0810  Stop Time: 0855  Time Calculation (min): 45 min  Total Timed Code Minutes- PT: 43 minute(s)  Therapy Charges for Today     Code Description Service Date Service Provider Modifiers Qty    [id number]  PT THER PROC EA 15 MIN 6/4/2019 Ame Rinaldi, PT GP 2    [id number]  PT MANUAL THERAPY EA 15 MIN 6/4/2019 Ame Rinaldi, PT GP 1                    Ame Rinaldi, PT  6/4/2019

## 2019-06-06 ENCOUNTER — HOSPITAL ENCOUNTER (OUTPATIENT)
Dept: PHYSICAL THERAPY | Facility: HOSPITAL | Age: 49
Setting detail: THERAPIES SERIES
Discharge: HOME | End: 2019-06-06

## 2019-06-06 DIAGNOSIS — M25.561 RIGHT KNEE PAIN, UNSPECIFIED CHRONICITY: Primary | ICD-10-CM

## 2019-06-06 DIAGNOSIS — Z98.890 S/P RIGHT KNEE ARTHROSCOPY: ICD-10-CM

## 2019-06-06 PROCEDURE — 97110 THERAPEUTIC EXERCISES: CPT | Performed by: PHYSICAL THERAPIST

## 2019-06-06 NOTE — THERAPY TREATMENT NOTE
Outpatient Physical Therapy Ortho Treatment Note  [facility]     Patient Name: Carina Zamudio  : 1970  MRN: [id number]  Today's Date: 2019      Visit Date: 2019    Visit Dx:    ICD-10-CM ICD-9-CM   1. Right knee pain, unspecified chronicity M25.561 719.46   2. S/P right knee arthroscopy Z98.890 V45.89       Patient Active Problem List   Diagnosis   • Adrenal congenital hyperplasia (CMS/HCC)   • Cerebellar infarction (CMS/HCC)   • Coronary artery disease involving native coronary artery of native heart with angina pectoris (CMS/HCC)   • Osteoarthritis of spine   • Diverticulitis of colon   • Essential hypertension   • Hyperlipidemia   • Multinodular goiter   • Myocardial infarction (CMS/HCC)   • Obstructive sleep apnea syndrome   • Proteinuria   • Retinal hemorrhage   • S/P coronary angioplasty   • Cervical radiculopathy due to degenerative joint disease of spine   • Spondylosis of lumbar region without myelopathy or radiculopathy   • Type 2 diabetes mellitus without complication, without long-term current use of insulin (CMS/HCC)   • Gastroesophageal reflux disease without esophagitis   • Class 3 severe obesity with serious comorbidity in adult (CMS/HCC)   • Diastolic heart failure (CMS/HCC)   • Tobacco abuse   • Leg edema, right   • Acute pain of right knee   • Acute medial meniscus tear of right knee   • Arthritis of right knee   • S/P right knee arthroscopy        Past Medical History:   Diagnosis Date   • 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     2012--Sees Dr Serra (Eye)--laser Rx's   • Adrenal abnormality (CMS/[facility])     ENLARGEMENT   • Adrenal cortical adenoma    • Adrenal hypertrophy (CMS/HCC)    • Anemia    • Anxiety    • Benign essential hypertension    • CAD (coronary artery disease)    • Cellulitis     4 MONTHS AGO   • Cerebellar infarct (CMS/HCC)    • Chronic bronchitis (CMS/HCC)    • Diabetes mellitus (CMS/HCC)    • DJD (degenerative joint disease),  cervical    • DJD (degenerative joint disease), lumbar    • Fatigue    • Gestational diabetes    • HPV (human papilloma virus) infection    • Hyperglycemia     Dr. Xie   • Hyperlipemia    • Hypertension    • Liver nodule    • Morbid obesity (CMS/HCC)    • Multiple thyroid nodules    • Myocardial infarction (CMS/HCC) 2015   • Nontoxic multinodular goiter    • Preretinal hemorrhage of left eye    • Proteinuria     CHRONIC   • Raynaud phenomenon    • Right knee pain    • Sleep apnea     Uses CPAP   • Vitamin D deficiency         Past Surgical History:   Procedure Laterality Date   • CATARACT EXTRACTION, BILATERAL     • CORONARY STENT PLACEMENT     • ENDOMETRIAL ABLATION  2012   • EYE SURGERY     • KNEE ARTHROSCOPY Right 5/10/2019    Procedure: RIGHT KNEE ARTHROSCOPY BWITH PART. MEDIAL AND LATERAL MENISECTOMY, PATELLA, CHONDROPLASTY, AND DEBRIDMENT;  Surgeon: Ignacio Martinez MD;  [place] [facility] [place] [facility] [place]  Service: [place]opedics   • TUBAL ABDOMINAL LIGATION  2012       PT Ortho     Row Name 06/06/19 0800       Right Lower Ext    Rt Knee Extension/Flexion AROM  ext=1 deg of HE  -LH      User Key  (r) = Recorded By, (t) = Taken By, (c) = Cosigned By    Initials Name Provider Type     Ame Rinaldi, PT Physical Therapist                      PT Assessment/Plan     Row Name 06/06/19 0800          PT Assessment    Assessment Comments  Pt had to teach a class on Tues and has been a little more stiff and sore the past couple of days. She continues to progress her ROM and strength. She is 4 weeks post op and plan to start [facility] activities next week per pt tolerance  -LH        PT Plan    PT Plan Comments  cont  -LH       User Key  (r) = Recorded By, (t) = Taken By, (c) = Cosigned By    Initials Name Provider Type     Ame Rinaldi PT Physical Therapist          Modalities     Row Name 06/06/19 0800             Ice    Ice Applied  Yes  -LH      Location  R knee in supine  -LH      Rx Minutes  12 mins  -LH      Ice  S/P Rx  Yes  -LH        User Key  (r) = Recorded By, (t) = Taken By, (c) = Cosigned By    Initials Name Provider Type     Ame Rinaldi, PT Physical Therapist        Exercises     Row Name 06/06/19 0800             Subjective Comments    Subjective Comments  It has been stiff this week.   -LH         Subjective Pain    Able to rate subjective pain?  yes  -LH      Pre-Treatment Pain Level  3  -LH         Total Minutes    27475 - PT Therapeutic Exercise Minutes  38  -LH         Exercise 1    Exercise Name 1  QS  -LH      Sets 1  2  -LH      Reps 1  10  -LH      Time 1  5 sec  -LH         Exercise 2    Exercise Name 2  SLR and SL hip abd  -LH      Sets 2  2  -LH      Reps 2  10  -LH      Time 2  1 lb  -LH         Exercise 3    Exercise Name 3  hip add iso with pillow  -LH      Sets 3  2  -LH      Reps 3  10  -LH      Time 3  5 sec  -LH         Exercise 4    Exercise Name 4  seated HS curls  -LH      Sets 4  2  -LH      Reps 4  10  -LH      Time 4  green  -LH         Exercise 5    Exercise Name 5  seated HS and calf stretching  -LH      Sets 5  1  -LH      Reps 5  3  -LH      Time 5  20 sec  -LH         Exercise 6    Exercise Name 6  hip abd/ER with band  -LH      Sets 6  2  -LH      Reps 6  10  -LH      Time 6  blue  -LH         Exercise 7    Exercise Name 7  bridge  -LH      Sets 7  1  -LH      Reps 7  20  -LH         Exercise 8    Exercise Name 8  NuStep seat 7  -LH      Time 8  6 min  -LH         Exercise 9    Exercise Name 9  piriformis stretch  -LH      Sets 9  1  -LH      Reps 9  3  -LH      Time 9  20 sec  -LH        User Key  (r) = Recorded By, (t) = Taken By, (c) = Cosigned By    Initials Name Provider Type     Ame Rinaldi, PT Physical Therapist                       PT OP Goals     Row Name 06/06/19 0800          PT Short Term Goals    STG 1  Patient will be independent with education for symptom management, joint protection and strategies to minimize stress on affected tissues  -LH     STG 1  Progress  Partially Met;Ongoing  -     STG 2  Pt to improve knee ROM to ext=4 deg and flex=115 deg for improved gait pattern  -     STG 2 Progress  Partially Met;Ongoing  -     STG 3  Patient will report decreased pain rating on VAS from  4/10 to  1/10 with ADLs, work,  and household activities.  -     STG 3 Progress  Ongoing  -        Long Term Goals    LTG 1  Pt to improve knee ROM to ext=3 deg of HE to 120 deg for improved gait pattern  -     LTG 1 Progress  Ongoing;Progressing  -     LTG 2  Patient will increase knee and hip strength to 4+ to 5/5 to improve functional mobility  -     LTG 2 Progress  Ongoing  -     LTG 3  Pt to improve score on Knee Outcome Survey from 66% to 85% for overall functional improvement  -     LTG 3 Progress  Ongoing  -     LTG 4  Patient will demonstrate an independent HEP for core and knee strength and flexibility/ROM.  -     LTG 4 Progress  Ongoing  -       User Key  (r) = Recorded By, (t) = Taken By, (c) = Cosigned By    Initials Name Provider Type     Ame Rinaldi, PT Physical Therapist          Therapy Education  Given: HEP, Symptoms/condition management, Pain management  Program: Reinforced  How Provided: Verbal  Provided to: Patient  Level of Understanding: Teach back education performed              Time Calculation:   Start Time: 0808  Stop Time: 0859  Time Calculation (min): 51 min  Total Timed Code Minutes- PT: 40 minute(s)  Therapy Charges for Today     Code Description Service Date Service Provider Modifiers Qty    [id number] HC PT THER PROC EA 15 MIN 6/6/2019 Ame Rinaldi PT GP 3                    Ame Rinaldi PT  6/6/2019

## 2019-06-11 ENCOUNTER — HOSPITAL ENCOUNTER (OUTPATIENT)
Dept: PHYSICAL THERAPY | Facility: HOSPITAL | Age: 49
Setting detail: THERAPIES SERIES
Discharge: HOME | End: 2019-06-11

## 2019-06-11 DIAGNOSIS — M25.561 RIGHT KNEE PAIN, UNSPECIFIED CHRONICITY: Primary | ICD-10-CM

## 2019-06-11 DIAGNOSIS — Z98.890 S/P RIGHT KNEE ARTHROSCOPY: ICD-10-CM

## 2019-06-11 PROCEDURE — 97110 THERAPEUTIC EXERCISES: CPT | Performed by: PHYSICAL THERAPIST

## 2019-06-11 NOTE — THERAPY TREATMENT NOTE
Outpatient Physical Therapy Ortho Treatment Note  [facility]     Patient Name: Carina Zamudio  : 1970  MRN: [id number]  Today's Date: 2019      Visit Date: 2019    Visit Dx:    ICD-10-CM ICD-9-CM   1. Right knee pain, unspecified chronicity M25.561 719.46   2. S/P right knee arthroscopy Z98.890 V45.89       Patient Active Problem List   Diagnosis   • Adrenal congenital hyperplasia (CMS/HCC)   • Cerebellar infarction (CMS/HCC)   • Coronary artery disease involving native coronary artery of native heart with angina pectoris (CMS/HCC)   • Osteoarthritis of spine   • Diverticulitis of colon   • Essential hypertension   • Hyperlipidemia   • Multinodular goiter   • Myocardial infarction (CMS/HCC)   • Obstructive sleep apnea syndrome   • Proteinuria   • Retinal hemorrhage   • S/P coronary angioplasty   • Cervical radiculopathy due to degenerative joint disease of spine   • Spondylosis of lumbar region without myelopathy or radiculopathy   • Type 2 diabetes mellitus without complication, without long-term current use of insulin (CMS/HCC)   • Gastroesophageal reflux disease without esophagitis   • Class 3 severe obesity with serious comorbidity in adult (CMS/HCC)   • Diastolic heart failure (CMS/HCC)   • Tobacco abuse   • Leg edema, right   • Acute pain of right knee   • Acute medial meniscus tear of right knee   • Arthritis of right knee   • S/P right knee arthroscopy        Past Medical History:   Diagnosis Date   • 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     2012--Sees Dr Serra (Eye)--laser Rx's   • Adrenal abnormality (CMS/[facility])     ENLARGEMENT   • Adrenal cortical adenoma    • Adrenal hypertrophy (CMS/HCC)    • Anemia    • Anxiety    • Benign essential hypertension    • CAD (coronary artery disease)    • Cellulitis     4 MONTHS AGO   • Cerebellar infarct (CMS/HCC)    • Chronic bronchitis (CMS/HCC)    • Diabetes mellitus (CMS/HCC)    • DJD (degenerative joint disease),  cervical    • DJD (degenerative joint disease), lumbar    • Fatigue    • Gestational diabetes    • HPV (human papilloma virus) infection    • Hyperglycemia     Dr. Xie   • Hyperlipemia    • Hypertension    • Liver nodule    • Morbid obesity (CMS/HCC)    • Multiple thyroid nodules    • Myocardial infarction (CMS/HCC) 2015   • Nontoxic multinodular goiter    • Preretinal hemorrhage of left eye    • Proteinuria     CHRONIC   • Raynaud phenomenon    • Right knee pain    • Sleep apnea     Uses CPAP   • Vitamin D deficiency         Past Surgical History:   Procedure Laterality Date   • CATARACT EXTRACTION, BILATERAL     • CORONARY STENT PLACEMENT     • ENDOMETRIAL ABLATION  2012   • EYE SURGERY     • KNEE ARTHROSCOPY Right 5/10/2019    Procedure: RIGHT KNEE ARTHROSCOPY BWITH PART. MEDIAL AND LATERAL MENISECTOMY, PATELLA, CHONDROPLASTY, AND DEBRIDMENT;  Surgeon: Ignacio Martinez MD;  [facility] [place];  Service: Orthopedics   • TUBAL ABDOMINAL LIGATION  2012                       PT Assessment/Plan     Row Name 06/11/19 0800          PT Assessment    Assessment Comments  Pt was up more this weekend and is really sore on the medial compartment. Added fwd and lateral step ups today without increased pain  -LH        PT Plan    PT Plan Comments  cont  -LH       User Key  (r) = Recorded By, (t) = Taken By, (c) = Cosigned By    Initials Name Provider Type     Ame Rinaldi, PT Physical Therapist          Modalities     Row Name 06/11/19 0800             Ice    Ice Applied  Yes  -LH      Location  R knee in supine  -LH      Rx Minutes  12 mins  -LH      Ice S/P Rx  Yes  -LH        User Key  (r) = Recorded By, (t) = Taken By, (c) = Cosigned By    Initials Name Provider Type     Ame Rinaldi, PT Physical Therapist        Exercises     Row Name 06/11/19 0800             Subjective Comments    Subjective Comments  I was on the leg a lot this weekend and it is sore  -LH         Subjective Pain    Able to rate  subjective pain?  yes  -LH      Pre-Treatment Pain Level  4  -LH         Total Minutes    37111 - PT Therapeutic Exercise Minutes  42  -LH         Exercise 1    Exercise Name 1  QS  -LH      Sets 1  2  -LH      Reps 1  10  -LH      Time 1  5 sec  -LH         Exercise 2    Exercise Name 2  SLR and SL hip abd  -LH      Sets 2  2  -LH      Reps 2  10  -LH      Time 2  1 lb  -LH         Exercise 3    Exercise Name 3  hip add iso with pillow  -LH      Sets 3  2  -LH      Reps 3  10  -LH      Time 3  5 sec  -LH         Exercise 4    Exercise Name 4  seated HS curls  -LH      Sets 4  2  -LH      Reps 4  10  -LH      Time 4  green  -LH         Exercise 5    Exercise Name 5  seated HS and calf stretching  -LH      Sets 5  1  -LH      Reps 5  3  -LH      Time 5  20 sec  -LH         Exercise 6    Exercise Name 6  hip abd/ER with band  -LH      Sets 6  2  -LH      Reps 6  10  -LH      Time 6  blue  -LH         Exercise 7    Exercise Name 7  bridge  -LH      Sets 7  1  -LH      Reps 7  20  -LH         Exercise 8    Exercise Name 8  NuStep seat 7  -LH      Time 8  6 min  -LH         Exercise 9    Exercise Name 9  piriformis stretch  -LH      Sets 9  1  -LH      Reps 9  3  -LH      Time 9  20 sec  -LH         Exercise 10    Exercise Name 10  4 in step ups, fwd and lat  -LH      Sets 10  2  -LH      Reps 10  10  -LH        User Key  (r) = Recorded By, (t) = Taken By, (c) = Cosigned By    Initials Name Provider Type     Ame Rinaldi, PT Physical Therapist                           Therapy Education  Given: HEP, Symptoms/condition management, Pain management  Program: New  How Provided: Verbal, Demonstration  Provided to: Patient  Level of Understanding: Teach back education performed              Time Calculation:   Start Time: 0815  Stop Time: 0910  Time Calculation (min): 55 min  Total Timed Code Minutes- PT: 40 minute(s)  Therapy Charges for Today     Code Description Service Date Service Provider Modifiers Qty     75914911219  PT THER PROC EA 15 MIN 6/11/2019 Ame Rinaldi, PT GP 3                    Ame Rinaldi, PT  6/11/2019

## 2019-06-13 ENCOUNTER — HOSPITAL ENCOUNTER (OUTPATIENT)
Dept: PHYSICAL THERAPY | Facility: HOSPITAL | Age: 49
Setting detail: THERAPIES SERIES
Discharge: HOME | End: 2019-06-13

## 2019-06-13 DIAGNOSIS — M25.561 RIGHT KNEE PAIN, UNSPECIFIED CHRONICITY: Primary | ICD-10-CM

## 2019-06-13 DIAGNOSIS — Z98.890 S/P RIGHT KNEE ARTHROSCOPY: ICD-10-CM

## 2019-06-13 PROCEDURE — 97110 THERAPEUTIC EXERCISES: CPT | Performed by: PHYSICAL THERAPIST

## 2019-06-13 NOTE — THERAPY TREATMENT NOTE
Outpatient Physical Therapy Ortho Treatment Note  [facility]     Patient Name: Carina Zamudio  : 1970  MRN: [id number]  Today's Date: 2019      Visit Date: 2019    Visit Dx:    ICD-10-CM ICD-9-CM   1. Right knee pain, unspecified chronicity M25.561 719.46   2. S/P right knee arthroscopy Z98.890 V45.89       Patient Active Problem List   Diagnosis   • Adrenal congenital hyperplasia (CMS/HCC)   • Cerebellar infarction (CMS/HCC)   • Coronary artery disease involving native coronary artery of native heart with angina pectoris (CMS/HCC)   • Osteoarthritis of spine   • Diverticulitis of colon   • Essential hypertension   • Hyperlipidemia   • Multinodular goiter   • Myocardial infarction (CMS/HCC)   • Obstructive sleep apnea syndrome   • Proteinuria   • Retinal hemorrhage   • S/P coronary angioplasty   • Cervical radiculopathy due to degenerative joint disease of spine   • Spondylosis of lumbar region without myelopathy or radiculopathy   • Type 2 diabetes mellitus without complication, without long-term current use of insulin (CMS/HCC)   • Gastroesophageal reflux disease without esophagitis   • Class 3 severe obesity with serious comorbidity in adult (CMS/HCC)   • Diastolic heart failure (CMS/HCC)   • Tobacco abuse   • Leg edema, right   • Acute pain of right knee   • Acute medial meniscus tear of right knee   • Arthritis of right knee   • S/P right knee arthroscopy        Past Medical History:   Diagnosis Date   • 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     2012--Sees Dr Serra (Eye)--laser Rx's   • Adrenal abnormality (CMS/[facility])     ENLARGEMENT   • Adrenal cortical adenoma    • Adrenal hypertrophy (CMS/HCC)    • Anemia    • Anxiety    • Benign essential hypertension    • CAD (coronary artery disease)    • Cellulitis     4 MONTHS AGO   • Cerebellar infarct (CMS/HCC)    • Chronic bronchitis (CMS/HCC)    • Diabetes mellitus (CMS/HCC)    • DJD (degenerative joint disease),  cervical    • DJD (degenerative joint disease), lumbar    • Fatigue    • Gestational diabetes    • HPV (human papilloma virus) infection    • Hyperglycemia     Dr. Xie   • Hyperlipemia    • Hypertension    • Liver nodule    • Morbid obesity (CMS/HCC)    • Multiple thyroid nodules    • Myocardial infarction (CMS/HCC) 2015   • Nontoxic multinodular goiter    • Preretinal hemorrhage of left eye    • Proteinuria     CHRONIC   • Raynaud phenomenon    • Right knee pain    • Sleep apnea     Uses CPAP   • Vitamin D deficiency         Past Surgical History:   Procedure Laterality Date   • CATARACT EXTRACTION, BILATERAL     • CORONARY STENT PLACEMENT     • ENDOMETRIAL ABLATION  2012   • EYE SURGERY     • KNEE ARTHROSCOPY Right 5/10/2019    Procedure: RIGHT KNEE ARTHROSCOPY BWITH PART. MEDIAL AND LATERAL MENISECTOMY, PATELLA, CHONDROPLASTY, AND DEBRIDMENT;  Surgeon: Ignacio Martinez MD;  [place] [facility] [place] [facility] [place]: [place]ics   • TUBAL ABDOMINAL LIGATION  2012       PT Ortho     Row Name 06/13/19 0800       Right Lower Ext    Rt Knee Extension/Flexion AROM  ext=1 deg of HE, flex=112 deg  -LH       MMT Right Lower Ext    Rt Hip Flexion MMT, Gross Movement  (4/5) good  -LH    Rt Hip ABduction MMT, Gross Movement  (4/5) good  -LH    Rt Knee Extension MMT, Gross Movement  (4/5) good  -LH    Rt Knee Flexion MMT, Gross Movement  (4/5) good  -LH      User Key  (r) = Recorded By, (t) = Taken By, (c) = Cosigned By    Initials Name Provider Type     Ame Rinaldi, PT Physical Therapist                      PT Assessment/Plan     Row Name 06/13/19 0900          PT Assessment    Assessment Comments  Carina PEREZ Lizz has been seen for 7 physical therapy sessions for s/p L knee scope.  Treatment has included therapeutic exercise, manual therapy, patient education with home exercise program  and ice. Progress to physical therapy goals is good. Her pain levels have been no more than 4/10, but it stays at that level  throughout the day. She has improved her ROM to 1deg of HE to 112 deg, strength is 4/5, and improved flexibility. She still has some mild edema and soreness across the joint line. Her gait is improved, but still mildly antalgic. She will benefit from continued skilled physical therapy to address remaining impairments and functional limitations.  -        PT Plan    PT Plan Comments  cont with skilled therapy  -       User Key  (r) = Recorded By, (t) = Taken By, (c) = Cosigned By    Initials Name Provider Type     Ame Rinaldi, PT Physical Therapist            Exercises     Row Name 06/13/19 0800             Subjective Comments    Subjective Comments  it is just staying sore, leg length feels off  -         Subjective Pain    Able to rate subjective pain?  yes  -      Pre-Treatment Pain Level  4  -LH         Total Minutes    91785 - PT Therapeutic Exercise Minutes  43  -LH         Exercise 1    Exercise Name 1  QS  -LH      Sets 1  2  -LH      Reps 1  10  -LH      Time 1  5 sec  -LH         Exercise 2    Exercise Name 2  SLR and SL hip abd  -LH      Sets 2  2  -LH      Reps 2  10  -LH      Time 2  1 lb  -LH         Exercise 3    Exercise Name 3  hip add iso with pillow  -LH      Sets 3  2  -LH      Reps 3  10  -LH      Time 3  5 sec  -LH         Exercise 4    Exercise Name 4  seated HS curls  -LH      Sets 4  2  -LH      Reps 4  10  -LH      Time 4  green  -LH         Exercise 5    Exercise Name 5  seated HS and calf stretching  -LH      Sets 5  1  -LH      Reps 5  3  -LH      Time 5  20 sec  -LH         Exercise 6    Exercise Name 6  hip abd/ER with band  -LH      Sets 6  2  -LH      Reps 6  10  -LH      Time 6  blue  -LH         Exercise 7    Exercise Name 7  bridge  -LH      Sets 7  1  -LH      Reps 7  20  -LH         Exercise 8    Exercise Name 8  NuStep seat 7  -LH      Time 8  6 min  -LH         Exercise 9    Exercise Name 9  piriformis stretch  -LH      Sets 9  1  -LH      Reps 9  3  -LH       Time 9  20 sec  -LH         Exercise 10    Exercise Name 10  4 in step ups, fwd and lat  -LH      Sets 10  2  -LH      Reps 10  10  -LH         Exercise 11    Exercise Name 11  heel slides with strap. supine  -LH      Sets 11  1  -LH      Reps 11  10  -LH      Time 11  10 sec  -LH         Exercise 12    Exercise Name 12  TKE w/green band  -LH      Reps 12  20  -LH      Time 12  hold 3 sec  -LH        User Key  (r) = Recorded By, (t) = Taken By, (c) = Cosigned By    Initials Name Provider Type     Ame Rinaldi, PT Physical Therapist                           Therapy Education  Given: HEP, Symptoms/condition management, Pain management  Program: New, Reinforced  How Provided: Verbal, Demonstration, Written  Provided to: Patient  Level of Understanding: Teach back education performed              Time Calculation:   Start Time: 0815  Stop Time: 0900  Time Calculation (min): 45 min  Total Timed Code Minutes- PT: 43 minute(s)  Therapy Charges for Today     Code Description Service Date Service Provider Modifiers Qty    [id number] HC PT THER PROC EA 15 MIN 6/13/2019 Ame Rinaldi, PT GP 3                    Ame Rinaldi, PT  6/13/2019

## 2019-06-20 ENCOUNTER — HOSPITAL ENCOUNTER (OUTPATIENT)
Dept: PHYSICAL THERAPY | Facility: HOSPITAL | Age: 49
Setting detail: THERAPIES SERIES
Discharge: HOME | End: 2019-06-20

## 2019-06-20 DIAGNOSIS — M25.561 RIGHT KNEE PAIN, UNSPECIFIED CHRONICITY: Primary | ICD-10-CM

## 2019-06-20 DIAGNOSIS — Z98.890 S/P RIGHT KNEE ARTHROSCOPY: ICD-10-CM

## 2019-06-20 PROCEDURE — 97110 THERAPEUTIC EXERCISES: CPT | Performed by: PHYSICAL THERAPIST

## 2019-06-20 NOTE — THERAPY TREATMENT NOTE
Outpatient Physical Therapy Ortho Treatment Note  [facility]     Patient Name: Carina Zamudio  : 1970  MRN: [id number]  Today's Date: 2019      Visit Date: 2019    Visit Dx:    ICD-10-CM ICD-9-CM   1. Right knee pain, unspecified chronicity M25.561 719.46   2. S/P right knee arthroscopy Z98.890 V45.89       Patient Active Problem List   Diagnosis   • Adrenal congenital hyperplasia (CMS/HCC)   • Cerebellar infarction (CMS/HCC)   • Coronary artery disease involving native coronary artery of native heart with angina pectoris (CMS/HCC)   • Osteoarthritis of spine   • Diverticulitis of colon   • Essential hypertension   • Hyperlipidemia   • Multinodular goiter   • Myocardial infarction (CMS/HCC)   • Obstructive sleep apnea syndrome   • Proteinuria   • Retinal hemorrhage   • S/P coronary angioplasty   • Cervical radiculopathy due to degenerative joint disease of spine   • Spondylosis of lumbar region without myelopathy or radiculopathy   • Type 2 diabetes mellitus without complication, without long-term current use of insulin (CMS/HCC)   • Gastroesophageal reflux disease without esophagitis   • Class 3 severe obesity with serious comorbidity in adult (CMS/HCC)   • Diastolic heart failure (CMS/HCC)   • Tobacco abuse   • Leg edema, right   • Acute pain of right knee   • Acute medial meniscus tear of right knee   • Arthritis of right knee   • S/P right knee arthroscopy        Past Medical History:   Diagnosis Date   • 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     2012--Sees Dr Serra (Eye)--laser Rx's   • Adrenal abnormality (CMS/[facility])     ENLARGEMENT   • Adrenal cortical adenoma    • Adrenal hypertrophy (CMS/HCC)    • Anemia    • Anxiety    • Benign essential hypertension    • CAD (coronary artery disease)    • Cellulitis     4 MONTHS AGO   • Cerebellar infarct (CMS/HCC)    • Chronic bronchitis (CMS/HCC)    • Diabetes mellitus (CMS/HCC)    • DJD (degenerative joint disease),  cervical    • DJD (degenerative joint disease), lumbar    • Fatigue    • Gestational diabetes    • HPV (human papilloma virus) infection    • Hyperglycemia     Dr. Xie   • Hyperlipemia    • Hypertension    • Liver nodule    • Morbid obesity (CMS/HCC)    • Multiple thyroid nodules    • Myocardial infarction (CMS/HCC) 2015   • Nontoxic multinodular goiter    • Preretinal hemorrhage of left eye    • Proteinuria     CHRONIC   • Raynaud phenomenon    • Right knee pain    • Sleep apnea     Uses CPAP   • Vitamin D deficiency         Past Surgical History:   Procedure Laterality Date   • CATARACT EXTRACTION, BILATERAL     • CORONARY STENT PLACEMENT     • ENDOMETRIAL ABLATION  2012   • EYE SURGERY     • KNEE ARTHROSCOPY Right 5/10/2019    Procedure: RIGHT KNEE ARTHROSCOPY BWITH PART. MEDIAL AND LATERAL MENISECTOMY, PATELLA, CHONDROPLASTY, AND DEBRIDMENT;  Surgeon: Ignacio Martinez MD;  [place] [facility] [place] [facility] [place]  Service: Orthopedics   • TUBAL ABDOMINAL LIGATION  2012                       PT Assessment/Plan     Row Name 06/20/19 0900          PT Assessment    Assessment Comments  Pt pain levels have reduced over the past week and it has not been as achy. She was starting back into the gym, riding a bike and doing her current HEP.  -        PT Plan    PT Plan Comments  cont  -       User Key  (r) = Recorded By, (t) = Taken By, (c) = Cosigned By    Initials Name Provider Type     Ame Rinaldi, PT Physical Therapist            Exercises     Row Name 06/20/19 0800             Subjective Comments    Subjective Comments  it has been getting better over the past week  -         Subjective Pain    Able to rate subjective pain?  yes  -      Pre-Treatment Pain Level  2  -         Total Minutes    33684 - PT Therapeutic Exercise Minutes  43  -         Exercise 1    Exercise Name 1  QS  -      Sets 1  2  -      Reps 1  10  -      Time 1  5 sec  -         Exercise 2    Exercise Name 2  SLR and SL hip abd   -LH      Sets 2  2  -LH      Reps 2  10  -LH      Time 2  1 lb  -LH         Exercise 3    Exercise Name 3  hip add iso with pillow  -LH      Sets 3  2  -LH      Reps 3  10  -LH      Time 3  5 sec  -LH         Exercise 4    Exercise Name 4  seated HS curls  -LH      Sets 4  2  -LH      Reps 4  10  -LH      Time 4  blue  -LH         Exercise 5    Exercise Name 5  seated HS and calf stretching  -LH      Sets 5  1  -LH      Reps 5  3  -LH      Time 5  20 sec  -LH         Exercise 6    Exercise Name 6  hip abd/ER with band  -LH      Sets 6  2  -LH      Reps 6  10  -LH      Time 6  blue  -LH         Exercise 7    Exercise Name 7  bridge  -LH      Sets 7  1  -LH      Reps 7  20  -LH         Exercise 8    Exercise Name 8  NuStep seat 7  -LH      Time 8  6 min  -LH         Exercise 9    Exercise Name 9  piriformis stretch  -LH      Sets 9  1  -LH      Reps 9  3  -LH      Time 9  20 sec  -LH         Exercise 10    Exercise Name 10  6 in step ups, fwd and lat  -LH      Sets 10  2  -LH      Reps 10  10  -LH         Exercise 11    Exercise Name 11  heel slides with strap. supine  -LH      Sets 11  1  -LH      Reps 11  10  -LH      Time 11  10 sec  -LH         Exercise 12    Exercise Name 12  TKE w/blue band  -LH      Reps 12  20  -LH      Time 12  hold 3 sec  -LH        User Key  (r) = Recorded By, (t) = Taken By, (c) = Cosigned By    Initials Name Provider Type     Ame Rinaldi, PT Physical Therapist                           Therapy Education  Given: HEP, Symptoms/condition management, Pain management  Program: Reinforced, Progressed  How Provided: Verbal  Provided to: Patient  Level of Understanding: Teach back education performed              Time Calculation:   Start Time: 0815  Stop Time: 0900  Time Calculation (min): 45 min  Total Timed Code Minutes- PT: 43 minute(s)  Therapy Charges for Today     Code Description Service Date Service Provider Modifiers Qty    [id number] HC PT THER PROC EA 15 MIN 6/20/2019  [place], Ame BYRD, PT GP 3                    Ame Rinaldi, PT  6/20/2019

## 2019-07-01 ENCOUNTER — HOSPITAL ENCOUNTER (OUTPATIENT)
Dept: PHYSICAL THERAPY | Facility: HOSPITAL | Age: 49
Setting detail: THERAPIES SERIES
Discharge: HOME | End: 2019-07-01

## 2019-07-01 DIAGNOSIS — Z98.890 S/P RIGHT KNEE ARTHROSCOPY: ICD-10-CM

## 2019-07-01 DIAGNOSIS — M25.561 RIGHT KNEE PAIN, UNSPECIFIED CHRONICITY: Primary | ICD-10-CM

## 2019-07-01 PROCEDURE — 97110 THERAPEUTIC EXERCISES: CPT | Performed by: PHYSICAL THERAPIST

## 2019-07-01 NOTE — THERAPY PROGRESS REPORT/RE-CERT
Outpatient Physical Therapy Ortho Progress Note  [facility]     Patient Name: Carina Zamudio  : 1970  MRN: [id number]  Today's Date: 2019      Visit Date: 2019    Visit Dx:    ICD-10-CM ICD-9-CM   1. Right knee pain, unspecified chronicity M25.561 719.46   2. S/P right knee arthroscopy Z98.890 V45.89       Patient Active Problem List   Diagnosis   • Adrenal congenital hyperplasia (CMS/HCC)   • Cerebellar infarction (CMS/HCC)   • Coronary artery disease involving native coronary artery of native heart with angina pectoris (CMS/HCC)   • Osteoarthritis of spine   • Diverticulitis of colon   • Essential hypertension   • Hyperlipidemia   • Multinodular goiter   • Myocardial infarction (CMS/HCC)   • Obstructive sleep apnea syndrome   • Proteinuria   • Retinal hemorrhage   • S/P coronary angioplasty   • Cervical radiculopathy due to degenerative joint disease of spine   • Spondylosis of lumbar region without myelopathy or radiculopathy   • Type 2 diabetes mellitus without complication, without long-term current use of insulin (CMS/HCC)   • Gastroesophageal reflux disease without esophagitis   • Class 3 severe obesity with serious comorbidity in adult (CMS/HCC)   • Diastolic heart failure (CMS/HCC)   • Tobacco abuse   • Leg edema, right   • Acute pain of right knee   • Acute medial meniscus tear of right knee   • Arthritis of right knee   • S/P right knee arthroscopy        Past Medical History:   Diagnosis Date   • 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     2012--Sees Dr Serra (Eye)--laser Rx's   • Adrenal abnormality (CMS/[facility])     ENLARGEMENT   • Adrenal cortical adenoma    • Adrenal hypertrophy (CMS/HCC)    • Anemia    • Anxiety    • Benign essential hypertension    • CAD (coronary artery disease)    • Cellulitis     4 MONTHS AGO   • Cerebellar infarct (CMS/HCC)    • Chronic bronchitis (CMS/HCC)    • Diabetes mellitus (CMS/HCC)    • DJD (degenerative joint disease),  cervical    • DJD (degenerative joint disease), lumbar    • Fatigue    • Gestational diabetes    • HPV (human papilloma virus) infection    • Hyperglycemia     Dr. Xie   • Hyperlipemia    • Hypertension    • Liver nodule    • Morbid obesity (CMS/HCC)    • Multiple thyroid nodules    • Myocardial infarction (CMS/HCC) 2015   • Nontoxic multinodular goiter    • Preretinal hemorrhage of left eye    • Proteinuria     CHRONIC   • Raynaud phenomenon    • Right knee pain    • Sleep apnea     Uses CPAP   • Vitamin D deficiency         Past Surgical History:   Procedure Laterality Date   • CATARACT EXTRACTION, BILATERAL     • CORONARY STENT PLACEMENT     • ENDOMETRIAL ABLATION  2012   • EYE SURGERY     • KNEE ARTHROSCOPY Right 5/10/2019    Procedure: RIGHT KNEE ARTHROSCOPY BWITH PART. MEDIAL AND LATERAL MENISECTOMY, PATELLA, CHONDROPLASTY, AND DEBRIDMENT;  Surgeon: Ignacio Martinez MD;  Location: [facility] OR [facility] [place];  Service: Orthopedics   • TUBAL ABDOMINAL LIGATION  2012                       PT Assessment/Plan     Row Name 07/01/19 1000          PT Assessment    Assessment Comments  Carina Zamudio has been seen for 9 physical therapy sessions for s/p R knee scope.  Treatment has included therapeutic exercise, manual therapy, gait training  and patient education with home exercise program . Progress to physical therapy goals is good. She has improved her ROM to 5 deg of HE to 120 deg, and her strength is 4 to 4+/5 for hip and knee. She is able to perform stairs ascending with a reciprocal pattern and a few descending, but with compensation.  She will benefit from continued skilled physical therapy to address remaining impairments and functional limitations.   -        PT Plan    PT Plan Comments  cont with skilled therapy, reduce to 1x weekly next week  -       User Key  (r) = Recorded By, (t) = Taken By, (c) = Cosigned By    Initials Name Provider Type     Ame Rinaldi, PT Physical Therapist            Exercises      Row Name 07/01/19 0900             Subjective Comments    Subjective Comments  my ankle has really been bothering me as well. It has been swollen along the outside of it. I had my bday party this weekend and was up on my feet a lot  -LH         Subjective Pain    Able to rate subjective pain?  yes  -LH      Pre-Treatment Pain Level  4 5/10 for ankle  -LH         Total Minutes    41931 - PT Therapeutic Exercise Minutes  43  -LH         Exercise 1    Exercise Name 1  QS  -LH      Sets 1  2  -LH      Reps 1  10  -LH      Time 1  5 sec  -LH         Exercise 2    Exercise Name 2  SLR and SL hip abd, SL w/hip ER  -LH      Sets 2  2  -LH      Reps 2  10  -LH      Time 2  --  -LH         Exercise 3    Exercise Name 3  hip add iso with pillow  -LH      Sets 3  2  -LH      Reps 3  10  -LH      Time 3  5 sec  -LH         Exercise 4    Exercise Name 4  seated HS curls  -LH      Sets 4  2  -LH      Reps 4  10  -LH      Time 4  blue  -LH         Exercise 5    Exercise Name 5  seated HS and calf stretching  -LH      Sets 5  1  -LH      Reps 5  3  -LH      Time 5  20 sec  -LH         Exercise 6    Exercise Name 6  hip abd/ER with band  -LH      Sets 6  2  -LH      Reps 6  10  -LH      Time 6  blue  -LH         Exercise 7    Exercise Name 7  bridge  -LH      Sets 7  1  -LH      Reps 7  20  -LH         Exercise 8    Exercise Name 8  NuStep seat 7  -LH      Time 8  6 min  -LH         Exercise 9    Exercise Name 9  piriformis stretch  -LH      Sets 9  1  -LH      Reps 9  3  -LH      Time 9  20 sec  -LH         Exercise 10    Exercise Name 10  6 in step ups, fwd and lat  -LH      Sets 10  --  -LH      Reps 10  --  -LH         Exercise 11    Exercise Name 11  heel slides with strap. supine  -LH      Sets 11  1  -LH      Reps 11  10  -LH      Time 11  10 sec  -LH         Exercise 12    Exercise Name 12  TKE w/blue band  -LH      Reps 12  20  -LH      Time 12  hold 3 sec  -LH        User Key  (r) = Recorded By, (t) = Taken By, (c) =  Cosigned By    Initials Name Provider Type     Ame Rinaldi PT Physical Therapist                       PT OP Goals     Row Name 07/01/19 1000          PT Short Term Goals    STG 1  Patient will be independent with education for symptom management, joint protection and strategies to minimize stress on affected tissues  -     STG 1 Progress  Partially Met;Ongoing  -     STG 2  Pt to improve knee ROM to ext=4 deg and flex=115 deg for improved gait pattern  -     STG 2 Progress  Met  -     STG 3  Patient will report decreased pain rating on VAS from  4/10 to 2/10 with ADLs, work,  and household activities.  -     STG 3 Progress  Ongoing;Goal Revised  -        Long Term Goals    LTG 1  Pt to improve knee ROM to ext=3 deg of HE to 120 deg for improved gait pattern  -     LTG 1 Progress  Met  -     LTG 2  Patient will increase knee and hip strength to 4+ to 5/5 to improve functional mobility  -     LTG 2 Progress  Ongoing;Partially Met  -     LTG 3  Pt to improve score on Knee Outcome Survey from 66% to 85% for overall functional improvement  -     LTG 3 Progress  Ongoing  -     LTG 4  Patient will demonstrate an independent HEP for core and knee strength and flexibility/ROM.  -     LTG 4 Progress  Ongoing  -       User Key  (r) = Recorded By, (t) = Taken By, (c) = Cosigned By    Initials Name Provider Type     Ame Rinaldi PT Physical Therapist          Therapy Education  Given: Symptoms/condition management, Pain management, HEP  Program: Reinforced  How Provided: Verbal  Provided to: Patient  Level of Understanding: Teach back education performed              Time Calculation:   Start Time: 0930  Stop Time: 1015  Time Calculation (min): 45 min  Total Timed Code Minutes- PT: 43 minute(s)  Therapy Charges for Today     Code Description Service Date Service Provider Modifiers Qty    08511261205 HC PT THER PROC EA 15 MIN 7/1/2019 Ame Rinaldi PT GP 3                     Ame Rinaldi, PT  7/1/2019

## 2019-07-03 ENCOUNTER — HOSPITAL ENCOUNTER (OUTPATIENT)
Dept: PHYSICAL THERAPY | Facility: HOSPITAL | Age: 49
Setting detail: THERAPIES SERIES
Discharge: HOME | End: 2019-07-03

## 2019-07-03 DIAGNOSIS — M25.561 RIGHT KNEE PAIN, UNSPECIFIED CHRONICITY: Primary | ICD-10-CM

## 2019-07-03 DIAGNOSIS — Z98.890 S/P RIGHT KNEE ARTHROSCOPY: ICD-10-CM

## 2019-07-03 PROCEDURE — 97110 THERAPEUTIC EXERCISES: CPT | Performed by: PHYSICAL THERAPIST

## 2019-07-03 NOTE — THERAPY TREATMENT NOTE
Outpatient Physical Therapy Ortho Treatment Note  [facility]     Patient Name: Carina Zamudio  : 1970  MRN: [id number]  Today's Date: 7/3/2019      Visit Date: 2019    Visit Dx:    ICD-10-CM ICD-9-CM   1. Right knee pain, unspecified chronicity M25.561 719.46   2. S/P right knee arthroscopy Z98.890 V45.89       Patient Active Problem List   Diagnosis   • Adrenal congenital hyperplasia (CMS/HCC)   • Cerebellar infarction (CMS/HCC)   • Coronary artery disease involving native coronary artery of native heart with angina pectoris (CMS/HCC)   • Osteoarthritis of spine   • Diverticulitis of colon   • Essential hypertension   • Hyperlipidemia   • Multinodular goiter   • Myocardial infarction (CMS/HCC)   • Obstructive sleep apnea syndrome   • Proteinuria   • Retinal hemorrhage   • S/P coronary angioplasty   • Cervical radiculopathy due to degenerative joint disease of spine   • Spondylosis of lumbar region without myelopathy or radiculopathy   • Type 2 diabetes mellitus without complication, without long-term current use of insulin (CMS/HCC)   • Gastroesophageal reflux disease without esophagitis   • Class 3 severe obesity with serious comorbidity in adult (CMS/HCC)   • Diastolic heart failure (CMS/HCC)   • Tobacco abuse   • Leg edema, right   • Acute pain of right knee   • Acute medial meniscus tear of right knee   • Arthritis of right knee   • S/P right knee arthroscopy        Past Medical History:   Diagnosis Date   • 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     2012--Sees Dr Serra (Eye)--laser Rx's   • Adrenal abnormality (CMS/[facility])     ENLARGEMENT   • Adrenal cortical adenoma    • Adrenal hypertrophy (CMS/HCC)    • Anemia    • Anxiety    • Benign essential hypertension    • CAD (coronary artery disease)    • Cellulitis     4 MONTHS AGO   • Cerebellar infarct (CMS/HCC)    • Chronic bronchitis (CMS/HCC)    • Diabetes mellitus (CMS/HCC)    • DJD (degenerative joint disease),  cervical    • DJD (degenerative joint disease), lumbar    • Fatigue    • Gestational diabetes    • HPV (human papilloma virus) infection    • Hyperglycemia     Dr. Xie   • Hyperlipemia    • Hypertension    • Liver nodule    • Morbid obesity (CMS/HCC)    • Multiple thyroid nodules    • Myocardial infarction (CMS/HCC) 2015   • Nontoxic multinodular goiter    • Preretinal hemorrhage of left eye    • Proteinuria     CHRONIC   • Raynaud phenomenon    • Right knee pain    • Sleep apnea     Uses CPAP   • Vitamin D deficiency         Past Surgical History:   Procedure Laterality Date   • CATARACT EXTRACTION, BILATERAL     • CORONARY STENT PLACEMENT     • ENDOMETRIAL ABLATION  2012   • EYE SURGERY     • KNEE ARTHROSCOPY Right 5/10/2019    Procedure: RIGHT KNEE ARTHROSCOPY BWITH PART. MEDIAL AND LATERAL MENISECTOMY, PATELLA, CHONDROPLASTY, AND DEBRIDMENT;  Surgeon: Ignacio Martinez MD;  Location: [facility] [place] [facility] [place];  Service: Orthopedics   • TUBAL ABDOMINAL LIGATION  2012                       PT Assessment/Plan     Row Name 07/03/19 8487          PT Assessment    Assessment Comments  Pt pain levels are better than earlier in the week, but she is still struggling to get it below 3/10. She is also having ankle pain as well. Gait still mildly antalgic, with a glute med lurch. Cont to watch her WBing activities and ice to help with pain control.   -        PT Plan    PT Plan Comments  cont  -       User Key  (r) = Recorded By, (t) = Taken By, (c) = Cosigned By    Initials Name Provider Type     Ame Rinaldi, PT Physical Therapist            Exercises     Row Name 07/03/19 1400             Subjective Comments    Subjective Comments  i am better today pain wise  -         Subjective Pain    Able to rate subjective pain?  yes  -      Pre-Treatment Pain Level  3  -         Total Minutes    24104 - PT Therapeutic Exercise Minutes  45  -LH         Exercise 1    Exercise Name 1  QS  -      Sets 1  2  -      Reps 1   10  -LH      Time 1  5 sec  -LH         Exercise 2    Exercise Name 2  SLR and SL hip abd, SL w/hip ER  -LH      Sets 2  3  -LH      Reps 2  10  -LH         Exercise 3    Exercise Name 3  hip add iso with pillow  -LH      Sets 3  2  -LH      Reps 3  10  -LH      Time 3  5 sec  -LH         Exercise 4    Exercise Name 4  seated HS curls  -LH      Sets 4  2  -LH      Reps 4  10  -LH      Time 4  blue  -LH         Exercise 5    Exercise Name 5  seated HS and calf stretching  -LH      Sets 5  1  -LH      Reps 5  3  -LH      Time 5  20 sec  -LH         Exercise 6    Exercise Name 6  hip abd/ER with band  -LH      Sets 6  2  -LH      Reps 6  10  -LH      Time 6  blue  -LH         Exercise 7    Exercise Name 7  bridge  -LH      Sets 7  1  -LH      Reps 7  20  -LH         Exercise 8    Exercise Name 8  NuStep seat 7  -LH      Time 8  6 min  -LH         Exercise 9    Exercise Name 9  piriformis stretch  -LH      Sets 9  1  -LH      Reps 9  3  -LH      Time 9  20 sec  -LH         Exercise 10    Exercise Name 10  4 in step ups, fwd  -LH      Sets 10  2  -LH      Reps 10  10  -LH         Exercise 11    Exercise Name 11  heel slides with strap. supine  -LH      Sets 11  1  -LH      Reps 11  10  -LH      Time 11  10 sec  -LH         Exercise 12    Exercise Name 12  TKE w/blue band  -LH      Reps 12  20  -LH      Time 12  hold 3 sec  -LH        User Key  (r) = Recorded By, (t) = Taken By, (c) = Cosigned By    Initials Name Provider Type     Ame Rinaldi, PT Physical Therapist                           Therapy Education  Given: Symptoms/condition management, Pain management, HEP  Program: Reinforced  How Provided: Verbal  Provided to: Patient  Level of Understanding: Teach back education performed              Time Calculation:   Start Time: 1415  Stop Time: 1500  Time Calculation (min): 45 min  Total Timed Code Minutes- PT: 45 minute(s)  Therapy Charges for Today     Code Description Service Date Service Provider Modifiers  Qty    [id number]  PT THER PROC EA 15 MIN 7/3/2019 Ame Rinaldi, PT GP 3                    Ame Rinaldi, PT  7/3/2019

## 2019-07-05 ENCOUNTER — OFFICE VISIT (OUTPATIENT)
Dept: INTERNAL MEDICINE | Facility: CLINIC | Age: 49
End: 2019-07-05

## 2019-07-05 VITALS
HEART RATE: 79 BPM | TEMPERATURE: 97.8 F | BODY MASS INDEX: 35.12 KG/M2 | OXYGEN SATURATION: 96 % | DIASTOLIC BLOOD PRESSURE: 84 MMHG | SYSTOLIC BLOOD PRESSURE: 128 MMHG | WEIGHT: 192 LBS

## 2019-07-05 DIAGNOSIS — J98.01 BRONCHOSPASM: ICD-10-CM

## 2019-07-05 DIAGNOSIS — J01.00 ACUTE MAXILLARY SINUSITIS, RECURRENCE NOT SPECIFIED: Primary | ICD-10-CM

## 2019-07-05 DIAGNOSIS — E11.9 TYPE 2 DIABETES MELLITUS WITHOUT COMPLICATION, WITHOUT LONG-TERM CURRENT USE OF INSULIN (HCC): ICD-10-CM

## 2019-07-05 LAB
ALBUMIN SERPL-MCNC: 4.3 G/DL (ref 3.5–5.2)
ALBUMIN/GLOB SERPL: 1.3 G/DL
ALP SERPL-CCNC: 54 U/L (ref 39–117)
ALT SERPL W P-5'-P-CCNC: 17 U/L (ref 1–33)
ANION GAP SERPL CALCULATED.3IONS-SCNC: 12.9 MMOL/L (ref 5–15)
AST SERPL-CCNC: 12 U/L (ref 1–32)
BASOPHILS # BLD AUTO: 0.02 10*3/MM3 (ref 0–0.2)
BASOPHILS NFR BLD AUTO: 0.2 % (ref 0–1.5)
BILIRUB SERPL-MCNC: 0.3 MG/DL (ref 0.2–1.2)
BUN BLD-MCNC: 12 MG/DL (ref 6–20)
BUN/CREAT SERPL: 18.8 (ref 7–25)
CALCIUM SPEC-SCNC: 10 MG/DL (ref 8.6–10.5)
CHLORIDE SERPL-SCNC: 98 MMOL/L (ref 98–107)
CO2 SERPL-SCNC: 27.1 MMOL/L (ref 22–29)
CREAT BLD-MCNC: 0.64 MG/DL (ref 0.57–1)
DEPRECATED RDW RBC AUTO: 42.9 FL (ref 37–54)
EOSINOPHIL # BLD AUTO: 0.17 10*3/MM3 (ref 0–0.4)
EOSINOPHIL NFR BLD AUTO: 1.6 % (ref 0.3–6.2)
ERYTHROCYTE [DISTWIDTH] IN BLOOD BY AUTOMATED COUNT: 12.9 % (ref 12.3–15.4)
GFR SERPL CREATININE-BSD FRML MDRD: 98 ML/MIN/1.73
GLOBULIN UR ELPH-MCNC: 3.3 GM/DL
GLUCOSE BLD-MCNC: 146 MG/DL (ref 65–99)
HBA1C MFR BLD: 7.84 % (ref 4.8–5.6)
HCT VFR BLD AUTO: 46.8 % (ref 34–46.6)
HGB BLD-MCNC: 16 G/DL (ref 12–15.9)
LYMPHOCYTES # BLD AUTO: 3.81 10*3/MM3 (ref 0.7–3.1)
LYMPHOCYTES NFR BLD AUTO: 36.1 % (ref 19.6–45.3)
MCH RBC QN AUTO: 31.6 PG (ref 26.6–33)
MCHC RBC AUTO-ENTMCNC: 34.2 G/DL (ref 31.5–35.7)
MCV RBC AUTO: 92.5 FL (ref 79–97)
MONOCYTES # BLD AUTO: 0.63 10*3/MM3 (ref 0.1–0.9)
MONOCYTES NFR BLD AUTO: 6 % (ref 5–12)
NEUTROPHILS # BLD AUTO: 5.91 10*3/MM3 (ref 1.7–7)
NEUTROPHILS NFR BLD AUTO: 56.1 % (ref 42.7–76)
PLATELET # BLD AUTO: 233 10*3/MM3 (ref 140–450)
PMV BLD AUTO: 10.1 FL (ref 6–12)
POTASSIUM BLD-SCNC: 3.6 MMOL/L (ref 3.5–5.2)
PROT SERPL-MCNC: 7.6 G/DL (ref 6–8.5)
RBC # BLD AUTO: 5.06 10*6/MM3 (ref 3.77–5.28)
SODIUM BLD-SCNC: 138 MMOL/L (ref 136–145)
WBC NRBC COR # BLD: 10.54 10*3/MM3 (ref 3.4–10.8)

## 2019-07-05 PROCEDURE — 83036 HEMOGLOBIN GLYCOSYLATED A1C: CPT | Performed by: FAMILY MEDICINE

## 2019-07-05 PROCEDURE — 85025 COMPLETE CBC W/AUTO DIFF WBC: CPT | Performed by: FAMILY MEDICINE

## 2019-07-05 PROCEDURE — 99213 OFFICE O/P EST LOW 20 MIN: CPT | Performed by: FAMILY MEDICINE

## 2019-07-05 PROCEDURE — 80053 COMPREHEN METABOLIC PANEL: CPT | Performed by: FAMILY MEDICINE

## 2019-07-05 PROCEDURE — 36415 COLL VENOUS BLD VENIPUNCTURE: CPT | Performed by: FAMILY MEDICINE

## 2019-07-05 RX ORDER — METHYLPREDNISOLONE 4 MG/1
TABLET ORAL
Qty: 21 TABLET | Refills: 0 | Status: SHIPPED | OUTPATIENT
Start: 2019-07-05 | End: 2019-08-02

## 2019-07-05 RX ORDER — AZITHROMYCIN 250 MG/1
TABLET, FILM COATED ORAL
Qty: 6 TABLET | Refills: 0 | Status: SHIPPED | OUTPATIENT
Start: 2019-07-05 | End: 2019-08-02

## 2019-07-05 NOTE — PROGRESS NOTES
Subjective   Carina Zamudio is a 49 y.o. female.CC: sinuses     History of Present Illness   Rosa is a 49 year old female who comes in today with the complaint of sinus problems.  Started in her chest earlier this week and has moved into her sinuses.   Has a cough and wheezing.  Has been taking nebulizer treatments.  Smoker.  The cough is productive.  Has also taken sudafed.  Her ears feel full.  She feels very tired.  Due for labs to check diabetes and potassium.        The following portions of the patient's history were reviewed and updated as appropriate: allergies, current medications, past family history, past medical history, past social history, past surgical history and problem list.    Review of Systems   Constitutional: Positive for fatigue. Negative for chills, diaphoresis and fever.   HENT: Positive for congestion, postnasal drip, rhinorrhea and sinus pain. Negative for sore throat.    Respiratory: Negative for cough, chest tightness and shortness of breath.    Cardiovascular: Negative for chest pain, palpitations and leg swelling.   Gastrointestinal: Negative for abdominal pain, diarrhea and nausea.   Skin: Negative for rash.   Neurological: Negative for dizziness.       Objective   Physical Exam   Constitutional: She appears well-developed and well-nourished.   HENT:   Head: Normocephalic and atraumatic.   Right Ear: Hearing, tympanic membrane, external ear and ear canal normal.   Left Ear: Hearing, tympanic membrane, external ear and ear canal normal.   Nose: Nose normal.   Mouth/Throat: Uvula is midline, oropharynx is clear and moist and mucous membranes are normal. No oropharyngeal exudate.   Cardiovascular: Normal rate, regular rhythm and normal heart sounds.   Pulmonary/Chest: Effort normal. No respiratory distress. She has wheezes (very mild wheeze with inspiration).   Skin: Skin is warm and dry. No rash noted.   Psychiatric: She has a normal mood and affect.   Nursing note and vitals  reviewed.    Vitals:    07/05/19 1255   BP: 128/84   BP Location: Right arm   Patient Position: Sitting   Cuff Size: Adult   Pulse: 79   Temp: 97.8 °F (36.6 °C)   SpO2: 96%   Weight: 87.1 kg (192 lb)     Assessment/Plan   Problems Addressed this Visit        Endocrine    Type 2 diabetes mellitus without complication, without long-term current use of insulin (CMS/[facility])    Relevant Orders    Hemoglobin A1c    CBC & Differential    Comprehensive Metabolic Panel      Other Visit Diagnoses     Acute maxillary sinusitis, recurrence not specified    -  Primary    Relevant Medications    azithromycin (ZITHROMAX Z-SOPHIA) 250 MG tablet    Bronchospasm        Relevant Medications    methylPREDNISolone (MEDROL, SOPHIA,) 4 MG tablet

## 2019-07-08 ENCOUNTER — TELEPHONE (OUTPATIENT)
Dept: INTERNAL MEDICINE | Facility: CLINIC | Age: 49
End: 2019-07-08

## 2019-07-10 ENCOUNTER — APPOINTMENT (OUTPATIENT)
Dept: PHYSICAL THERAPY | Facility: HOSPITAL | Age: 49
End: 2019-07-10

## 2019-07-17 ENCOUNTER — HOSPITAL ENCOUNTER (OUTPATIENT)
Dept: PHYSICAL THERAPY | Facility: HOSPITAL | Age: 49
Setting detail: THERAPIES SERIES
Discharge: HOME | End: 2019-07-17

## 2019-07-17 DIAGNOSIS — M25.561 RIGHT KNEE PAIN, UNSPECIFIED CHRONICITY: Primary | ICD-10-CM

## 2019-07-17 DIAGNOSIS — Z98.890 S/P RIGHT KNEE ARTHROSCOPY: ICD-10-CM

## 2019-07-17 PROCEDURE — 97110 THERAPEUTIC EXERCISES: CPT | Performed by: PHYSICAL THERAPIST

## 2019-07-17 NOTE — THERAPY TREATMENT NOTE
Outpatient Physical Therapy Ortho Treatment Note  [facility]     Patient Name: Carina Zamudio  : 1970  MRN: [id number]  Today's Date: 2019      Visit Date: 2019    Visit Dx:    ICD-10-CM ICD-9-CM   1. Right knee pain, unspecified chronicity M25.561 719.46   2. S/P right knee arthroscopy Z98.890 V45.89       Patient Active Problem List   Diagnosis   • Adrenal congenital hyperplasia (CMS/HCC)   • Cerebellar infarction (CMS/HCC)   • Coronary artery disease involving native coronary artery of native heart with angina pectoris (CMS/HCC)   • Osteoarthritis of spine   • Diverticulitis of colon   • Essential hypertension   • Hyperlipidemia   • Multinodular goiter   • Myocardial infarction (CMS/HCC)   • Obstructive sleep apnea syndrome   • Proteinuria   • Retinal hemorrhage   • S/P coronary angioplasty   • Cervical radiculopathy due to degenerative joint disease of spine   • Spondylosis of lumbar region without myelopathy or radiculopathy   • Type 2 diabetes mellitus without complication, without long-term current use of insulin (CMS/HCC)   • Gastroesophageal reflux disease without esophagitis   • Class 3 severe obesity with serious comorbidity in adult (CMS/HCC)   • Diastolic heart failure (CMS/HCC)   • Tobacco abuse   • Leg edema, right   • Acute pain of right knee   • Acute medial meniscus tear of right knee   • Arthritis of right knee   • S/P right knee arthroscopy        Past Medical History:   Diagnosis Date   • 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     2012--Sees Dr Serra (Eye)--laser Rx's   • Adrenal abnormality (CMS/[facility])     ENLARGEMENT   • Adrenal cortical adenoma    • Adrenal hypertrophy (CMS/HCC)    • Anemia    • Anxiety    • Benign essential hypertension    • CAD (coronary artery disease)    • Cellulitis     4 MONTHS AGO   • Cerebellar infarct (CMS/HCC)    • Chronic bronchitis (CMS/HCC)    • Diabetes mellitus (CMS/HCC)    • DJD (degenerative joint disease),  cervical    • DJD (degenerative joint disease), lumbar    • Fatigue    • Gestational diabetes    • HPV (human papilloma virus) infection    • Hyperglycemia     Dr. Xie   • Hyperlipemia    • Hypertension    • Liver nodule    • Morbid obesity (CMS/HCC)    • Multiple thyroid nodules    • Myocardial infarction (CMS/HCC) 2015   • Nontoxic multinodular goiter    • Preretinal hemorrhage of left eye    • Proteinuria     CHRONIC   • Raynaud phenomenon    • Right knee pain    • Sleep apnea     Uses CPAP   • Vitamin D deficiency         Past Surgical History:   Procedure Laterality Date   • CATARACT EXTRACTION, BILATERAL     • CORONARY STENT PLACEMENT     • ENDOMETRIAL ABLATION  2012   • EYE SURGERY     • KNEE ARTHROSCOPY Right 5/10/2019    Procedure: RIGHT KNEE ARTHROSCOPY BWITH PART. MEDIAL AND LATERAL MENISECTOMY, PATELLA, CHONDROPLASTY, AND DEBRIDMENT;  Surgeon: Ignacio Martinez MD;  Location: [facility] [place];  Service: Orthopedics   • TUBAL ABDOMINAL LIGATION  2012                       PT Assessment/Plan     Row Name 07/17/19 1027          PT Assessment    Assessment Comments  pt has been on her feet a little more than usual, cleaning gutters on the ladder. It was really sore last week, but has calmed down. Still with the glute med shift as she walks, but can normalize it out if she slows down her pace  -        PT Plan    PT Plan Comments  cont  -       User Key  (r) = Recorded By, (t) = Taken By, (c) = Cosigned By    Initials Name Provider Type     Ame Rinaldi, PT Physical Therapist            Exercises     Row Name 07/17/19 0900             Subjective Comments    Subjective Comments  I over did last wek nd cleaned the gutters, better today  -         Subjective Pain    Able to rate subjective pain?  yes  -      Pre-Treatment Pain Level  3  -         Total Minutes    15408 - PT Therapeutic Exercise Minutes  43  -         Exercise 1    Exercise Name 1  QS  -      Sets 1  2  -      Reps 1  10   -LH      Time 1  5 sec  -LH         Exercise 2    Exercise Name 2  SLR and SL hip abd, SL w/hip ER  -LH      Sets 2  3  -LH      Reps 2  10  -LH         Exercise 3    Exercise Name 3  hip add iso with pillow  -LH      Sets 3  2  -LH      Reps 3  10  -LH      Time 3  5 sec  -LH         Exercise 4    Exercise Name 4  seated HS curls  -LH      Sets 4  2  -LH      Reps 4  10  -LH      Time 4  blue  -LH         Exercise 5    Exercise Name 5  seated HS and calf stretching  -LH      Sets 5  1  -LH      Reps 5  3  -LH      Time 5  20 sec  -LH         Exercise 6    Exercise Name 6  hip abd/ER with band  -LH      Sets 6  2  -LH      Reps 6  10  -LH      Time 6  blue  -LH         Exercise 7    Exercise Name 7  bridge  -LH      Sets 7  1  -LH      Reps 7  20  -LH         Exercise 8    Exercise Name 8  NuStep seat 7  -LH      Time 8  6 min  -LH         Exercise 9    Exercise Name 9  piriformis stretch  -LH      Sets 9  1  -LH      Reps 9  3  -LH      Time 9  20 sec  -LH         Exercise 10    Exercise Name 10  4 in step ups, fwd and lat  -LH      Sets 10  2  -LH      Reps 10  10  -LH      Additional Comments  1 set of 10 on lateral  -LH         Exercise 11    Exercise Name 11  heel slides with strap. supine  -LH      Sets 11  1  -LH      Reps 11  10  -LH      Time 11  10 sec  -LH         Exercise 12    Exercise Name 12  TKE w/blue ball  -LH      Reps 12  20  -LH      Time 12  hold 3 sec  -LH         Exercise 13    Exercise Name 13  [place] B and single leg press  -LH      Sets 13  2  -LH      Reps 13  10  -LH      Time 13  80 lb  -LH        User Key  (r) = Recorded By, (t) = Taken By, (c) = Cosigned By    Initials Name Provider Type     Ame Rinaldi, PT Physical Therapist                           Therapy Education  Given: Symptoms/condition management, Pain management, HEP  Program: New, Reinforced  How Provided: Verbal  Provided to: Patient  Level of Understanding: Teach back education performed              Time  Calculation:   Start Time: 0931  Stop Time: 1015  Time Calculation (min): 44 min  Total Timed Code Minutes- PT: 43 minute(s)  Therapy Charges for Today     Code Description Service Date Service Provider Modifiers Qty    03616659278 HC PT THER PROC EA 15 MIN 7/17/2019 Ame Rinaldi, PT GP 3                    Ame Rinaldi, PT  7/17/2019

## 2019-07-24 ENCOUNTER — HOSPITAL ENCOUNTER (OUTPATIENT)
Dept: PHYSICAL THERAPY | Facility: HOSPITAL | Age: 49
Setting detail: THERAPIES SERIES
Discharge: HOME | End: 2019-07-24

## 2019-07-24 DIAGNOSIS — Z98.890 S/P RIGHT KNEE ARTHROSCOPY: ICD-10-CM

## 2019-07-24 DIAGNOSIS — M25.561 RIGHT KNEE PAIN, UNSPECIFIED CHRONICITY: Primary | ICD-10-CM

## 2019-07-24 PROCEDURE — 97110 THERAPEUTIC EXERCISES: CPT | Performed by: PHYSICAL THERAPIST

## 2019-07-24 NOTE — THERAPY TREATMENT NOTE
Outpatient Physical Therapy Ortho Treatment Note  [facility]     Patient Name: Carina Zamudio  : 1970  MRN: [id number]  Today's Date: 2019      Visit Date: 2019    Visit Dx:    ICD-10-CM ICD-9-CM   1. Right knee pain, unspecified chronicity M25.561 719.46   2. S/P right knee arthroscopy Z98.890 V45.89       Patient Active Problem List   Diagnosis   • Adrenal congenital hyperplasia (CMS/HCC)   • Cerebellar infarction (CMS/HCC)   • Coronary artery disease involving native coronary artery of native heart with angina pectoris (CMS/HCC)   • Osteoarthritis of spine   • Diverticulitis of colon   • Essential hypertension   • Hyperlipidemia   • Multinodular goiter   • Myocardial infarction (CMS/HCC)   • Obstructive sleep apnea syndrome   • Proteinuria   • Retinal hemorrhage   • S/P coronary angioplasty   • Cervical radiculopathy due to degenerative joint disease of spine   • Spondylosis of lumbar region without myelopathy or radiculopathy   • Type 2 diabetes mellitus without complication, without long-term current use of insulin (CMS/HCC)   • Gastroesophageal reflux disease without esophagitis   • Class 3 severe obesity with serious comorbidity in adult (CMS/HCC)   • Diastolic heart failure (CMS/HCC)   • Tobacco abuse   • Leg edema, right   • Acute pain of right knee   • Acute medial meniscus tear of right knee   • Arthritis of right knee   • S/P right knee arthroscopy        Past Medical History:   Diagnosis Date   • 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     2012--Sees Dr Serra (Eye)--laser Rx's   • Adrenal abnormality (CMS/[facility])     ENLARGEMENT   • Adrenal cortical adenoma    • Adrenal hypertrophy (CMS/HCC)    • Anemia    • Anxiety    • Benign essential hypertension    • CAD (coronary artery disease)    • Cellulitis     4 MONTHS AGO   • Cerebellar infarct (CMS/HCC)    • Chronic bronchitis (CMS/HCC)    • Diabetes mellitus (CMS/HCC)    • DJD (degenerative joint disease),  cervical    • DJD (degenerative joint disease), lumbar    • Fatigue    • Gestational diabetes    • HPV (human papilloma virus) infection    • Hyperglycemia     Dr. Xie   • Hyperlipemia    • Hypertension    • Liver nodule    • Morbid obesity (CMS/HCC)    • Multiple thyroid nodules    • Myocardial infarction (CMS/HCC) 2015   • Nontoxic multinodular goiter    • Preretinal hemorrhage of left eye    • Proteinuria     CHRONIC   • Raynaud phenomenon    • Right knee pain    • Sleep apnea     Uses CPAP   • Vitamin D deficiency         Past Surgical History:   Procedure Laterality Date   • CATARACT EXTRACTION, BILATERAL     • CORONARY STENT PLACEMENT     • ENDOMETRIAL ABLATION  2012   • EYE SURGERY     • KNEE ARTHROSCOPY Right 5/10/2019    Procedure: RIGHT KNEE ARTHROSCOPY BWITH PART. MEDIAL AND LATERAL MENISECTOMY, PATELLA, CHONDROPLASTY, AND DEBRIDMENT;  Surgeon: Ignacio Martinez MD;  Location[place] [facility] [place];  Service: Orthopedics   • TUBAL ABDOMINAL LIGATION  2012                       PT Assessment/Plan     Row Name 07/24/19 1000          PT Assessment    Assessment Comments  Pt has been out of town and did a lot of walking. Her pain levels still are 3/10 on average. She c/o continued edema as well. She has only a mild glute med type gait now and her hip and knee strength have greatly improved. Pt to make follow up appt with MD office prior to PT return.  -        PT Plan    PT Plan Comments  cont, per MD orders  -       User Key  (r) = Recorded By, (t) = Taken By, (c) = Cosigned By    Initials Name Provider Type     Ame Rinaldi, PT Physical Therapist            Exercises     Row Name 07/24/19 0900             Subjective Comments    Subjective Comments  still stiff and swollen  -         Subjective Pain    Able to rate subjective pain?  yes  -      Pre-Treatment Pain Level  3  -         Total Minutes    90659 - PT Therapeutic Exercise Minutes  44  -LH         Exercise 1    Exercise Name 1    -       Sets 1  2  -LH      Reps 1  10  -LH      Time 1  5 sec  -LH         Exercise 2    Exercise Name 2  SLR and SL hip abd, SL w/hip ER  -LH      Sets 2  3  -LH      Reps 2  10  -LH      Time 2  1 lb  -LH         Exercise 3    Exercise Name 3  hip add iso with pillow  -LH      Sets 3  2  -LH      Reps 3  10  -LH      Time 3  5 sec  -LH         Exercise 4    Exercise Name 4  seated HS curls  -LH      Sets 4  2  -LH      Reps 4  10  -LH      Time 4  blue  -LH         Exercise 5    Exercise Name 5  seated HS and calf stretching  -LH      Sets 5  1  -LH      Reps 5  3  -LH      Time 5  20 sec  -LH         Exercise 6    Exercise Name 6  hip abd/ER with band  -LH      Sets 6  2  -LH      Reps 6  10  -LH      Time 6  blue  -LH         Exercise 7    Exercise Name 7  bridge with blue band abd  -LH      Sets 7  1  -LH      Reps 7  20  -LH         Exercise 8    Exercise Name 8  NuStep seat 7  -LH      Time 8  6 min  -LH         Exercise 9    Exercise Name 9  piriformis stretch  -LH      Sets 9  1  -LH      Reps 9  3  -LH      Time 9  20 sec  -LH         Exercise 10    Exercise Name 10  4 in step ups, fwd and lat  -LH      Sets 10  2  -LH      Reps 10  10  -LH         Exercise 11    Exercise Name 11  heel slides with strap. supine  -LH      Sets 11  --  -LH      Reps 11  --  -LH      Time 11  --  -LH         Exercise 12    Exercise Name 12  TKE w/blue ball  -LH      Reps 12  20  -LH      Time 12  hold 3 sec  -LH         Exercise 13    Exercise Name 13  [place] B and single leg press  -LH      Sets 13  2  -LH      Reps 13  10  -LH      Time 13  100 lb/75 lb  -LH         Exercise 14    Exercise Name 14  Matrix hip abd  -LH      Sets 14  2  -LH      Reps 14  10  -LH      Time 14  50 lb  -LH         Exercise 15    Exercise Name 15  matrix hip add  -LH      Sets 15  2  -LH      Reps 15  10  -LH      Time 15  50 lb  -LH         Exercise 16    Exercise Name 16  Matrix HS curl  -LH      Sets 16  2  -LH      Reps 16  10  -LH      Time 16   20 lb  -        User Key  (r) = Recorded By, (t) = Taken By, (c) = Cosigned By    Initials Name Provider Type     Ame Rinaldi PT Physical Therapist                       PT OP Goals     Row Name 07/24/19 1000          PT Short Term Goals    STG 1  Patient will be independent with education for symptom management, joint protection and strategies to minimize stress on affected tissues  -     STG 1 Progress  Met  -     STG 2  Pt to improve knee ROM to ext=4 deg and flex=115 deg for improved gait pattern  -     STG 2 Progress  Met  -     STG 3  Patient will report decreased pain rating on VAS from  4/10 to 2/10 with ADLs, work,  and household activities.  -     STG 3 Progress  Partially Met  -        Long Term Goals    LTG 1  Pt to improve knee ROM to ext=3 deg of HE to 120 deg for improved gait pattern  -     LTG 1 Progress  Met  -     LTG 2  Patient will increase knee and hip strength to 4+ to 5/5 to improve functional mobility  -     LTG 2 Progress  Ongoing;Partially Met  -     LTG 3  Pt to improve score on Knee Outcome Survey from 66% to 85% for overall functional improvement  -     LTG 3 Progress  Ongoing  -     LTG 3 Progress Comments  pt to email back survey  -     LTG 4  Patient will demonstrate an independent HEP for core and knee strength and flexibility/ROM.  -     LTG 4 Progress  Partially Met  [facility]       User Key  (r) = Recorded By, (t) = Taken By, (c) = Cosigned By    Initials Name Provider Type     Ame Rinaldi PT Physical Therapist          Therapy Education  Given: Symptoms/condition management, Pain management, HEP  Program: Reinforced  How Provided: Verbal  Provided to: Patient  Level of Understanding: Teach back education performed              Time Calculation:   Start Time: 0930  Stop Time: 1015  Time Calculation (min): 45 min  Total Timed Code Minutes- PT: 45 minute(s)  Therapy Charges for Today     Code Description Service Date Service Provider  Modifiers Qty    [id number]  PT THER PROC EA 15 MIN 7/24/2019 Ame Rinaldi, PT GP 3                    Ame Rinaldi, PT  7/24/2019

## 2019-08-02 ENCOUNTER — OFFICE VISIT (OUTPATIENT)
Dept: ORTHOPEDIC SURGERY | Facility: CLINIC | Age: 49
End: 2019-08-02

## 2019-08-02 VITALS — TEMPERATURE: 98.2 F | WEIGHT: 192 LBS | BODY MASS INDEX: 35.33 KG/M2 | HEIGHT: 62 IN

## 2019-08-02 DIAGNOSIS — Z98.890 S/P RIGHT KNEE ARTHROSCOPY: Primary | ICD-10-CM

## 2019-08-02 PROCEDURE — 99024 POSTOP FOLLOW-UP VISIT: CPT | Performed by: NURSE PRACTITIONER

## 2019-08-02 RX ORDER — METHYLPREDNISOLONE ACETATE 80 MG/ML
80 INJECTION, SUSPENSION INTRA-ARTICULAR; INTRALESIONAL; INTRAMUSCULAR; SOFT TISSUE
Status: COMPLETED | OUTPATIENT
Start: 2019-08-02 | End: 2019-08-02

## 2019-08-02 RX ADMIN — METHYLPREDNISOLONE ACETATE 80 MG: 80 INJECTION, SUSPENSION INTRA-ARTICULAR; INTRALESIONAL; INTRAMUSCULAR; SOFT TISSUE at 09:12

## 2019-08-02 NOTE — PROGRESS NOTES
Carina Zamudio : 1970 MRN: [id number] DATE: 2019  Body mass index is 35.12 kg/m².  Vitals:    19 0838   Temp: 98.2 °F (36.8 °C)       DIAGNOSIS: 10 weeks follow up right knee arthroscopy     SUBJECTIVE:Patient returns today for follow up of knee arthroscopy. Patient reports doing well with no unusual complaints. Appears to be progressing appropriately. Is still having issues with stiffness and swelling and is still doing exercises for strengthening daily and has joined gym. Has lost 40lbs.     OBJECTIVE:   Exam:.  The incision is healing appropriately. No sign of infection. Range of motion is progressing as expected. The calf is soft and nontender with a negative Homans sign. Moderate to large swelling and stiffness in operative knee. Distal pulse intact.     DIAGNOSTIC STUDIES Pictures from surgery were reviewed with the patient and questions were answered.    ASSESSMENT: status post knee arthroscopy expected healing.  Large Joint Arthrocentesis: R knee  Date/Time: 2019 9:12 AM  Consent given by: patient  Site marked: site marked  Timeout: Immediately prior to procedure a time out was called to verify the correct patient, procedure, equipment, support staff and site/side marked as required   Supporting Documentation  Indications: pain and joint swelling   Procedure Details  Location: knee - R knee  Preparation: Patient was prepped and draped in the usual sterile fashion  Needle size: 22 G  Approach: anterolateral  Medications administered: 80 mg methylPREDNISolone acetate 80 MG/ML; 4 mL lidocaine (cardiac)  Patient tolerance: patient tolerated the procedure well with no immediate complications              PLAN: Follow up in 3 months as needed for repeat cortisone inj.        Alma Delia Chopra, VITOR  2019

## 2019-08-02 NOTE — PATIENT INSTRUCTIONS
DIAGNOSIS: 10 weeks follow up right knee arthroscopy     SUBJECTIVE:Patient returns today for follow up of knee arthroscopy. Patient reports doing well with no unusual complaints. Appears to be progressing appropriately. Is still having issues with stiffness and swelling and is still doing exercises for strengthening daily and has joined gym. Has lost 40lbs.     OBJECTIVE:   Exam:.  The incision is healing appropriately. No sign of infection. Range of motion is progressing as expected. The calf is soft and nontender with a negative Homans sign. Moderate to large swelling and stiffness in operative knee. Distal pulse intact.     DIAGNOSTIC STUDIES Pictures from surgery were reviewed with the patient and questions were answered.    ASSESSMENT: status post knee arthroscopy expected healing.    PLAN: Follow up in 3 months as needed for repeat cortisone inj.        Alma Delia Chopra, APRN  8/2/2019

## 2019-08-06 ENCOUNTER — OFFICE VISIT (OUTPATIENT)
Dept: INTERNAL MEDICINE | Facility: CLINIC | Age: 49
End: 2019-08-06

## 2019-08-06 DIAGNOSIS — Z76.89 ENCOUNTER FOR INCISION AND DRAINAGE PROCEDURE: ICD-10-CM

## 2019-08-06 DIAGNOSIS — L72.3 INFECTED SEBACEOUS CYST: ICD-10-CM

## 2019-08-06 DIAGNOSIS — L08.9 INFECTED SEBACEOUS CYST: ICD-10-CM

## 2019-08-06 DIAGNOSIS — E11.9 TYPE 2 DIABETES MELLITUS WITHOUT COMPLICATION, WITHOUT LONG-TERM CURRENT USE OF INSULIN (HCC): Primary | ICD-10-CM

## 2019-08-06 PROCEDURE — 99214 OFFICE O/P EST MOD 30 MIN: CPT | Performed by: INTERNAL MEDICINE

## 2019-08-06 RX ORDER — AMOXICILLIN AND CLAVULANATE POTASSIUM 875; 125 MG/1; MG/1
1 TABLET, FILM COATED ORAL 2 TIMES DAILY
Qty: 14 TABLET | Refills: 0 | Status: SHIPPED | OUTPATIENT
Start: 2019-08-06 | End: 2019-08-13

## 2019-08-06 NOTE — PROGRESS NOTES
Subjective   Carina Zamudio is a 49 y.o. female.  Presents with a chief complaint of an infected sebaceous cyst on the right side of her rib cage that requires drainage today.  Important factor in her history is that of factor of diabetes and I recommended that she be placed on antibiotics and follow-up in 2 weeks to recheck the cyst.    History of Present Illness the patient recently developed an infected sebaceous cyst on the right side of her rib cage    The following portions of the patient's history were reviewed and updated as appropriate: allergies, current medications, past family history, past medical history, past social history, past surgical history and problem list.    Review of Systems   Skin: Positive for skin lesions.       Objective   Physical Exam   Skin:   The patient has a sebaceous cyst that is approximately 3 x 3 cm and very tender on the right side of her rib cage I plan on using lidocaine and under sterile conditions incising the cyst and draining it         Assessment/Plan   Diagnoses and all orders for this visit:    Type 2 diabetes mellitus without complication, without long-term current use of insulin (CMS/[facility])  Comments:  No change in therapy at this time    Infected sebaceous cyst  Comments:  I plan on incising and draining the cyst today and adding antibiotics at Augmentin 875 mg twice a day    Encounter for incision and drainage procedure  Comments:  Abscess drained without any complications at this time    Other orders  -     amoxicillin-clavulanate (AUGMENTIN) 875-125 MG per tablet; Take 1 tablet by mouth 2 (Two) Times a Day for 7 days.

## 2019-08-26 ENCOUNTER — DOCUMENTATION (OUTPATIENT)
Dept: PHYSICAL THERAPY | Facility: HOSPITAL | Age: 49
End: 2019-08-26

## 2019-08-26 DIAGNOSIS — Z98.890 S/P RIGHT KNEE ARTHROSCOPY: ICD-10-CM

## 2019-08-26 DIAGNOSIS — M25.561 RIGHT KNEE PAIN, UNSPECIFIED CHRONICITY: Primary | ICD-10-CM

## 2019-08-26 NOTE — THERAPY DISCHARGE NOTE
Outpatient Physical Therapy Discharge Summary         Patient Name: Carina Zamudio  : 1970  MRN: [id number]    Today's Date: 2019    Visit Dx:    ICD-10-CM ICD-9-CM   1. Right knee pain, unspecified chronicity M25.561 719.46   2. S/P right knee arthroscopy Z98.890 V45.89       PT OP Goals     Row Name 19 1000          PT Short Term Goals    STG 1  Patient will be independent with education for symptom management, joint protection and strategies to minimize stress on affected tissues  -     STG 1 Progress  Met  -     STG 2  Pt to improve knee ROM to ext=4 deg and flex=115 deg for improved gait pattern  -     STG 2 Progress  Met  -     STG 3  Patient will report decreased pain rating on VAS from  4/10 to 2/10 with ADLs, work,  and household activities.  -     STG 3 Progress  Partially Met  -        Long Term Goals    LTG 1  Pt to improve knee ROM to ext=3 deg of HE to 120 deg for improved gait pattern  -     LTG 1 Progress  Met  -     LTG 2  Patient will increase knee and hip strength to 4+ to 5/5 to improve functional mobility  -     LTG 2 Progress  Partially Met  -     LTG 3  Pt to improve score on Knee Outcome Survey from 66% to 85% for overall functional improvement  -     LTG 3 Progress  Ongoing  -     LTG 4  Patient will demonstrate an independent HEP for core and knee strength and flexibility/ROM.  -     LTG 4 Progress  Met  [facility]       User Key  (r) = Recorded By, (t) = Taken By, (c) = Cosigned By    Initials Name Provider Type     Ame Rinaldi, PT Physical Therapist          OP PT Discharge Summary  Date of Discharge: 19  Reason for Discharge: Independent  Outcomes Achieved: Patient able to partially acheive established goals  Discharge Destination: Home with home program  Discharge Instructions/Additional Comments: Carina Zamudio was seen for 12 physical therapy sessions for s/p knee scope.  Treatment included therapeutic exercise, manual  therapy, neuro-muscular retraining , gait training and patient education with home exercise program . Progress to physical therapy goals was good. She had still been having some pain in the joint and mild gait deficits, but was going to work on her own to progress this.  She was discharged to an independent [facility] and provided patient education to self-manage condition.       Time Calculation:                    Ame Rinaldi, PT  8/26/2019

## 2019-09-06 RX ORDER — CARVEDILOL 3.12 MG/1
3.12 TABLET ORAL 2 TIMES DAILY
Qty: 180 TABLET | Refills: 0 | Status: SHIPPED | OUTPATIENT
Start: 2019-09-06 | End: 2019-12-19 | Stop reason: SDUPTHER

## 2019-10-16 ENCOUNTER — OFFICE VISIT (OUTPATIENT)
Dept: INTERNAL MEDICINE | Facility: CLINIC | Age: 49
End: 2019-10-16

## 2019-10-16 VITALS
DIASTOLIC BLOOD PRESSURE: 68 MMHG | HEART RATE: 85 BPM | WEIGHT: 192 LBS | SYSTOLIC BLOOD PRESSURE: 118 MMHG | HEIGHT: 62 IN | BODY MASS INDEX: 35.33 KG/M2 | OXYGEN SATURATION: 98 %

## 2019-10-16 DIAGNOSIS — R07.89 CHEST WALL TENDERNESS: ICD-10-CM

## 2019-10-16 DIAGNOSIS — L60.0 INGROWN RIGHT BIG TOENAIL: ICD-10-CM

## 2019-10-16 DIAGNOSIS — R51.9 SINUS HEADACHE: Primary | ICD-10-CM

## 2019-10-16 DIAGNOSIS — R07.9 CHEST PAIN, UNSPECIFIED TYPE: ICD-10-CM

## 2019-10-16 PROBLEM — E11.9 TYPE 2 DIABETES MELLITUS (HCC): Status: ACTIVE | Noted: 2019-10-16

## 2019-10-16 PROBLEM — Z83.3 FAMILY HISTORY OF DIABETES MELLITUS: Status: ACTIVE | Noted: 2019-10-16

## 2019-10-16 PROCEDURE — 99214 OFFICE O/P EST MOD 30 MIN: CPT | Performed by: NURSE PRACTITIONER

## 2019-10-16 PROCEDURE — 93000 ELECTROCARDIOGRAM COMPLETE: CPT | Performed by: NURSE PRACTITIONER

## 2019-10-16 RX ORDER — METHOCARBAMOL 500 MG/1
500 TABLET, FILM COATED ORAL EVERY 8 HOURS PRN
Qty: 30 TABLET | Refills: 0 | Status: SHIPPED | OUTPATIENT
Start: 2019-10-16 | End: 2021-06-10

## 2019-10-16 RX ORDER — CEPHALEXIN 500 MG/1
500 CAPSULE ORAL 2 TIMES DAILY
Qty: 14 CAPSULE | Refills: 0 | Status: SHIPPED | OUTPATIENT
Start: 2019-10-16 | End: 2019-10-25

## 2019-10-16 RX ORDER — FLUCONAZOLE 150 MG/1
150 TABLET ORAL ONCE
Qty: 1 TABLET | Refills: 0 | Status: SHIPPED | OUTPATIENT
Start: 2019-10-16 | End: 2019-10-19

## 2019-10-16 NOTE — PROGRESS NOTES
Subjective   Carina Zamudio is a 49 y.o. female.     She reports about one week ago she was working with  doing some extension weight workout for her. She reports shortly afterwards she started with pain in left upper scapula area. The pain has worsened and started to radiate to left upper chest. She has history of MI and slightly concerned. The pain is intermittent. She is unsure of actual aggravators. She has applied ice and biofreeze which helps. She tried flexeril without relief. Her pain is described as stabbing and intermittent.  Also she reports in the last 48 hours she has started with headache. She suspects related to sinus and has restarted flonase.       Headache    This is a new problem. The current episode started yesterday. The pain is located in the frontal region. The pain quality is similar to prior headaches. The quality of the pain is described as aching. The pain is at a severity of 2/10. The pain is mild. Associated symptoms include back pain, numbness (chronic ), sinus pressure and tingling (chronic ). Pertinent negatives include no coughing, dizziness, fever, nausea, phonophobia, photophobia, rhinorrhea, visual change or vomiting. Associated symptoms comments: Dizziness   Ear fullness . She has tried acetaminophen and NSAIDs (sudafed, stahist ) for the symptoms. The treatment provided mild relief.   Lower Extremity Issue   This is a new problem. The current episode started 1 to 4 weeks ago. Associated symptoms include headaches and numbness (chronic ). Pertinent negatives include no chest pain, coughing, fatigue, fever, nausea, visual change or vomiting. Treatments tried: epsom salt, intermittently, attempted to remove it  The treatment provided mild relief.   Back Pain   This is a new problem. The current episode started 1 to 4 weeks ago. The problem has been gradually worsening since onset. Pain location: trapezius  The quality of the pain is described as stabbing. Radiates  to: left chest wall  The pain is at a severity of 3/10 (worst 9/10). The pain is mild. Associated symptoms include headaches, numbness (chronic ) and tingling (chronic ). Pertinent negatives include no chest pain or fever. Treatments tried: ice, biofreeze         The following portions of the patient's history were reviewed and updated as appropriate: allergies, current medications, past family history, past medical history, past social history, past surgical history and problem list.    Review of Systems   Constitutional: Negative for activity change, appetite change, fatigue and fever.   HENT: Positive for postnasal drip, sinus pressure and sinus pain. Negative for rhinorrhea.    Eyes: Negative for photophobia.   Respiratory: Negative for cough, shortness of breath and wheezing.    Cardiovascular: Negative for chest pain, palpitations and leg swelling.   Gastrointestinal: Negative for nausea and vomiting.   Musculoskeletal: Positive for back pain.   Neurological: Positive for tingling (chronic ), numbness (chronic ) and headaches. Negative for dizziness.       Objective    Physical Exam   Constitutional: She is oriented to person, place, and time. She appears well-developed and well-nourished.   HENT:   Head: Normocephalic.   Right Ear: Hearing, tympanic membrane, external ear and ear canal normal.   Left Ear: Hearing, tympanic membrane, external ear and ear canal normal.   Nose: Right sinus exhibits frontal sinus tenderness. Right sinus exhibits no maxillary sinus tenderness. Left sinus exhibits frontal sinus tenderness. Left sinus exhibits no maxillary sinus tenderness.   Mouth/Throat: Oropharynx is clear and moist and mucous membranes are normal. No tonsillar exudate.   Cardiovascular: Regular rhythm and normal heart sounds. Exam reveals no S3 and no S4.   No murmur heard.  No pedal edema    Pulmonary/Chest: Effort normal and breath sounds normal. She has no decreased breath sounds. She has no wheezes. She has  no rhonchi. She has no rales.       Musculoskeletal: She exhibits no edema.        Right shoulder: She exhibits normal range of motion, no tenderness, no pain and no spasm.        Left shoulder: She exhibits normal range of motion, no pain and no spasm.        Cervical back: She exhibits normal range of motion, no tenderness and no pain.        Arms:  Point tenderness with palpation      Skin Integrity  -  She has right foot ingrown toenail..  Lymphadenopathy:        Head (right side): No submental, no submandibular, no tonsillar, no preauricular, no posterior auricular and no occipital adenopathy present.        Head (left side): No submental, no submandibular, no tonsillar, no preauricular, no posterior auricular and no occipital adenopathy present.     She has no cervical adenopathy.   Neurological: She is alert and oriented to person, place, and time. Gait normal.   Skin: Skin is warm and dry.   Psychiatric: She has a normal mood and affect.       Assessment/Plan   Carina was seen today for headache, nail problem and back pain.    Diagnoses and all orders for this visit:    Sinus headache  Comments:  continue with flonase   Orders:  -     cephalexin (KEFLEX) 500 MG capsule; Take 1 capsule by mouth 2 (Two) Times a Day for 7 days.  -     fluconazole (DIFLUCAN) 150 MG tablet; Take 1 tablet by mouth 1 (One) Time for 1 dose.    Ingrown right big toenail  Comments:  continue with epsom soaks, will refer to podiatrist   Orders:  -     Ambulatory Referral to Podiatry    Chest pain, unspecified type  Comments:  ? muscular, normal ECG, due to see cardiologist 10/29/2019 for routine appt   Orders:  -     ECG 12 Lead    Chest wall tenderness  Comments:  apply heat/stretching, use muscle relaxer prn   Orders:  -     methocarbamol (ROBAXIN) 500 MG tablet; Take 1 tablet by mouth Every 8 (Eight) Hours As Needed for Muscle Spasms.        .  ECG 12 Lead  Date/Time: 10/16/2019 2:52 PM  Performed by: Gely Alba  VITOR  Authorized by: Gely Alba APRN   Rhythm: sinus rhythm  Rate: normal  Conduction: conduction normal  ST Segments: ST segments normal  T Waves: T waves normal  QRS axis: normal    Clinical impression: normal ECG

## 2019-10-29 ENCOUNTER — OFFICE VISIT (OUTPATIENT)
Dept: CARDIOLOGY | Facility: CLINIC | Age: 49
End: 2019-10-29

## 2019-10-29 VITALS
HEART RATE: 81 BPM | HEIGHT: 62 IN | DIASTOLIC BLOOD PRESSURE: 78 MMHG | WEIGHT: 196 LBS | SYSTOLIC BLOOD PRESSURE: 110 MMHG | BODY MASS INDEX: 36.07 KG/M2

## 2019-10-29 DIAGNOSIS — I10 ESSENTIAL HYPERTENSION: ICD-10-CM

## 2019-10-29 DIAGNOSIS — I21.11 ST ELEVATION MYOCARDIAL INFARCTION INVOLVING RIGHT CORONARY ARTERY (HCC): ICD-10-CM

## 2019-10-29 DIAGNOSIS — I25.119 CORONARY ARTERY DISEASE INVOLVING NATIVE CORONARY ARTERY OF NATIVE HEART WITH ANGINA PECTORIS (HCC): Primary | ICD-10-CM

## 2019-10-29 DIAGNOSIS — F17.200 TOBACCO DEPENDENCE: ICD-10-CM

## 2019-10-29 DIAGNOSIS — Z72.0 TOBACCO ABUSE: ICD-10-CM

## 2019-10-29 DIAGNOSIS — E11.59 TYPE 2 DIABETES MELLITUS WITH OTHER CIRCULATORY COMPLICATION, WITHOUT LONG-TERM CURRENT USE OF INSULIN (HCC): Chronic | ICD-10-CM

## 2019-10-29 PROCEDURE — 99215 OFFICE O/P EST HI 40 MIN: CPT | Performed by: INTERNAL MEDICINE

## 2019-10-29 NOTE — PROGRESS NOTES
Subjective:     Encounter Date:10/29/19      Patient ID: Carina Zamudio is a 49 y.o. female.    Chief Complaint: CAD  History of Present Illness    Dear Dr. Gómez,    I had the pleasure of seeing this patient in the office today for evaluation.  In today to be established-she typically was followed in the past by Dr. Alcazar.    This is a 49-year-old female with a documented history of CAD, inferior STEMI in August 2015 with drug-eluting stent placement to the left circumflex as well as nonocclusive CAD in the RCA and LAD, history of CVA, diabetes mellitus with circulatory complication, obstructive sleep apnea on CPAP, hypertensive heart disease without heart failure, and history of tobacco abuse and dependence.    This is the first time that I have seen this patient.  All of her issues are new to me.    She had a stress test in 2017 that showed no ischemia.  Echocardiogram in 2017 demonstrated normal left ventricular size and function.    She states that generally she has been doing okay.  She has not had any complaints of chest pain or chest discomfort.  Occasionally she will have a cough which she attributes to her smoking.  No shortness of breath with activity.  Rare palpitations.  Sometimes she will have a little bit of dizziness.  No presyncope or syncope.  No orthopnea or PND.  She has had no unusual fatigue.  She says some issues with her right knee that is limited the ability to ambulate but she still working with a  and can walk okay.    She continues to smoke, but is looking to decrease and then stop.  She plans on stopping soon, but she has not yet picked a stop date.    Cardiac catheterization 2015:     Left heart catheterization: LV pressure was 120/20. There was normal LV  systolic function without segmental wall motion abnormality. No mitral  insufficiency or gradient across the aortic valve on pullback.      The left main coronary artery is normal.      The LAD has some mild luminal  irregularities in the proximal portion that are  about 20%. The first diagonal has some mild luminal irregularities of 10% in  it.   The circumflex has a 99% blockage in the middle portion. There is an OM1 that  is subtotally occluded right in this lesion and the flow beyond that is CAROL-1.        The RCA is a dominant vessel with 30% proximal and mid disease, diffuse  aneurysmal change distally. The MARCIA has diffuse 50% disease in the proximal  portion of it, and the PDA has a 30% origin lesion in it.      Drug-eluting stent placement 2015:  STEMI. Due to this circumflex lesion we are going to proceed on with  intervention in it.       Heparin 9000 units was given. The ACT was over 300 seconds. She had been  pretreated with 600 mg of Plavix. Initially, we went up with a Voda 3.0 guiding  catheter. I attempted to cross it with a BMW wire, but it would not,  switched  to a whisper wire and it easily crossed and then brought a 2.5 x 15 Trek  balloon down, inflated it at 14 atmospheres, predilating this and then we  brought a 2.75 x 18 Xience Alpine stent and deployed it at 14 atmospheres. I  postdilated the front end of that stent with a 3.0 x 8 NC Trek at 20  atmospheres. There was 0% residual and CAROL-3 flow. There was just a trickle of  flow in the OM1, but it is too small to go after or do anything.     Carina Zamudio is a current cigarettes user.  She currently smokes 1 pack of cigarettes per day for a duration of 12 months. I have educated her on the risk of diseases from using tobacco products such as cancer, COPD and heart diease.     I advised her to quit and she is willing to quit. We have discussed the following method/s for tobacco cessation:  Counseling OTC Cessation Products.  Together we have set a quit date for 1 month from today.  She will follow up with me in a few months or sooner to check on her progress.    I spent >10 minutes counseling the patient.        The following portions of the patient's  history were reviewed and updated as appropriate: allergies, current medications, past family history, past medical history, past social history, past surgical history and problem list.    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 Xie   • Hyperlipemia    • Hypertension    • Liver nodule    • Morbid obesity (CMS/HCC)    • Multiple thyroid nodules    • Myocardial infarction (CMS/HCC) 2015   • Nontoxic multinodular goiter    • Preretinal hemorrhage of left eye    • Proteinuria     CHRONIC   • Raynaud phenomenon    • Right knee pain    • Sleep apnea     Uses CPAP   • Type 2 diabetes mellitus with circulatory disorder, without long-term current use of insulin (CMS/HCC) 2/24/2017   • Vitamin D deficiency        Past Surgical History:   Procedure Laterality Date   • CATARACT EXTRACTION, BILATERAL     • CORONARY STENT PLACEMENT     • ENDOMETRIAL ABLATION  2012   • EYE SURGERY     • KNEE ARTHROSCOPY Right 5/10/2019    Procedure: RIGHT KNEE ARTHROSCOPY BWITH PART. MEDIAL AND LATERAL MENISECTOMY, PATELLA, CHONDROPLASTY, AND DEBRIDMENT;  Surgeon: Ignacio Martinez MD;  Location: [facility] OR [facility] [place];  Service: Orthopedics   • TUBAL ABDOMINAL LIGATION  2012       Social History     Socioeconomic History   • Marital status:      Spouse name: Not on file   • Number of children: Not on file  "  • Years of education: Not on file   • Highest education level: Not on file   Tobacco Use   • Smoking status: Current Every Day Smoker     Packs/day: 1.00   • Smokeless tobacco: Never Used   Substance and Sexual Activity   • Alcohol use: Yes     Comment: socially   • Drug use: No       Review of Systems   Constitution: Negative for chills, decreased appetite, fever and night sweats.   HENT: Negative for ear discharge, ear pain, hearing loss, nosebleeds and sore throat.    Eyes: Negative for blurred vision, double vision and pain.   Cardiovascular: Negative for cyanosis.   Respiratory: Negative for hemoptysis and sputum production.    Endocrine: Negative for cold intolerance and heat intolerance.   Hematologic/Lymphatic: Negative for adenopathy.   Skin: Negative for dry skin, itching, nail changes, rash and suspicious lesions.   Musculoskeletal: Negative for arthritis, gout, muscle cramps, muscle weakness, myalgias and neck pain.   Gastrointestinal: Negative for anorexia, bowel incontinence, constipation, diarrhea, dysphagia, hematemesis and jaundice.   Genitourinary: Negative for bladder incontinence, dysuria, flank pain, frequency, hematuria and nocturia.   Neurological: Negative for focal weakness, numbness, paresthesias and seizures.   Psychiatric/Behavioral: Negative for altered mental status, hallucinations, hypervigilance, suicidal ideas and thoughts of violence.   Allergic/Immunologic: Negative for persistent infections.       Procedures       Objective:     Vitals:    10/29/19 1037   BP: 110/78   Pulse: 81   Weight: 88.9 kg (196 lb)   Height: 157.5 cm (62\")         Physical Exam   Constitutional: She is oriented to person, place, and time. She appears well-developed and well-nourished. No distress.   HENT:   Head: Normocephalic and atraumatic.   Nose: Nose normal.   Mouth/Throat: Oropharynx is clear and moist.   Eyes: Conjunctivae and EOM are normal. Pupils are equal, round, and reactive to light. Right eye " exhibits no discharge. Left eye exhibits no discharge.   Neck: Normal range of motion. Neck supple. No tracheal deviation present. No thyromegaly present.   Cardiovascular: Normal rate, regular rhythm, S1 normal, S2 normal, normal heart sounds and normal pulses. Exam reveals no S3.   Pulmonary/Chest: Effort normal and breath sounds normal. No stridor. No respiratory distress. She exhibits no tenderness.   Abdominal: Soft. Bowel sounds are normal. She exhibits no distension and no mass. There is no tenderness. There is no rebound and no guarding.   Musculoskeletal: Normal range of motion. She exhibits no tenderness or deformity.   Lymphadenopathy:     She has no cervical adenopathy.   Neurological: She is alert and oriented to person, place, and time. She has normal reflexes.   Skin: Skin is warm and dry. No rash noted. She is not diaphoretic. No erythema.   Psychiatric: She has a normal mood and affect. Thought content normal.       Lab Review:             Performed        Assessment:          Diagnosis Plan   1. Coronary artery disease involving native coronary artery of native heart with angina pectoris (CMS/[facility])     2. ST elevation myocardial infarction involving right coronary artery (CMS/[facility])     3. Essential hypertension     4. Type 2 diabetes mellitus with other circulatory complication, without long-term current use of insulin (CMS/[facility])     5. Tobacco abuse            Plan:       1.  CAD- no evidence of cardiac symptoms, we will continue to focus on guideline directed medical therapy and risk factor modification  2.  History of myocardial infarction  3.  Drug-eluting stent placement in the circumflex-continue guideline directed medical therapy  4.  Diabetes mellitus with circulatory complication- continue guideline directed medical therapy  5.  Tobacco abuse and dependence-patient is planning on stopping.  She started to work on this although she is not yet identified a stop date.  We spent 14 minutes  discussing smoking cessation strategies.   Carina Zamudio is a current cigarettes user.  She currently smokes 1 pack of cigarettes per day for a duration of 12 months. I have educated her on the risk of diseases from using tobacco products such as cancer, COPD and heart diease.     I advised her to quit and she is willing to quit. We have discussed the following method/s for tobacco cessation:  Counseling OTC Cessation Products.  Together we have set a quit date for 1 month from today.  She will follow up with me in a few months or sooner to check on her progress.    I spent >10 minutes counseling the patient.  Thank you very much for allowing us to participate in the care of this pleasant patient.  Please don't hesitate to call if I can be of assistance in any way.      Current Outpatient Medications:   •  ALPRAZolam (XANAX) 0.5 MG tablet, Take 1 tablet by mouth Daily As Needed for Anxiety., Disp: 30 tablet, Rfl: 4  •  aspirin 81 MG tablet, Take 81 mg by mouth Daily. INSTRUCTED PT FOLLOW MD INSTRUCTIONS, Disp: , Rfl:   •  B Complex Vitamins (VITAMIN-B COMPLEX) tablet, Take 1 tablet by mouth Daily. HOLD PRIOR TO SURG, Disp: , Rfl:   •  budesonide-formoterol (SYMBICORT) 80-4.5 MCG/ACT inhaler, Inhale 2 puffs 2 (Two) Times a Day. Rinse mouth with water after each use (Patient taking differently: Inhale 2 puffs 2 (Two) Times a Day As Needed. Rinse mouth with water after each use), Disp: 6.9 g, Rfl: 0  •  carvedilol (COREG) 3.125 MG tablet, Take 1 tablet by mouth 2 Times a Day., Disp: 180 tablet, Rfl: 0  •  clopidogrel (PLAVIX) 75 MG tablet, Take 1 tablet by mouth Daily., Disp: 90 tablet, Rfl: 1  •  Flaxseed, Linseed, (FLAX SEED OIL PO), Take 1 tablet by mouth 2 (Two) Times a Day. HOLD PRIOR TO SURG, Disp: , Rfl:   •  hydroCHLOROthiazide (MICROZIDE) 12.5 MG capsule, Take 1 capsule by mouth Daily., Disp: 90 capsule, Rfl: 3  •  losartan (COZAAR) 100 MG tablet, Take 1 tablet by mouth Daily., Disp: 90 tablet, Rfl: 3  •   methocarbamol (ROBAXIN) 500 MG tablet, Take 1 tablet by mouth Every 8 (Eight) Hours As Needed for Muscle Spasms., Disp: 30 tablet, Rfl: 0  •  Multiple Vitamin tablet, Take 1 tablet by mouth Daily. HOLD PRIOR TO SURG, Disp: , Rfl:   •  mupirocin (BACTROBAN) 2 % ointment, Apply topically to affected area on toe once a day, Disp: 22 g, Rfl: 0  •  rosuvastatin (CRESTOR) 40 MG tablet, Take 1 tablet by mouth Daily., Disp: 90 tablet, Rfl: 3  •  vitamin D (ERGOCALCIFEROL) 17856 units capsule capsule, Take 1 capsule by mouth 2 (Two) Times a Week., Disp: 26 capsule, Rfl: 3  •  metFORMIN (GLUCOPHAGE) 500 MG tablet, TAKE HALF A TABLET BY MOUTH TWICE A DAY., Disp: 180 tablet, Rfl: 3         No Follow-up on file.

## 2019-10-31 ENCOUNTER — OFFICE VISIT (OUTPATIENT)
Dept: INTERNAL MEDICINE | Facility: CLINIC | Age: 49
End: 2019-10-31

## 2019-10-31 VITALS
HEIGHT: 62 IN | SYSTOLIC BLOOD PRESSURE: 102 MMHG | RESPIRATION RATE: 14 BRPM | DIASTOLIC BLOOD PRESSURE: 60 MMHG | BODY MASS INDEX: 36.07 KG/M2 | WEIGHT: 196 LBS

## 2019-10-31 DIAGNOSIS — Z01.00 DIABETIC EYE EXAM (HCC): ICD-10-CM

## 2019-10-31 DIAGNOSIS — Z00.00 HEALTH CARE MAINTENANCE: Primary | ICD-10-CM

## 2019-10-31 DIAGNOSIS — E55.9 VITAMIN D DEFICIENCY: ICD-10-CM

## 2019-10-31 DIAGNOSIS — Z12.12 SCREENING FOR COLORECTAL CANCER: ICD-10-CM

## 2019-10-31 DIAGNOSIS — E11.9 DIABETIC EYE EXAM (HCC): ICD-10-CM

## 2019-10-31 DIAGNOSIS — Z12.11 SCREENING FOR COLORECTAL CANCER: ICD-10-CM

## 2019-10-31 DIAGNOSIS — E11.59 TYPE 2 DIABETES MELLITUS WITH OTHER CIRCULATORY COMPLICATION, WITHOUT LONG-TERM CURRENT USE OF INSULIN (HCC): Chronic | ICD-10-CM

## 2019-10-31 PROBLEM — Z98.890 S/P RIGHT KNEE ARTHROSCOPY: Status: RESOLVED | Noted: 2019-05-17 | Resolved: 2019-10-31

## 2019-10-31 PROBLEM — R60.0 LEG EDEMA, RIGHT: Status: RESOLVED | Noted: 2019-02-25 | Resolved: 2019-10-31

## 2019-10-31 PROBLEM — Z76.89 ENCOUNTER FOR INCISION AND DRAINAGE PROCEDURE: Status: RESOLVED | Noted: 2019-08-06 | Resolved: 2019-10-31

## 2019-10-31 PROBLEM — Z72.0 TOBACCO ABUSE: Chronic | Status: RESOLVED | Noted: 2018-10-26 | Resolved: 2019-10-31

## 2019-10-31 PROBLEM — Z86.79 HISTORY OF SUPERFICIAL PHLEBITIS: Status: ACTIVE | Noted: 2019-10-31

## 2019-10-31 PROBLEM — M25.561 ACUTE PAIN OF RIGHT KNEE: Status: RESOLVED | Noted: 2019-02-25 | Resolved: 2019-10-31

## 2019-10-31 PROBLEM — I50.30 DIASTOLIC HEART FAILURE: Status: RESOLVED | Noted: 2018-10-26 | Resolved: 2019-10-31

## 2019-10-31 PROBLEM — Z83.3 FAMILY HISTORY OF DIABETES MELLITUS: Status: RESOLVED | Noted: 2019-10-16 | Resolved: 2019-10-31

## 2019-10-31 PROCEDURE — 99396 PREV VISIT EST AGE 40-64: CPT | Performed by: INTERNAL MEDICINE

## 2019-10-31 NOTE — PROGRESS NOTES
Subjective   Carina Zamudio is a 49 y.o. female.     History of Present Illness   Carina Zamudio 49 y.o. female who is here to establish as a new patient. In a past had been to see .  Past medical and surgical history, family and social history was obtained by me in the interview and recorded in the EHR. In a past patient had been to see another [facility] provider. Epic records reviewed, discussed with patient, and updated.     Patient rates her own health as: good.  Tobacco use: current smoker.  Alcohol use:occasionally.  Recreational drugs use: none  Medication list rewieved.  Diet: well balanced.  Exercise: 2 times a week and resistance training.  Marital status: .   Employment: full time.  Patient rates her stress level as: high.  Dental health: good. Brushes teeth 2 times a day, flosses 1 time a day . Dental visits: 2 times a year .  Vision correction: reading glasses  Hearing: normal.    Recent vaccinations:   Flu  is up to date and recommended yearly  Tdap  is up to date  Shingles prevention discussed and recommended to get Shindrix at the pharmacy    Patient is premenopausal. Past pregnancies: . Currently patient requires no additional contraception, as she had bilateral tubal ligation.            Current Outpatient Medications:   •  ALPRAZolam (XANAX) 0.5 MG tablet, Take 1 tablet by mouth Daily As Needed for Anxiety., Disp: 30 tablet, Rfl: 4  •  aspirin 81 MG tablet, Take 81 mg by mouth Daily. INSTRUCTED PT FOLLOW MD INSTRUCTIONS, Disp: , Rfl:   •  B Complex Vitamins (VITAMIN-B COMPLEX) tablet, Take 1 tablet by mouth Daily. HOLD PRIOR TO SURG, Disp: , Rfl:   •  budesonide-formoterol (SYMBICORT) 80-4.5 MCG/ACT inhaler, Inhale 2 puffs 2 (Two) Times a Day. Rinse mouth with water after each use (Patient taking differently: Inhale 2 puffs 2 (Two) Times a Day As Needed. Rinse mouth with water after each use), Disp: 6.9 g, Rfl: 0  •  carvedilol (COREG) 3.125 MG tablet, Take 1 tablet by  mouth 2 Times a Day., Disp: 180 tablet, Rfl: 0  •  clopidogrel (PLAVIX) 75 MG tablet, Take 1 tablet by mouth Daily., Disp: 90 tablet, Rfl: 1  •  Flaxseed, Linseed, (FLAX SEED OIL PO), Take 1 tablet by mouth 2 (Two) Times a Day. HOLD PRIOR TO SURG, Disp: , Rfl:   •  hydroCHLOROthiazide (MICROZIDE) 12.5 MG capsule, Take 1 capsule by mouth Daily., Disp: 90 capsule, Rfl: 3  •  losartan (COZAAR) 100 MG tablet, Take 1 tablet by mouth Daily., Disp: 90 tablet, Rfl: 3  •  metFORMIN (GLUCOPHAGE) 500 MG tablet, TAKE HALF A TABLET BY MOUTH TWICE A DAY., Disp: 180 tablet, Rfl: 3  •  methocarbamol (ROBAXIN) 500 MG tablet, Take 1 tablet by mouth Every 8 (Eight) Hours As Needed for Muscle Spasms., Disp: 30 tablet, Rfl: 0  •  Multiple Vitamin tablet, Take 1 tablet by mouth Daily. HOLD PRIOR TO SURG, Disp: , Rfl:   •  mupirocin (BACTROBAN) 2 % ointment, Apply topically to affected area on toe once a day, Disp: 22 g, Rfl: 0  •  rosuvastatin (CRESTOR) 40 MG tablet, Take 1 tablet by mouth Daily., Disp: 90 tablet, Rfl: 3  •  vitamin D (ERGOCALCIFEROL) 16499 units capsule capsule, Take 1 capsule by mouth 2 (Two) Times a Week., Disp: 26 capsule, Rfl: 3  The following portions of the patient's history were reviewed and updated as appropriate: allergies, current medications, past family history, past medical history, past social history, past surgical history and problem list.    Review of Systems   Constitutional: Negative for chills and fever.   HENT: Negative for postnasal drip, sinus pressure and sore throat.    Eyes: Negative for pain and itching.   Respiratory: Negative for cough and chest tightness.    Cardiovascular: Negative for chest pain and leg swelling.   Gastrointestinal: Negative for abdominal pain and blood in stool.   Endocrine: Negative for cold intolerance and heat intolerance.   Genitourinary: Negative for difficulty urinating and flank pain.   Musculoskeletal: Negative for back pain and neck pain.   Skin: Negative for  color change and rash.   Neurological: Negative for dizziness, weakness and headaches.   Hematological: Negative for adenopathy. Does not bruise/bleed easily.   Psychiatric/Behavioral: Negative for sleep disturbance. The patient is not nervous/anxious.        Objective   Physical Exam   Constitutional: She is oriented to person, place, and time. She appears well-developed. No distress.   obese   HENT:   Head: Normocephalic and atraumatic.   Right Ear: Tympanic membrane, external ear and ear canal normal. No drainage or tenderness. No mastoid tenderness. Tympanic membrane is not injected. No middle ear effusion.   Left Ear: Tympanic membrane, external ear and ear canal normal. No drainage or tenderness. No mastoid tenderness. Tympanic membrane is not injected.  No middle ear effusion.   Nose: Nose normal. No sinus tenderness. Right sinus exhibits no maxillary sinus tenderness and no frontal sinus tenderness. Left sinus exhibits no maxillary sinus tenderness and no frontal sinus tenderness.   Mouth/Throat: Oropharynx is clear and moist. No oropharyngeal exudate.   Eyes: Conjunctivae and EOM are normal. Pupils are equal, round, and reactive to light. Right eye exhibits no discharge. Left eye exhibits no discharge. No scleral icterus.   Neck: Normal range of motion and full passive range of motion without pain. Neck supple. No JVD present. Carotid bruit is not present. No thyromegaly present.   Cardiovascular: Normal rate, regular rhythm, S1 normal, S2 normal, normal heart sounds and intact distal pulses. Exam reveals no gallop and no friction rub.   No murmur heard.  Pulmonary/Chest: Effort normal and breath sounds normal. No respiratory distress. She has no wheezes. She has no rales. She exhibits no tenderness.   Abdominal: Soft. Bowel sounds are normal. She exhibits no distension and no mass. There is no tenderness. There is no rebound and no guarding.   Central weight distribution   Musculoskeletal: Normal range of  motion. She exhibits no edema.    Carina had a diabetic foot exam performed today.   During the foot exam she had a monofilament test performed (light touch intact over both feet on exam with microfilament).    Neurological Sensory Findings - Unaltered hot/cold right ankle/foot discrimination and unaltered hot/cold left ankle/foot discrimination. Unaltered sharp/dull right ankle/foot discrimination and unaltered sharp/dull left ankle/foot discrimination. No right ankle/foot altered proprioception and no left ankle/foot altered proprioception  Vascular Status -  Her right foot exhibits normal foot vasculature  and no edema. Her left foot exhibits normal foot vasculature  and no edema.  Skin Integrity  -  Her right foot skin is intact.Her left foot skin is intact..  Lymphadenopathy:        Head (right side): No submental, no submandibular, no preauricular, no posterior auricular and no occipital adenopathy present.        Head (left side): No submental, no submandibular, no tonsillar, no preauricular, no posterior auricular and no occipital adenopathy present.     She has no cervical adenopathy.        Right cervical: No superficial cervical, no deep cervical and no posterior cervical adenopathy present.       Left cervical: No superficial cervical, no deep cervical and no posterior cervical adenopathy present.        Right: No supraclavicular adenopathy present.        Left: No supraclavicular adenopathy present.   Neurological: She is alert and oriented to person, place, and time. She has normal reflexes. She displays normal reflexes. No cranial nerve deficit. She exhibits normal muscle tone. Coordination normal.   Reflex Scores:       Bicep reflexes are 2+ on the right side and 2+ on the left side.       Patellar reflexes are 2+ on the right side and 2+ on the left side.  Skin: Skin is warm. No rash noted. She is not diaphoretic. No erythema.   Few benign appearing papillomas   Psychiatric: She has a normal mood and  affect. Her behavior is normal. Thought content normal.   Vitals reviewed.      Assessment/Plan   Carina was seen today for establish care and annual exam.    Diagnoses and all orders for this visit:    Health care maintenance  -     CBC & Differential; Future  -     Comprehensive Metabolic Panel; Future  -     Lipid Panel; Future  -     TSH; Future  -     Urinalysis With Microscopic If Indicated (No Culture) - Urine, Clean Catch; Future    Screening for colorectal cancer  -     Ambulatory Referral to General Surgery    Diabetic eye exam (CMS/HCC)  -     Ambulatory Referral to Ophthalmology    Type 2 diabetes mellitus with other circulatory complication, without long-term current use of insulin (CMS/[facility])  -     Hemoglobin A1c; Future  -     Microalbumin / Creatinine Urine Ratio - Urine, Clean Catch; Future    Vitamin D deficiency  -     Vitamin D 25 Hydroxy; Future        Risk evaluation:  1. Cardiovascular risk factors: hypertension, hyperlipidemia, diabetes mellitus, family history of CAD, patient has CAD and is being treated   2. Diabetes risk factors: patient has diabetes and being treated.  3. Cancer risk factors: FH of colon polyps and tobacco smoking.  4. Risky behavior: none. Use of seat belts: regular. Use of sunscreens: regular. SPF 30.   Tattoos: none.  H/o blood transfusions/organ transplants before 1992 or clotting factor transfusion before 1987: none.     Prevention:  Cholesterol test  recommended. Cholesterol is will be checked..  Blood sugar test recommended. Fasting blood sugar Patient has diabetes and is being treated. Recommended to avoid sweets and starches: pasta, pizza, bread, potato, corn. .  Hep C testing (for patients born [birth year]): not needed, patient is not in the age group, and has no risk factors..  Mammogram up to date. Recommended frequency and time of the next screening per GYN.. Breast self exams recommended once a month.  Colonoscopy: is due. Will schedule. Benefits and risks  discussed..  PAP smear : up to date. Recommendations per GYN..  DEXA : not indicated yet..                      1. Coronary artery disease -patient is asymptomatic, compliant with daily use of beta-blocker and statin, also aspirin and clopidogrel.  Target LDL is below 70.  Will check fasting blood test.  She is under the care of cardiologist.  She desperately needs to quit smoking, and the risk had been discussed today.  2.  Hypertension-well-controlled with current medication.  Will check kidney function tests and electrolytes.  Needs to stay on low-salt diet.  3.  Diabetes mellitus type 2-in the last year patient was able to lower your hemoglobin A1c with his weight loss.  She had not been able to tolerate metformin due to GI side effects.  We will check hemoglobin A1c and urine for microalbumin.  No evidence of peripheral neuropathy on feet exam with microfilament.  Will continue aspirin, statin and ARB.  Most likely patient will need to restart medication, and I had explained cardiovascular benefits from the use of GLP1 agonist and SGLT 2 inhibitors.  Patient is concerned about potential side effects of SGLT 2 inhibitors.  She needs yearly dilated eye exams, will refer to Dr. Shoemaker.  4.  Obstructive sleep sleep apnea-compliant with nightly use of CPAP.  5.  Chronic proteinuria-we will check urine for microalbumin.  Needs to continue on a salt restricted diet.  Excellent blood pressure control.  6.  Metabolic syndrome-needs to stay on a low-carb diet with the salt restrictions as above, consider use of GLP-1 agonist.  7.  Vitamin D deficiency-we will check blood for vitamin D.  8.  Tobacco dependence and current use-risks and available treatments explained to the patient.  She worries about potential weight gain, but understands the risk of continuing smoking.  In the past patient had tried Chantix, but was not not able to tolerate side effects.  Wellbutrin or Contrave both will remain a valid option.  9.  History  of anemia-we will check CBC.  10.  History of cerebellar infarct about 10 years ago-etiology is still obscure.  Patient reports that in the past she had negative hypercoagulable state work-up, but results are not available to me at that time.  11.  Small thyroid nodule-she will follow-up with Dr. Fields.  Will check TSH.  12.  Left adrenal [id number] with Dr. Fields, will need another CT scan.

## 2019-10-31 NOTE — PATIENT INSTRUCTIONS
Risk evaluation:  1. Cardiovascular risk factors: hypertension, hyperlipidemia, diabetes mellitus, family history of CAD, patient has CAD and is being treated   2. Diabetes risk factors: patient has diabetes and being treated.  3. Cancer risk factors: FH of colon polyps and tobacco smoking.  4. Risky behavior: none. Use of seat belts: regular. Use of sunscreens: regular. SPF 30.   Tattoos: none.  H/o blood transfusions/organ transplants before 1992 or clotting factor transfusion before 1987: none.     Prevention:  Cholesterol test  recommended. Cholesterol is will be checked..  Blood sugar test recommended. Fasting blood sugar Patient has diabetes and is being treated. Recommended to avoid sweets and starches: pasta, pizza, bread, potato, corn. .  Hep C testing (for patients born [birth year]): not needed, patient is not in the age group, and has no risk factors..  Mammogram up to date. Recommended frequency and time of the next screening per GYN.. Breast self exams recommended once a month.  Colonoscopy: is due. Will schedule. Benefits and risks discussed..  PAP smear : up to date. Recommendations per GYN..  DEXA : not indicated yet..

## 2019-12-19 ENCOUNTER — TELEPHONE (OUTPATIENT)
Dept: INTERNAL MEDICINE | Facility: CLINIC | Age: 49
End: 2019-12-19

## 2019-12-19 NOTE — TELEPHONE ENCOUNTER
Pt requested refill,  I called in 90 day supply with one refill of Coreg 3.125 mg 1 po bid to [facility] voicemail.

## 2019-12-26 RX ORDER — ROSUVASTATIN CALCIUM 40 MG/1
40 TABLET, COATED ORAL DAILY
Qty: 90 TABLET | Refills: 3 | Status: SHIPPED | OUTPATIENT
Start: 2019-12-26 | End: 2020-10-23 | Stop reason: SDUPTHER

## 2019-12-26 RX ORDER — HYDROCHLOROTHIAZIDE 12.5 MG/1
12.5 CAPSULE, GELATIN COATED ORAL
Qty: 90 CAPSULE | Refills: 3 | Status: SHIPPED | OUTPATIENT
Start: 2019-12-26 | End: 2021-01-05 | Stop reason: SDUPTHER

## 2020-01-21 ENCOUNTER — LAB (OUTPATIENT)
Dept: INTERNAL MEDICINE | Facility: CLINIC | Age: 50
End: 2020-01-21

## 2020-01-21 ENCOUNTER — CLINICAL SUPPORT (OUTPATIENT)
Dept: ORTHOPEDIC SURGERY | Facility: CLINIC | Age: 50
End: 2020-01-21

## 2020-01-21 VITALS — TEMPERATURE: 97.4 F | HEIGHT: 62 IN | WEIGHT: 196 LBS | BODY MASS INDEX: 36.07 KG/M2

## 2020-01-21 DIAGNOSIS — M25.561 CHRONIC PAIN OF RIGHT KNEE: Primary | ICD-10-CM

## 2020-01-21 DIAGNOSIS — M17.11 ARTHRITIS OF RIGHT KNEE: ICD-10-CM

## 2020-01-21 DIAGNOSIS — E55.9 VITAMIN D DEFICIENCY: ICD-10-CM

## 2020-01-21 DIAGNOSIS — G89.29 CHRONIC PAIN OF RIGHT KNEE: Primary | ICD-10-CM

## 2020-01-21 DIAGNOSIS — Z00.00 HEALTH CARE MAINTENANCE: ICD-10-CM

## 2020-01-21 DIAGNOSIS — E11.59 TYPE 2 DIABETES MELLITUS WITH OTHER CIRCULATORY COMPLICATION, WITHOUT LONG-TERM CURRENT USE OF INSULIN (HCC): Chronic | ICD-10-CM

## 2020-01-21 PROBLEM — M17.0 ARTHRITIS OF BOTH KNEES: Status: ACTIVE | Noted: 2019-05-10

## 2020-01-21 LAB
25(OH)D3 SERPL-MCNC: 22.3 NG/ML (ref 30–100)
ALBUMIN SERPL-MCNC: 4.1 G/DL (ref 3.5–5.2)
ALBUMIN/GLOB SERPL: 1.2 G/DL
ALP SERPL-CCNC: 57 U/L (ref 39–117)
ALT SERPL W P-5'-P-CCNC: 9 U/L (ref 1–33)
ANION GAP SERPL CALCULATED.3IONS-SCNC: 14.3 MMOL/L (ref 5–15)
AST SERPL-CCNC: 9 U/L (ref 1–32)
BASOPHILS # BLD AUTO: 0.06 10*3/MM3 (ref 0–0.2)
BASOPHILS NFR BLD AUTO: 0.6 % (ref 0–1.5)
BILIRUB SERPL-MCNC: 0.3 MG/DL (ref 0.2–1.2)
BUN BLD-MCNC: 15 MG/DL (ref 6–20)
BUN/CREAT SERPL: 19 (ref 7–25)
CALCIUM SPEC-SCNC: 9.8 MG/DL (ref 8.6–10.5)
CHLORIDE SERPL-SCNC: 98 MMOL/L (ref 98–107)
CHOLEST SERPL-MCNC: 178 MG/DL (ref 0–200)
CO2 SERPL-SCNC: 28.7 MMOL/L (ref 22–29)
CREAT BLD-MCNC: 0.79 MG/DL (ref 0.57–1)
DEPRECATED RDW RBC AUTO: 42.1 FL (ref 37–54)
EOSINOPHIL # BLD AUTO: 0.11 10*3/MM3 (ref 0–0.4)
EOSINOPHIL NFR BLD AUTO: 1.1 % (ref 0.3–6.2)
ERYTHROCYTE [DISTWIDTH] IN BLOOD BY AUTOMATED COUNT: 12.3 % (ref 12.3–15.4)
GFR SERPL CREATININE-BSD FRML MDRD: 77 ML/MIN/1.73
GLOBULIN UR ELPH-MCNC: 3.4 GM/DL
GLUCOSE BLD-MCNC: 217 MG/DL (ref 65–99)
HBA1C MFR BLD: 8.3 % (ref 4.8–5.6)
HCT VFR BLD AUTO: 50.2 % (ref 34–46.6)
HDLC SERPL-MCNC: 61 MG/DL (ref 40–60)
HGB BLD-MCNC: 16.9 G/DL (ref 12–15.9)
IMM GRANULOCYTES # BLD AUTO: 0.03 10*3/MM3 (ref 0–0.05)
IMM GRANULOCYTES NFR BLD AUTO: 0.3 % (ref 0–0.5)
LDLC SERPL CALC-MCNC: 103 MG/DL (ref 0–100)
LDLC/HDLC SERPL: 1.68 {RATIO}
LYMPHOCYTES # BLD AUTO: 2.28 10*3/MM3 (ref 0.7–3.1)
LYMPHOCYTES NFR BLD AUTO: 22.9 % (ref 19.6–45.3)
MCH RBC QN AUTO: 31.1 PG (ref 26.6–33)
MCHC RBC AUTO-ENTMCNC: 33.7 G/DL (ref 31.5–35.7)
MCV RBC AUTO: 92.4 FL (ref 79–97)
MONOCYTES # BLD AUTO: 0.57 10*3/MM3 (ref 0.1–0.9)
MONOCYTES NFR BLD AUTO: 5.7 % (ref 5–12)
NEUTROPHILS # BLD AUTO: 6.92 10*3/MM3 (ref 1.7–7)
NEUTROPHILS NFR BLD AUTO: 69.4 % (ref 42.7–76)
NRBC BLD AUTO-RTO: 0 /100 WBC (ref 0–0.2)
PLATELET # BLD AUTO: 282 10*3/MM3 (ref 140–450)
PMV BLD AUTO: 9.9 FL (ref 6–12)
POTASSIUM BLD-SCNC: 5.2 MMOL/L (ref 3.5–5.2)
PROT SERPL-MCNC: 7.5 G/DL (ref 6–8.5)
RBC # BLD AUTO: 5.43 10*6/MM3 (ref 3.77–5.28)
SODIUM BLD-SCNC: 141 MMOL/L (ref 136–145)
TRIGL SERPL-MCNC: 72 MG/DL (ref 0–150)
TSH SERPL DL<=0.05 MIU/L-ACNC: 1.51 UIU/ML (ref 0.27–4.2)
VLDLC SERPL-MCNC: 14.4 MG/DL (ref 5–40)
WBC NRBC COR # BLD: 9.97 10*3/MM3 (ref 3.4–10.8)

## 2020-01-21 PROCEDURE — 85025 COMPLETE CBC W/AUTO DIFF WBC: CPT | Performed by: INTERNAL MEDICINE

## 2020-01-21 PROCEDURE — 99213 OFFICE O/P EST LOW 20 MIN: CPT | Performed by: NURSE PRACTITIONER

## 2020-01-21 PROCEDURE — 82306 VITAMIN D 25 HYDROXY: CPT | Performed by: INTERNAL MEDICINE

## 2020-01-21 PROCEDURE — 83036 HEMOGLOBIN GLYCOSYLATED A1C: CPT | Performed by: INTERNAL MEDICINE

## 2020-01-21 PROCEDURE — 73562 X-RAY EXAM OF KNEE 3: CPT | Performed by: NURSE PRACTITIONER

## 2020-01-21 PROCEDURE — 20610 DRAIN/INJ JOINT/BURSA W/O US: CPT | Performed by: NURSE PRACTITIONER

## 2020-01-21 PROCEDURE — 80053 COMPREHEN METABOLIC PANEL: CPT | Performed by: INTERNAL MEDICINE

## 2020-01-21 PROCEDURE — 84443 ASSAY THYROID STIM HORMONE: CPT | Performed by: INTERNAL MEDICINE

## 2020-01-21 PROCEDURE — 80061 LIPID PANEL: CPT | Performed by: INTERNAL MEDICINE

## 2020-01-21 PROCEDURE — 36415 COLL VENOUS BLD VENIPUNCTURE: CPT | Performed by: INTERNAL MEDICINE

## 2020-01-21 RX ORDER — METHYLPREDNISOLONE ACETATE 80 MG/ML
80 INJECTION, SUSPENSION INTRA-ARTICULAR; INTRALESIONAL; INTRAMUSCULAR; SOFT TISSUE
Status: COMPLETED | OUTPATIENT
Start: 2020-01-21 | End: 2020-01-21

## 2020-01-21 RX ADMIN — METHYLPREDNISOLONE ACETATE 80 MG: 80 INJECTION, SUSPENSION INTRA-ARTICULAR; INTRALESIONAL; INTRAMUSCULAR; SOFT TISSUE at 07:59

## 2020-01-21 NOTE — PROGRESS NOTES
Patient: Carina Zamudio  YOB: 1970    Chief Complaints:  right knee pain    Subjective:    History of Present Illness: Here today for knee pain. The pain is a generalized joint tenderness.  It has been progressive in nature but remains intermittent.  Worsened by prolonged standing or walking and squatting activities. Has had improvement in the past with ice/heat, rest, and injections.     This problem is not new to this examiner.     Allergies: No Known Allergies    Medications:   Home Medications:  Current Outpatient Medications on File Prior to Visit   Medication Sig   • ALPRAZolam (XANAX) 0.5 MG tablet Take 1 tablet by mouth Daily As Needed for Anxiety.   • aspirin 81 MG tablet Take 81 mg by mouth Daily. INSTRUCTED PT FOLLOW MD INSTRUCTIONS   • B Complex Vitamins (VITAMIN-B COMPLEX) tablet Take 1 tablet by mouth Daily. HOLD PRIOR TO SURG   • budesonide-formoterol (SYMBICORT) 80-4.5 MCG/ACT inhaler Inhale 2 puffs 2 (Two) Times a Day. Rinse mouth with water after each use (Patient taking differently: Inhale 2 puffs 2 (Two) Times a Day As Needed. Rinse mouth with water after each use)   • carvedilol (COREG) 3.125 MG tablet Take 1 tablet by mouth 2 (Two) Times a Day.   • clopidogrel (PLAVIX) 75 MG tablet Take 1 tablet by mouth Daily.   • Flaxseed, Linseed, (FLAX SEED OIL PO) Take 1 tablet by mouth 2 (Two) Times a Day. HOLD PRIOR TO SURG   • hydroCHLOROthiazide (MICROZIDE) 12.5 MG capsule Take 1 capsule by mouth Daily.   • losartan (COZAAR) 100 MG tablet Take 1 tablet by mouth Daily.   • methocarbamol (ROBAXIN) 500 MG tablet Take 1 tablet by mouth Every 8 (Eight) Hours As Needed for Muscle Spasms.   • Multiple Vitamin tablet Take 1 tablet by mouth Daily. HOLD PRIOR TO SURG   • mupirocin (BACTROBAN) 2 % ointment Apply topically to affected area on toe once a day   • rosuvastatin (CRESTOR) 40 MG tablet Take 1 tablet by mouth Daily.   • vitamin D (ERGOCALCIFEROL) 81563 units capsule capsule Take 1  capsule by mouth 2 (Two) Times a Week.     No current facility-administered medications on file prior to visit.      Current Medications:  Scheduled Meds:  Continuous Infusions:  No current facility-administered medications for this visit.   PRN Meds:.    I have reviewed the patient's medical history in detail and updated the computerized patient record.  Review and summarization of old records include:    Past Medical History:   Diagnosis Date   • Adrenal cortical adenoma    • Anemia    • Anxiety    • Benign essential hypertension    • CAD (coronary artery disease)    • Cerebellar infarct (CMS/[facility])    • Cervical radiculopathy due to degenerative joint disease of spine 2016   • Diabetes mellitus (CMS/[facility])    • DJD (degenerative joint disease), cervical    • DJD (degenerative joint disease), lumbar    • Fracture, toe    • Gestational diabetes    • HPV (human papilloma virus) infection    • Hyperlipemia    • Leg edema, right 2019   • Liver nodule    • Myocardial infarction (CMS/[facility])    • Osteoarthritis of spine 2016   • Other specified dorsopathies, site unspecified 2011   • Pregnancy      - stillbirth at the gestational age 7 months x1   • Proteinuria     CHRONIC   • Raynaud phenomenon    • S/P right knee arthroscopy 2019   • Type 2 diabetes mellitus with circulatory disorder, without long-term current use of insulin (CMS/[facility]) 2017   • Venous tributary (branch) occlusion of retina 2012   • Vitamin D deficiency         Past Surgical History:   Procedure Laterality Date   • CATARACT EXTRACTION, BILATERAL     • CORONARY STENT PLACEMENT  2015    circ   • ENDOMETRIAL ABLATION     • KNEE ARTHROSCOPY Right 5/10/2019    Procedure: RIGHT KNEE ARTHROSCOPY BWITH PART. MEDIAL AND LATERAL MENISECTOMY, PATELLA, CHONDROPLASTY, AND DEBRIDMENT;  Surgeon: Ignacio Martinez MD;  [place] [facility] [place] [facility] [place]: [place]opedics   • TUBAL ABDOMINAL LIGATION          Social History  "    Occupational History   • Not on file   Tobacco Use   • Smoking status: Current Every Day Smoker     Packs/day: 1.00     Years: 30.00     Pack years: 30.00   • Smokeless tobacco: Never Used   Substance and Sexual Activity   • Alcohol use: Yes     Comment: socially   • Drug use: No   • Sexual activity: Not on file      Social History     Social History Narrative   • Not on file        Family History   Problem Relation Age of Onset   • Lymphoma Father    • Lung cancer Maternal Grandfather    • Heart attack Maternal Grandfather    • Diabetes Maternal Grandfather    • Heart attack Paternal Grandfather    • Diabetes Paternal Grandfather    • Diabetes Paternal Grandmother    • Diabetes Maternal Grandmother    • Breast cancer Neg Hx    • Ovarian cancer Neg Hx    • Uterine cancer Neg Hx    • Colon cancer Neg Hx    • Malig Hyperthermia Neg Hx        ROS: 14 point review of systems was performed and was negative except for documented findings in HPI and today's encounter.     Allergies: No Known Allergies  Constitutional:  Denies fever, shaking or chills   Eyes:  Denies change in visual acuity   HENT:  Denies nasal congestion or sore throat   Respiratory:  Denies cough or shortness of breath   Cardiovascular:  Denies chest pain or severe LE edema   GI:  Denies abdominal pain, nausea, vomiting, bloody stools or diarrhea   Musculoskeletal:  Numbness, tingling, or loss of motor function only as noted above in history of present illness.  : Denies painful urination or hematuria  Integument:  Denies rash, lesion or ulceration   Neurologic:  Denies headache or focal weakness  Endocrine:  Denies lymphadenopathy  Psych:  Denies confusion or change in mental status   Hem:  Denies active bleeding    Physical Exam: 49 y.o. female  Wt Readings from Last 3 Encounters:   01/21/20 88.9 kg (196 lb)   10/31/19 88.9 kg (196 lb)   10/29/19 88.9 kg (196 lb)     Ht Readings from Last 1 Encounters:   01/21/20 157.5 cm (62\")     Body mass " index is 35.85 kg/m².  Vitals:    01/21/20 1011   Temp: 97.4 °F (36.3 °C)     Vital signs reviewed.   Constitutional: Awake alert and oriented x3, well developed, no acute distress, non-toxic appearance.  EYES: symmetric, sclera clear  ENT:  Normocephalic, Atraumatic.   Respiratory:  No respiratory distress, No wheezing  CV: pulse regular, no palpitations or pallor.  GI:  Abdomen soft, non-tender.   Vascular:  Intact distal pulses, No cyanosis, no signs or symptoms of DVT.  Neurologic: Sensation grossly intact to the involved extremity, No focal deficits noted.   Neck: No tenderness, Supple.  Integument: warm, dry, no ulcerations.   Psychiatric:  Oriented, no pathological affect.  Musculoskeletal:    Affected knee(s):  Painful gait with a subtle limp, positive for synovitis, swelling, joint effusion with crepitation.  Lachman negative  Posterior drawer negative  Mateo's negative  Patellofemoral grind +  Sensation grossly intact to light touch throughout the lower extremity  Skin is intact  Distal pulses are palpable  No signs or symptoms of DVT        Diagnostic Data:     Imaging was done today, images were personally viewed and discussed with the patient:    Indication: pain related symptoms,  Views: 3V AP, LAT & 40 degree PA right knee(s)   Findings: severe end-stage arthritis (bone on bone, subchondral sclerosis/cysts, osteophytes)  Comparison views: viewed last xray done in the office.     Procedure:  Large Joint Arthrocentesis: R knee  Date/Time: 1/21/2020 7:59 AM  Consent given by: patient  Site marked: site marked  Timeout: Immediately prior to procedure a time out was called to verify the correct patient, procedure, equipment, support staff and site/side marked as required   Supporting Documentation  Indications: pain   Procedure Details  Location: knee - R knee  Preparation: Patient was prepped and draped in the usual sterile fashion  Needle gauge: 21.  Approach: anterolateral  Medications administered:  4 mL lidocaine (cardiac); 80 mg methylPREDNISolone acetate 80 MG/ML  Patient tolerance: patient tolerated the procedure well with no immediate complications          Assessment:     ICD-10-CM ICD-9-CM   1. Chronic pain of right knee M25.561 719.46    G89.29 338.29   2. Arthritis of right knee M17.11 716.96           Plan: Is to proceed with injection  Follow up as indicated.  Ice, elevate, and rest as needed.  Additional interventions include:  15 min spent face to face with patient 11 min spent counseling about:  Biomechanics of pertinent body area discussed.  Risks, benefits, alternatives, comparisons, and complications of accepted medicines, injections, recommendations, surgical procedures, and therapies explained and education provided in laymen's terms. Natural history and expected course of this patient's diagnosis discussed along with evaluation of therapies. Questions answered. When appropriate I also discussed proper use of cane, walker, trekking poles.   BMI:  The concept of BMI body mass index and its importance and implications discussed.  BMI suggested to be < 40 or as low as possible. Lifestyle measures for weight loss and how this affects orthopedic condition.  EXERCISES:  Advice on benefits of, and types of regular/moderate exercise including biomechanical forces involved as it pertains to this complaint.  RICE: Rest, ice, compression, and elevation therapy, Cryotherapy/brachy therapy, and or OTC linaments as indicated with instructions.   Cortisone Injection. See procedure note.  Discussed wt loss and exercises.   1/21/2020  Alma Delia Chopra, APRN

## 2020-01-22 ENCOUNTER — LAB (OUTPATIENT)
Dept: INTERNAL MEDICINE | Facility: CLINIC | Age: 50
End: 2020-01-22

## 2020-01-22 DIAGNOSIS — E11.59 TYPE 2 DIABETES MELLITUS WITH OTHER CIRCULATORY COMPLICATION, WITHOUT LONG-TERM CURRENT USE OF INSULIN (HCC): Chronic | ICD-10-CM

## 2020-01-22 DIAGNOSIS — Z00.00 HEALTH CARE MAINTENANCE: ICD-10-CM

## 2020-01-22 LAB
ALBUMIN UR-MCNC: 7 MG/DL
BILIRUB UR QL STRIP: NEGATIVE
CLARITY UR: CLEAR
COLOR UR: YELLOW
CREAT UR-MCNC: 22.2 MG/DL
GLUCOSE UR STRIP-MCNC: NEGATIVE MG/DL
HGB UR QL STRIP.AUTO: NEGATIVE
KETONES UR QL STRIP: NEGATIVE
LEUKOCYTE ESTERASE UR QL STRIP.AUTO: NEGATIVE
MICROALBUMIN/CREAT UR: 315.3 MG/G
NITRITE UR QL STRIP: NEGATIVE
PH UR STRIP.AUTO: 5.5 [PH] (ref 5–8)
PROT UR QL STRIP: NEGATIVE
SP GR UR STRIP: 1.01 (ref 1–1.03)
UROBILINOGEN UR QL STRIP: NORMAL

## 2020-01-22 PROCEDURE — 81003 URINALYSIS AUTO W/O SCOPE: CPT | Performed by: INTERNAL MEDICINE

## 2020-01-22 PROCEDURE — 82570 ASSAY OF URINE CREATININE: CPT | Performed by: INTERNAL MEDICINE

## 2020-01-22 PROCEDURE — 82043 UR ALBUMIN QUANTITATIVE: CPT | Performed by: INTERNAL MEDICINE

## 2020-01-23 RX ORDER — LOSARTAN POTASSIUM 100 MG/1
100 TABLET ORAL
Qty: 90 TABLET | Refills: 3 | Status: SHIPPED | OUTPATIENT
Start: 2020-01-23 | End: 2021-02-10 | Stop reason: SDUPTHER

## 2020-02-07 DIAGNOSIS — J01.00 ACUTE NON-RECURRENT MAXILLARY SINUSITIS: Primary | ICD-10-CM

## 2020-02-07 RX ORDER — FLUTICASONE PROPIONATE 50 MCG
2 SPRAY, SUSPENSION (ML) NASAL DAILY
Qty: 16 G | Refills: 11 | Status: SHIPPED | OUTPATIENT
Start: 2020-02-07

## 2020-02-07 RX ORDER — AMOXICILLIN AND CLAVULANATE POTASSIUM 500; 125 MG/1; MG/1
1 TABLET, FILM COATED ORAL 2 TIMES DAILY
Qty: 20 TABLET | Refills: 0 | Status: SHIPPED | OUTPATIENT
Start: 2020-02-07 | End: 2020-03-10

## 2020-02-13 ENCOUNTER — PREP FOR SURGERY (OUTPATIENT)
Dept: OTHER | Facility: HOSPITAL | Age: 50
End: 2020-02-13

## 2020-02-13 DIAGNOSIS — Z12.11 SCREEN FOR COLON CANCER: Primary | ICD-10-CM

## 2020-02-18 ENCOUNTER — DOCUMENTATION (OUTPATIENT)
Dept: PHYSICAL THERAPY | Facility: CLINIC | Age: 50
End: 2020-02-18

## 2020-02-18 DIAGNOSIS — Z98.890 S/P RIGHT KNEE ARTHROSCOPY: ICD-10-CM

## 2020-02-18 DIAGNOSIS — M25.561 RIGHT KNEE PAIN, UNSPECIFIED CHRONICITY: Primary | ICD-10-CM

## 2020-02-25 PROBLEM — Z12.11 SCREEN FOR COLON CANCER: Status: ACTIVE | Noted: 2020-02-25

## 2020-03-10 ENCOUNTER — OFFICE VISIT (OUTPATIENT)
Dept: INTERNAL MEDICINE | Facility: CLINIC | Age: 50
End: 2020-03-10

## 2020-03-10 VITALS
HEIGHT: 62 IN | DIASTOLIC BLOOD PRESSURE: 72 MMHG | WEIGHT: 195 LBS | RESPIRATION RATE: 14 BRPM | BODY MASS INDEX: 35.88 KG/M2 | SYSTOLIC BLOOD PRESSURE: 112 MMHG

## 2020-03-10 DIAGNOSIS — E55.9 VITAMIN D DEFICIENCY: ICD-10-CM

## 2020-03-10 DIAGNOSIS — E78.2 MIXED HYPERLIPIDEMIA: ICD-10-CM

## 2020-03-10 DIAGNOSIS — E11.59 TYPE 2 DIABETES MELLITUS WITH OTHER CIRCULATORY COMPLICATION, WITHOUT LONG-TERM CURRENT USE OF INSULIN (HCC): Primary | Chronic | ICD-10-CM

## 2020-03-10 PROCEDURE — 99214 OFFICE O/P EST MOD 30 MIN: CPT | Performed by: INTERNAL MEDICINE

## 2020-03-10 NOTE — PROGRESS NOTES
"Gildardo Zamudio is a 49 y.o. female.     History of Present Illness     /72 (BP Location: Left arm, Patient Position: Sitting, Cuff Size: Adult)   Resp 14   Ht 157.5 cm (62\")   Wt 88.5 kg (195 lb)   BMI 35.67 kg/m²     Current Outpatient Medications:   •  ALPRAZolam (XANAX) 0.5 MG tablet, Take 1 tablet by mouth Daily As Needed for Anxiety., Disp: 30 tablet, Rfl: 4  •  aspirin 81 MG tablet, Take 81 mg by mouth Daily. INSTRUCTED PT FOLLOW MD INSTRUCTIONS, Disp: , Rfl:   •  B Complex Vitamins (VITAMIN-B COMPLEX) tablet, Take 1 tablet by mouth Daily. HOLD PRIOR TO SURG, Disp: , Rfl:   •  budesonide-formoterol (SYMBICORT) 80-4.5 MCG/ACT inhaler, Inhale 2 puffs 2 (Two) Times a Day. Rinse mouth with water after each use (Patient taking differently: Inhale 2 puffs 2 (Two) Times a Day As Needed. Rinse mouth with water after each use), Disp: 6.9 g, Rfl: 0  •  carvedilol (COREG) 3.125 MG tablet, Take 1 tablet by mouth 2 (Two) Times a Day., Disp: 180 tablet, Rfl: 1  •  clopidogrel (PLAVIX) 75 MG tablet, Take 1 tablet by mouth Daily., Disp: 90 tablet, Rfl: 1  •  Flaxseed, Linseed, (FLAX SEED OIL PO), Take 1 tablet by mouth 2 (Two) Times a Day. HOLD PRIOR TO SURG, Disp: , Rfl:   •  fluticasone (FLONASE) 50 MCG/ACT nasal spray, Administer 2 sprays in each nostril once a day, Disp: 16 g, Rfl: 11  •  hydroCHLOROthiazide (MICROZIDE) 12.5 MG capsule, Take 1 capsule by mouth Daily., Disp: 90 capsule, Rfl: 3  •  losartan (COZAAR) 100 MG tablet, Take 1 tablet by mouth Daily., Disp: 90 tablet, Rfl: 3  •  methocarbamol (ROBAXIN) 500 MG tablet, Take 1 tablet by mouth Every 8 (Eight) Hours As Needed for Muscle Spasms., Disp: 30 tablet, Rfl: 0  •  Multiple Vitamin tablet, Take 1 tablet by mouth Daily. HOLD PRIOR TO SURG, Disp: , Rfl:   •  mupirocin (BACTROBAN) 2 % ointment, Apply topically to affected area on toe once a day, Disp: 22 g, Rfl: 0  •  rosuvastatin (CRESTOR) 40 MG tablet, Take 1 tablet by mouth Daily., " "Disp: 90 tablet, Rfl: 3  •  vitamin D (ERGOCALCIFEROL) 83904 units capsule capsule, Take 1 capsule by mouth 2 (Two) Times a Week., Disp: 26 capsule, Rfl: 3    Patient has been diagnosed with hyperlipidemia. Target LDL is < 70 mg/dl (\"very high\" risk for CHD). Patient is on low fat diet with fair compliance. Patient is compliant with treatment: daily use of Crestor (rozuvastatin). Side effects of medication: none. Exercise daily intense short resistance exercises.    Patient has DM type II. Carina Zamudio uses diet alone, as she was not able to tolerate metformin, even at the low doises due to GI side efefcts, for blood sugars control. Patient does not check home blood sugars..  Patient just started carb cycling and short intensity resistance exercise.  In a past control had been poor. Last Hb A1C was 7.84 back in 07/2020.    Patient had been diagnosed with Vitamin D deficiency. Takes Vit D 41143 2 times a week. Patient does not have complaints of muscle or bone aches. Balance is good. No recent falls.   The following portions of the patient's history were reviewed and updated as appropriate: allergies, current medications, past family history, past medical history, past social history, past surgical history and problem list.    Review of Systems   Constitutional: Negative for chills and fever.   Eyes: Negative for pain and redness.   Respiratory: Negative for cough and shortness of breath.    Cardiovascular: Negative for chest pain and leg swelling.   Neurological: Negative for dizziness and headaches.       Objective   Physical Exam   Constitutional: She is oriented to person, place, and time. She appears well-developed.   overweight   HENT:   Head: Normocephalic and atraumatic.   Right Ear: Tympanic membrane, external ear and ear canal normal.   Left Ear: Tympanic membrane, external ear and ear canal normal.   Nose: Nose normal. Right sinus exhibits no maxillary sinus tenderness and no frontal sinus tenderness. " "Left sinus exhibits no maxillary sinus tenderness and no frontal sinus tenderness.   Mouth/Throat: Uvula is midline, oropharynx is clear and moist and mucous membranes are normal.   Eyes: Pupils are equal, round, and reactive to light. Conjunctivae and EOM are normal. Right eye exhibits no discharge. Left eye exhibits no discharge. No scleral icterus.   Neck: Neck supple. No JVD present.   Cardiovascular: Normal rate, regular rhythm and normal heart sounds. Exam reveals no gallop and no friction rub.   No murmur heard.  Pulmonary/Chest: Effort normal and breath sounds normal. She has no wheezes. She has no rales.   Musculoskeletal: She exhibits no edema.   Lymphadenopathy:     She has no cervical adenopathy.   Neurological: She is alert and oriented to person, place, and time. No cranial nerve deficit.   Skin: Skin is warm and dry. No rash noted.   Psychiatric: She has a normal mood and affect. Her behavior is normal.   Vitals reviewed.      Assessment/Plan   Carina was seen today for diabetes, hyperlipidemia and vitamin d deficiency.    Diagnoses and all orders for this visit:    Type 2 diabetes mellitus with other circulatory complication, without long-term current use of insulin (CMS/[facility])  -     Hemoglobin A1c; Future    Mixed hyperlipidemia  -     Lipid Panel; Future  -     Comprehensive Metabolic Panel; Future    Vitamin D deficiency  -     Vitamin D 25 Hydroxy; Future        Hyperlipidemia - not well contrlled with statin. Normal liver function tests. LDL target is below < 70 mg/dl (\"very high\" risk for CHD). Patient's 103. Continue same treatment.I had suggested addition of Zetia, but patient prefers to add Flex Seed oil, and recheck in 3 months or so. Regular exercise recommended.  DM II - well controlled with diet and exercise. Hb A1C is   Lab Results   Component Value Date    HGBA1C 8.30 (H) 01/21/2020    .  No hypoglycemic episodes. Low carb diet and regular exercise recommended. Weight loss will be very " beneficial. Regular feet self examinations and comfortable footwear recommended.  Will start once a week Ozempic, potential side effects and benefits discussed.  Given sample: Initial doses 0.25 mg, in a week keep increasing by 0.5 mg a week till the target dose of 1 mg a week.  If any side effects-change to previous tolerated dose.  Please let me know when you need prescription.  Vitamin D deficiency - not well compensated, as patient had not been taking Vit D3 lately. Restart same dose 27431 IU 2 times a week. This supplement is fat-soluble and has to be taken with meals.

## 2020-03-10 NOTE — PATIENT INSTRUCTIONS
"Hyperlipidemia - not well contrlled with statin. Normal liver function tests. LDL target is below < 70 mg/dl (\"very high\" risk for CHD). Patient's 103. Continue same treatment.I had suggested addition of Zetia, but patient prefers to add Flex Seed oil, and recheck in 3 months or so. Regular exercise recommended.  DM II - well controlled with diet and exercise. Hb A1C is   Lab Results   Component Value Date    HGBA1C 8.30 (H) 01/21/2020    .  No hypoglycemic episodes. Low carb diet and regular exercise recommended. Weight loss will be very beneficial. Regular feet self examinations and comfortable footwear recommended.  Will start once a week Ozempic, potential side effects and benefits discussed.  Given sample: Initial doses 0.25 mg, in a week keep increasing by 0.5 mg a week till the target dose of 1 mg a week.  If any side effects-change to previous tolerated dose.  Please let me know when you need prescription.  Vitamin D deficiency - not well compensated, as patient had not been taking Vit D3 lately. Restart same dose 11597 IU 2 times a week. This supplement is fat-soluble and has to be taken with meals.      "

## 2020-03-17 DIAGNOSIS — H10.9 CONJUNCTIVITIS OF BOTH EYES, UNSPECIFIED CONJUNCTIVITIS TYPE: Primary | ICD-10-CM

## 2020-03-17 RX ORDER — OFLOXACIN 3 MG/ML
1 SOLUTION/ DROPS OPHTHALMIC 4 TIMES DAILY
Qty: 5 ML | Refills: 0 | Status: SHIPPED | OUTPATIENT
Start: 2020-03-17 | End: 2020-04-11

## 2020-03-18 RX ORDER — AMOXICILLIN AND CLAVULANATE POTASSIUM 875; 125 MG/1; MG/1
1 TABLET, FILM COATED ORAL 2 TIMES DAILY
Qty: 14 TABLET | Refills: 0 | Status: SHIPPED | OUTPATIENT
Start: 2020-03-18 | End: 2020-04-02

## 2020-04-17 ENCOUNTER — OFFICE VISIT (OUTPATIENT)
Dept: INTERNAL MEDICINE | Facility: CLINIC | Age: 50
End: 2020-04-17

## 2020-04-17 VITALS
WEIGHT: 195 LBS | DIASTOLIC BLOOD PRESSURE: 80 MMHG | BODY MASS INDEX: 35.67 KG/M2 | OXYGEN SATURATION: 99 % | SYSTOLIC BLOOD PRESSURE: 128 MMHG | HEART RATE: 72 BPM

## 2020-04-17 DIAGNOSIS — R00.2 PALPITATIONS: Primary | ICD-10-CM

## 2020-04-17 PROCEDURE — 93000 ELECTROCARDIOGRAM COMPLETE: CPT | Performed by: INTERNAL MEDICINE

## 2020-04-17 PROCEDURE — 99214 OFFICE O/P EST MOD 30 MIN: CPT | Performed by: INTERNAL MEDICINE

## 2020-04-17 RX ORDER — ALPRAZOLAM 0.5 MG/1
0.5 TABLET ORAL DAILY PRN
Qty: 30 TABLET | Refills: 4 | OUTPATIENT
Start: 2020-04-17 | End: 2021-01-05 | Stop reason: SDUPTHER

## 2020-04-17 NOTE — PROGRESS NOTES
"Gildardo Zamudio is a 49 y.o. female.     History of Present Illness   Patient with known CAD, s/p MI, is complaining of the increased palpitations in the last month.At times she had some chest pressure and \"hert ruttling\".Duatarion is seconds, and those are self-limiting, but at times so prominent and intense that patient reports that they \"take my breath away\". Admits increased coffee intake.  She is under  Lot of stress and feels very stressed as well. She also has some abdominal bloating and belching - this has improved with OTC GasEx. No actual chest pain or dyspnea. Took some old 0.5 mg of Xanax last night,m and felt much calmer. She is compliant with daily use of Carvedilol and Losartan.   Cardiac risk factors include hypertension, hyperlipidemia, diabetes, current tobacco smoking.  The following portions of the patient's history were reviewed and updated as appropriate: allergies, current medications, past family history, past medical history, past social history, past surgical history and problem list.    Review of Systems   Constitutional: Negative for chills and fever.   Respiratory: Negative for shortness of breath.    Cardiovascular: Positive for chest pain and palpitations. Negative for leg swelling.   Neurological: Positive for dizziness. Negative for headaches.       Objective   Physical Exam   Constitutional: She is oriented to person, place, and time. She appears well-developed.   overweight   HENT:   Head: Normocephalic and atraumatic.   Right Ear: Tympanic membrane, external ear and ear canal normal.   Left Ear: Tympanic membrane, external ear and ear canal normal.   Nose: Nose normal. Right sinus exhibits no maxillary sinus tenderness and no frontal sinus tenderness. Left sinus exhibits no maxillary sinus tenderness and no frontal sinus tenderness.   Mouth/Throat: Uvula is midline, oropharynx is clear and moist and mucous membranes are normal.   Eyes: Pupils are equal, round, and " reactive to light. Conjunctivae and EOM are normal. Right eye exhibits no discharge. Left eye exhibits no discharge. No scleral icterus.   Neck: Neck supple. No JVD present.   Cardiovascular: Normal rate, regular rhythm and normal heart sounds. Exam reveals no gallop and no friction rub.   No murmur heard.  Pulmonary/Chest: Effort normal and breath sounds normal. She has no wheezes. She has no rales.   Abdominal: Soft. Bowel sounds are normal. She exhibits no distension and no mass. There is no tenderness. There is no rebound and no guarding.   Musculoskeletal: She exhibits no edema.   Lymphadenopathy:     She has no cervical adenopathy.   Neurological: She is alert and oriented to person, place, and time. No cranial nerve deficit.   Skin: Skin is warm and dry. No rash noted.   Psychiatric: She has a normal mood and affect. Her behavior is normal.   Vitals reviewed.    Reason for ECG: Palpitations, chest tightness,  rhythm: sinus  Arterial rate:75  VT interval: nl  P waves:nl  QRS:nl  ST: nl  QTc:422   Comparison: Unchanged compared to EKG taken on 05/02/2019   Impression: normal ECG    Assessment/Plan   Carina was seen today for palpitations.    Diagnoses and all orders for this visit:    Palpitations    Palpitations in a patient with known coronary artery disease and numerous cardiac risk factors- most likely due to increased stress. Normal ECG today in the office.  Will change Carvedilol to 6.25 mg twice a day with plans to change to 12.5 mg BID if able to tolerate.  Will reevaluate in a week, at that time we will recheck blood pressure control  If needed will change Losartan to 50 mg a day. Will also give Xanax to use as needed for anxiety. Will check Karthik.

## 2020-05-01 RX ORDER — LOSARTAN POTASSIUM 100 MG/1
100 TABLET ORAL
Qty: 90 TABLET | Refills: 3 | Status: CANCELLED | OUTPATIENT
Start: 2020-05-01

## 2020-05-18 RX ORDER — CARVEDILOL 6.25 MG/1
6.25 TABLET ORAL 2 TIMES DAILY WITH MEALS
Qty: 180 TABLET | Refills: 3 | Status: SHIPPED | OUTPATIENT
Start: 2020-05-18 | End: 2021-01-05 | Stop reason: SDUPTHER

## 2020-05-26 ENCOUNTER — TELEPHONE (OUTPATIENT)
Dept: ORTHOPEDIC SURGERY | Facility: CLINIC | Age: 50
End: 2020-05-26

## 2020-05-26 NOTE — TELEPHONE ENCOUNTER
----- Message from Carina Zamudio sent at 5/26/2020 11:31 AM EDT -----  Regarding: Non-Urgent Medical Question  Contact: [phone]  Would like to reschedule my appt that was cancelled in April secondary to COVID. I am desperate need of a right knee injection. Schedule and tell me when to be there! I will adjust. Work in [address] [place] [address]. Thank you thank you!!

## 2020-06-08 ENCOUNTER — RESULTS ENCOUNTER (OUTPATIENT)
Dept: INTERNAL MEDICINE | Facility: CLINIC | Age: 50
End: 2020-06-08

## 2020-06-08 DIAGNOSIS — E11.59 TYPE 2 DIABETES MELLITUS WITH OTHER CIRCULATORY COMPLICATION, WITHOUT LONG-TERM CURRENT USE OF INSULIN (HCC): Chronic | ICD-10-CM

## 2020-06-08 DIAGNOSIS — E55.9 VITAMIN D DEFICIENCY: ICD-10-CM

## 2020-06-09 ENCOUNTER — OFFICE VISIT (OUTPATIENT)
Dept: INTERNAL MEDICINE | Facility: CLINIC | Age: 50
End: 2020-06-09

## 2020-06-09 VITALS
BODY MASS INDEX: 36.99 KG/M2 | HEART RATE: 77 BPM | OXYGEN SATURATION: 98 % | SYSTOLIC BLOOD PRESSURE: 110 MMHG | DIASTOLIC BLOOD PRESSURE: 74 MMHG | WEIGHT: 201 LBS | TEMPERATURE: 98.2 F | HEIGHT: 62 IN

## 2020-06-09 DIAGNOSIS — H10.33 ACUTE BACTERIAL CONJUNCTIVITIS OF BOTH EYES: Primary | ICD-10-CM

## 2020-06-09 PROCEDURE — 99213 OFFICE O/P EST LOW 20 MIN: CPT | Performed by: NURSE PRACTITIONER

## 2020-06-09 RX ORDER — OFLOXACIN 3 MG/ML
1 SOLUTION/ DROPS OPHTHALMIC 4 TIMES DAILY
Qty: 5 ML | Refills: 0 | Status: SHIPPED | OUTPATIENT
Start: 2020-06-09 | End: 2021-03-22 | Stop reason: SDUPTHER

## 2020-06-09 NOTE — PROGRESS NOTES
Subjective   Carina Zamudio is a 50 y.o. female.     She reports over the weekend her eyes were watery. She reports on  she used an  mascara. Her symptoms started in the left eye and now in right eye.     Eye Problem    Both eyes are affected.This is a recurrent problem. The current episode started in the past 7 days. The pain is at a severity of 0/10. The patient is experiencing no pain. There is no known exposure to pink eye. She does not wear contacts. Associated symptoms include an eye discharge and eye redness. Pertinent negatives include no blurred vision, double vision, fever, itching, nausea, recent URI or vomiting. Treatments tried: visine and old antibiotic.  The treatment provided mild relief.        The following portions of the patient's history were reviewed and updated as appropriate: allergies, current medications, past social history and problem list.    Review of Systems   Constitutional: Negative for appetite change, chills, fatigue and fever.   HENT: Negative for congestion, ear discharge, ear pain, facial swelling, hearing loss, postnasal drip, rhinorrhea, sinus pressure, sneezing, sore throat and tinnitus.    Eyes: Positive for discharge and redness. Negative for blurred vision, double vision and itching.   Respiratory: Negative for cough, chest tightness, shortness of breath and wheezing.    Cardiovascular: Negative for chest pain, palpitations and leg swelling.   Gastrointestinal: Negative for abdominal pain, diarrhea, nausea and vomiting.   Musculoskeletal: Negative for neck pain and neck stiffness.   Allergic/Immunologic: Positive for environmental allergies.   Neurological: Negative for headaches.   Hematological: Negative for adenopathy.       Objective   Physical Exam   Constitutional: She appears well-developed and well-nourished. She is cooperative. She does not have a sickly appearance. She does not appear ill.   HENT:   Head: Normocephalic.   Right Ear: Hearing,  tympanic membrane and external ear normal. No drainage, swelling or tenderness. No mastoid tenderness. Tympanic membrane is not injected, not scarred, not erythematous and not bulging. Tympanic membrane mobility is normal. No middle ear effusion. No decreased hearing is noted.   Left Ear: Hearing, tympanic membrane and external ear normal. No drainage, swelling or tenderness. No mastoid tenderness. Tympanic membrane is not injected, not scarred, not erythematous and not bulging. Tympanic membrane mobility is normal.  No middle ear effusion. No decreased hearing is noted.   Nose: No mucosal edema, rhinorrhea, sinus tenderness or nasal deformity. Right sinus exhibits maxillary sinus tenderness. Right sinus exhibits no frontal sinus tenderness. Left sinus exhibits maxillary sinus tenderness. Left sinus exhibits no frontal sinus tenderness.   Mouth/Throat: Oropharynx is clear and moist and mucous membranes are normal. Normal dentition. No tonsillar exudate.   Eyes: Pupils are equal, round, and reactive to light. EOM and lids are normal. Right eye exhibits discharge. Right eye exhibits no exudate. Left eye exhibits no discharge and no exudate. Right conjunctiva is injected.   Neck: Trachea normal.   Cardiovascular: Regular rhythm, normal heart sounds and normal pulses.   No murmur heard.  Pulmonary/Chest: Breath sounds normal. No respiratory distress. She has no decreased breath sounds. She has no wheezes. She has no rhonchi. She has no rales.   Lymphadenopathy:        Head (right side): No submental, no submandibular, no tonsillar, no preauricular, no posterior auricular and no occipital adenopathy present.        Head (left side): No submental, no submandibular, no tonsillar, no preauricular, no posterior auricular and no occipital adenopathy present.   Neurological: She is alert.   Skin: Skin is warm, dry and intact. No cyanosis. Nails show no clubbing.       Assessment/Plan   Carina was seen today for eye  problem.    Diagnoses and all orders for this visit:    Acute bacterial conjunctivitis of both eyes  -     ofloxacin (Ocuflox) 0.3 % ophthalmic solution; Administer 1 drop to both eyes 4 (Four) Times a Day for 7 days.  -     Culture, Routine - Swab, Eye, Right; Future        Due to recurrence of symptoms will obtain culture. She was advised to discard any eye makeup. She will start with routine baby shampoo cleansing of eyelids.

## 2020-06-10 ENCOUNTER — RESULTS ENCOUNTER (OUTPATIENT)
Dept: INTERNAL MEDICINE | Facility: CLINIC | Age: 50
End: 2020-06-10

## 2020-06-10 ENCOUNTER — OFFICE VISIT (OUTPATIENT)
Dept: OBSTETRICS AND GYNECOLOGY | Age: 50
End: 2020-06-10

## 2020-06-10 VITALS
HEIGHT: 62 IN | WEIGHT: 199 LBS | BODY MASS INDEX: 36.62 KG/M2 | SYSTOLIC BLOOD PRESSURE: 110 MMHG | DIASTOLIC BLOOD PRESSURE: 68 MMHG

## 2020-06-10 DIAGNOSIS — Z13.89 SCREENING FOR BLOOD OR PROTEIN IN URINE: ICD-10-CM

## 2020-06-10 DIAGNOSIS — E78.2 MIXED HYPERLIPIDEMIA: ICD-10-CM

## 2020-06-10 DIAGNOSIS — N84.1 CERVICAL POLYP: ICD-10-CM

## 2020-06-10 DIAGNOSIS — Z01.419 ENCOUNTER FOR GYNECOLOGICAL EXAMINATION WITHOUT ABNORMAL FINDING: Primary | ICD-10-CM

## 2020-06-10 DIAGNOSIS — Z12.4 SCREENING FOR MALIGNANT NEOPLASM OF THE CERVIX: ICD-10-CM

## 2020-06-10 DIAGNOSIS — F17.200 TOBACCO DEPENDENCE: ICD-10-CM

## 2020-06-10 DIAGNOSIS — Z12.31 BREAST CANCER SCREENING BY MAMMOGRAM: ICD-10-CM

## 2020-06-10 DIAGNOSIS — N90.7 SEBACEOUS CYST OF LABIA: ICD-10-CM

## 2020-06-10 DIAGNOSIS — N30.00 ACUTE CYSTITIS WITHOUT HEMATURIA: ICD-10-CM

## 2020-06-10 DIAGNOSIS — Z98.890 HISTORY OF ENDOMETRIAL ABLATION: ICD-10-CM

## 2020-06-10 LAB
BILIRUB BLD-MCNC: ABNORMAL MG/DL
GLUCOSE UR STRIP-MCNC: NEGATIVE MG/DL
KETONES UR QL: ABNORMAL
LEUKOCYTE EST, POC: NEGATIVE
NITRITE UR-MCNC: POSITIVE MG/ML
PH UR: 6 [PH] (ref 5–8)
PROT UR STRIP-MCNC: ABNORMAL MG/DL
RBC # UR STRIP: ABNORMAL /UL
SP GR UR: 1.03 (ref 1–1.03)
UROBILINOGEN UR QL: NORMAL

## 2020-06-10 PROCEDURE — 99396 PREV VISIT EST AGE 40-64: CPT | Performed by: OBSTETRICS & GYNECOLOGY

## 2020-06-10 PROCEDURE — 10060 I&D ABSCESS SIMPLE/SINGLE: CPT | Performed by: OBSTETRICS & GYNECOLOGY

## 2020-06-10 PROCEDURE — 81002 URINALYSIS NONAUTO W/O SCOPE: CPT | Performed by: OBSTETRICS & GYNECOLOGY

## 2020-06-10 PROCEDURE — 57500 BIOPSY OF CERVIX: CPT | Performed by: OBSTETRICS & GYNECOLOGY

## 2020-06-10 RX ORDER — SULFAMETHOXAZOLE AND TRIMETHOPRIM 800; 160 MG/1; MG/1
1 TABLET ORAL 2 TIMES DAILY
Qty: 6 TABLET | Refills: 0 | Status: SHIPPED | OUTPATIENT
Start: 2020-06-10 | End: 2020-06-19

## 2020-06-10 NOTE — PROGRESS NOTES
"Subjective   Carina Zamudio is a 50 y.o. female cc: annual , last pap 07/06/2018 neg,  vag swab neg , last mmg 07/06/18 [facility] patient likes to have mmg done at [facility] ,had ablation in 2012.  Denies any bleeding. Patient has noticed a lump persisting on her right labia for last year.   Colonoscopy scheduled for 7/1/20.      History of Present Illness    The following portions of the patient's history were reviewed and updated as appropriate: allergies, current medications, past family history, past medical history, past social history, past surgical history and problem list.    Review of Systems   Constitutional: Negative for chills, fatigue and fever.   Gastrointestinal: Negative for abdominal distention and abdominal pain.   Genitourinary: Negative for dyspareunia, dysuria, menstrual problem, pelvic pain, vaginal bleeding, vaginal discharge and vaginal pain.        + vaginal lump    All other systems reviewed and are negative.    /68   Ht 157.5 cm (62\")   Wt 90.3 kg (199 lb)   LMP  (LMP Unknown)   Breastfeeding No   BMI 36.40 kg/m²     Objective   Physical Exam   Constitutional: She is oriented to person, place, and time. She appears well-developed and well-nourished.   Neck: Normal range of motion. Neck supple. No thyromegaly present.   Cardiovascular: Normal rate and regular rhythm.   Pulmonary/Chest: Effort normal and breath sounds normal. Right breast exhibits no mass, no nipple discharge, no skin change and no tenderness. Left breast exhibits no mass, no nipple discharge, no skin change and no tenderness.   Abdominal: Soft. Bowel sounds are normal. She exhibits no distension. There is no tenderness.   Genitourinary: Vagina normal and uterus normal.       Pelvic exam was performed with patient supine. There is lesion on the right labia. There is no rash or tenderness on the right labia. There is no rash or tenderness on the left labia. Uterus is not tender. Cervix exhibits no discharge and no " friability. Right adnexum displays no mass and no tenderness. Left adnexum displays no mass and no tenderness. No vaginal discharge found.       Genitourinary Comments: 1-2 cm cervical polyp protruding from cervix   5 mm Small hardened sebaceous cyst on right labia.     Musculoskeletal: Normal range of motion. She exhibits no edema.   Neurological: She is alert and oriented to person, place, and time.   Skin: Skin is warm and dry. No rash noted.   Psychiatric: She has a normal mood and affect. Her behavior is normal.   Nursing note and vitals reviewed.        Assessment/Plan   Carina was seen today for gynecologic exam.    Diagnoses and all orders for this visit:    Encounter for gynecological examination without abnormal finding    Screening for malignant neoplasm of the cervix  -     IgP, Aptima HPV    Screening for blood or protein in urine  -     POC Urinalysis Dipstick    Breast cancer screening by mammogram  -     Cancel: Mammo Screening Bilateral With CAD; Future  -     Mammo Screening Bilateral With CAD; Future    History of endometrial ablation    Tobacco dependence    Acute cystitis without hematuria  -     sulfamethoxazole-trimethoprim (Bactrim DS) 800-160 MG per tablet; Take 1 tablet by mouth 2 (Two) Times a Day.  -     Urine Culture - Urine, Urine, Clean Catch    Cervical polyp  -     Reference Histopathology  -     Biopsy Cervix    Sebaceous cyst of labia  -     Destruction of Lesion    Patient advised sitz bathes and notify me if sebaceous cyst re-occurs.    Counseling was given to patient for the following topics: diagnostic results, instructions for management, prognosis, impressions, importance of treatment compliance and self-breast exams  .

## 2020-06-10 NOTE — PROGRESS NOTES
Procedure   Destruction of Lesion  Date/Time: 6/10/2020 12:53 PM  Performed by: Perla Sullivan MD  Authorized by: Perla Sullivan MD   Preparation: Patient was prepped and draped in the usual sterile fashion.  Local anesthesia used: yes    Anesthesia:  Local anesthesia used: yes  Local Anesthetic: lidocaine 1% with epinephrine  Anesthetic total: 5 mL    Sedation:  Patient sedated: no    Patient tolerance: Patient tolerated the procedure well with no immediate complications  Comments: Betadine swab use to clean right labia. Needle used to disrupt head of sebaceous cyst after lidocaine injected.  Minimal material expressed

## 2020-06-10 NOTE — PROGRESS NOTES
Procedure   Biopsy Cervix  Date/Time: 6/10/2020 12:51 PM  Performed by: Perla Sullivan MD  Authorized by: Perla Sullivan MD   Preparation: Patient was prepped and draped in the usual sterile fashion.  Local anesthesia used: no    Anesthesia:  Local anesthesia used: no    Sedation:  Patient sedated: no    Patient tolerance: Patient tolerated the procedure well with no immediate complications  Comments: Ringed forcep was used to gently twist the cervical polyp off the cervix. Silver nitrate used for hemostasis

## 2020-06-12 LAB
BACTERIA SPEC AEROBE CULT: ABNORMAL
BACTERIA SPEC CULT: ABNORMAL
BACTERIA SPEC CULT: ABNORMAL
BACTERIA UR CULT: NORMAL
BACTERIA UR CULT: NORMAL
CYTOLOGIST CVX/VAG CYTO: NORMAL
CYTOLOGY CVX/VAG DOC CYTO: NORMAL
CYTOLOGY CVX/VAG DOC THIN PREP: NORMAL
DX ICD CODE: NORMAL
HIV 1 & 2 AB SER-IMP: NORMAL
HPV I/H RISK 4 DNA CVX QL PROBE+SIG AMP: NEGATIVE
OTHER ANTIBIOTIC SUSC ISLT: ABNORMAL
OTHER STN SPEC: NORMAL
STAT OF ADQ CVX/VAG CYTO-IMP: NORMAL

## 2020-06-15 ENCOUNTER — OFFICE VISIT (OUTPATIENT)
Dept: OBSTETRICS AND GYNECOLOGY | Age: 50
End: 2020-06-15

## 2020-06-15 ENCOUNTER — CLINICAL SUPPORT (OUTPATIENT)
Dept: ORTHOPEDIC SURGERY | Facility: CLINIC | Age: 50
End: 2020-06-15

## 2020-06-15 ENCOUNTER — TELEPHONE (OUTPATIENT)
Dept: OBSTETRICS AND GYNECOLOGY | Age: 50
End: 2020-06-15

## 2020-06-15 VITALS
DIASTOLIC BLOOD PRESSURE: 72 MMHG | WEIGHT: 199 LBS | BODY MASS INDEX: 36.62 KG/M2 | SYSTOLIC BLOOD PRESSURE: 120 MMHG | HEIGHT: 62 IN

## 2020-06-15 VITALS — BODY MASS INDEX: 36.62 KG/M2 | TEMPERATURE: 98.4 F | WEIGHT: 199 LBS | HEIGHT: 62 IN

## 2020-06-15 DIAGNOSIS — N90.89 VULVAR HEMATOMA: Primary | ICD-10-CM

## 2020-06-15 DIAGNOSIS — M25.561 ACUTE PAIN OF RIGHT KNEE: Primary | ICD-10-CM

## 2020-06-15 LAB
DX ICD CODE: NORMAL
PATH REPORT.FINAL DX SPEC: NORMAL
PATH REPORT.GROSS SPEC: NORMAL
PATH REPORT.SITE OF ORIGIN SPEC: NORMAL
PATHOLOGIST NAME: NORMAL
PAYMENT PROCEDURE: NORMAL

## 2020-06-15 PROCEDURE — 20610 DRAIN/INJ JOINT/BURSA W/O US: CPT | Performed by: NURSE PRACTITIONER

## 2020-06-15 PROCEDURE — 99213 OFFICE O/P EST LOW 20 MIN: CPT | Performed by: NURSE PRACTITIONER

## 2020-06-15 PROCEDURE — 99024 POSTOP FOLLOW-UP VISIT: CPT | Performed by: OBSTETRICS & GYNECOLOGY

## 2020-06-15 RX ORDER — ERGOCALCIFEROL 1.25 MG/1
50000 CAPSULE ORAL 2 TIMES WEEKLY
Qty: 26 CAPSULE | Refills: 3 | Status: SHIPPED | OUTPATIENT
Start: 2020-06-15 | End: 2021-10-21 | Stop reason: SDUPTHER

## 2020-06-15 RX ADMIN — METHYLPREDNISOLONE ACETATE 80 MG: 80 INJECTION, SUSPENSION INTRA-ARTICULAR; INTRALESIONAL; INTRAMUSCULAR; SOFT TISSUE at 08:31

## 2020-06-15 NOTE — TELEPHONE ENCOUNTER
([facility] pt) Pt was seen 6/10/2020 and you had tried to remove a lesion off of the patient. Pt has since developed a blood blister the size of a ping pong ball and she is requesting an appt. 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 Please advise.     [phone]

## 2020-06-15 NOTE — PROGRESS NOTES
Chief Complaint   Patient presents with   • Right Knee - Follow-up, Pain       History of Present Illness  HPI: Carina Zamudio is a 50 y.o. female present today for right knee cortisone for arthritis. No cough or fever, injections work well.    Review of Systems    Patient Active Problem List   Diagnosis   • Adrenal adenoma, left   • Cerebellar infarction (CMS/HCC)   • Coronary artery disease involving native coronary artery of native heart with angina pectoris (CMS/HCC)   • Diverticulosis   • Essential hypertension   • Hyperlipidemia   • Thyroid nodule   • History of myocardial infarction   • Obstructive sleep apnea syndrome   • Proteinuria   • Retinal hemorrhage   • S/P coronary angioplasty   • Spondylosis of lumbar region without myelopathy or radiculopathy   • Type 2 diabetes mellitus with circulatory disorder, without long-term current use of insulin (CMS/HCC)   • Gastroesophageal reflux disease without esophagitis   • Class 3 severe obesity with serious comorbidity in adult (CMS/HCC)   • Chronic pain of right knee   • Acute medial meniscus tear of right knee   • Arthritis of right knee   • Infected sebaceous cyst   • Displacement of lumbar intervertebral disc without myelopathy   • Tobacco dependence   • History of superficial phlebitis   • Vitamin D deficiency   • Screen for colon cancer   • Palpitations   • History of endometrial ablation   • Vulvar hematoma     Past Medical History:   Diagnosis Date   • Adrenal cortical adenoma    • Anemia    • Anxiety    • Benign essential hypertension    • CAD (coronary artery disease)    • Cerebellar infarct (CMS/HCC)    • Cervical radiculopathy due to degenerative joint disease of spine 12/27/2016   • Diabetes mellitus (CMS/HCC)    • DJD (degenerative joint disease), cervical    • DJD (degenerative joint disease), lumbar    • Fracture, toe    • Gestational diabetes    • HPV (human papilloma virus) infection    • Hyperlipemia    • Leg edema, right 2/25/2019   • Liver  nodule    • Myocardial infarction (CMS/[facility])    • Osteoarthritis of spine 2016   • Other specified dorsopathies, site unspecified 2011   • Pregnancy      - stillbirth at the gestational age 7 months x1   • Proteinuria     CHRONIC   • Raynaud phenomenon    • S/P right knee arthroscopy 2019   • Type 2 diabetes mellitus with circulatory disorder, without long-term current use of insulin (CMS/[facility]) 2017   • Venous tributary (branch) occlusion of retina 2012   • Vitamin D deficiency      Social History     Socioeconomic History   • Marital status:      Spouse name: Not on file   • Number of children: Not on file   • Years of education: Not on file   • Highest education level: Not on file   Tobacco Use   • Smoking status: Current Every Day Smoker     Packs/day: 1.00     Years: 30.00     Pack years: 30.00   • Smokeless tobacco: Never Used   Substance and Sexual Activity   • Alcohol use: Yes     Comment: socially   • Drug use: No   • Sexual activity: Yes     Partners: Male     Past Surgical History:   Procedure Laterality Date   • CATARACT EXTRACTION, BILATERAL     • CORONARY STENT PLACEMENT  2015    circ   • ENDOMETRIAL ABLATION     • KNEE ARTHROSCOPY Right 5/10/2019    Procedure: RIGHT KNEE ARTHROSCOPY BWITH PART. MEDIAL AND LATERAL MENISECTOMY, PATELLA, CHONDROPLASTY, AND DEBRIDMENT;  Surgeon: Ignacio Martinez MD;  [place] [facility] [place] [facility] [place]opedics   • TUBAL ABDOMINAL LIGATION         Current Outpatient Medications:   •  ALPRAZolam (XANAX) 0.5 MG tablet, Take 1 tablet by mouth Daily As Needed for Anxiety., Disp: 30 tablet, Rfl: 4  •  aspirin 81 MG tablet, Take 81 mg by mouth Daily. INSTRUCTED PT FOLLOW MD INSTRUCTIONS, Disp: , Rfl:   •  B Complex Vitamins (VITAMIN-B COMPLEX) tablet, Take 1 tablet by mouth Daily. HOLD PRIOR TO SURG, Disp: , Rfl:   •  budesonide-formoterol (SYMBICORT) 80-4.5 MCG/ACT inhaler, Inhale 2 puffs 2 (Two) Times a Day. Rinse mouth  "with water after each use (Patient taking differently: Inhale 2 puffs 2 (Two) Times a Day As Needed. Rinse mouth with water after each use), Disp: 6.9 g, Rfl: 0  •  carvedilol (Coreg) 6.25 MG tablet, Take 1 tablet by mouth 2 (Two) Times a Day With Meals., Disp: 180 tablet, Rfl: 3  •  clopidogrel (PLAVIX) 75 MG tablet, Take 1 tablet by mouth Daily., Disp: 90 tablet, Rfl: 1  •  Flaxseed, Linseed, (FLAX SEED OIL PO), Take 1 tablet by mouth 2 (Two) Times a Day. HOLD PRIOR TO SURG, Disp: , Rfl:   •  fluticasone (FLONASE) 50 MCG/ACT nasal spray, Administer 2 sprays in each nostril once a day, Disp: 16 g, Rfl: 11  •  hydroCHLOROthiazide (MICROZIDE) 12.5 MG capsule, Take 1 capsule by mouth Daily., Disp: 90 capsule, Rfl: 3  •  losartan (COZAAR) 100 MG tablet, Take 1 tablet by mouth Daily., Disp: 90 tablet, Rfl: 3  •  methocarbamol (ROBAXIN) 500 MG tablet, Take 1 tablet by mouth Every 8 (Eight) Hours As Needed for Muscle Spasms., Disp: 30 tablet, Rfl: 0  •  Multiple Vitamin tablet, Take 1 tablet by mouth Daily. HOLD PRIOR TO SURG, Disp: , Rfl:   •  mupirocin (BACTROBAN) 2 % ointment, Apply topically to affected area on toe once a day, Disp: 22 g, Rfl: 0  •  ofloxacin (Ocuflox) 0.3 % ophthalmic solution, Administer 1 drop to both eyes 4 (Four) Times a Day for 7 days., Disp: 5 mL, Rfl: 0  •  rosuvastatin (Crestor) 40 MG tablet, Take 1 tablet by mouth Daily., Disp: 90 tablet, Rfl: 3  •  sulfamethoxazole-trimethoprim (Bactrim DS) 800-160 MG per tablet, Take 1 tablet by mouth 2 (Two) Times a Day., Disp: 6 tablet, Rfl: 0  •  vitamin D (ERGOCALCIFEROL) 1.25 MG (39650 UT) capsule capsule, Take 1 capsule by mouth 2 (Two) Times a Week., Disp: 26 capsule, Rfl: 3  No Known Allergies  Vitals:    06/15/20 1513   Temp: 98.4 °F (36.9 °C)   TempSrc: Temporal   Weight: 90.3 kg (199 lb)   Height: 157.5 cm (62\")       Physical Exam   Constitutional: She is oriented to person, place, and time. She appears well-developed and well-nourished. "   HENT:   Head: Normocephalic.   Musculoskeletal: Normal range of motion. She exhibits edema and tenderness.        Right knee: Tenderness found. Medial joint line and lateral joint line tenderness noted.   Neurological: She is alert and oriented to person, place, and time. She has normal strength. No cranial nerve deficit or sensory deficit.   Calves soft nttp   Skin: Skin is warm and dry.   Psychiatric: She has a normal mood and affect.   Nursing note and vitals reviewed.        Assessment  Encounter Diagnosis   Name Primary?   • Acute pain of right knee Yes         Discussion/Summary      End of Encounter Orders  Orders Placed This Encounter   Procedures   • Large Joint Arthrocentesis: R knee     This order was created via procedure documentation   • Ambulatory Referral to Physical Therapy Evaluate and treat     Referral Priority:   Routine     Referral Type:   Therapy     Referral Reason:   Specialty Services Required     Requested Specialty:   Physical Therapy     Number of Visits Requested:   1       Follow Up  Patient was told to schedule follow up in 3month(s)        [address]  Large Joint Arthrocentesis: R knee  Date/Time: 6/15/2020 8:31 AM  Consent given by: patient  Site marked: site marked  Timeout: Immediately prior to procedure a time out was called to verify the correct patient, procedure, equipment, support staff and site/side marked as required   Supporting Documentation  Indications: pain   Procedure Details  Location: knee - R knee  Preparation: Patient was prepped and draped in the usual sterile fashion  Needle gauge: 21g.  Approach: lateral  Medications administered: 4 mL lidocaine (cardiac); 80 mg methylPREDNISolone acetate 80 MG/ML  Patient tolerance: patient tolerated the procedure well with no immediate complications

## 2020-06-15 NOTE — PROGRESS NOTES
"Subjective   Carina Zamudio is a 50 y.o. female cc:area of the sebaceous cyst feels sweeling , lots of pressure after attempted expression of sebaceous cyst in office last week that was unsucesful. Patient states it swelled up to the size of a soft ball and after ice packs it has decreased in size.          History of Present Illness    The following portions of the patient's history were reviewed and updated as appropriate: allergies, current medications, past family history, past medical history, past social history, past surgical history and problem list.    Review of Systems   Constitutional: Negative for chills, fatigue and fever.   Gastrointestinal: Negative for abdominal distention and abdominal pain.   Genitourinary: Positive for vaginal pain. Negative for dysuria, menstrual problem, pelvic pain, vaginal bleeding and vaginal discharge.   All other systems reviewed and are negative.  /72   Ht 157.5 cm (62\")   Wt 90.3 kg (199 lb)   LMP  (LMP Unknown)   Breastfeeding No   BMI 36.40 kg/m²       Objective   Physical Exam   Constitutional: She appears well-developed and well-nourished.   Pulmonary/Chest: Effort normal.   Genitourinary:       Pelvic exam was performed with patient supine. There is injury on the right labia.   Genitourinary Comments: 1 x 3 cm flat vulvar hematoma on right labia - no fluctuance or signs of infection   Skin: Skin is warm and dry.   Psychiatric: She has a normal mood and affect. Her behavior is normal.   Nursing note and vitals reviewed.      Assessment/Plan   Carina was seen today for gynecologic exam.    Diagnoses and all orders for this visit:    Vulvar hematoma       Rec sitz bathes/cold compresses - strict infection warnings and advised to call for any worsening symptoms  Counseling was given to patient for the following topics: instructions for management, impressions and importance of treatment compliance . Total time of the encounter was 20 minutes and 15 minutes " was spend counseling.  Return 1 week

## 2020-06-15 NOTE — PATIENT INSTRUCTIONS
"Journal for Nurse Practitioners, 15(4), 263-267. Retrieved October 7, 2019 from http://clinicalkey.com/nursing\">   Knee Exercises  Ask your health care provider which exercises are safe for you. Do exercises exactly as told by your health care provider and adjust them as directed. It is normal to feel mild stretching, pulling, tightness, or discomfort as you do these exercises. Stop right away if you feel sudden pain or your pain gets worse. Do not begin these exercises until told by your health care provider.  Stretching and range-of-motion exercises  These exercises warm up your muscles and joints and improve the movement and flexibility of your knee. These exercises also help to relieve pain and swelling.  Knee extension, prone  1. Lie on your abdomen (prone position) on a bed.  2. Place your left / right knee just beyond the edge of the surface so your knee is not on the bed. You can put a towel under your left / right thigh just above your kneecap for comfort.  3. Relax your leg muscles and allow gravity to straighten your knee (extension). You should feel a stretch behind your left / right knee.  4. Hold this position for __________ seconds.  5. Scoot up so your knee is supported between repetitions.  Repeat __________ times. Complete this exercise __________ times a day.  Knee flexion, active    1. Lie on your back with both legs straight. If this causes back discomfort, bend your left / right knee so your foot is flat on the floor.  2. Slowly slide your left / right heel back toward your buttocks. Stop when you feel a gentle stretch in the front of your knee or thigh (flexion).  3. Hold this position for __________ seconds.  4. Slowly slide your left / right heel back to the starting position.  Repeat __________ times. Complete this exercise __________ times a day.  Quadriceps stretch, prone    1. Lie on your abdomen on a firm surface, such as a bed or padded floor.  2. Bend your left / right knee and hold " your ankle. If you cannot reach your ankle or pant leg, loop a belt around your foot and grab the belt instead.  3. Gently pull your heel toward your buttocks. Your knee should not slide out to the side. You should feel a stretch in the front of your thigh and knee (quadriceps).  4. Hold this position for __________ seconds.  Repeat __________ times. Complete this exercise __________ times a day.  Hamstring, supine  1. Lie on your back (supine position).  2. Loop a belt or towel over the ball of your left / right foot. The ball of your foot is on the walking surface, right under your toes.  3. Straighten your left / right knee and slowly pull on the belt to raise your leg until you feel a gentle stretch behind your knee (hamstring).  ? Do not let your knee bend while you do this.  ? Keep your other leg flat on the floor.  4. Hold this position for __________ seconds.  Repeat __________ times. Complete this exercise __________ times a day.  Strengthening exercises  These exercises build strength and endurance in your knee. Endurance is the ability to use your muscles for a long time, even after they get tired.  Quadriceps, isometric  This exercise stretches the muscles in front of your thigh (quadriceps) without moving your knee joint (isometric).  1. Lie on your back with your left / right leg extended and your other knee bent. Put a rolled towel or small pillow under your knee if told by your health care provider.  2. Slowly tense the muscles in the front of your left / right thigh. You should see your kneecap slide up toward your hip or see increased dimpling just above the knee. This motion will push the back of the knee toward the floor.  3. For __________ seconds, hold the muscle as tight as you can without increasing your pain.  4. Relax the muscles slowly and completely.  Repeat __________ times. Complete this exercise __________ times a day.  Straight leg raises  This exercise stretches the muscles in front  "of your thigh (quadriceps) and the muscles that move your hips (hip flexors).  1. Lie on your back with your left / right leg extended and your other knee bent.  2. Tense the muscles in the front of your left / right thigh. You should see your kneecap slide up or see increased dimpling just above the knee. Your thigh may even shake a bit.  3. Keep these muscles tight as you raise your leg 4-6 inches (10-15 cm) off the floor. Do not let your knee bend.  4. Hold this position for __________ seconds.  5. Keep these muscles tense as you lower your leg.  6. Relax your muscles slowly and completely after each repetition.  Repeat __________ times. Complete this exercise __________ times a day.  Hamstring, isometric  1. Lie on your back on a firm surface.  2. Bend your left / right knee about __________ degrees.  3. Dig your left / right heel into the surface as if you are trying to pull it toward your buttocks. Tighten the muscles in the back of your thighs (hamstring) to \"dig\" as hard as you can without increasing any pain.  4. Hold this position for __________ seconds.  5. Release the tension gradually and allow your muscles to relax completely for __________ seconds after each repetition.  Repeat __________ times. Complete this exercise __________ times a day.  Hamstring curls  If told by your health care provider, do this exercise while wearing ankle weights. Begin with __________ lb weights. Then increase the weight by 1 lb (0.5 kg) increments. Do not wear ankle weights that are more than __________ lb.  1. Lie on your abdomen with your legs straight.  2. Bend your left / right knee as far as you can without feeling pain. Keep your hips flat against the floor.  3. Hold this position for __________ seconds.  4. Slowly lower your leg to the starting position.  Repeat __________ times. Complete this exercise __________ times a day.  Squats  This exercise strengthens the muscles in front of your thigh and knee " (quadriceps).  1.  front of a table, with your feet and knees pointing straight ahead. You may rest your hands on the table for balance but not for support.  2. Slowly bend your knees and lower your hips like you are going to sit in a chair.  ? Keep your weight over your heels, not over your toes.  ? Keep your lower legs upright so they are parallel with the table legs.  ? Do not let your hips go lower than your knees.  ? Do not bend lower than told by your health care provider.  ? If your knee pain increases, do not bend as low.  3. Hold the squat position for __________ seconds.  4. Slowly push with your legs to return to standing. Do not use your hands to pull yourself to standing.  Repeat __________ times. Complete this exercise __________ times a day.  Wall slides  This exercise strengthens the muscles in front of your thigh and knee (quadriceps).  1. Lean your back against a smooth wall or door, and walk your feet out 18-24 inches (46-61 cm) from it.  2. Place your feet hip-width apart.  3. Slowly slide down the wall or door until your knees bend __________ degrees. Keep your knees over your heels, not over your toes. Keep your knees in line with your hips.  4. Hold this position for __________ seconds.  Repeat __________ times. Complete this exercise __________ times a day.  Straight leg raises  This exercise strengthens the muscles that rotate the leg at the hip and move it away from your body (hip abductors).  1. Lie on your side with your left / right leg in the top position. Lie so your head, shoulder, knee, and hip line up. You may bend your bottom knee to help you keep your balance.  2. Roll your hips slightly forward so your hips are stacked directly over each other and your left / right knee is facing forward.  3. Leading with your heel, lift your top leg 4-6 inches (10-15 cm). You should feel the muscles in your outer hip lifting.  ? Do not let your foot drift forward.  ? Do not let your knee  roll toward the ceiling.  4. Hold this position for __________ seconds.  5. Slowly return your leg to the starting position.  6. Let your muscles relax completely after each repetition.  Repeat __________ times. Complete this exercise __________ times a day.  Straight leg raises  This exercise stretches the muscles that move your hips away from the front of the pelvis (hip extensors).  1. Lie on your abdomen on a firm surface. You can put a pillow under your hips if that is more comfortable.  2. Tense the muscles in your buttocks and lift your left / right leg about 4-6 inches (10-15 cm). Keep your knee straight as you lift your leg.  3. Hold this position for __________ seconds.  4. Slowly lower your leg to the starting position.  5. Let your leg relax completely after each repetition.  Repeat __________ times. Complete this exercise __________ times a day.  This information is not intended to replace advice given to you by your health care provider. Make sure you discuss any questions you have with your health care provider.  Document Released: 11/01/2006 Document Revised: 10/08/2019 Document Reviewed: 10/08/2019  Elsevier Patient Education © 2020 [employer]

## 2020-06-16 ENCOUNTER — TELEPHONE (OUTPATIENT)
Dept: OBSTETRICS AND GYNECOLOGY | Age: 50
End: 2020-06-16

## 2020-06-16 NOTE — PROGRESS NOTES
Please call patient and notify of normal/benign results of cervical polyp and let me know how she is doing

## 2020-06-16 NOTE — TELEPHONE ENCOUNTER
Pt was notified of the benign polyp and she states she is doing great and has some itchiness near the site which she assumes is the healing process. FYI only

## 2020-06-17 RX ORDER — METHYLPREDNISOLONE ACETATE 80 MG/ML
80 INJECTION, SUSPENSION INTRA-ARTICULAR; INTRALESIONAL; INTRAMUSCULAR; SOFT TISSUE
Status: COMPLETED | OUTPATIENT
Start: 2020-06-15 | End: 2020-06-15

## 2020-06-18 LAB — REF LAB TEST METHOD: NORMAL

## 2020-06-19 ENCOUNTER — OFFICE VISIT (OUTPATIENT)
Dept: OBSTETRICS AND GYNECOLOGY | Age: 50
End: 2020-06-19

## 2020-06-19 ENCOUNTER — TELEPHONE (OUTPATIENT)
Dept: OBSTETRICS AND GYNECOLOGY | Age: 50
End: 2020-06-19

## 2020-06-19 VITALS
HEIGHT: 62 IN | SYSTOLIC BLOOD PRESSURE: 120 MMHG | BODY MASS INDEX: 37.36 KG/M2 | WEIGHT: 203 LBS | DIASTOLIC BLOOD PRESSURE: 68 MMHG

## 2020-06-19 DIAGNOSIS — N90.89 VULVAR HEMATOMA: Primary | ICD-10-CM

## 2020-06-19 PROCEDURE — 99024 POSTOP FOLLOW-UP VISIT: CPT | Performed by: OBSTETRICS & GYNECOLOGY

## 2020-06-19 RX ORDER — DOXYCYCLINE HYCLATE 100 MG/1
100 CAPSULE ORAL 2 TIMES DAILY
Qty: 14 CAPSULE | Refills: 0 | Status: SHIPPED | OUTPATIENT
Start: 2020-06-19 | End: 2020-06-19

## 2020-06-19 RX ORDER — FLUCONAZOLE 150 MG/1
150 TABLET ORAL ONCE
Qty: 1 TABLET | Refills: 0 | Status: SHIPPED | OUTPATIENT
Start: 2020-06-19 | End: 2020-06-20

## 2020-06-19 RX ORDER — DOXYCYCLINE HYCLATE 100 MG/1
100 CAPSULE ORAL 2 TIMES DAILY
Qty: 14 CAPSULE | Refills: 0 | Status: SHIPPED | OUTPATIENT
Start: 2020-06-19 | End: 2020-06-26

## 2020-06-24 RX ORDER — CLOPIDOGREL BISULFATE 75 MG/1
75 TABLET ORAL DAILY
Qty: 90 TABLET | Refills: 3 | Status: SHIPPED | OUTPATIENT
Start: 2020-06-24 | End: 2021-01-05 | Stop reason: SDUPTHER

## 2020-06-29 ENCOUNTER — OFFICE VISIT (OUTPATIENT)
Dept: OBSTETRICS AND GYNECOLOGY | Age: 50
End: 2020-06-29

## 2020-06-29 VITALS
HEIGHT: 62 IN | SYSTOLIC BLOOD PRESSURE: 108 MMHG | WEIGHT: 197 LBS | BODY MASS INDEX: 36.25 KG/M2 | DIASTOLIC BLOOD PRESSURE: 65 MMHG

## 2020-06-29 DIAGNOSIS — S31.40XS OPEN WOUND OF VULVA, SEQUELA: Primary | ICD-10-CM

## 2020-06-29 PROCEDURE — 99213 OFFICE O/P EST LOW 20 MIN: CPT | Performed by: OBSTETRICS & GYNECOLOGY

## 2020-06-29 RX ORDER — DOXYCYCLINE HYCLATE 100 MG/1
100 CAPSULE ORAL 2 TIMES DAILY
Qty: 14 CAPSULE | Refills: 0 | Status: SHIPPED | OUTPATIENT
Start: 2020-06-29 | End: 2020-07-07

## 2020-06-29 NOTE — PROGRESS NOTES
"Subjective   Carina Zamudio is a 50 y.o. female cc: presents for follow up on vulvar hematoma , states wound is open feels like is getting a little better.  Denies fever/chills or that wound is getting more painful.  Discussed importance of getting diabetes under control for healing. Will do another week of Doxycycline.     History of Present Illness    The following portions of the patient's history were reviewed and updated as appropriate: allergies, current medications, past family history, past medical history, past social history, past surgical history and problem list.    Review of Systems   Constitutional: Negative for chills and fever.   Gastrointestinal: Negative for abdominal distention and abdominal pain.   Genitourinary: Positive for genital sores. Negative for dysuria and vaginal pain.   All other systems reviewed and are negative.  /65   Ht 157.5 cm (62\")   Wt 89.4 kg (197 lb)   LMP  (LMP Unknown)   Breastfeeding No   BMI 36.03 kg/m²       Objective   Physical Exam   Constitutional: She is oriented to person, place, and time. She appears well-developed and well-nourished.   Pulmonary/Chest: Effort normal.   Genitourinary:       Pelvic exam was performed with patient supine. There is lesion on the right labia.   Genitourinary Comments: 1 x 3 cm healing vulvar wound with healthy edges    Neurological: She is alert and oriented to person, place, and time.   Skin: Skin is warm and dry.   Psychiatric: She has a normal mood and affect. Her behavior is normal.   Nursing note and vitals reviewed.      Assessment/Plan   Carina was seen today for gynecologic exam.    Diagnoses and all orders for this visit:    Open wound of vulva, sequela  -     Culture, Routine - Swab, Vulva    Other orders  -     doxycycline (VIBRAMYCIN) 100 MG capsule; Take 1 capsule by mouth 2 (Two) Times a Day for 7 days.       Counseling was given to patient for the following topics: instructions for management, risk factor " reductions and importance of treatment compliance . Total time of the encounter was 20 minutes and 15 minutes was spend counseling.  Return 2 weeks   Strict infection warnings

## 2020-07-02 LAB
BACTERIA SPEC AEROBE CULT: ABNORMAL
BACTERIA SPEC CULT: ABNORMAL

## 2020-07-02 RX ORDER — CEPHALEXIN 500 MG/1
500 CAPSULE ORAL 3 TIMES DAILY
Qty: 21 CAPSULE | Refills: 0 | Status: SHIPPED | OUTPATIENT
Start: 2020-07-02 | End: 2020-07-13

## 2020-07-06 ENCOUNTER — TELEPHONE (OUTPATIENT)
Dept: OBSTETRICS AND GYNECOLOGY | Age: 50
End: 2020-07-06

## 2020-07-06 NOTE — TELEPHONE ENCOUNTER
----- Message from Perla Sullivan MD sent at 7/2/2020  6:03 PM EDT -----  Please call patient and notify that culture showed group b strep so will add keflex to her regimen - please let me know how she is doing

## 2020-07-10 RX ORDER — FLUCONAZOLE 150 MG/1
150 TABLET ORAL ONCE
Qty: 2 TABLET | Refills: 1 | Status: SHIPPED | OUTPATIENT
Start: 2020-07-10 | End: 2022-07-20 | Stop reason: SDUPTHER

## 2020-07-13 ENCOUNTER — OFFICE VISIT (OUTPATIENT)
Dept: OBSTETRICS AND GYNECOLOGY | Age: 50
End: 2020-07-13

## 2020-07-13 VITALS
HEIGHT: 62 IN | BODY MASS INDEX: 36.44 KG/M2 | WEIGHT: 198 LBS | DIASTOLIC BLOOD PRESSURE: 72 MMHG | SYSTOLIC BLOOD PRESSURE: 120 MMHG

## 2020-07-13 DIAGNOSIS — N90.89 VULVAR HEMATOMA: Primary | ICD-10-CM

## 2020-07-13 PROCEDURE — 99213 OFFICE O/P EST LOW 20 MIN: CPT | Performed by: OBSTETRICS & GYNECOLOGY

## 2020-07-13 NOTE — PROGRESS NOTES
"Subjective   Carina Zamudio is a 50 y.o. female cc: pt presents here for follow up on vulvar hematoma , pt states she is doing  better , says wound is healing slowly  , still on keflex last day tomorrow.     History of Present Illness    The following portions of the patient's history were reviewed and updated as appropriate: allergies, current medications, past family history, past medical history, past social history, past surgical history and problem list.    Review of Systems   Constitutional: Negative for chills and fever.   Genitourinary: Negative for difficulty urinating, dysuria, vaginal bleeding, vaginal discharge and vaginal pain.   All other systems reviewed and are negative.    /72   Ht 157.5 cm (62\")   Wt 89.8 kg (198 lb)   LMP  (LMP Unknown)   Breastfeeding No   BMI 36.21 kg/m²     Objective   Physical Exam   Constitutional: She is oriented to person, place, and time. She appears well-developed and well-nourished.   Genitourinary:   Genitourinary Comments: Right labia with healing superficial wound caused by hematoma- no pus or erythema    Neurological: She is alert and oriented to person, place, and time.   Skin: Skin is warm and dry.   Psychiatric: She has a normal mood and affect. Her behavior is normal.   Nursing note and vitals reviewed.      Assessment/Plan   Carina was seen today for gynecologic exam.    Diagnoses and all orders for this visit:    Vulvar hematoma       Counseling was given to patient for the following topics: instructions for management, impressions and importance of treatment compliance . Total time of the encounter was 20 minutes and 15 minutes was spend counseling.    Return 2 weeks        "

## 2020-07-27 ENCOUNTER — OFFICE VISIT (OUTPATIENT)
Dept: OBSTETRICS AND GYNECOLOGY | Age: 50
End: 2020-07-27

## 2020-07-27 VITALS
SYSTOLIC BLOOD PRESSURE: 120 MMHG | WEIGHT: 200 LBS | HEIGHT: 62 IN | DIASTOLIC BLOOD PRESSURE: 70 MMHG | BODY MASS INDEX: 36.8 KG/M2

## 2020-07-27 DIAGNOSIS — N90.89 VULVAR HEMATOMA: Primary | ICD-10-CM

## 2020-07-27 PROCEDURE — 99213 OFFICE O/P EST LOW 20 MIN: CPT | Performed by: OBSTETRICS & GYNECOLOGY

## 2020-07-27 NOTE — PROGRESS NOTES
"Subjective   Carina Zamudio is a 50 y.o. female presents for follow-up on vulvar hematoma - reports doing great.  Feels like vulva is completely healed. Had SI and was a little uncomfortable. Will give Premarin to massage into scar.        History of Present Illness    The following portions of the patient's history were reviewed and updated as appropriate: allergies, current medications, past family history, past medical history, past social history, past surgical history and problem list.    Review of Systems   Constitutional: Negative for chills, fatigue and fever.   Gastrointestinal: Negative for abdominal distention and abdominal pain.   Genitourinary: Negative for dysuria, pelvic pain, vaginal bleeding, vaginal discharge and vaginal pain.   All other systems reviewed and are negative.    /70   Ht 157.5 cm (62\")   Wt 90.7 kg (200 lb)   LMP  (LMP Unknown)   Breastfeeding No   BMI 36.58 kg/m²     Objective   Physical Exam   Constitutional: She is oriented to person, place, and time. She appears well-developed and well-nourished.   Pulmonary/Chest: Effort normal.   Genitourinary:   Genitourinary Comments: Vulva healed    Neurological: She is alert and oriented to person, place, and time.   Skin: Skin is warm and dry.   Psychiatric: She has a normal mood and affect. Her behavior is normal.   Nursing note and vitals reviewed.      Assessment/Plan   Carina was seen today for gynecologic exam.    Diagnoses and all orders for this visit:    Vulvar hematoma     premarin samples given   Counseling was given to patient for the following topics: instructions for management, impressions and importance of treatment compliance . Total time of the encounter was 20 minutes and 15 minutes was spend counseling.           "

## 2020-07-28 ENCOUNTER — TRANSCRIBE ORDERS (OUTPATIENT)
Dept: SLEEP MEDICINE | Facility: HOSPITAL | Age: 50
End: 2020-07-28

## 2020-07-28 DIAGNOSIS — Z01.818 OTHER SPECIFIED PRE-OPERATIVE EXAMINATION: Primary | ICD-10-CM

## 2020-08-03 ENCOUNTER — LAB (OUTPATIENT)
Dept: LAB | Facility: HOSPITAL | Age: 50
End: 2020-08-03

## 2020-08-03 DIAGNOSIS — Z01.818 OTHER SPECIFIED PRE-OPERATIVE EXAMINATION: ICD-10-CM

## 2020-08-03 PROCEDURE — C9803 HOPD COVID-19 SPEC COLLECT: HCPCS

## 2020-08-03 PROCEDURE — U0004 COV-19 TEST NON-CDC HGH THRU: HCPCS

## 2020-08-04 LAB
REF LAB TEST METHOD: NORMAL
SARS-COV-2 RNA RESP QL NAA+PROBE: NOT DETECTED

## 2020-08-05 ENCOUNTER — ANESTHESIA (OUTPATIENT)
Dept: GASTROENTEROLOGY | Facility: HOSPITAL | Age: 50
End: 2020-08-05

## 2020-08-05 ENCOUNTER — HOSPITAL ENCOUNTER (OUTPATIENT)
Facility: HOSPITAL | Age: 50
Setting detail: HOSPITAL OUTPATIENT SURGERY
Discharge: HOME | End: 2020-08-05
Attending: SURGERY | Admitting: SURGERY

## 2020-08-05 ENCOUNTER — ANESTHESIA EVENT (OUTPATIENT)
Dept: GASTROENTEROLOGY | Facility: HOSPITAL | Age: 50
End: 2020-08-05

## 2020-08-05 VITALS
HEIGHT: 63 IN | OXYGEN SATURATION: 99 % | HEART RATE: 67 BPM | SYSTOLIC BLOOD PRESSURE: 99 MMHG | DIASTOLIC BLOOD PRESSURE: 67 MMHG | RESPIRATION RATE: 18 BRPM | TEMPERATURE: 98.6 F | WEIGHT: 195.2 LBS | BODY MASS INDEX: 34.59 KG/M2

## 2020-08-05 DIAGNOSIS — Z12.11 SCREEN FOR COLON CANCER: ICD-10-CM

## 2020-08-05 LAB — GLUCOSE BLDC GLUCOMTR-MCNC: 144 MG/DL (ref 70–130)

## 2020-08-05 PROCEDURE — 82962 GLUCOSE BLOOD TEST: CPT

## 2020-08-05 PROCEDURE — 25010000002 PROPOFOL 10 MG/ML EMULSION: Performed by: NURSE ANESTHETIST, CERTIFIED REGISTERED

## 2020-08-05 PROCEDURE — 88305 TISSUE EXAM BY PATHOLOGIST: CPT | Performed by: SURGERY

## 2020-08-05 PROCEDURE — 25010000002 PHENYLEPHRINE PER 1 ML: Performed by: NURSE ANESTHETIST, CERTIFIED REGISTERED

## 2020-08-05 PROCEDURE — S0260 H&P FOR SURGERY: HCPCS | Performed by: SURGERY

## 2020-08-05 PROCEDURE — 45385 COLONOSCOPY W/LESION REMOVAL: CPT | Performed by: SURGERY

## 2020-08-05 RX ORDER — PROPOFOL 10 MG/ML
VIAL (ML) INTRAVENOUS AS NEEDED
Status: DISCONTINUED | OUTPATIENT
Start: 2020-08-05 | End: 2020-08-05 | Stop reason: SURG

## 2020-08-05 RX ORDER — SODIUM CHLORIDE, SODIUM LACTATE, POTASSIUM CHLORIDE, CALCIUM CHLORIDE 600; 310; 30; 20 MG/100ML; MG/100ML; MG/100ML; MG/100ML
30 INJECTION, SOLUTION INTRAVENOUS CONTINUOUS PRN
Status: DISCONTINUED | OUTPATIENT
Start: 2020-08-05 | End: 2020-08-05 | Stop reason: HOSPADM

## 2020-08-05 RX ORDER — PROPOFOL 10 MG/ML
VIAL (ML) INTRAVENOUS CONTINUOUS PRN
Status: DISCONTINUED | OUTPATIENT
Start: 2020-08-05 | End: 2020-08-05 | Stop reason: SURG

## 2020-08-05 RX ORDER — LIDOCAINE HYDROCHLORIDE 20 MG/ML
INJECTION, SOLUTION INFILTRATION; PERINEURAL AS NEEDED
Status: DISCONTINUED | OUTPATIENT
Start: 2020-08-05 | End: 2020-08-05 | Stop reason: SURG

## 2020-08-05 RX ORDER — EPHEDRINE SULFATE 50 MG/ML
INJECTION, SOLUTION INTRAVENOUS AS NEEDED
Status: DISCONTINUED | OUTPATIENT
Start: 2020-08-05 | End: 2020-08-05 | Stop reason: SURG

## 2020-08-05 RX ADMIN — PHENYLEPHRINE HYDROCHLORIDE 100 MCG: 10 INJECTION INTRAVENOUS at 12:05

## 2020-08-05 RX ADMIN — PROPOFOL 100 MG: 10 INJECTION, EMULSION INTRAVENOUS at 11:45

## 2020-08-05 RX ADMIN — LIDOCAINE HYDROCHLORIDE 60 MG: 20 INJECTION, SOLUTION INFILTRATION; PERINEURAL at 11:45

## 2020-08-05 RX ADMIN — PHENYLEPHRINE HYDROCHLORIDE 100 MCG: 10 INJECTION INTRAVENOUS at 11:49

## 2020-08-05 RX ADMIN — EPHEDRINE SULFATE 20 MG: 50 INJECTION INTRAVENOUS at 11:49

## 2020-08-05 RX ADMIN — PHENYLEPHRINE HYDROCHLORIDE 100 MCG: 10 INJECTION INTRAVENOUS at 12:08

## 2020-08-05 RX ADMIN — PHENYLEPHRINE HYDROCHLORIDE 100 MCG: 10 INJECTION INTRAVENOUS at 12:04

## 2020-08-05 RX ADMIN — SODIUM CHLORIDE, POTASSIUM CHLORIDE, SODIUM LACTATE AND CALCIUM CHLORIDE 30 ML/HR: 600; 310; 30; 20 INJECTION, SOLUTION INTRAVENOUS at 11:31

## 2020-08-05 RX ADMIN — PROPOFOL 200 MCG/KG/MIN: 10 INJECTION, EMULSION INTRAVENOUS at 11:46

## 2020-08-05 RX ADMIN — PHENYLEPHRINE HYDROCHLORIDE 100 MCG: 10 INJECTION INTRAVENOUS at 12:07

## 2020-08-05 NOTE — ANESTHESIA PREPROCEDURE EVALUATION
Anesthesia Evaluation     Patient summary reviewed and Nursing notes reviewed   no history of anesthetic complications:  NPO Solid Status: > 8 hours  NPO Liquid Status: > 2 hours           Airway   Mallampati: II  Dental      Pulmonary - normal exam   (+) a smoker Current, sleep apnea on CPAP,   Cardiovascular - normal exam    (+) hypertension 2 medications or greater, past MI  >12 months, CAD, cardiac stents more than 12 months ago angina, hyperlipidemia,       Neuro/Psych    ROS Comment: Cerebellar infarction  GI/Hepatic/Renal/Endo    (+) obesity,  GERD,  diabetes mellitus type 2,     ROS Comment: Adrenal adenoma    Musculoskeletal     (+) back pain, chronic pain, neck pain,   Abdominal    Substance History      OB/GYN negative ob/gyn ROS   (-)  Pregnant        Other   arthritis,        Other Comment: Raynaud phenomenon                  Anesthesia Plan    ASA 3     MAC     intravenous induction     Anesthetic plan, all risks, benefits, and alternatives have been provided, discussed and informed consent has been obtained with: patient.

## 2020-08-05 NOTE — OP NOTE
PREOPERATIVE DIAGNOSIS:  Screening    POSTOPERATIVE DIAGNOSIS AND FINDINGS:  Small sigmoid polyp  Diverticulosis    PROCEDURE:  Colonoscopy to cecum with snare polypectomy    SURGEON:  Maulik Ashraf MD    ANESTHESIA:  MAC    SPECIMEN(S):  Polyp    DESCRIPTION:  In decubitus position digital rectal exam was normal. Colonoscope inserted under direct visualization of lumen to cecum confirmed by visualization of ileocecal valve and appendiceal orifice.    Scope slowly withdrawn circumferentially examining all mucosal surfaces.    Quality of bowel preparation good.    The only mucosal abnormality was a small polyp in the sigmoid colon removed completely with a hot snare and retrieved, good hemostasis at the site.    Scattered diverticulosis seen.    Tolerated well.    RECOMMENDATION FOR FUTURE SURVEILLANCE:  To be determined based on polyp pathology and issued as separate report    Maulik Ashraf M.D.

## 2020-08-05 NOTE — H&P
HPI: Screening    PMH, PSH, MEDS AND ALLERGIES reviewed and reconciled with EPIC    PHYSICAL EXAM:  -  Constitutional:  no acute distress  -  Respiratory:  normal inspiratory effort  -  Cardiovascular: regular rate  -  Gastrointestinal: Soft    ASSESSMENT/PLAN:    Colonoscopy    Maulik Ashraf M.D.

## 2020-08-05 NOTE — DISCHARGE INSTRUCTIONS
For the next 24 hours patient needs to be with a responsible adult.    For 24 hours DO NOT drive, operate machinery, appliances, drink alcohol, make important decisions or sign legal documents.    Start with a light or bland diet if you are feeling sick to your stomach otherwise advance to regular diet as tolerated.    Follow recommendations on procedure report if provided by your doctor.    Call Dr. Ashraf for problems [phone]    Problems may include but not limited to: large amounts of bleeding, trouble breathing, repeated vomiting, severe unrelieved pain, fever or chills.

## 2020-08-05 NOTE — ANESTHESIA POSTPROCEDURE EVALUATION
Patient: Carina Zamudio    Procedure Summary     Date:  08/05/20 Room / Location:  [facility] ENDOSCOPY 1 /  [facility]    Anesthesia Start:  1138 Anesthesia Stop:  1209    Procedure:  COLONOSCOPY TO CECUM WITH HOT SNARE POLYPECTOMY (N/A ) Diagnosis:       Screen for colon cancer      (Screen for colon cancer [Z12.11])    Surgeon:  Maulik Ashraf MD Provider:  Vargas Looney MD    Anesthesia Type:  MAC ASA Status:  3          Anesthesia Type: MAC    Vitals  Vitals Value Taken Time   BP 96/69 8/5/2020 12:20 PM   Temp     Pulse 73 8/5/2020 12:20 PM   Resp 18 8/5/2020 12:20 PM   SpO2 96 % 8/5/2020 12:20 PM           Post Anesthesia Care and Evaluation    Patient location during evaluation: PACU  Patient participation: complete - patient participated  Level of consciousness: awake and alert  Pain management: adequate  Airway patency: patent  Anesthetic complications: No anesthetic complications  PONV Status: none  Cardiovascular status: acceptable  Respiratory status: acceptable  Hydration status: acceptable

## 2020-08-06 LAB
LAB AP CASE REPORT: NORMAL
PATH REPORT.FINAL DX SPEC: NORMAL
PATH REPORT.GROSS SPEC: NORMAL

## 2020-08-10 ENCOUNTER — DOCUMENTATION (OUTPATIENT)
Dept: SURGERY | Facility: CLINIC | Age: 50
End: 2020-08-10

## 2020-08-10 ENCOUNTER — TELEPHONE (OUTPATIENT)
Dept: SURGERY | Facility: CLINIC | Age: 50
End: 2020-08-10

## 2020-08-10 NOTE — PROGRESS NOTES
ENDOSCOPY FOLLOW UP NOTE    Colonoscopy to cecum with snare polypectomy 8/5/2020    Indication:  Screening    Findings (Pathology):  · Small sigmoid polyp (pathology-hyperplastic)  · Diverticulosis    Recommendations:  10-year surveillance    Maulik Ashraf M.D.

## 2020-09-02 ENCOUNTER — TELEPHONE (OUTPATIENT)
Dept: INTERNAL MEDICINE | Facility: CLINIC | Age: 50
End: 2020-09-02

## 2020-09-02 DIAGNOSIS — R39.9 LOWER URINARY TRACT SYMPTOMS: Primary | ICD-10-CM

## 2020-09-02 RX ORDER — SULFAMETHOXAZOLE AND TRIMETHOPRIM 800; 160 MG/1; MG/1
1 TABLET ORAL 2 TIMES DAILY
Qty: 10 TABLET | Refills: 0 | Status: SHIPPED | OUTPATIENT
Start: 2020-09-02 | End: 2022-07-20 | Stop reason: SDUPTHER

## 2020-09-02 NOTE — TELEPHONE ENCOUNTER
Pt does not believe UTI went away and is requesting a refill on Bactrim.   Last course was 3 day supply, could she get the usual course?  Would like sent to [facility].

## 2020-10-22 ENCOUNTER — OFFICE VISIT (OUTPATIENT)
Dept: INTERNAL MEDICINE | Facility: CLINIC | Age: 50
End: 2020-10-22

## 2020-10-22 VITALS
DIASTOLIC BLOOD PRESSURE: 60 MMHG | TEMPERATURE: 98 F | WEIGHT: 200 LBS | OXYGEN SATURATION: 98 % | BODY MASS INDEX: 36.8 KG/M2 | SYSTOLIC BLOOD PRESSURE: 122 MMHG | HEIGHT: 62 IN

## 2020-10-22 DIAGNOSIS — Z00.00 ANNUAL PHYSICAL EXAM: ICD-10-CM

## 2020-10-22 DIAGNOSIS — E78.2 MIXED HYPERLIPIDEMIA: ICD-10-CM

## 2020-10-22 DIAGNOSIS — K57.90 DIVERTICULOSIS: ICD-10-CM

## 2020-10-22 DIAGNOSIS — R10.32 LLQ PAIN: Primary | ICD-10-CM

## 2020-10-22 DIAGNOSIS — E55.9 VITAMIN D DEFICIENCY: ICD-10-CM

## 2020-10-22 DIAGNOSIS — E04.1 THYROID NODULE: ICD-10-CM

## 2020-10-22 DIAGNOSIS — E11.59 TYPE 2 DIABETES MELLITUS WITH OTHER CIRCULATORY COMPLICATION, WITHOUT LONG-TERM CURRENT USE OF INSULIN (HCC): ICD-10-CM

## 2020-10-22 DIAGNOSIS — K76.89 LIVER CYST: ICD-10-CM

## 2020-10-22 DIAGNOSIS — N83.209 CYST OF OVARY, UNSPECIFIED LATERALITY: ICD-10-CM

## 2020-10-22 LAB
25(OH)D3+25(OH)D2 SERPL-MCNC: 36.3 NG/ML (ref 30–100)
ALBUMIN SERPL-MCNC: 4.3 G/DL (ref 3.5–5.2)
ALBUMIN/GLOB SERPL: 1.6 G/DL
ALP SERPL-CCNC: 59 U/L (ref 39–117)
ALT SERPL-CCNC: 14 U/L (ref 1–33)
AST SERPL-CCNC: 14 U/L (ref 1–32)
BASOPHILS # BLD AUTO: 0.06 10*3/MM3 (ref 0–0.2)
BASOPHILS NFR BLD AUTO: 0.6 % (ref 0–1.5)
BILIRUB SERPL-MCNC: 0.3 MG/DL (ref 0–1.2)
BUN SERPL-MCNC: 15 MG/DL (ref 6–20)
BUN/CREAT SERPL: 23.4 (ref 7–25)
CALCIUM SERPL-MCNC: 9.2 MG/DL (ref 8.6–10.5)
CHLORIDE SERPL-SCNC: 100 MMOL/L (ref 98–107)
CHOLEST SERPL-MCNC: 325 MG/DL (ref 0–200)
CO2 SERPL-SCNC: 26.9 MMOL/L (ref 22–29)
CREAT SERPL-MCNC: 0.64 MG/DL (ref 0.57–1)
EOSINOPHIL # BLD AUTO: 0.15 10*3/MM3 (ref 0–0.4)
EOSINOPHIL NFR BLD AUTO: 1.4 % (ref 0.3–6.2)
ERYTHROCYTE [DISTWIDTH] IN BLOOD BY AUTOMATED COUNT: 12.7 % (ref 12.3–15.4)
GLOBULIN SER CALC-MCNC: 2.7 GM/DL
GLUCOSE SERPL-MCNC: 124 MG/DL (ref 65–99)
HBA1C MFR BLD: 7 % (ref 4.8–5.6)
HCT VFR BLD AUTO: 47.2 % (ref 34–46.6)
HDLC SERPL-MCNC: 57 MG/DL (ref 40–60)
HGB BLD-MCNC: 16.5 G/DL (ref 12–15.9)
IMM GRANULOCYTES # BLD AUTO: 0.05 10*3/MM3 (ref 0–0.05)
IMM GRANULOCYTES NFR BLD AUTO: 0.5 % (ref 0–0.5)
LDLC SERPL CALC-MCNC: 254 MG/DL (ref 0–100)
LYMPHOCYTES # BLD AUTO: 3.67 10*3/MM3 (ref 0.7–3.1)
LYMPHOCYTES NFR BLD AUTO: 34.3 % (ref 19.6–45.3)
MCH RBC QN AUTO: 31.3 PG (ref 26.6–33)
MCHC RBC AUTO-ENTMCNC: 35 G/DL (ref 31.5–35.7)
MCV RBC AUTO: 89.4 FL (ref 79–97)
MONOCYTES # BLD AUTO: 0.76 10*3/MM3 (ref 0.1–0.9)
MONOCYTES NFR BLD AUTO: 7.1 % (ref 5–12)
NEUTROPHILS # BLD AUTO: 6 10*3/MM3 (ref 1.7–7)
NEUTROPHILS NFR BLD AUTO: 56.1 % (ref 42.7–76)
NRBC BLD AUTO-RTO: 0 /100 WBC (ref 0–0.2)
PLATELET # BLD AUTO: 299 10*3/MM3 (ref 140–450)
POTASSIUM SERPL-SCNC: 4 MMOL/L (ref 3.5–5.2)
PROT SERPL-MCNC: 7 G/DL (ref 6–8.5)
RBC # BLD AUTO: 5.28 10*6/MM3 (ref 3.77–5.28)
SODIUM SERPL-SCNC: 137 MMOL/L (ref 136–145)
TRIGL SERPL-MCNC: 88 MG/DL (ref 0–150)
TSH SERPL DL<=0.005 MIU/L-ACNC: 1.68 UIU/ML (ref 0.27–4.2)
VLDLC SERPL CALC-MCNC: 14 MG/DL (ref 5–40)
WBC # BLD AUTO: 10.69 10*3/MM3 (ref 3.4–10.8)

## 2020-10-22 PROCEDURE — 99396 PREV VISIT EST AGE 40-64: CPT | Performed by: NURSE PRACTITIONER

## 2020-10-22 PROCEDURE — 99214 OFFICE O/P EST MOD 30 MIN: CPT | Performed by: NURSE PRACTITIONER

## 2020-10-22 RX ORDER — CIPROFLOXACIN 750 MG/1
750 TABLET, FILM COATED ORAL 2 TIMES DAILY
Qty: 14 TABLET | Refills: 0 | Status: SHIPPED | OUTPATIENT
Start: 2020-10-22 | End: 2021-05-13 | Stop reason: SDUPTHER

## 2020-10-22 NOTE — PROGRESS NOTES
Subjective     Carina Zamudio is a 50 y.o. female.         She presents with LLQ x 2 days annual physical. She started feeling fatigued and unwell 3 nights ago and woke up the next day with severe pain to LLQ. She does have a PMH of diverticulitis but about 10 years ago. Her trigger food at the time was jalapeno. Sunday night she did eat pizza with jalapeno. She denies diarrhea, constipation, fever. She does have some constipation now. She did have flagyl at home that she started taking. Her pain has improved. She has had fatigue and chills intermittently.    Past medical history includes diabetes, hypertension, hyperlipidemia, MI, CVA, vitamin D deficiency.  She feels well overall today except for the abdominal pain discussed above. Denies chest pain, shortness of breath, palpitations, fatigue, dizziness, headaches, visual changes, leg swelling, myalgias, weakness.  She does report increased stress with her work environment, she is a manager of a medical practice.  She is a current everyday smoker and is motivated to quit.  She does not eat the healthiest of diets and does not get routine exercise but is motivated to improve.  She also reports that she has weaned herself off her statin due to myalgias, she is prescribed Crestor 40 mg but is not taking it currently.    Patient is new to me, she has previously been followed by Dr. Nadine Gómez who has left the practice and now Gely Alba who is her current PCP.    Abdominal Pain  This is a new problem. The current episode started in the past 7 days. The problem has been gradually improving. The pain is located in the LLQ. The quality of the pain is sharp. Associated symptoms include constipation. Pertinent negatives include no diarrhea, dysuria, frequency, hematuria or melena. The pain is aggravated by certain positions. The pain is relieved by nothing.        The following portions of the patient's history were reviewed and updated as appropriate: allergies,  current medications, past social history and problem list.    Past Medical History:   Diagnosis Date   • Adrenal cortical adenoma    • Anemia    • Anxiety    • Benign essential hypertension    • CAD (coronary artery disease)    • Cerebellar infarct (CMS/[facility])    • Cervical radiculopathy due to degenerative joint disease of spine 2016   • Diabetes mellitus (CMS/[facility])    • DJD (degenerative joint disease), cervical    • DJD (degenerative joint disease), lumbar    • Fracture, toe    • Gestational diabetes    • HPV (human papilloma virus) infection    • Hyperlipemia    • Leg edema, right 2019   • Liver nodule    • Myocardial infarction (CMS/[facility])    • Osteoarthritis of spine 2016   • Other specified dorsopathies, site unspecified 2011   • Pregnancy      - stillbirth at the gestational age 7 months x1   • Proteinuria     CHRONIC   • Raynaud phenomenon    • S/P right knee arthroscopy 2019   • Sleep apnea    • Type 2 diabetes mellitus with circulatory disorder, without long-term current use of insulin (CMS/[facility]) 2017   • Venous tributary (branch) occlusion of retina 2012   • Vitamin D deficiency          Current Outpatient Medications:   •  ALPRAZolam (XANAX) 0.5 MG tablet, Take 1 tablet by mouth Daily As Needed for Anxiety., Disp: 30 tablet, Rfl: 4  •  aspirin 81 MG tablet, Take 81 mg by mouth Daily. INSTRUCTED PT FOLLOW MD INSTRUCTIONS, Disp: , Rfl:   •  B Complex Vitamins (VITAMIN-B COMPLEX) tablet, Take 1 tablet by mouth Daily. HOLD PRIOR TO SURG, Disp: , Rfl:   •  budesonide-formoterol (SYMBICORT) 80-4.5 MCG/ACT inhaler, Inhale 2 puffs 2 (Two) Times a Day. Rinse mouth with water after each use (Patient taking differently: Inhale 2 puffs 2 (Two) Times a Day As Needed. Rinse mouth with water after each use), Disp: 6.9 g, Rfl: 0  •  carvedilol (Coreg) 6.25 MG tablet, Take 1 tablet by mouth 2 (Two) Times a Day With Meals., Disp: 180 tablet, Rfl: 3  •  clopidogrel (PLAVIX) 75 MG  "tablet, Take 1 tablet by mouth Daily., Disp: 90 tablet, Rfl: 3  •  Flaxseed, Linseed, (FLAX SEED OIL PO), Take 1 tablet by mouth 2 (Two) Times a Day. HOLD PRIOR TO SURG, Disp: , Rfl:   •  fluticasone (FLONASE) 50 MCG/ACT nasal spray, Administer 2 sprays in each nostril once a day, Disp: 16 g, Rfl: 11  •  hydroCHLOROthiazide (MICROZIDE) 12.5 MG capsule, Take 1 capsule by mouth Daily., Disp: 90 capsule, Rfl: 3  •  lidocaine (XYLOCAINE) 2 % jelly, Apply  topically to the appropriate area as directed As Needed for Mild Pain ., Disp: 85 g, Rfl: 1  •  losartan (COZAAR) 100 MG tablet, Take 1 tablet by mouth Daily., Disp: 90 tablet, Rfl: 3  •  methocarbamol (ROBAXIN) 500 MG tablet, Take 1 tablet by mouth Every 8 (Eight) Hours As Needed for Muscle Spasms., Disp: 30 tablet, Rfl: 0  •  Multiple Vitamin tablet, Take 1 tablet by mouth Daily. HOLD PRIOR TO SURG, Disp: , Rfl:   •  mupirocin (BACTROBAN) 2 % ointment, Apply topically to affected area on toe once a day, Disp: 22 g, Rfl: 0  •  rosuvastatin (Crestor) 40 MG tablet, Take 1 tablet by mouth Daily., Disp: 90 tablet, Rfl: 3  •  Semaglutide, 1 MG/DOSE, (OZEMPIC) 2 MG/1.5ML solution pen-injector, Inject 0.25 mg under the skin into the appropriate area as directed 1 (One) Time Per Week., Disp: , Rfl:   •  vitamin D (ERGOCALCIFEROL) 1.25 MG (47101 UT) capsule capsule, Take 1 capsule by mouth 2 (Two) Times a Week., Disp: 26 capsule, Rfl: 3    No Known Allergies    Review of Systems   Gastrointestinal: Positive for constipation. Negative for diarrhea and melena.   Genitourinary: Negative for dysuria, frequency and hematuria.       Objective     /60   Temp 98 °F (36.7 °C)   Ht 157.5 cm (62\")   Wt 90.7 kg (200 lb)   SpO2 98%   BMI 36.58 kg/m²   Wt Readings from Last 3 Encounters:   10/22/20 90.7 kg (200 lb)   09/16/20 90.7 kg (200 lb)   08/05/20 88.5 kg (195 lb 3.2 oz)     Temp Readings from Last 3 Encounters:   10/22/20 98 °F (36.7 °C)   09/16/20 98.1 °F (36.7 °C) " (Temporal)   08/05/20 98.6 °F (37 °C) (Oral)     BP Readings from Last 3 Encounters:   10/22/20 122/60   09/16/20 114/79   08/05/20 99/67     Pulse Readings from Last 3 Encounters:   09/16/20 78   08/05/20 67   06/09/20 77       Physical Exam    Assessment/Plan     Diagnoses and all orders for this visit:    1. LLQ pain (Primary)  -     Comprehensive Metabolic Panel  -     CBC & Differential  -     CT Abdomen Pelvis With & Without Contrast; Future  -     ciprofloxacin (CIPRO) 750 MG tablet; Take 1 tablet by mouth 2 (Two) Times a Day for 7 days.  Dispense: 14 tablet; Refill: 0    2. Annual physical exam  -     Lipid Panel  -     TSH Rfx On Abnormal To Free T4  -     Hemoglobin A1c    3. Vitamin D deficiency  -     Vitamin D 25 hydroxy    4. Type 2 diabetes mellitus with other circulatory complication, without long-term current use of insulin (CMS/HCC)  -     TSH Rfx On Abnormal To Free T4  -     Hemoglobin A1c    5. Mixed hyperlipidemia  Comments:  Patient has weaned herself off of statins due to myalgias, will check cholesterol  Orders:  -     Lipid Panel    6. Thyroid nodule  Comments:  Routine surveillance with thyroid ultrasound suggested  Orders:  -     US Thyroid; Future    7. Diverticulosis  -     ciprofloxacin (CIPRO) 750 MG tablet; Take 1 tablet by mouth 2 (Two) Times a Day for 7 days.  Dispense: 14 tablet; Refill: 0    8. Cyst of ovary, unspecified laterality  -     CT Abdomen Pelvis With & Without Contrast; Future    9. Liver cyst  -     CT Abdomen Pelvis With & Without Contrast; Future    Patient was counseled on smoking cessation she is motivated to quit. I also suggest an anit-inflammatory diet ( mediterranean diet to see if this improves her arthralgias and myalgias.  Depending on her cholesterol she may need to go back on a statin.    We will continue the Flagyl and add Cipro as her left lower quadrant pain is likely related to diverticulitis.  Upon review of prior CT she does have a significant  history of diverticulosis.  There is also been some incidental findings of ovarian cysts and liver cysts that is to be monitored routinely.  We will go ahead and order the CT due to her routine follow-up and current abdominal pain.      Encouraged healthy diet, regular exercise, maintenance of healthy weight, good sleep habits, avoidance of tobacco/vaping products and excessive alcohol.    She will follow-up in 4 months or sooner if needed based on lab results.

## 2020-10-23 RX ORDER — ROSUVASTATIN CALCIUM 40 MG/1
40 TABLET, COATED ORAL DAILY
Qty: 90 TABLET | Refills: 1 | Status: SHIPPED | OUTPATIENT
Start: 2020-10-23 | End: 2021-01-05

## 2020-11-05 ENCOUNTER — HOSPITAL ENCOUNTER (OUTPATIENT)
Dept: CT IMAGING | Facility: HOSPITAL | Age: 50
Discharge: HOME | End: 2020-11-05

## 2020-11-05 ENCOUNTER — HOSPITAL ENCOUNTER (OUTPATIENT)
Dept: ULTRASOUND IMAGING | Facility: HOSPITAL | Age: 50
Discharge: HOME | End: 2020-11-05

## 2020-11-05 DIAGNOSIS — N83.209 CYST OF OVARY, UNSPECIFIED LATERALITY: ICD-10-CM

## 2020-11-05 DIAGNOSIS — R10.32 LLQ PAIN: ICD-10-CM

## 2020-11-05 DIAGNOSIS — E04.1 THYROID NODULE: ICD-10-CM

## 2020-11-05 DIAGNOSIS — K76.89 LIVER CYST: ICD-10-CM

## 2020-11-05 LAB — CREAT BLDA-MCNC: 0.6 MG/DL (ref 0.6–1.3)

## 2020-11-05 PROCEDURE — 82565 ASSAY OF CREATININE: CPT

## 2020-11-05 PROCEDURE — 76536 US EXAM OF HEAD AND NECK: CPT

## 2020-11-05 PROCEDURE — 25010000002 IOPAMIDOL 61 % SOLUTION: Performed by: NURSE PRACTITIONER

## 2020-11-05 PROCEDURE — 74178 CT ABD&PLV WO CNTR FLWD CNTR: CPT

## 2020-11-05 RX ADMIN — IOPAMIDOL 85 ML: 612 INJECTION, SOLUTION INTRAVENOUS at 16:07

## 2020-11-09 ENCOUNTER — APPOINTMENT (OUTPATIENT)
Dept: WOMENS IMAGING | Facility: HOSPITAL | Age: 50
End: 2020-11-09

## 2020-11-09 DIAGNOSIS — R91.8 GROUND GLASS OPACITY PRESENT ON IMAGING OF LUNG: Primary | ICD-10-CM

## 2020-11-09 PROCEDURE — 71046 X-RAY EXAM CHEST 2 VIEWS: CPT | Performed by: RADIOLOGY

## 2020-11-09 PROCEDURE — 71046 X-RAY EXAM CHEST 2 VIEWS: CPT | Performed by: NURSE PRACTITIONER

## 2020-11-23 ENCOUNTER — TELEPHONE (OUTPATIENT)
Dept: OBSTETRICS AND GYNECOLOGY | Age: 50
End: 2020-11-23

## 2020-11-23 NOTE — TELEPHONE ENCOUNTER
----- Message from Carina Zamudio sent at 11/23/2020 11:05 AM EST -----  Regarding: Non-Urgent Medical Question  Contact: [phone]  Good morning! A message for Dr. Sullivan.  I saw her multiple times this past summer for an ongoing problem at healed. There is a new spot/growth and she did tell me to see her if this happened. Wanted to see if she can take a look at some point. I am a fast in and out patient. Lol! I know we are all busy. Normally I have no problem seeing someone else or APRN but she is very aware of this. Thank you all! Happy Thanksgiving.

## 2020-11-24 ENCOUNTER — TELEMEDICINE (OUTPATIENT)
Dept: CARDIOLOGY | Facility: CLINIC | Age: 50
End: 2020-11-24

## 2020-11-24 VITALS — WEIGHT: 199 LBS | HEIGHT: 62 IN | BODY MASS INDEX: 36.62 KG/M2

## 2020-11-24 DIAGNOSIS — E66.01 CLASS 3 SEVERE OBESITY WITH SERIOUS COMORBIDITY AND BODY MASS INDEX (BMI) OF 40.0 TO 44.9 IN ADULT, UNSPECIFIED OBESITY TYPE (HCC): ICD-10-CM

## 2020-11-24 DIAGNOSIS — E78.2 MIXED HYPERLIPIDEMIA: ICD-10-CM

## 2020-11-24 DIAGNOSIS — Z78.9 STATIN INTOLERANCE: ICD-10-CM

## 2020-11-24 DIAGNOSIS — E11.59 TYPE 2 DIABETES MELLITUS WITH OTHER CIRCULATORY COMPLICATION, WITHOUT LONG-TERM CURRENT USE OF INSULIN (HCC): Chronic | ICD-10-CM

## 2020-11-24 DIAGNOSIS — I10 ESSENTIAL HYPERTENSION: Chronic | ICD-10-CM

## 2020-11-24 DIAGNOSIS — I25.119 CORONARY ARTERY DISEASE INVOLVING NATIVE CORONARY ARTERY OF NATIVE HEART WITH ANGINA PECTORIS (HCC): Primary | Chronic | ICD-10-CM

## 2020-11-24 DIAGNOSIS — I63.9 CEREBELLAR INFARCTION (HCC): ICD-10-CM

## 2020-11-24 DIAGNOSIS — I25.2 HISTORY OF MYOCARDIAL INFARCTION: ICD-10-CM

## 2020-11-24 PROCEDURE — 99214 OFFICE O/P EST MOD 30 MIN: CPT | Performed by: INTERNAL MEDICINE

## 2020-11-24 NOTE — PROGRESS NOTES
Subjective:     Encounter Date:11/24/20      Patient ID: Carina Zamudio is a 50 y.o. female.    Chief Complaint: CAD  History of Present Illness    Dear Dr. Gómez,    This is a 49-year-old female with a documented history of CAD, inferior STEMI in August 2015 with drug-eluting stent placement to the left circumflex as well as nonocclusive CAD in the RCA and LAD, history of CVA, diabetes mellitus with circulatory complication, obstructive sleep apnea on CPAP, hypertensive heart disease without heart failure, and history of tobacco abuse and dependence.    This patient has consented to a telehealth visit via video. The visit was scheduled as a video visit to comply with patient safety concerns in accordance with CDC recommendations.  All vitals recorded within this visit are reported by the patient.  I spent  16 minutes in total including but not limited to the 8 minutes spent in direct conversation with this patient.     Overall she has been doing well from cardiac standpoint with no cardiac complaints.  She denies any chest pain, pressure, tightness, squeezing, or heartburn.  She has not experienced any feeling of palpitations, tachycardia or heart racing and no presyncope or syncope.  There have not been any problems with dizziness or lightheadedness.  There have not been any orthopnea or PND, and no problems with lower extremity edema.  She denies any shortness of breath at rest or with activity and has not had any wheezing.  She has not had any problems with unexplained nausea or vomiting. She has continued to perform daily activities of living without any specific problem or change in the level of activity.  She has not been recently hospitalized for any reason.    She has statin intolerance.  We have had her on rosuvastatin and she has been intolerant of that.  She had to stop it, she did not work to really be careful with her diet, and she got blood work obtained by yourself that showed her LDL was back  up to 254.  She has had problems with myalgia that was incapacitating previously when she was on atorvastatin and has been intolerant to pravastatin.    She had a stress test in 2017 that showed no ischemia.  Echocardiogram in 2017 demonstrated normal left ventricular size and function.    She continues to smoke, but is looking to decrease and then stop.  She plans on stopping soon, but she has not yet picked a stop date.  She was originally planning to do this in early 2020, but then with everything that is gone on this year she has not been able to stop smoking.    Cardiac catheterization 2015:     Left heart catheterization: LV pressure was 120/20. There was normal LV  systolic function without segmental wall motion abnormality. No mitral  insufficiency or gradient across the aortic valve on pullback.      The left main coronary artery is normal.      The LAD has some mild luminal irregularities in the proximal portion that are  about 20%. The first diagonal has some mild luminal irregularities of 10% in  it.   The circumflex has a 99% blockage in the middle portion. There is an OM1 that  is subtotally occluded right in this lesion and the flow beyond that is CAROL-1.        The RCA is a dominant vessel with 30% proximal and mid disease, diffuse  aneurysmal change distally. The MARCIA has diffuse 50% disease in the proximal  portion of it, and the PDA has a 30% origin lesion in it.      Drug-eluting stent placement 2015:  STEMI. Due to this circumflex lesion we are going to proceed on with  intervention in it.       Heparin 9000 units was given. The ACT was over 300 seconds. She had been  pretreated with 600 mg of Plavix. Initially, we went up with a Voda 3.0 guiding  catheter. I attempted to cross it with a BMW wire, but it would not,  switched  to a whisper wire and it easily crossed and then brought a 2.5 x 15 Trek  balloon down, inflated it at 14 atmospheres, predilating this and then we  brought a 2.75 x 18  Xience Alpine stent and deployed it at 14 atmospheres. I  postdilated the front end of that stent with a 3.0 x 8 NC Trek at 20  atmospheres. There was 0% residual and CAROL-3 flow. There was just a trickle of  flow in the OM1, but it is too small to go after or do anything.     Carina Zamudio is a current cigarettes user.  She currently smokes 1 pack of cigarettes per day for a duration of 12 months. I have educated her on the risk of diseases from using tobacco products such as cancer, COPD and heart diease.     I advised her to quit and she is willing to quit. We have discussed the following method/s for tobacco cessation:  Counseling OTC Cessation Products.  Together we have set a quit date for 1 month from today.  She will follow up with me in a few months or sooner to check on her progress.    I spent >10 minutes counseling the patient.        The following portions of the patient's history were reviewed and updated as appropriate: allergies, current medications, past family history, past medical history, past social history, past surgical history and problem list.    Past Medical History:   Diagnosis Date   • Adrenal cortical adenoma    • Anemia    • Anxiety    • Benign essential hypertension    • CAD (coronary artery disease)    • Cerebellar infarct (CMS/HCC)    • Cervical radiculopathy due to degenerative joint disease of spine 2016   • Diabetes mellitus (CMS/HCC)    • DJD (degenerative joint disease), cervical    • DJD (degenerative joint disease), lumbar    • Fracture, toe    • Gestational diabetes    • HPV (human papilloma virus) infection    • Hyperlipemia    • Leg edema, right 2019   • Liver nodule    • Myocardial infarction (CMS/HCC)    • Osteoarthritis of spine 2016   • Other specified dorsopathies, site unspecified 2011   • Pregnancy      - stillbirth at the gestational age 7 months x1   • Proteinuria     CHRONIC   • Raynaud phenomenon    • S/P right knee arthroscopy  5/17/2019   • Sleep apnea    • Type 2 diabetes mellitus with circulatory disorder, without long-term current use of insulin (CMS/[facility]) 2/24/2017   • 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 MEDIAL AND LATERAL MENISECTOMY, PATELLA, CHONDROPLASTY, AND DEBRIDMENT;  Surgeon: Ignacio Martinez MD;  Location: [facility] OR [facility];  Service: Orthopedics   • TUBAL ABDOMINAL LIGATION  2012       Social History     Socioeconomic History   • Marital status:      Spouse name: Not on file   • Number of children: Not on file   • Years of education: Not on file   • Highest education level: Not on file   Tobacco Use   • Smoking status: Current Every Day Smoker     Packs/day: 1.00     Years: 30.00     Pack years: 30.00   • Smokeless tobacco: Never Used   Substance and Sexual Activity   • Alcohol use: Yes     Comment: socially   • Drug use: No   • Sexual activity: Yes     Partners: Male     Birth control/protection: Surgical       Review of Systems   Constitution: Negative for chills, decreased appetite, fever and night sweats.   HENT: Negative for ear discharge, ear pain, hearing loss, nosebleeds and sore throat.    Eyes: Negative for blurred vision, double vision and pain.   Cardiovascular: Negative for cyanosis.   Respiratory: Negative for hemoptysis and sputum production.    Endocrine: Negative for cold intolerance and heat intolerance.   Hematologic/Lymphatic: Negative for adenopathy.   Skin: Negative for dry skin, itching, nail  "changes, rash and suspicious lesions.   Musculoskeletal: Negative for arthritis, gout, muscle cramps, muscle weakness, myalgias and neck pain.   Gastrointestinal: Negative for anorexia, bowel incontinence, constipation, diarrhea, dysphagia, hematemesis and jaundice.   Genitourinary: Negative for bladder incontinence, dysuria, flank pain, frequency, hematuria and nocturia.   Neurological: Negative for focal weakness, numbness, paresthesias and seizures.   Psychiatric/Behavioral: Negative for altered mental status, hallucinations, hypervigilance, suicidal ideas and thoughts of violence.   Allergic/Immunologic: Negative for persistent infections.       Procedures       Objective:     Vitals:    11/24/20 1405   Weight: 90.3 kg (199 lb)   Height: 157.5 cm (62\")        Alert and oriented x3, face, eyebrows, eyelids, and mouth are symmetric.  Sclerae is nonicteric.  Oral mucosa is pink and moist.  Neck is supple.  No rashes or lesions are seen.  Ears are normal to external exam.  Shoulders are symmetric.  Both upper extremities move nicely.  Patient is speaking in full sentences without wheezing or respiratory distress.  No stridor. Hearing is appropriate without difficulty.  Lab Review:             Performed        Assessment:          Diagnosis Plan   1. Coronary artery disease involving native coronary artery of native heart with angina pectoris (CMS/HCC)     2. Type 2 diabetes mellitus with other circulatory complication, without long-term current use of insulin (CMS/[facility])     3. Essential hypertension     4. Class 3 severe obesity with serious comorbidity and body mass index (BMI) of 40.0 to 44.9 in adult, unspecified obesity type (CMS/HCC)     5. Cerebellar infarction (CMS/HCC)     6. History of myocardial infarction            Plan:       1.  CAD- no evidence of cardiac symptoms, we will continue to focus on guideline directed medical therapy and risk factor modification, continue current medical therapy  2.  History " of myocardial infarction  3.  Drug-eluting stent placement in the circumflex-continue guideline directed medical therapy, doing well  4.  Diabetes mellitus with circulatory complication- continue guideline directed medical therapy, doing well  5.  Tobacco abuse and dependence-we spent 6 minutes reaffirming decisions to stop smoking.  We reviewed strategies.  6.  Statin intolerance-markedly elevated lipid levels off of her rosuvastatin with an LDL of 254.  Prior intolerance to multiple statins, we will initiate therapy with Praluent    Carina PEREZ Zamudio is a current cigarettes user.  She currently smokes 1 pack of cigarettes per day for a duration of 12 months. I have educated her on the risk of diseases from using tobacco products such as cancer, COPD and heart diease.     I advised her to quit and she is willing to quit. We have discussed the following method/s for tobacco cessation:  Counseling OTC Cessation Products.  Together we have set a quit date for 1 month from today.  She will follow up with me in a few months or sooner to check on her progress.    I spent >10 minutes counseling the patient.  Thank you very much for allowing us to participate in the care of this pleasant patient.  Please don't hesitate to call if I can be of assistance in any way.      Current Outpatient Medications:   •  ALPRAZolam (XANAX) 0.5 MG tablet, Take 1 tablet by mouth Daily As Needed for Anxiety., Disp: 30 tablet, Rfl: 4  •  aspirin 81 MG tablet, Take 81 mg by mouth Daily. INSTRUCTED PT FOLLOW MD INSTRUCTIONS, Disp: , Rfl:   •  B Complex Vitamins (VITAMIN-B COMPLEX) tablet, Take 1 tablet by mouth Daily. HOLD PRIOR TO SURG, Disp: , Rfl:   •  budesonide-formoterol (SYMBICORT) 80-4.5 MCG/ACT inhaler, Inhale 2 puffs 2 (Two) Times a Day. Rinse mouth with water after each use (Patient taking differently: Inhale 2 puffs 2 (Two) Times a Day As Needed. Rinse mouth with water after each use), Disp: 6.9 g, Rfl: 0  •  carvedilol (Coreg) 6.25  MG tablet, Take 1 tablet by mouth 2 (Two) Times a Day With Meals., Disp: 180 tablet, Rfl: 3  •  clopidogrel (PLAVIX) 75 MG tablet, Take 1 tablet by mouth Daily., Disp: 90 tablet, Rfl: 3  •  Flaxseed, Linseed, (FLAX SEED OIL PO), Take 1 tablet by mouth 2 (Two) Times a Day. HOLD PRIOR TO SURG, Disp: , Rfl:   •  fluticasone (FLONASE) 50 MCG/ACT nasal spray, Administer 2 sprays in each nostril once a day, Disp: 16 g, Rfl: 11  •  hydroCHLOROthiazide (MICROZIDE) 12.5 MG capsule, Take 1 capsule by mouth Daily., Disp: 90 capsule, Rfl: 3  •  lidocaine (XYLOCAINE) 2 % jelly, Apply  topically to the appropriate area as directed As Needed for Mild Pain ., Disp: 85 g, Rfl: 1  •  losartan (COZAAR) 100 MG tablet, Take 1 tablet by mouth Daily., Disp: 90 tablet, Rfl: 3  •  methocarbamol (ROBAXIN) 500 MG tablet, Take 1 tablet by mouth Every 8 (Eight) Hours As Needed for Muscle Spasms., Disp: 30 tablet, Rfl: 0  •  Multiple Vitamin tablet, Take 1 tablet by mouth Daily. HOLD PRIOR TO SURG, Disp: , Rfl:   •  rosuvastatin (Crestor) 40 MG tablet, Take 1 tablet by mouth Daily., Disp: 90 tablet, Rfl: 1  •  Semaglutide, 1 MG/DOSE, (OZEMPIC) 2 MG/1.5ML solution pen-injector, Inject 0.25 mg under the skin into the appropriate area as directed 1 (One) Time Per Week., Disp: , Rfl:   •  vitamin D (ERGOCALCIFEROL) 1.25 MG (69896 UT) capsule capsule, Take 1 capsule by mouth 2 (Two) Times a Week., Disp: 26 capsule, Rfl: 3         No follow-ups on file.

## 2020-11-25 DIAGNOSIS — E11.59 TYPE 2 DIABETES MELLITUS WITH OTHER CIRCULATORY COMPLICATION, WITHOUT LONG-TERM CURRENT USE OF INSULIN (HCC): Chronic | ICD-10-CM

## 2020-11-25 DIAGNOSIS — Z78.9 STATIN INTOLERANCE: ICD-10-CM

## 2020-11-25 DIAGNOSIS — I10 ESSENTIAL HYPERTENSION: Chronic | ICD-10-CM

## 2020-11-25 DIAGNOSIS — I63.9 CEREBELLAR INFARCTION (HCC): ICD-10-CM

## 2020-11-25 DIAGNOSIS — E66.01 CLASS 3 SEVERE OBESITY WITH SERIOUS COMORBIDITY AND BODY MASS INDEX (BMI) OF 40.0 TO 44.9 IN ADULT, UNSPECIFIED OBESITY TYPE (HCC): ICD-10-CM

## 2020-11-25 DIAGNOSIS — E78.2 MIXED HYPERLIPIDEMIA: ICD-10-CM

## 2020-11-25 DIAGNOSIS — I25.119 CORONARY ARTERY DISEASE INVOLVING NATIVE CORONARY ARTERY OF NATIVE HEART WITH ANGINA PECTORIS (HCC): Chronic | ICD-10-CM

## 2020-11-25 DIAGNOSIS — I25.2 HISTORY OF MYOCARDIAL INFARCTION: ICD-10-CM

## 2020-12-01 ENCOUNTER — TELEPHONE (OUTPATIENT)
Dept: CARDIOLOGY | Facility: CLINIC | Age: 50
End: 2020-12-01

## 2020-12-01 NOTE — TELEPHONE ENCOUNTER
PA for Alirocumab 75 is not required per the pharmacy. It will cost $60.00.     LM on pt vm to let her know the cost.

## 2021-01-05 DIAGNOSIS — R00.2 PALPITATIONS: ICD-10-CM

## 2021-01-05 RX ORDER — HYDROCHLOROTHIAZIDE 12.5 MG/1
12.5 CAPSULE, GELATIN COATED ORAL
Qty: 90 CAPSULE | Refills: 3 | Status: SHIPPED | OUTPATIENT
Start: 2021-01-05 | End: 2022-01-10 | Stop reason: SDUPTHER

## 2021-01-05 RX ORDER — ALPRAZOLAM 0.5 MG/1
0.5 TABLET ORAL DAILY PRN
Qty: 30 TABLET | Refills: 4 | Status: SHIPPED | OUTPATIENT
Start: 2021-01-05 | End: 2022-01-10 | Stop reason: SDUPTHER

## 2021-01-05 RX ORDER — CARVEDILOL 6.25 MG/1
6.25 TABLET ORAL 2 TIMES DAILY WITH MEALS
Qty: 180 TABLET | Refills: 3 | Status: SHIPPED | OUTPATIENT
Start: 2021-01-05 | End: 2022-01-06 | Stop reason: SDUPTHER

## 2021-01-05 RX ORDER — CLOPIDOGREL BISULFATE 75 MG/1
75 TABLET ORAL DAILY
Qty: 90 TABLET | Refills: 3 | Status: SHIPPED | OUTPATIENT
Start: 2021-01-05 | End: 2022-01-10 | Stop reason: SDUPTHER

## 2021-01-06 ENCOUNTER — IMMUNIZATION (OUTPATIENT)
Dept: VACCINE CLINIC | Facility: HOSPITAL | Age: 51
End: 2021-01-06

## 2021-01-06 PROCEDURE — 0011A: CPT | Performed by: OBSTETRICS & GYNECOLOGY

## 2021-01-06 PROCEDURE — 91301 HC SARSCO02 VAC 100MCG/0.5ML IM: CPT | Performed by: OBSTETRICS & GYNECOLOGY

## 2021-01-21 ENCOUNTER — TELEPHONE (OUTPATIENT)
Dept: INTERNAL MEDICINE | Facility: CLINIC | Age: 51
End: 2021-01-21

## 2021-01-21 ENCOUNTER — LAB (OUTPATIENT)
Dept: INTERNAL MEDICINE | Facility: CLINIC | Age: 51
End: 2021-01-21

## 2021-01-21 DIAGNOSIS — Z20.822 CONTACT WITH AND (SUSPECTED) EXPOSURE TO COVID-19: ICD-10-CM

## 2021-01-21 DIAGNOSIS — Z20.822 CONTACT WITH AND (SUSPECTED) EXPOSURE TO COVID-19: Primary | ICD-10-CM

## 2021-01-22 LAB — SARS-COV-2 RNA RESP QL NAA+PROBE: NOT DETECTED

## 2021-01-26 LAB — REF LAB TEST METHOD: NORMAL

## 2021-02-03 ENCOUNTER — IMMUNIZATION (OUTPATIENT)
Dept: VACCINE CLINIC | Facility: HOSPITAL | Age: 51
End: 2021-02-03

## 2021-02-03 PROCEDURE — 91301 HC SARSCO02 VAC 100MCG/0.5ML IM: CPT | Performed by: OBSTETRICS & GYNECOLOGY

## 2021-02-03 PROCEDURE — 0012A: CPT | Performed by: OBSTETRICS & GYNECOLOGY

## 2021-02-10 RX ORDER — LOSARTAN POTASSIUM 100 MG/1
100 TABLET ORAL
Qty: 90 TABLET | Refills: 1 | Status: SHIPPED | OUTPATIENT
Start: 2021-02-10 | End: 2021-08-18 | Stop reason: SDUPTHER

## 2021-02-26 ENCOUNTER — OFFICE VISIT (OUTPATIENT)
Dept: INTERNAL MEDICINE | Facility: CLINIC | Age: 51
End: 2021-02-26

## 2021-02-26 ENCOUNTER — APPOINTMENT (OUTPATIENT)
Dept: WOMENS IMAGING | Facility: HOSPITAL | Age: 51
End: 2021-02-26

## 2021-02-26 VITALS
BODY MASS INDEX: 36.8 KG/M2 | WEIGHT: 200 LBS | DIASTOLIC BLOOD PRESSURE: 70 MMHG | OXYGEN SATURATION: 97 % | HEIGHT: 62 IN | HEART RATE: 68 BPM | SYSTOLIC BLOOD PRESSURE: 142 MMHG | TEMPERATURE: 98.7 F

## 2021-02-26 DIAGNOSIS — M25.561 CHRONIC PAIN OF RIGHT KNEE: ICD-10-CM

## 2021-02-26 DIAGNOSIS — M25.562 ACUTE PAIN OF LEFT KNEE: ICD-10-CM

## 2021-02-26 DIAGNOSIS — R23.2 HOT FLASHES: ICD-10-CM

## 2021-02-26 DIAGNOSIS — E55.9 VITAMIN D DEFICIENCY: ICD-10-CM

## 2021-02-26 DIAGNOSIS — M54.6 CHRONIC BILATERAL THORACIC BACK PAIN: ICD-10-CM

## 2021-02-26 DIAGNOSIS — Z11.59 SCREENING FOR VIRAL DISEASE: ICD-10-CM

## 2021-02-26 DIAGNOSIS — G89.29 CHRONIC BILATERAL THORACIC BACK PAIN: ICD-10-CM

## 2021-02-26 DIAGNOSIS — R53.83 OTHER FATIGUE: ICD-10-CM

## 2021-02-26 DIAGNOSIS — M47.816 SPONDYLOSIS OF LUMBAR REGION WITHOUT MYELOPATHY OR RADICULOPATHY: ICD-10-CM

## 2021-02-26 DIAGNOSIS — G89.29 CHRONIC PAIN OF RIGHT KNEE: ICD-10-CM

## 2021-02-26 DIAGNOSIS — I10 ESSENTIAL HYPERTENSION: Chronic | ICD-10-CM

## 2021-02-26 DIAGNOSIS — E78.2 MIXED HYPERLIPIDEMIA: ICD-10-CM

## 2021-02-26 DIAGNOSIS — R20.2 PARESTHESIAS IN LEFT HAND: ICD-10-CM

## 2021-02-26 DIAGNOSIS — E04.1 THYROID NODULE: ICD-10-CM

## 2021-02-26 DIAGNOSIS — E11.59 TYPE 2 DIABETES MELLITUS WITH OTHER CIRCULATORY COMPLICATION, WITHOUT LONG-TERM CURRENT USE OF INSULIN (HCC): Primary | ICD-10-CM

## 2021-02-26 DIAGNOSIS — R20.2 PARESTHESIAS IN RIGHT HAND: ICD-10-CM

## 2021-02-26 PROCEDURE — 73560 X-RAY EXAM OF KNEE 1 OR 2: CPT | Performed by: RADIOLOGY

## 2021-02-26 PROCEDURE — 99214 OFFICE O/P EST MOD 30 MIN: CPT | Performed by: NURSE PRACTITIONER

## 2021-02-26 PROCEDURE — 73560 X-RAY EXAM OF KNEE 1 OR 2: CPT | Performed by: NURSE PRACTITIONER

## 2021-02-26 NOTE — PROGRESS NOTES
Subjective     Carina Zamudio is a 50 y.o. female.         She presents for routine follow-up for diabetes, hypertension, hyperlipidemia and current complaints of thoracic and lumbar back pain, neck pain, bilateral knee pain, and numbness and tingling to bilateral upper extremities.  Patient also reports hot flashes and would like to have hormones checked since she is approaching menopause.  Patient does have a chronic history of right knee pain and has had multiple steroid injections.  The injections at first helped but now patient is starting to have more pain to the right knee.  Patient has also developed pain in the left knee in the last couple of months possibly due to compensating for her right knee pain.  Patient also reports chronic fatigue.  She has high work stress.  She is also a current every day smoker but plans to quit next month.  She is also been making changes to her diet and will start with a  next week.    Patient reports that about 9 to 10 years ago she did have an MRI of her entire spine and was found to have arthritis, DDD, spinal stenosis, spondylosis, and a 6th lumbar vertebrae.  She would like to have follow-up MRIs of her spine and neck.    Denies chest pain, shortness of breath, palpitations, dizziness, headaches, visual changes, leg swelling, myalgias, weakness, polyphagia, polydipsia, polyuria.         The following portions of the patient's history were reviewed and updated as appropriate: allergies, current medications, past social history and problem list.    Past Medical History:   Diagnosis Date   • Adrenal cortical adenoma    • Anemia    • Anxiety    • Benign essential hypertension    • CAD (coronary artery disease)    • Cerebellar infarct (CMS/[facility])    • Cervical radiculopathy due to degenerative joint disease of spine 12/27/2016   • Diabetes mellitus (CMS/[facility])    • DJD (degenerative joint disease), cervical    • DJD (degenerative joint disease), lumbar    •  Fracture, toe    • Gestational diabetes    • HPV (human papilloma virus) infection    • Hyperlipemia    • Leg edema, right 2019   • Liver nodule    • Myocardial infarction (CMS/[facility])    • Osteoarthritis of spine 2016   • Other specified dorsopathies, site unspecified 2011   • Pregnancy      - stillbirth at the gestational age 7 months x1   • Proteinuria     CHRONIC   • Raynaud phenomenon    • S/P right knee arthroscopy 2019   • Sleep apnea    • Type 2 diabetes mellitus with circulatory disorder, without long-term current use of insulin (CMS/[facility]) 2017   • Venous tributary (branch) occlusion of retina 2012   • Vitamin D deficiency          Current Outpatient Medications:   •  Alirocumab 75 MG/ML solution auto-injector, Inject 75 mg under the skin into the appropriate area as directed Every 14 (Fourteen) Days., Disp: 2 mL, Rfl: 11  •  ALPRAZolam (XANAX) 0.5 MG tablet, Take 1 tablet by mouth Daily As Needed for Anxiety., Disp: 30 tablet, Rfl: 4  •  aspirin 81 MG tablet, Take 81 mg by mouth Daily. INSTRUCTED PT FOLLOW MD INSTRUCTIONS, Disp: , Rfl:   •  B Complex Vitamins (VITAMIN-B COMPLEX) tablet, Take 1 tablet by mouth Daily. HOLD PRIOR TO SURG, Disp: , Rfl:   •  budesonide-formoterol (SYMBICORT) 80-4.5 MCG/ACT inhaler, Inhale 2 puffs 2 (Two) Times a Day. Rinse mouth with water after each use (Patient taking differently: Inhale 2 puffs 2 (Two) Times a Day As Needed. Rinse mouth with water after each use), Disp: 6.9 g, Rfl: 0  •  carvedilol (Coreg) 6.25 MG tablet, Take 1 tablet by mouth 2 (Two) Times a Day With Meals., Disp: 180 tablet, Rfl: 3  •  clopidogrel (PLAVIX) 75 MG tablet, Take 1 tablet by mouth Daily., Disp: 90 tablet, Rfl: 3  •  Flaxseed, Linseed, (FLAX SEED OIL PO), Take 1 tablet by mouth 2 (Two) Times a Day. HOLD PRIOR TO SURG, Disp: , Rfl:   •  fluticasone (FLONASE) 50 MCG/ACT nasal spray, Administer 2 sprays in each nostril once a day, Disp: 16 g, Rfl: 11  •   "hydroCHLOROthiazide (MICROZIDE) 12.5 MG capsule, Take 1 capsule by mouth Daily., Disp: 90 capsule, Rfl: 3  •  losartan (COZAAR) 100 MG tablet, Take 1 tablet by mouth Daily., Disp: 90 tablet, Rfl: 1  •  methocarbamol (ROBAXIN) 500 MG tablet, Take 1 tablet by mouth Every 8 (Eight) Hours As Needed for Muscle Spasms., Disp: 30 tablet, Rfl: 0  •  Multiple Vitamin tablet, Take 1 tablet by mouth Daily. HOLD PRIOR TO SURG, Disp: , Rfl:   •  Semaglutide, 1 MG/DOSE, (OZEMPIC) 2 MG/1.5ML solution pen-injector, Inject 0.25 mg under the skin into the appropriate area as directed 1 (One) Time Per Week., Disp: , Rfl:   •  vitamin D (ERGOCALCIFEROL) 1.25 MG (25496 UT) capsule capsule, Take 1 capsule by mouth 2 (Two) Times a Week., Disp: 26 capsule, Rfl: 3    No Known Allergies    Review of Systems   Constitutional: Positive for fatigue. Negative for fever.   HENT: Positive for congestion.    Respiratory: Positive for wheezing. Negative for shortness of breath.    Cardiovascular: Negative for chest pain and palpitations.   Endocrine: Negative for polydipsia, polyphagia and polyuria.   Musculoskeletal: Positive for arthralgias, gait problem, joint swelling, myalgias, neck pain and neck stiffness.   Skin: Negative for color change and wound.   Neurological: Positive for numbness. Negative for weakness.       Objective     /70   Pulse 68   Temp 98.7 °F (37.1 °C)   Ht 157.5 cm (62\")   Wt 90.7 kg (200 lb)   SpO2 97%   BMI 36.58 kg/m²   Wt Readings from Last 3 Encounters:   02/26/21 90.7 kg (200 lb)   11/24/20 90.3 kg (199 lb)   10/22/20 90.7 kg (200 lb)     Temp Readings from Last 3 Encounters:   02/26/21 98.7 °F (37.1 °C)   10/22/20 98 °F (36.7 °C)   09/16/20 98.1 °F (36.7 °C) (Temporal)     BP Readings from Last 3 Encounters:   02/26/21 142/70   10/22/20 122/60   09/16/20 114/79     Pulse Readings from Last 3 Encounters:   02/26/21 68   09/16/20 78   08/05/20 67       Physical Exam  Vitals signs reviewed.   Constitutional:  "      Appearance: She is well-developed.   HENT:      Head: Normocephalic and atraumatic.   Cardiovascular:      Rate and Rhythm: Normal rate and regular rhythm.      Heart sounds: Normal heart sounds. No murmur.   Pulmonary:      Effort: Pulmonary effort is normal. No respiratory distress.      Breath sounds: Normal breath sounds.   Musculoskeletal:      Right knee: She exhibits decreased range of motion and swelling. She exhibits no erythema.      Left knee: She exhibits decreased range of motion. She exhibits no swelling.      Comments: Mild muscle atrophy to right calf likely related to compensation for right knee pain   Skin:     General: Skin is warm and dry.   Neurological:      Mental Status: She is alert.   Psychiatric:         Behavior: Behavior normal.         Thought Content: Thought content normal.         Judgment: Judgment normal.         Assessment/Plan     Diagnoses and all orders for this visit:    1. Type 2 diabetes mellitus with other circulatory complication, without long-term current use of insulin (CMS/[facility]) (Primary)  Comments:  Last A1c 4 months ago was 7%, continue current regimen, recheck A1c and urine microalbumin today  Orders:  -     Comprehensive Metabolic Panel  -     Hemoglobin A1c  -     Microalbumin / Creatinine Urine Ratio - Urine, Clean Catch    2. Hot flashes  -     FSH & LH    3. Vitamin D deficiency  -     Vitamin D 25 Hydroxy    4. Mixed hyperlipidemia  Comments:  Continue Repatha  Orders:  -     Lipid Panel    5. Thyroid nodule  -     TSH Rfx On Abnormal To Free T4    6. Other fatigue  -     Vitamin B12 & Folate    7. Screening for viral disease  -     Hepatitis C antibody    8. Spondylosis of lumbar region without myelopathy or radiculopathy  -     MRI Lumbar Spine Without Contrast; Future    9. Paresthesias in left hand  -     MRI Cervical Spine Without Contrast; Future    10. Paresthesias in right hand  -     MRI Cervical Spine Without Contrast; Future    11. Chronic  bilateral thoracic back pain  -     MRI thoracic spine wo contrast; Future    12. Chronic pain of right knee  -     CAMELIA With / DsDNA, RNP, Sjogrens A / B, Smith  -     C-reactive Protein  -     Rheumatoid Factor  -     Sedimentation Rate  -     Uric Acid    13. Acute pain of left knee  -     XR Knee 1 or 2 View Left (In Office)    14. Essential hypertension  Comments:  Controlled, continue current regimen    I recommend patient follow an anti-inflammatory or Mediterranean diet.    Smoking cessation highly encouraged.    I think patient could benefit from physical therapy.    She will follow-up in 3 to 4 months.

## 2021-03-01 LAB — ERYTHROCYTE [SEDIMENTATION RATE] IN BLOOD: 23 MM/HR (ref 0–20)

## 2021-03-01 PROCEDURE — 85651 RBC SED RATE NONAUTOMATED: CPT | Performed by: NURSE PRACTITIONER

## 2021-03-02 LAB
25(OH)D3+25(OH)D2 SERPL-MCNC: 35.6 NG/ML
ALBUMIN SERPL-MCNC: 4.1 G/DL (ref 3.5–5.2)
ALBUMIN/GLOB SERPL: 1.8 G/DL
ALP SERPL-CCNC: 54 U/L (ref 39–117)
ALT SERPL-CCNC: 14 U/L (ref 1–33)
ANA SER QL: NEGATIVE
AST SERPL-CCNC: 9 U/L (ref 1–32)
BILIRUB SERPL-MCNC: <0.2 MG/DL (ref 0–1.2)
BUN SERPL-MCNC: 14 MG/DL (ref 6–20)
BUN/CREAT SERPL: 24.1 (ref 7–25)
CALCIUM SERPL-MCNC: 8.9 MG/DL (ref 8.6–10.5)
CHLORIDE SERPL-SCNC: 102 MMOL/L (ref 98–107)
CHOLEST SERPL-MCNC: 243 MG/DL (ref 0–200)
CO2 SERPL-SCNC: 24.6 MMOL/L (ref 22–29)
CREAT SERPL-MCNC: 0.58 MG/DL (ref 0.57–1)
CRP SERPL-MCNC: 1.41 MG/DL (ref 0–0.5)
FOLATE SERPL-MCNC: 5.51 NG/ML (ref 4.78–24.2)
FSH SERPL-ACNC: 62.4 MIU/ML
GLOBULIN SER CALC-MCNC: 2.3 GM/DL
GLUCOSE SERPL-MCNC: 179 MG/DL (ref 65–99)
HBA1C MFR BLD: 7.9 % (ref 4.8–5.6)
HCV AB S/CO SERPL IA: <0.1 S/CO RATIO (ref 0–0.9)
HDLC SERPL-MCNC: 53 MG/DL (ref 40–60)
LDLC SERPL CALC-MCNC: 173 MG/DL (ref 0–100)
LH SERPL-ACNC: 30.7 MIU/ML
POTASSIUM SERPL-SCNC: 4.2 MMOL/L (ref 3.5–5.2)
PROT SERPL-MCNC: 6.4 G/DL (ref 6–8.5)
RHEUMATOID FACT SERPL-ACNC: <10 IU/ML (ref 0–13.9)
SODIUM SERPL-SCNC: 137 MMOL/L (ref 136–145)
TRIGL SERPL-MCNC: 99 MG/DL (ref 0–150)
TSH SERPL DL<=0.005 MIU/L-ACNC: 1.37 UIU/ML (ref 0.27–4.2)
UNABLE TO VOID: NORMAL
URATE SERPL-MCNC: 4.9 MG/DL (ref 2.4–5.7)
VIT B12 SERPL-MCNC: 384 PG/ML (ref 211–946)
VLDLC SERPL CALC-MCNC: 17 MG/DL (ref 5–40)

## 2021-03-22 DIAGNOSIS — H10.33 ACUTE BACTERIAL CONJUNCTIVITIS OF BOTH EYES: ICD-10-CM

## 2021-03-23 RX ORDER — OFLOXACIN 3 MG/ML
1 SOLUTION/ DROPS OPHTHALMIC 4 TIMES DAILY
Qty: 5 ML | Refills: 0 | Status: SHIPPED | OUTPATIENT
Start: 2021-03-23 | End: 2021-04-05

## 2021-03-29 ENCOUNTER — HOSPITAL ENCOUNTER (OUTPATIENT)
Dept: MRI IMAGING | Facility: HOSPITAL | Age: 51
Discharge: HOME | End: 2021-03-29

## 2021-03-29 DIAGNOSIS — R20.2 PARESTHESIAS IN LEFT HAND: ICD-10-CM

## 2021-03-29 DIAGNOSIS — M47.816 SPONDYLOSIS OF LUMBAR REGION WITHOUT MYELOPATHY OR RADICULOPATHY: ICD-10-CM

## 2021-03-29 DIAGNOSIS — G89.29 CHRONIC BILATERAL THORACIC BACK PAIN: ICD-10-CM

## 2021-03-29 DIAGNOSIS — R20.2 PARESTHESIAS IN RIGHT HAND: ICD-10-CM

## 2021-03-29 DIAGNOSIS — M54.6 CHRONIC BILATERAL THORACIC BACK PAIN: ICD-10-CM

## 2021-03-29 PROCEDURE — 72148 MRI LUMBAR SPINE W/O DYE: CPT

## 2021-03-29 PROCEDURE — 72146 MRI CHEST SPINE W/O DYE: CPT

## 2021-03-29 PROCEDURE — 72141 MRI NECK SPINE W/O DYE: CPT

## 2021-05-13 DIAGNOSIS — K57.90 DIVERTICULOSIS: ICD-10-CM

## 2021-05-13 DIAGNOSIS — R10.32 LLQ PAIN: ICD-10-CM

## 2021-05-14 RX ORDER — CIPROFLOXACIN 750 MG/1
750 TABLET, FILM COATED ORAL 2 TIMES DAILY
Qty: 14 TABLET | Refills: 0 | Status: SHIPPED | OUTPATIENT
Start: 2021-05-14 | End: 2021-05-28

## 2021-06-10 ENCOUNTER — OFFICE VISIT (OUTPATIENT)
Dept: INTERNAL MEDICINE | Facility: CLINIC | Age: 51
End: 2021-06-10

## 2021-06-10 VITALS
OXYGEN SATURATION: 96 % | BODY MASS INDEX: 37.17 KG/M2 | TEMPERATURE: 97.9 F | SYSTOLIC BLOOD PRESSURE: 116 MMHG | WEIGHT: 202 LBS | DIASTOLIC BLOOD PRESSURE: 64 MMHG | HEART RATE: 72 BPM | HEIGHT: 62 IN

## 2021-06-10 DIAGNOSIS — I10 ESSENTIAL HYPERTENSION: Chronic | ICD-10-CM

## 2021-06-10 DIAGNOSIS — M47.816 SPONDYLOSIS OF LUMBAR REGION WITHOUT MYELOPATHY OR RADICULOPATHY: ICD-10-CM

## 2021-06-10 DIAGNOSIS — E11.59 TYPE 2 DIABETES MELLITUS WITH OTHER CIRCULATORY COMPLICATION, WITHOUT LONG-TERM CURRENT USE OF INSULIN (HCC): Chronic | ICD-10-CM

## 2021-06-10 DIAGNOSIS — E27.8 ADRENAL NODULE (HCC): ICD-10-CM

## 2021-06-10 DIAGNOSIS — I25.119 CORONARY ARTERY DISEASE INVOLVING NATIVE CORONARY ARTERY OF NATIVE HEART WITH ANGINA PECTORIS (HCC): Chronic | ICD-10-CM

## 2021-06-10 DIAGNOSIS — L65.9 ALOPECIA: Primary | ICD-10-CM

## 2021-06-10 PROCEDURE — 99213 OFFICE O/P EST LOW 20 MIN: CPT | Performed by: FAMILY MEDICINE

## 2021-06-10 RX ORDER — SPIRONOLACTONE 25 MG/1
25 TABLET ORAL DAILY
Qty: 90 TABLET | Refills: 3 | Status: SHIPPED | OUTPATIENT
Start: 2021-06-10 | End: 2022-04-07

## 2021-06-10 NOTE — PROGRESS NOTES
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 History is reviewed as far as I am going management of several issues. She is doing a great job of exercising in order to maintain strength and bone health. She does have damage to meniscus in the right knee. Otherwise reviewed MRI of thoracic and lumbar spine as well as cervical spine with issues in that area which may affect overall mobility.    Stable medically managed CAD with history of circumflex stent placement reviewed.    Generic type 2 diabetes reviewed.    Discussion about some degree of alopecia with initiation of treatment with spironolactone with follow-up labs to include potassium level. Also check free and total testosterone.      Objective   Vital Signs:   /64   Pulse 72   Temp 97.9 °F (36.6 °C)   Ht 157.5 cm (62\")   Wt 91.6 kg (202 lb)   SpO2 96%   BMI 36.95 kg/m²     Physical Exam  HENT:      Head: Normocephalic.   Eyes:      Pupils: Pupils are equal, round, and reactive to light.   Cardiovascular:      Rate and Rhythm: Normal rate and regular rhythm.   Pulmonary:      Effort: Pulmonary effort is normal.      Breath sounds: Normal breath sounds.   Neurological:      General: No focal deficit present.      Mental Status: She is alert.   Psychiatric:         Mood and Affect: Mood normal.        Result Review :   The following data was reviewed by: Rowdy Engel Jr., MD on 06/10/2021:  Common labs    Common Labsle 10/22/20 10/22/20 10/22/20 10/22/20 11/5/20 3/1/21 3/1/21 3/1/21 3/1/21    1705 1705 1705 1705  0923 0923 0923 0923   Glucose    124 (A)   179 (A)     BUN    15   14     Creatinine    0.64 0.60  0.58     eGFR Non African Am    98   110     eGFR  Am    119   133     Sodium    137   137     Potassium    4.0   4.2     Chloride    100   102     Calcium    9.2   8.9   "   Total Protein    7.0   6.4     Albumin    4.30   4.10     Total Bilirubin    0.3   <0.2     Alkaline Phosphatase    59   54     AST (SGOT)    14   9     ALT (SGPT)    14   14     WBC   10.69         Hemoglobin   16.5 (A)         Hematocrit   47.2 (A)         Platelets   299         Total Cholesterol 325 (A)     243 (A)      Triglycerides 88     99      HDL Cholesterol 57     53      LDL Cholesterol  254 (A)     173 (A)      Hemoglobin A1C  7.00 (A)      7.90 (A)    Uric Acid         4.9   (A) Abnormal value       Comments are available for some flowsheets but are not being displayed.           Data reviewed: Radiologic studies MRI cervical lumbar thoracic spine          Assessment and Plan    Diagnoses and all orders for this visit:    1. Alopecia (Primary)  -     Comprehensive Metabolic Panel  -     Hemoglobin A1c  -     Lipid Panel With / Chol / HDL Ratio  -     CBC & Differential  -     TSH  -     T3, Free  -     T4, Free  -     Urinalysis With Microscopic If Indicated (No Culture) - Urine, Clean Catch  -     Vitamin B12  -     Testosterone, Free, Total    2. Coronary artery disease involving native coronary artery of native heart with angina pectoris (CMS/HCC)  -     Comprehensive Metabolic Panel  -     Hemoglobin A1c  -     Lipid Panel With / Chol / HDL Ratio  -     CBC & Differential  -     TSH  -     T3, Free  -     T4, Free  -     Urinalysis With Microscopic If Indicated (No Culture) - Urine, Clean Catch  -     Vitamin B12  -     Testosterone, Free, Total    3. Essential hypertension  -     Comprehensive Metabolic Panel  -     Hemoglobin A1c  -     Lipid Panel With / Chol / HDL Ratio  -     CBC & Differential  -     TSH  -     T3, Free  -     T4, Free  -     Urinalysis With Microscopic If Indicated (No Culture) - Urine, Clean Catch  -     Vitamin B12  -     Testosterone, Free, Total    4. Adrenal nodule (CMS/HCC)  -     Comprehensive Metabolic Panel  -     Hemoglobin A1c  -     Lipid Panel With / Chol /  HDL Ratio  -     CBC & Differential  -     TSH  -     T3, Free  -     T4, Free  -     Urinalysis With Microscopic If Indicated (No Culture) - Urine, Clean Catch  -     Vitamin B12  -     Testosterone, Free, Total    5. Type 2 diabetes mellitus with other circulatory complication, without long-term current use of insulin (CMS/[facility])  -     Comprehensive Metabolic Panel  -     Hemoglobin A1c  -     Lipid Panel With / Chol / HDL Ratio  -     CBC & Differential  -     TSH  -     T3, Free  -     T4, Free  -     Urinalysis With Microscopic If Indicated (No Culture) - Urine, Clean Catch  -     Vitamin B12  -     Testosterone, Free, Total    6. Spondylosis of lumbar region without myelopathy or radiculopathy  -     Comprehensive Metabolic Panel  -     Hemoglobin A1c  -     Lipid Panel With / Chol / HDL Ratio  -     CBC & Differential  -     TSH  -     T3, Free  -     T4, Free  -     Urinalysis With Microscopic If Indicated (No Culture) - Urine, Clean Catch  -     Vitamin B12  -     Testosterone, Free, Total    Other orders  -     spironolactone (Aldactone) 25 MG tablet; Take 1 tablet by mouth Daily.  Dispense: 90 tablet; Refill: 3        Follow Up   No follow-ups on file.  Patient was given instructions and counseling regarding her condition or for health maintenance advice. Please see specific information pulled into the AVS if appropriate.

## 2021-06-16 ENCOUNTER — OFFICE VISIT (OUTPATIENT)
Dept: OBSTETRICS AND GYNECOLOGY | Age: 51
End: 2021-06-16

## 2021-06-16 VITALS
DIASTOLIC BLOOD PRESSURE: 68 MMHG | SYSTOLIC BLOOD PRESSURE: 110 MMHG | WEIGHT: 202 LBS | BODY MASS INDEX: 37.17 KG/M2 | HEIGHT: 62 IN

## 2021-06-16 DIAGNOSIS — Z01.419 ENCOUNTER FOR GYNECOLOGICAL EXAMINATION WITHOUT ABNORMAL FINDING: Primary | ICD-10-CM

## 2021-06-16 DIAGNOSIS — Z12.4 SCREENING FOR MALIGNANT NEOPLASM OF THE CERVIX: ICD-10-CM

## 2021-06-16 DIAGNOSIS — Z98.890 HISTORY OF ENDOMETRIAL ABLATION: ICD-10-CM

## 2021-06-16 PROCEDURE — 99396 PREV VISIT EST AGE 40-64: CPT | Performed by: OBSTETRICS & GYNECOLOGY

## 2021-06-16 NOTE — PROGRESS NOTES
"Subjective   Carina Zamudio is a 51 y.o. female cc: annual visit today , last pap 06/10/20 neg , mammo 2018 @ [facility] , colonoscopy 8/2020 , no complaints. H/o endo ablation.  Denies hot flashes or night sweats but is starting to have trouble sleeping. Not a candidate for HRT.  Will schedule MMG here.     I last saw her last July for follow-up on vulvar hematoma - reports doing great.  Feels like vulva is completely healed. Had SI and was a little uncomfortable. Will give Premarin to massage into scar.        History of Present Illness    The following portions of the patient's history were reviewed and updated as appropriate: allergies, current medications, past family history, past medical history, past social history, past surgical history and problem list.    Review of Systems   Constitutional: Negative for chills, fatigue and fever.   Gastrointestinal: Negative for abdominal distention and abdominal pain.   Genitourinary: Negative for dyspareunia, dysuria, menstrual problem, pelvic pain, vaginal bleeding, vaginal discharge and vaginal pain.   All other systems reviewed and are negative.    /68   Ht 157.5 cm (62\")   Wt 91.6 kg (202 lb)   LMP  (LMP Unknown)   Breastfeeding No   BMI 36.95 kg/m²     Objective   Physical Exam  Vitals and nursing note reviewed.   Constitutional:       Appearance: She is well-developed and normal weight.   Neck:      Thyroid: No thyromegaly.   Pulmonary:      Effort: Pulmonary effort is normal.   Chest:      Breasts:         Right: No mass, nipple discharge, skin change or tenderness.         Left: No mass, nipple discharge, skin change or tenderness.   Abdominal:      General: Bowel sounds are normal. There is no distension.      Palpations: Abdomen is soft.      Tenderness: There is no abdominal tenderness.   Genitourinary:     General: Normal vulva.      Exam position: Lithotomy position.      Labia:         Right: No rash or lesion.         Left: No rash or lesion.       " Vagina: Normal. No vaginal discharge.      Cervix: No friability, lesion or cervical bleeding.      Uterus: Not enlarged and not tender.       Adnexa:         Right: No mass or tenderness.          Left: No mass or tenderness.     Musculoskeletal:         General: Normal range of motion.      Cervical back: Normal range of motion.   Skin:     General: Skin is warm and dry.      Findings: No rash.   Neurological:      Mental Status: She is alert and oriented to person, place, and time.   Psychiatric:         Mood and Affect: Mood normal.         Behavior: Behavior normal.           Assessment/Plan   Diagnoses and all orders for this visit:    1. Encounter for gynecological examination without abnormal finding (Primary)    2. Screening for malignant neoplasm of the cervix  -     IgP, Aptima HPV    3. History of endometrial ablation        Counseling was given to patient for the following topics: instructions for management, risks and benefits of treatment options, importance of treatment compliance and self-breast exams .   Return in about 1 year (around 6/16/2022) for Annual physical.

## 2021-06-18 LAB
CYTOLOGIST CVX/VAG CYTO: NORMAL
CYTOLOGY CVX/VAG DOC CYTO: NORMAL
CYTOLOGY CVX/VAG DOC THIN PREP: NORMAL
DX ICD CODE: NORMAL
HIV 1 & 2 AB SER-IMP: NORMAL
HPV I/H RISK 4 DNA CVX QL PROBE+SIG AMP: NEGATIVE
OTHER STN SPEC: NORMAL
STAT OF ADQ CVX/VAG CYTO-IMP: NORMAL

## 2021-06-21 ENCOUNTER — APPOINTMENT (OUTPATIENT)
Dept: WOMENS IMAGING | Facility: HOSPITAL | Age: 51
End: 2021-06-21

## 2021-06-21 ENCOUNTER — PROCEDURE VISIT (OUTPATIENT)
Dept: OBSTETRICS AND GYNECOLOGY | Age: 51
End: 2021-06-21

## 2021-06-21 DIAGNOSIS — Z12.31 VISIT FOR SCREENING MAMMOGRAM: Primary | ICD-10-CM

## 2021-06-21 PROCEDURE — 77063 BREAST TOMOSYNTHESIS BI: CPT | Performed by: RADIOLOGY

## 2021-06-21 PROCEDURE — 77067 SCR MAMMO BI INCL CAD: CPT | Performed by: OBSTETRICS & GYNECOLOGY

## 2021-06-21 PROCEDURE — 77063 BREAST TOMOSYNTHESIS BI: CPT | Performed by: OBSTETRICS & GYNECOLOGY

## 2021-06-21 PROCEDURE — 77067 SCR MAMMO BI INCL CAD: CPT | Performed by: RADIOLOGY

## 2021-06-29 LAB
ALBUMIN SERPL-MCNC: 4.3 G/DL (ref 3.5–5.2)
ALBUMIN/GLOB SERPL: 1.8 G/DL
ALP SERPL-CCNC: 55 U/L (ref 39–117)
ALT SERPL-CCNC: 14 U/L (ref 1–33)
AST SERPL-CCNC: 10 U/L (ref 1–32)
BASOPHILS # BLD AUTO: 0.03 10*3/MM3 (ref 0–0.2)
BASOPHILS NFR BLD AUTO: 0.3 % (ref 0–1.5)
BILIRUB SERPL-MCNC: 0.3 MG/DL (ref 0–1.2)
BUN SERPL-MCNC: 10 MG/DL (ref 6–20)
BUN/CREAT SERPL: 15.6 (ref 7–25)
CALCIUM SERPL-MCNC: 9.5 MG/DL (ref 8.6–10.5)
CHLORIDE SERPL-SCNC: 100 MMOL/L (ref 98–107)
CHOLEST SERPL-MCNC: 233 MG/DL (ref 0–200)
CHOLEST/HDLC SERPL: 5.18 {RATIO}
CO2 SERPL-SCNC: 24.1 MMOL/L (ref 22–29)
CREAT SERPL-MCNC: 0.64 MG/DL (ref 0.57–1)
EOSINOPHIL # BLD AUTO: 0.11 10*3/MM3 (ref 0–0.4)
EOSINOPHIL NFR BLD AUTO: 1.1 % (ref 0.3–6.2)
ERYTHROCYTE [DISTWIDTH] IN BLOOD BY AUTOMATED COUNT: 12.8 % (ref 12.3–15.4)
GLOBULIN SER CALC-MCNC: 2.4 GM/DL
GLUCOSE SERPL-MCNC: 146 MG/DL (ref 65–99)
HBA1C MFR BLD: 7.2 % (ref 4.8–5.6)
HCT VFR BLD AUTO: 49.2 % (ref 34–46.6)
HDLC SERPL-MCNC: 45 MG/DL (ref 40–60)
HGB BLD-MCNC: 16.4 G/DL (ref 12–15.9)
IMM GRANULOCYTES # BLD AUTO: 0.02 10*3/MM3 (ref 0–0.05)
IMM GRANULOCYTES NFR BLD AUTO: 0.2 % (ref 0–0.5)
LDLC SERPL CALC-MCNC: 170 MG/DL (ref 0–100)
LYMPHOCYTES # BLD AUTO: 3.18 10*3/MM3 (ref 0.7–3.1)
LYMPHOCYTES NFR BLD AUTO: 31.6 % (ref 19.6–45.3)
MCH RBC QN AUTO: 31.8 PG (ref 26.6–33)
MCHC RBC AUTO-ENTMCNC: 33.3 G/DL (ref 31.5–35.7)
MCV RBC AUTO: 95.3 FL (ref 79–97)
MONOCYTES # BLD AUTO: 0.64 10*3/MM3 (ref 0.1–0.9)
MONOCYTES NFR BLD AUTO: 6.4 % (ref 5–12)
NEUTROPHILS # BLD AUTO: 6.08 10*3/MM3 (ref 1.7–7)
NEUTROPHILS NFR BLD AUTO: 60.4 % (ref 42.7–76)
NRBC BLD AUTO-RTO: 0 /100 WBC (ref 0–0.2)
PLATELET # BLD AUTO: 280 10*3/MM3 (ref 140–450)
POTASSIUM SERPL-SCNC: 4.1 MMOL/L (ref 3.5–5.2)
PROT SERPL-MCNC: 6.7 G/DL (ref 6–8.5)
RBC # BLD AUTO: 5.16 10*6/MM3 (ref 3.77–5.28)
SODIUM SERPL-SCNC: 139 MMOL/L (ref 136–145)
T3FREE SERPL-MCNC: 2.9 PG/ML (ref 2–4.4)
T4 FREE SERPL-MCNC: 1.37 NG/DL (ref 0.93–1.7)
TESTOST FREE SERPL-MCNC: 2.7 PG/ML (ref 0–4.2)
TESTOST SERPL-MCNC: 11 NG/DL (ref 4–50)
TRIGL SERPL-MCNC: 102 MG/DL (ref 0–150)
TSH SERPL DL<=0.005 MIU/L-ACNC: 1.25 UIU/ML (ref 0.27–4.2)
VIT B12 SERPL-MCNC: 427 PG/ML (ref 211–946)
VLDLC SERPL CALC-MCNC: 18 MG/DL (ref 5–40)
WBC # BLD AUTO: 10.06 10*3/MM3 (ref 3.4–10.8)

## 2021-08-18 ENCOUNTER — LAB (OUTPATIENT)
Dept: INTERNAL MEDICINE | Facility: CLINIC | Age: 51
End: 2021-08-18

## 2021-08-18 DIAGNOSIS — R68.89 CONGESTION OF THROAT: Primary | ICD-10-CM

## 2021-08-18 DIAGNOSIS — R68.89 CONGESTION OF THROAT: ICD-10-CM

## 2021-08-18 RX ORDER — LOSARTAN POTASSIUM 100 MG/1
100 TABLET ORAL
Qty: 90 TABLET | Refills: 1 | Status: CANCELLED | OUTPATIENT
Start: 2021-08-18

## 2021-08-19 LAB
LABCORP SARS-COV-2, NAA 2 DAY TAT: NORMAL
SARS-COV-2 RNA RESP QL NAA+PROBE: NOT DETECTED

## 2021-08-19 RX ORDER — LOSARTAN POTASSIUM 100 MG/1
100 TABLET ORAL
Qty: 90 TABLET | Refills: 1 | Status: SHIPPED | OUTPATIENT
Start: 2021-08-19 | End: 2022-03-03 | Stop reason: SDUPTHER

## 2021-08-20 LAB — REF LAB TEST METHOD: NORMAL

## 2021-09-03 ENCOUNTER — APPOINTMENT (OUTPATIENT)
Dept: VACCINE CLINIC | Facility: HOSPITAL | Age: 51
End: 2021-09-03

## 2021-09-23 ENCOUNTER — LAB (OUTPATIENT)
Dept: INTERNAL MEDICINE | Facility: CLINIC | Age: 51
End: 2021-09-23

## 2021-09-23 DIAGNOSIS — N30.00 ACUTE CYSTITIS WITHOUT HEMATURIA: ICD-10-CM

## 2021-09-23 DIAGNOSIS — R10.9 FLANK PAIN: ICD-10-CM

## 2021-09-23 DIAGNOSIS — N30.00 ACUTE CYSTITIS WITHOUT HEMATURIA: Primary | ICD-10-CM

## 2021-09-23 RX ORDER — LEVOFLOXACIN 750 MG/1
750 TABLET ORAL DAILY
Qty: 10 TABLET | Refills: 0 | Status: SHIPPED | OUTPATIENT
Start: 2021-09-23 | End: 2021-11-04

## 2021-09-27 LAB
BACTERIA UR CULT: ABNORMAL
BACTERIA UR CULT: ABNORMAL
OTHER ANTIBIOTIC SUSC ISLT: ABNORMAL

## 2021-09-28 ENCOUNTER — DOCUMENTATION (OUTPATIENT)
Dept: INTERNAL MEDICINE | Facility: CLINIC | Age: 51
End: 2021-09-28

## 2021-09-28 RX ORDER — FLUCONAZOLE 100 MG/1
100 TABLET ORAL DAILY
Qty: 10 TABLET | Refills: 1 | Status: SHIPPED | OUTPATIENT
Start: 2021-09-28 | End: 2021-11-17

## 2021-09-28 RX ORDER — NITROFURANTOIN 25; 75 MG/1; MG/1
100 CAPSULE ORAL 2 TIMES DAILY
Qty: 20 CAPSULE | Refills: 1 | Status: SHIPPED | OUTPATIENT
Start: 2021-09-28 | End: 2021-11-17

## 2021-09-28 NOTE — PROGRESS NOTES
Rosa has had evidence of hemorrhagic cystitis treated with Levaquin 750 mg daily.  Culture however came back with E. coli with ESBL activity.  I contacted Dr. Alas about the culture and he recommended Bactrim DS or Macrobid 100 twice daily.  I have sent also fluconazole 100 mg daily for 10 days.  We will do a repeat culture after completion.

## 2021-10-21 RX ORDER — ERGOCALCIFEROL 1.25 MG/1
50000 CAPSULE ORAL 2 TIMES WEEKLY
Qty: 26 CAPSULE | Refills: 3 | Status: SHIPPED | OUTPATIENT
Start: 2021-10-21

## 2021-10-29 RX ORDER — VALACYCLOVIR HYDROCHLORIDE 1 G/1
1000 TABLET, FILM COATED ORAL 3 TIMES DAILY
Qty: 21 TABLET | Refills: 0 | Status: SHIPPED | OUTPATIENT
Start: 2021-10-29 | End: 2021-11-04 | Stop reason: HOSPADM

## 2021-10-31 ENCOUNTER — APPOINTMENT (OUTPATIENT)
Dept: ULTRASOUND IMAGING | Facility: HOSPITAL | Age: 51
End: 2021-10-31

## 2021-10-31 ENCOUNTER — HOSPITAL ENCOUNTER (EMERGENCY)
Facility: HOSPITAL | Age: 51
Discharge: HOME | End: 2021-10-31
Attending: EMERGENCY MEDICINE | Admitting: EMERGENCY MEDICINE

## 2021-10-31 ENCOUNTER — APPOINTMENT (OUTPATIENT)
Dept: CT IMAGING | Facility: HOSPITAL | Age: 51
End: 2021-10-31

## 2021-10-31 ENCOUNTER — APPOINTMENT (OUTPATIENT)
Dept: GENERAL RADIOLOGY | Facility: HOSPITAL | Age: 51
End: 2021-10-31

## 2021-10-31 VITALS
HEIGHT: 62 IN | OXYGEN SATURATION: 99 % | DIASTOLIC BLOOD PRESSURE: 67 MMHG | RESPIRATION RATE: 16 BRPM | WEIGHT: 195 LBS | TEMPERATURE: 97.8 F | SYSTOLIC BLOOD PRESSURE: 133 MMHG | BODY MASS INDEX: 35.88 KG/M2 | HEART RATE: 66 BPM

## 2021-10-31 DIAGNOSIS — R93.89 ABNORMAL CT SCAN, CHEST: ICD-10-CM

## 2021-10-31 DIAGNOSIS — R10.11 RIGHT UPPER QUADRANT ABDOMINAL PAIN: Primary | ICD-10-CM

## 2021-10-31 LAB
ALBUMIN SERPL-MCNC: 4.2 G/DL (ref 3.5–5.2)
ALBUMIN/GLOB SERPL: 1.5 G/DL
ALP SERPL-CCNC: 52 U/L (ref 39–117)
ALT SERPL W P-5'-P-CCNC: 15 U/L (ref 1–33)
ANION GAP SERPL CALCULATED.3IONS-SCNC: 12.9 MMOL/L (ref 5–15)
AST SERPL-CCNC: 13 U/L (ref 1–32)
BACTERIA UR QL AUTO: NORMAL /HPF
BASOPHILS # BLD AUTO: 0.03 10*3/MM3 (ref 0–0.2)
BASOPHILS NFR BLD AUTO: 0.4 % (ref 0–1.5)
BILIRUB SERPL-MCNC: 0.3 MG/DL (ref 0–1.2)
BILIRUB UR QL STRIP: NEGATIVE
BUN SERPL-MCNC: 13 MG/DL (ref 6–20)
BUN/CREAT SERPL: 17.1 (ref 7–25)
CALCIUM SPEC-SCNC: 8.9 MG/DL (ref 8.6–10.5)
CHLORIDE SERPL-SCNC: 100 MMOL/L (ref 98–107)
CLARITY UR: CLEAR
CO2 SERPL-SCNC: 24.1 MMOL/L (ref 22–29)
COLOR UR: YELLOW
CREAT SERPL-MCNC: 0.76 MG/DL (ref 0.57–1)
D DIMER PPP FEU-MCNC: 0.36 MCGFEU/ML (ref 0–0.49)
DEPRECATED RDW RBC AUTO: 43.9 FL (ref 37–54)
EOSINOPHIL # BLD AUTO: 0.09 10*3/MM3 (ref 0–0.4)
EOSINOPHIL NFR BLD AUTO: 1.2 % (ref 0.3–6.2)
ERYTHROCYTE [DISTWIDTH] IN BLOOD BY AUTOMATED COUNT: 13.1 % (ref 12.3–15.4)
GFR SERPL CREATININE-BSD FRML MDRD: 80 ML/MIN/1.73
GLOBULIN UR ELPH-MCNC: 2.8 GM/DL
GLUCOSE SERPL-MCNC: 188 MG/DL (ref 65–99)
GLUCOSE UR STRIP-MCNC: NEGATIVE MG/DL
HCG SERPL QL: NEGATIVE
HCT VFR BLD AUTO: 44.4 % (ref 34–46.6)
HGB BLD-MCNC: 15.5 G/DL (ref 12–15.9)
HGB UR QL STRIP.AUTO: NEGATIVE
HOLD SPECIMEN: NORMAL
HYALINE CASTS UR QL AUTO: NORMAL /LPF
IMM GRANULOCYTES # BLD AUTO: 0.03 10*3/MM3 (ref 0–0.05)
IMM GRANULOCYTES NFR BLD AUTO: 0.4 % (ref 0–0.5)
KETONES UR QL STRIP: NEGATIVE
LEUKOCYTE ESTERASE UR QL STRIP.AUTO: NEGATIVE
LIPASE SERPL-CCNC: 32 U/L (ref 13–60)
LYMPHOCYTES # BLD AUTO: 2.24 10*3/MM3 (ref 0.7–3.1)
LYMPHOCYTES NFR BLD AUTO: 30.5 % (ref 19.6–45.3)
MCH RBC QN AUTO: 31.9 PG (ref 26.6–33)
MCHC RBC AUTO-ENTMCNC: 34.9 G/DL (ref 31.5–35.7)
MCV RBC AUTO: 91.4 FL (ref 79–97)
MONOCYTES # BLD AUTO: 0.5 10*3/MM3 (ref 0.1–0.9)
MONOCYTES NFR BLD AUTO: 6.8 % (ref 5–12)
NEUTROPHILS NFR BLD AUTO: 4.46 10*3/MM3 (ref 1.7–7)
NEUTROPHILS NFR BLD AUTO: 60.7 % (ref 42.7–76)
NITRITE UR QL STRIP: NEGATIVE
NRBC BLD AUTO-RTO: 0 /100 WBC (ref 0–0.2)
PH UR STRIP.AUTO: 6 [PH] (ref 5–8)
PLATELET # BLD AUTO: 273 10*3/MM3 (ref 140–450)
PMV BLD AUTO: 9.4 FL (ref 6–12)
POTASSIUM SERPL-SCNC: 4.1 MMOL/L (ref 3.5–5.2)
PROT SERPL-MCNC: 7 G/DL (ref 6–8.5)
PROT UR QL STRIP: ABNORMAL
QT INTERVAL: 398 MS
RBC # BLD AUTO: 4.86 10*6/MM3 (ref 3.77–5.28)
RBC # UR: NORMAL /HPF
REF LAB TEST METHOD: NORMAL
SODIUM SERPL-SCNC: 137 MMOL/L (ref 136–145)
SP GR UR STRIP: 1.02 (ref 1–1.03)
SQUAMOUS #/AREA URNS HPF: NORMAL /HPF
TROPONIN T SERPL-MCNC: <0.01 NG/ML (ref 0–0.03)
TROPONIN T SERPL-MCNC: <0.01 NG/ML (ref 0–0.03)
UROBILINOGEN UR QL STRIP: ABNORMAL
WBC # BLD AUTO: 7.35 10*3/MM3 (ref 3.4–10.8)
WBC UR QL AUTO: NORMAL /HPF
WHOLE BLOOD HOLD SPECIMEN: NORMAL
WHOLE BLOOD HOLD SPECIMEN: NORMAL

## 2021-10-31 PROCEDURE — 96374 THER/PROPH/DIAG INJ IV PUSH: CPT

## 2021-10-31 PROCEDURE — 36415 COLL VENOUS BLD VENIPUNCTURE: CPT

## 2021-10-31 PROCEDURE — 71250 CT THORAX DX C-: CPT

## 2021-10-31 PROCEDURE — 80053 COMPREHEN METABOLIC PANEL: CPT | Performed by: EMERGENCY MEDICINE

## 2021-10-31 PROCEDURE — 25010000002 IOPAMIDOL 61 % SOLUTION: Performed by: EMERGENCY MEDICINE

## 2021-10-31 PROCEDURE — 74177 CT ABD & PELVIS W/CONTRAST: CPT

## 2021-10-31 PROCEDURE — 25010000002 KETOROLAC TROMETHAMINE PER 15 MG: Performed by: EMERGENCY MEDICINE

## 2021-10-31 PROCEDURE — 99283 EMERGENCY DEPT VISIT LOW MDM: CPT

## 2021-10-31 PROCEDURE — 85025 COMPLETE CBC W/AUTO DIFF WBC: CPT | Performed by: EMERGENCY MEDICINE

## 2021-10-31 PROCEDURE — 93010 ELECTROCARDIOGRAM REPORT: CPT | Performed by: INTERNAL MEDICINE

## 2021-10-31 PROCEDURE — 84703 CHORIONIC GONADOTROPIN ASSAY: CPT | Performed by: EMERGENCY MEDICINE

## 2021-10-31 PROCEDURE — 85379 FIBRIN DEGRADATION QUANT: CPT | Performed by: NURSE PRACTITIONER

## 2021-10-31 PROCEDURE — 83690 ASSAY OF LIPASE: CPT | Performed by: EMERGENCY MEDICINE

## 2021-10-31 PROCEDURE — 76705 ECHO EXAM OF ABDOMEN: CPT

## 2021-10-31 PROCEDURE — 81001 URINALYSIS AUTO W/SCOPE: CPT

## 2021-10-31 PROCEDURE — 93005 ELECTROCARDIOGRAM TRACING: CPT | Performed by: NURSE PRACTITIONER

## 2021-10-31 PROCEDURE — 71045 X-RAY EXAM CHEST 1 VIEW: CPT

## 2021-10-31 PROCEDURE — 84484 ASSAY OF TROPONIN QUANT: CPT | Performed by: EMERGENCY MEDICINE

## 2021-10-31 RX ORDER — KETOROLAC TROMETHAMINE 15 MG/ML
15 INJECTION, SOLUTION INTRAMUSCULAR; INTRAVENOUS ONCE
Status: COMPLETED | OUTPATIENT
Start: 2021-10-31 | End: 2021-10-31

## 2021-10-31 RX ORDER — SODIUM CHLORIDE 0.9 % (FLUSH) 0.9 %
10 SYRINGE (ML) INJECTION AS NEEDED
Status: DISCONTINUED | OUTPATIENT
Start: 2021-10-31 | End: 2021-10-31 | Stop reason: HOSPADM

## 2021-10-31 RX ADMIN — KETOROLAC TROMETHAMINE 15 MG: 15 INJECTION, SOLUTION INTRAMUSCULAR; INTRAVENOUS at 14:01

## 2021-10-31 RX ADMIN — SODIUM CHLORIDE 1000 ML: 9 INJECTION, SOLUTION INTRAVENOUS at 11:21

## 2021-10-31 RX ADMIN — IOPAMIDOL 85 ML: 612 INJECTION, SOLUTION INTRAVENOUS at 15:19

## 2021-11-01 DIAGNOSIS — R10.11 RIGHT UPPER QUADRANT PAIN: Primary | ICD-10-CM

## 2021-11-01 DIAGNOSIS — R91.8 HILAR MASS: Primary | ICD-10-CM

## 2021-11-01 DIAGNOSIS — R10.11 RIGHT UPPER QUADRANT PAIN: ICD-10-CM

## 2021-11-01 RX ORDER — HYDROCODONE BITARTRATE AND ACETAMINOPHEN 7.5; 325 MG/1; MG/1
1 TABLET ORAL EVERY 4 HOURS PRN
Qty: 24 TABLET | Refills: 0 | Status: SHIPPED | OUTPATIENT
Start: 2021-11-01 | End: 2021-11-17

## 2021-11-02 ENCOUNTER — HOSPITAL ENCOUNTER (EMERGENCY)
Facility: HOSPITAL | Age: 51
Discharge: HOME | End: 2021-11-02
Attending: EMERGENCY MEDICINE | Admitting: EMERGENCY MEDICINE

## 2021-11-02 VITALS
OXYGEN SATURATION: 97 % | TEMPERATURE: 96.9 F | HEART RATE: 66 BPM | WEIGHT: 195 LBS | BODY MASS INDEX: 35.88 KG/M2 | SYSTOLIC BLOOD PRESSURE: 132 MMHG | RESPIRATION RATE: 16 BRPM | HEIGHT: 62 IN | DIASTOLIC BLOOD PRESSURE: 78 MMHG

## 2021-11-02 DIAGNOSIS — R10.10 UPPER ABDOMINAL PAIN: Primary | ICD-10-CM

## 2021-11-02 LAB
ALBUMIN SERPL-MCNC: 4.3 G/DL (ref 3.5–5.2)
ALBUMIN/GLOB SERPL: 1.4 G/DL
ALP SERPL-CCNC: 49 U/L (ref 39–117)
ALT SERPL W P-5'-P-CCNC: 14 U/L (ref 1–33)
ANION GAP SERPL CALCULATED.3IONS-SCNC: 9.6 MMOL/L (ref 5–15)
AST SERPL-CCNC: 12 U/L (ref 1–32)
BACTERIA UR QL AUTO: ABNORMAL /HPF
BASOPHILS # BLD AUTO: 0.05 10*3/MM3 (ref 0–0.2)
BASOPHILS NFR BLD AUTO: 0.5 % (ref 0–1.5)
BILIRUB SERPL-MCNC: 0.3 MG/DL (ref 0–1.2)
BILIRUB UR QL STRIP: NEGATIVE
BUN SERPL-MCNC: 7 MG/DL (ref 6–20)
BUN/CREAT SERPL: 11.7 (ref 7–25)
CALCIUM SPEC-SCNC: 8.7 MG/DL (ref 8.6–10.5)
CHLORIDE SERPL-SCNC: 101 MMOL/L (ref 98–107)
CLARITY UR: CLEAR
CO2 SERPL-SCNC: 26.4 MMOL/L (ref 22–29)
COLOR UR: YELLOW
CREAT SERPL-MCNC: 0.6 MG/DL (ref 0.57–1)
DEPRECATED RDW RBC AUTO: 45 FL (ref 37–54)
EOSINOPHIL # BLD AUTO: 0.11 10*3/MM3 (ref 0–0.4)
EOSINOPHIL NFR BLD AUTO: 1 % (ref 0.3–6.2)
ERYTHROCYTE [DISTWIDTH] IN BLOOD BY AUTOMATED COUNT: 13.2 % (ref 12.3–15.4)
GFR SERPL CREATININE-BSD FRML MDRD: 105 ML/MIN/1.73
GLOBULIN UR ELPH-MCNC: 3 GM/DL
GLUCOSE SERPL-MCNC: 186 MG/DL (ref 65–99)
GLUCOSE UR STRIP-MCNC: NEGATIVE MG/DL
HCT VFR BLD AUTO: 45.3 % (ref 34–46.6)
HGB BLD-MCNC: 15.7 G/DL (ref 12–15.9)
HGB UR QL STRIP.AUTO: NEGATIVE
HYALINE CASTS UR QL AUTO: ABNORMAL /LPF
IMM GRANULOCYTES # BLD AUTO: 0.04 10*3/MM3 (ref 0–0.05)
IMM GRANULOCYTES NFR BLD AUTO: 0.4 % (ref 0–0.5)
KETONES UR QL STRIP: ABNORMAL
LEUKOCYTE ESTERASE UR QL STRIP.AUTO: NEGATIVE
LIPASE SERPL-CCNC: 31 U/L (ref 13–60)
LYMPHOCYTES # BLD AUTO: 2.45 10*3/MM3 (ref 0.7–3.1)
LYMPHOCYTES NFR BLD AUTO: 22.7 % (ref 19.6–45.3)
MCH RBC QN AUTO: 32.4 PG (ref 26.6–33)
MCHC RBC AUTO-ENTMCNC: 34.7 G/DL (ref 31.5–35.7)
MCV RBC AUTO: 93.4 FL (ref 79–97)
MONOCYTES # BLD AUTO: 0.64 10*3/MM3 (ref 0.1–0.9)
MONOCYTES NFR BLD AUTO: 5.9 % (ref 5–12)
NEUTROPHILS NFR BLD AUTO: 69.5 % (ref 42.7–76)
NEUTROPHILS NFR BLD AUTO: 7.48 10*3/MM3 (ref 1.7–7)
NITRITE UR QL STRIP: NEGATIVE
NRBC BLD AUTO-RTO: 0 /100 WBC (ref 0–0.2)
PH UR STRIP.AUTO: 8.5 [PH] (ref 5–8)
PLATELET # BLD AUTO: 285 10*3/MM3 (ref 140–450)
PMV BLD AUTO: 9.3 FL (ref 6–12)
POTASSIUM SERPL-SCNC: 4 MMOL/L (ref 3.5–5.2)
PROT SERPL-MCNC: 7.3 G/DL (ref 6–8.5)
PROT UR QL STRIP: ABNORMAL
QT INTERVAL: 394 MS
RBC # BLD AUTO: 4.85 10*6/MM3 (ref 3.77–5.28)
RBC # UR: ABNORMAL /HPF
REF LAB TEST METHOD: ABNORMAL
SODIUM SERPL-SCNC: 137 MMOL/L (ref 136–145)
SP GR UR STRIP: 1.02 (ref 1–1.03)
SQUAMOUS #/AREA URNS HPF: ABNORMAL /HPF
TROPONIN T SERPL-MCNC: <0.01 NG/ML (ref 0–0.03)
UROBILINOGEN UR QL STRIP: ABNORMAL
WBC # BLD AUTO: 10.77 10*3/MM3 (ref 3.4–10.8)
WBC UR QL AUTO: ABNORMAL /HPF

## 2021-11-02 PROCEDURE — 85025 COMPLETE CBC W/AUTO DIFF WBC: CPT | Performed by: PHYSICIAN ASSISTANT

## 2021-11-02 PROCEDURE — 93010 ELECTROCARDIOGRAM REPORT: CPT | Performed by: INTERNAL MEDICINE

## 2021-11-02 PROCEDURE — 84484 ASSAY OF TROPONIN QUANT: CPT | Performed by: PHYSICIAN ASSISTANT

## 2021-11-02 PROCEDURE — 81001 URINALYSIS AUTO W/SCOPE: CPT | Performed by: PHYSICIAN ASSISTANT

## 2021-11-02 PROCEDURE — 93005 ELECTROCARDIOGRAM TRACING: CPT | Performed by: PHYSICIAN ASSISTANT

## 2021-11-02 PROCEDURE — 80053 COMPREHEN METABOLIC PANEL: CPT | Performed by: PHYSICIAN ASSISTANT

## 2021-11-02 PROCEDURE — 96375 TX/PRO/DX INJ NEW DRUG ADDON: CPT

## 2021-11-02 PROCEDURE — 99283 EMERGENCY DEPT VISIT LOW MDM: CPT

## 2021-11-02 PROCEDURE — 83690 ASSAY OF LIPASE: CPT | Performed by: PHYSICIAN ASSISTANT

## 2021-11-02 PROCEDURE — 96374 THER/PROPH/DIAG INJ IV PUSH: CPT

## 2021-11-02 PROCEDURE — 25010000002 ONDANSETRON PER 1 MG: Performed by: PHYSICIAN ASSISTANT

## 2021-11-02 PROCEDURE — 25010000002 MORPHINE PER 10 MG: Performed by: EMERGENCY MEDICINE

## 2021-11-02 RX ORDER — ONDANSETRON 2 MG/ML
4 INJECTION INTRAMUSCULAR; INTRAVENOUS ONCE
Status: COMPLETED | OUTPATIENT
Start: 2021-11-02 | End: 2021-11-02

## 2021-11-02 RX ORDER — MORPHINE SULFATE 2 MG/ML
4 INJECTION, SOLUTION INTRAMUSCULAR; INTRAVENOUS ONCE
Status: COMPLETED | OUTPATIENT
Start: 2021-11-02 | End: 2021-11-02

## 2021-11-02 RX ADMIN — ONDANSETRON 4 MG: 2 INJECTION INTRAMUSCULAR; INTRAVENOUS at 18:53

## 2021-11-02 RX ADMIN — SODIUM CHLORIDE 1000 ML: 9 INJECTION, SOLUTION INTRAVENOUS at 18:54

## 2021-11-02 RX ADMIN — MORPHINE SULFATE 4 MG: 2 INJECTION, SOLUTION INTRAMUSCULAR; INTRAVENOUS at 18:54

## 2021-11-02 NOTE — ED PROVIDER NOTES
"EMERGENCY DEPARTMENT ENCOUNTER    Room Number: 04/04  Date seen: 11/2/2021  Time seen: 18:21 EDT  PCP: Rowdy Engel Jr., MD    Spoken Language:  English  Language interpretation services not needed     CHIEF COMPLAINT: abdominal pain    HPI: Carina Zamudio is a 51 y.o. female presenting to the ED for evaluation of abdominal pain. The history is being obtained by the patient and by review of the medical chart.  The patient presents complaining of abdominal pain which onset 3 days ago.  She states that this pain is \"sharp\" in nature and is constant.  She states that the pain is located in the right upper quadrant and radiates to the back.  She was seen in the emergency department 2 days ago.  Her pain improved following IV pain medication, but returned after being discharged home.  While in the ED, the patient underwent CT scans of the abdomen/pelvis and had a gallbladder ultrasound.  The scans did not demonstrate a good pathology for the right upper quadrant pain.  The patient admits to mild nausea with 2 episodes of vomiting.  She states that she is only had a clear liquid for the past 2 days.  She rates her current pain is 9/10.  She has tried ibuprofen and Norco at home without benefit.  She states that she has an appointment to see Dr. Davila tomorrow for the right hilar mass which was found on her CT scan while in the ED.  She has an appointment with Dr. Ashraf's office on Thursday.    MEDICAL RECORD REVIEW:  Reviewed in EPIC.     CT SCAN OF THE ABDOMEN AND PELVIS WITH INTRAVENOUS CONTRAST     HISTORY: Right upper quadrant pain.     FINDINGS:  The CT scan was performed as an emergency procedure through  the abdomen and pelvis with intravenous contrast and compared to  previous CT scans of the abdomen and pelvis dated 11/05/2020. The  following findings are present:  1. The highest CT image is highly suspicious for a right hilar mass  measuring up to 2.3 x 2.6 cm. This is not completely imaged on the  current " exam and further evaluation with a chest CT scan is therefore  recommended for this suspicious finding.  2. There is very mild fatty infiltration of the liver which is  unchanged. There is a small cyst in the left hepatic lobe that is  unchanged. The spleen, pancreas, and right adrenal gland are  unremarkable. There is hyperplasia of the left adrenal gland unchanged  from multiple previous exams. The gallbladder appears normal.  3. The the kidneys are normal in size and there is no evidence of renal  stone or obstruction. There is no aortic aneurysm or retroperitoneal  lymphadenopathy.  4. The large and small bowel loops are normal in caliber and show no  inflammatory change. The appendix appears normal. In the pelvis there is  some sigmoid diverticulosis without CT evidence of diverticulitis.     Radiation dose reduction techniques were utilized, including automated  exposure control and exposure modulation based on body size.     This report was finalized on 10/31/2021 4:33 PM by Dr. Antelmo Price M.D.    GALLBLADDER ULTRASOUND     HISTORY: Right upper quadrant pain.     The examination was performed as an emergency procedure. The gallbladder  shows no gallstones or wall thickening and the common bile duct is  normal in caliber, measuring 4 mm. There is a small cyst in the left  hepatic lobe measuring up to 1.6 cm as also noted on the CT scan dated  11/05/2020. The visualized pancreas and right kidney are unremarkable.     CONCLUSION: Negative gallbladder ultrasound. Very small hepatic cyst  similar to CT scan dated 11/05/2020.     This report was finalized on 10/31/2021 2:40 PM by Dr. Antelmo Price M.D.    PAST MEDICAL HISTORY  Past Medical History:   Diagnosis Date   • Adrenal cortical adenoma    • Anemia    • Anxiety    • Benign essential hypertension    • CAD (coronary artery disease)    • Cerebellar infarct (HCC)    • Cervical radiculopathy due to degenerative joint disease of spine 12/27/2016   •  Diabetes mellitus (HCC)    • DJD (degenerative joint disease), cervical    • DJD (degenerative joint disease), lumbar    • Fracture, toe    • Gestational diabetes    • HPV (human papilloma virus) infection    • Hyperlipemia    • Leg edema, right 2019   • Liver nodule    • Myocardial infarction (HCC)    • Osteoarthritis of spine 2016   • Other specified dorsopathies, site unspecified 2011   • Pregnancy      - stillbirth at the gestational age 7 months x1   • Proteinuria     CHRONIC   • Raynaud phenomenon    • S/P right knee arthroscopy 2019   • Sleep apnea    • Type 2 diabetes mellitus with circulatory disorder, 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 MEDIAL AND LATERAL MENISECTOMY, PATELLA, CHONDROPLASTY, AND DEBRIDMENT;  Surgeon: Ignacio Martinez MD;  Location: [facility] OR [facility];  Service: Orthopedics   • TUBAL ABDOMINAL LIGATION         FAMILY HISTORY  Family History   Problem Relation Age of Onset   • Lymphoma Father    • Lung cancer Maternal Grandfather    • Heart attack Maternal Grandfather    • Diabetes Maternal Grandfather    • Heart attack Paternal Grandfather    • Diabetes Paternal Grandfather    • Diabetes Paternal Grandmother    • Diabetes Maternal Grandmother    • Breast cancer Neg Hx    • Ovarian cancer Neg Hx    • Uterine cancer Neg Hx    • Colon cancer Neg Hx    • Malig Hyperthermia Neg Hx         SOCIAL HISTORY  Social History     Socioeconomic History   • Marital status:    Tobacco Use   • Smoking status: Current Every Day Smoker     Packs/day: 1.00     Years: 30.00     Pack years: 30.00   • Smokeless tobacco: Never Used   Vaping Use   • Vaping Use: Never used   Substance and Sexual Activity   • Alcohol use: Yes     Comment: socially   • Drug use: No   • Sexual activity: Yes     Partners: Male     Birth control/protection: Surgical     Comment: ablation        CURRENT MEDICATIONS  Prior to Admission medications    Medication Sig Start Date End Date Taking? Authorizing Provider   Alirocumab 75 MG/ML solution auto-injector Inject 75 mg under the skin into the appropriate area as directed Every 14 (Fourteen) Days. 11/25/20   Vaughn Tipton III, MD   ALPRAZolam (XANAX) 0.5 MG tablet Take 1 tablet by mouth Daily As Needed for Anxiety. 1/5/21   Yamilet Munoz MD   aspirin 81 MG tablet Take 81 mg by mouth Daily. INSTRUCTED PT FOLLOW MD INSTRUCTIONS 4/24/13   Jabier Stephenson MD   B Complex Vitamins (VITAMIN-B COMPLEX) tablet Take 1 tablet by mouth Daily. HOLD PRIOR TO SURG 4/24/13   ProviderJabier MD   carvedilol (Coreg) 6.25 MG tablet Take 1 tablet by mouth 2 (Two) Times a Day With Meals. 1/5/21   Yamilet Munoz MD   clopidogrel (PLAVIX) 75 MG tablet Take 1 tablet by mouth Daily. 1/5/21   Yamilet Munoz MD   fluconazole (Diflucan) 100 MG tablet Take 1 tablet by mouth Daily. 9/28/21   Rowdy Engel Jr., MD   fluticasone (FLONASE) 50 MCG/ACT nasal spray Administer 2 sprays in each nostril once a day 2/7/20   Nadine Gómez MD   hydroCHLOROthiazide (MICROZIDE) 12.5 MG capsule Take 1 capsule by mouth Daily. 1/5/21   Yamilet Munoz MD   HYDROcodone-acetaminophen (Norco) 7.5-325 MG per tablet Take 1 tablet by mouth Every 4 (Four) Hours As Needed for Moderate Pain  or Severe Pain . 11/1/21   Rowdy Engel Jr., MD   levoFLOXacin (Levaquin) 750 MG tablet Take 1 tablet  by mouth daily. 9/23/21   Rowdy Engel Jr., MD   losartan (COZAAR) 100 MG tablet Take 1 tablet by mouth Daily. 8/19/21   Rowdy Engel Jr., MD   Multiple Vitamin tablet Take 1 tablet by mouth Daily. HOLD PRIOR TO SURG 4/24/13   ProviderJabier MD   nitrofurantoin, macrocrystal-monohydrate, (Macrobid) 100 MG capsule Take 1 capsule by mouth 2 (Two) Times a Day. 9/28/21   Rowdy Engel Jr., MD   Semaglutide, 1 MG/DOSE, (OZEMPIC) 2 MG/1.5ML solution pen-injector Inject 0.25 mg under the skin into the appropriate area as directed 1 (One) Time Per Week.    ProviderJabier MD   spironolactone (Aldactone) 25 MG tablet Take 1 tablet by mouth Daily. 6/10/21   Rowdy Engel Jr., MD   valACYclovir (Valtrex) 1000 MG tablet Take 1 tablet by mouth 3 (Three) Times a Day. 10/29/21   Rowdy Engel Jr., MD   vitamin D (ERGOCALCIFEROL) 1.25 MG (64992 UT) capsule capsule Take 1 capsule by mouth 2 (Two) Times a Week. 10/21/21   Rowdy Engel Jr., MD       ALLERGIES  Patient has no known allergies.    REVIEW OF SYSTEMS  All systems reviewed and negative except for those discussed in HPI.     PHYSICAL EXAM  ED Triage Vitals [11/02/21 1803]   Temp Heart Rate Resp BP SpO2   96.9 °F (36.1 °C) 81 18 -- 99 %      Temp src Heart Rate Source Patient Position BP Location FiO2 (%)   -- -- -- -- --       Physical Exam  Constitutional:       Appearance: Normal appearance.   HENT:      Head: Normocephalic and atraumatic.      Mouth/Throat:      Mouth: Mucous membranes are moist.   Eyes:      Extraocular Movements: Extraocular movements intact.      Pupils: Pupils are equal, round, and reactive to light.   Cardiovascular:      Rate and Rhythm: Normal rate and regular rhythm.   Pulmonary:      Effort: Pulmonary effort is normal.      Breath sounds: Normal breath sounds.   Abdominal:      General: There is no distension.      Palpations: Abdomen is soft.      Tenderness: There is abdominal tenderness in the right upper quadrant and epigastric  area. Positive signs include Cleary's sign.   Musculoskeletal:      Cervical back: Normal range of motion.   Skin:     General: Skin is warm and dry.   Neurological:      General: No focal deficit present.      Mental Status: She is alert and oriented to person, place, and time.   Psychiatric:         Mood and Affect: Mood normal.         Behavior: Behavior normal.         Thought Content: Thought content normal.         Judgment: Judgment normal.         PROCEDURES  Procedures  None    LABS  Recent Results (from the past 24 hour(s))   Urinalysis With Microscopic If Indicated (No Culture) - Urine, Clean Catch    Collection Time: 11/02/21  6:12 PM    Specimen: Urine, Clean Catch   Result Value Ref Range    Color, UA Yellow Yellow, Straw    Appearance, UA Clear Clear    pH, UA 8.5 (H) 5.0 - 8.0    Specific Gravity, UA 1.019 1.005 - 1.030    Glucose, UA Negative Negative    Ketones, UA Trace (A) Negative    Bilirubin, UA Negative Negative    Blood, UA Negative Negative    Protein,  mg/dL (2+) (A) Negative    Leuk Esterase, UA Negative Negative    Nitrite, UA Negative Negative    Urobilinogen, UA 1.0 E.U./dL 0.2 - 1.0 E.U./dL   Urinalysis, Microscopic Only - Urine, Clean Catch    Collection Time: 11/02/21  6:12 PM    Specimen: Urine, Clean Catch   Result Value Ref Range    RBC, UA 0-2 None Seen, 0-2 /HPF    WBC, UA 0-2 None Seen, 0-2 /HPF    Bacteria, UA 1+ (A) None Seen /HPF    Squamous Epithelial Cells, UA 7-12 (A) None Seen, 0-2 /HPF    Hyaline Casts, UA 3-6 None Seen /LPF    Methodology Automated Microscopy    Comprehensive Metabolic Panel    Collection Time: 11/02/21  6:16 PM    Specimen: Blood   Result Value Ref Range    Glucose 186 (H) 65 - 99 mg/dL    BUN 7 6 - 20 mg/dL    Creatinine 0.60 0.57 - 1.00 mg/dL    Sodium 137 136 - 145 mmol/L    Potassium 4.0 3.5 - 5.2 mmol/L    Chloride 101 98 - 107 mmol/L    CO2 26.4 22.0 - 29.0 mmol/L    Calcium 8.7 8.6 - 10.5 mg/dL    Total Protein 7.3 6.0 - 8.5 g/dL     Albumin 4.30 3.50 - 5.20 g/dL    ALT (SGPT) 14 1 - 33 U/L    AST (SGOT) 12 1 - 32 U/L    Alkaline Phosphatase 49 39 - 117 U/L    Total Bilirubin 0.3 0.0 - 1.2 mg/dL    eGFR Non African Amer 105 >60 mL/min/1.73    Globulin 3.0 gm/dL    A/G Ratio 1.4 g/dL    BUN/Creatinine Ratio 11.7 7.0 - 25.0    Anion Gap 9.6 5.0 - 15.0 mmol/L   Lipase    Collection Time: 11/02/21  6:16 PM    Specimen: Blood   Result Value Ref Range    Lipase 31 13 - 60 U/L   CBC Auto Differential    Collection Time: 11/02/21  6:16 PM    Specimen: Blood   Result Value Ref Range    WBC 10.77 3.40 - 10.80 10*3/mm3    RBC 4.85 3.77 - 5.28 10*6/mm3    Hemoglobin 15.7 12.0 - 15.9 g/dL    Hematocrit 45.3 34.0 - 46.6 %    MCV 93.4 79.0 - 97.0 fL    MCH 32.4 26.6 - 33.0 pg    MCHC 34.7 31.5 - 35.7 g/dL    RDW 13.2 12.3 - 15.4 %    RDW-SD 45.0 37.0 - 54.0 fl    MPV 9.3 6.0 - 12.0 fL    Platelets 285 140 - 450 10*3/mm3    Neutrophil % 69.5 42.7 - 76.0 %    Lymphocyte % 22.7 19.6 - 45.3 %    Monocyte % 5.9 5.0 - 12.0 %    Eosinophil % 1.0 0.3 - 6.2 %    Basophil % 0.5 0.0 - 1.5 %    Immature Grans % 0.4 0.0 - 0.5 %    Neutrophils, Absolute 7.48 (H) 1.70 - 7.00 10*3/mm3    Lymphocytes, Absolute 2.45 0.70 - 3.10 10*3/mm3    Monocytes, Absolute 0.64 0.10 - 0.90 10*3/mm3    Eosinophils, Absolute 0.11 0.00 - 0.40 10*3/mm3    Basophils, Absolute 0.05 0.00 - 0.20 10*3/mm3    Immature Grans, Absolute 0.04 0.00 - 0.05 10*3/mm3    nRBC 0.0 0.0 - 0.2 /100 WBC   Troponin    Collection Time: 11/02/21  6:16 PM    Specimen: Blood   Result Value Ref Range    Troponin T <0.010 0.000 - 0.030 ng/mL   ECG 12 Lead    Collection Time: 11/02/21  6:36 PM   Result Value Ref Range    QT Interval 394 ms       RADIOLOGY  No orders to display       MEDICATIONS GIVEN IN THE ER  Medications   sodium chloride 0.9 % bolus 1,000 mL (0 mL Intravenous Stopped 11/2/21 2002)   ondansetron (ZOFRAN) injection 4 mg (4 mg Intravenous Given 11/2/21 5523)   morphine injection 4 mg (4 mg Intravenous  Given 11/2/21 1854)       MEDICAL DECISION MAKING, CONSULTS AND PROGRESS NOTES  All labs and all radiology studies were have been viewed and interpreted by me.   Discussion below represents my analysis of pertinent findings related to patient's condition, differential diagnosis, treatment plan and final disposition.    Differential diagnosis includes but is not limited to:  - Hepatobiliary pathology such as cholecystitis, cholangitis, and symptomatic cholelithiasis  - Pancreatitis  - Dyspepsia  - Small bowel obstruction  - Appendicitis  - Diverticulitis  - UTI including pyelonephritis  - Ureteral stone  - Zoster  - Colitis, including infectious and ischemic  - Atypical ACS    PPE: The patient was placed in a face mask in first look. Patient was wearing facemask when I entered the room and throughout our encounter. I wore full protective equipment throughout this patient encounter including a face mask, eye protection and gloves. Hand hygiene was performed before donning protective equipment and after removal when leaving the room.    AS OF 20:13 EDT VITALS:    BP - 140/80  HR - 66  TEMP - 96.9 °F (36.1 °C)  O2 SATS - 97%    ED Course as of 11/02/21 2013 Tue Nov 02, 2021 2006 We discussed with the patient the option for admission for symptomatic relief and for observation.  She states that she would rather be discharged home and will talk with Dr. Engel tomorrow about ordering an outpatient HIDA scan.  She has Norco and Phenergan at home to take as needed for her symptoms. [AR]      ED Course User Index  [AR] Mitzy Baker PA     The patient's history, physical exam, and lab findings were discussed with the physician, who also performed a face to face history and physical exam.  I discussed all results and noted any abnormalities with patient.  Discussed absoute need to recheck abnormalities with their family physician.  I answered any of the patient's questions.  Discussed plan for discharge, as there is  no emergent indication for admission.  Pt is agreeable and understands need for follow up and repeat testing.  Pt is aware that discharge does not mean that nothing is wrong but it indicates no emergency is present and they must continue care with their family physician.  Pt is discharged with instructions to follow up with primary care doctor to have their blood pressure rechecked.     DIAGNOSIS   Diagnosis Plan   1. Upper abdominal pain         DISPOSITION  ED Disposition     ED Disposition Condition Comment    Discharge Stable           FOLLOW UP  Keep your upcoming appointment with Dr. Ashraf's office          Keep your upcoming appointment with Dr. Lola Engel, Rowdy JOSE Jr., MD  [facility] [address]  [phone]    Schedule an appointment as soon as possible for a visit   Discuss ordering a HIDA scan with Dr. Engel      RX  Medications   sodium chloride 0.9 % bolus 1,000 mL (0 mL Intravenous Stopped 11/2/21 2002)   ondansetron (ZOFRAN) injection 4 mg (4 mg Intravenous Given 11/2/21 1853)   morphine injection 4 mg (4 mg Intravenous Given 11/2/21 1854)          Medication List      Changed    fluticasone 50 MCG/ACT nasal spray  Commonly known as: FLONASE  Administer 2 sprays in each nostril once a day  What changed:   · when to take this  · reasons to take this          Provider Attestation:  I personally reviewed the past medical history, past surgical history, social history, family history, current medications and allergies as they appear in the chart. I reviewed the patient's history, physical, lab/imaging results and overall care with Dr. Phillips who is in agreement with the patient's treatment plan.    EMR Dragon/Transcription disclaimer:  Some of this encounter note is an electronic transcription/translation of spoken language to printed text. The electronic translation of spoken language may permit erroneous, or at times, nonsensical words or phrases to be inadvertently  transcribed; Although I have reviewed the note for such errors, some may still exist.    Provider note signed by:         Mitzy Baker PA  11/02/21 2014

## 2021-11-02 NOTE — ED NOTES
Patient was placed in face mask in first look. Patient was wearing facemask when I entered the room and throughout our encounter. I wore full protective equipment throughout this patient encounter including a face mask, and gloves. Hand hygiene was performed before donning protective equipment and after removal when leaving the room.

## 2021-11-02 NOTE — ED NOTES
Pt was in mask through out encounter. This ERT was in proper PPE through out encounter.      Marta Chamberlain, PCT  11/02/21 191

## 2021-11-03 ENCOUNTER — HOSPITAL ENCOUNTER (OUTPATIENT)
Dept: NUCLEAR MEDICINE | Facility: HOSPITAL | Age: 51
Discharge: HOME | End: 2021-11-03

## 2021-11-03 DIAGNOSIS — R10.11 RIGHT UPPER QUADRANT PAIN: Primary | ICD-10-CM

## 2021-11-03 DIAGNOSIS — R10.11 RIGHT UPPER QUADRANT PAIN: ICD-10-CM

## 2021-11-03 PROCEDURE — 78227 HEPATOBIL SYST IMAGE W/DRUG: CPT

## 2021-11-03 PROCEDURE — A9537 TC99M MEBROFENIN: HCPCS | Performed by: FAMILY MEDICINE

## 2021-11-03 PROCEDURE — 25010000002 SINCALIDE PER 5 MCG: Performed by: FAMILY MEDICINE

## 2021-11-03 PROCEDURE — 0 TECHNETIUM TC 99M MEBROFENIN KIT: Performed by: FAMILY MEDICINE

## 2021-11-03 RX ORDER — KIT FOR THE PREPARATION OF TECHNETIUM TC 99M MEBROFENIN 45 MG/10ML
1 INJECTION, POWDER, LYOPHILIZED, FOR SOLUTION INTRAVENOUS
Status: COMPLETED | OUTPATIENT
Start: 2021-11-03 | End: 2021-11-03

## 2021-11-03 RX ADMIN — SINCALIDE 1.8 MCG: 5 INJECTION, POWDER, LYOPHILIZED, FOR SOLUTION INTRAVENOUS at 13:07

## 2021-11-03 RX ADMIN — MEBROFENIN 1 DOSE: 45 INJECTION, POWDER, LYOPHILIZED, FOR SOLUTION INTRAVENOUS at 10:57

## 2021-11-03 NOTE — DISCHARGE INSTRUCTIONS
Although you are being discharged from the ED today, I encourage you to return for worsening symptoms. Things can, and do, change such that treatment at home with medication may not be adequate. Specifically I recommend returning for chest pain or discomfort, difficulty breathing, persistent vomiting or difficulty holding down liquids or medications, fever > 102.0 F or any other worsening or alarming symptoms.     Rest. Drink plenty of fluids.  Follow up with Dr. Ashraf and Dr. Davila for further evaluation and management.  Follow up with primary care provider for further management and to have blood pressure rechecked.

## 2021-11-03 NOTE — ED PROVIDER NOTES
Pt presents to the ED c/o  right upper quadrant abdominal pain.  She was seen here recently for same symptoms and had a CT that was reassuring as well as gallbladder ultrasound that was reassuring without evidence of cholecystitis.  She has had persistent pain.  She denies nausea but she has had 2 episodes of vomiting in the last 24 hours that she describes as projectile.  She has been trying to utilize a clear liquid diet.  She has follow-up with general surgery on Thursday of this week.  She denies fever or chills.  She has been taking Norco and ibuprofen as needed for pain at home.     On exam,   Awake and alert, no acute distress.  There is mild right upper quadrant and epigastric tenderness without rebound or guarding.  Abdomen is nondistended.     Plan: Patient was offered admission to observation unit in order to facilitate HIDA scan with EF however she prefers to go home and try to get this done as outpatient in the next couple of days.  She does have prearranged follow-up with general surgery in 2 days.  She has Phenergan at home to take as needed for nausea.  Return precautions were discussed.      I wore an N95 mask, face shield, and gloves during this patient encounter.  Patient also wearing a surgical mask.  Hand hygeine performed before and after seeing the patient.     Attestation:  The JOHN and I have discussed this patient's history, physical exam, and treatment plan.  I have reviewed the documentation and personally had a face to face interaction with the patient. I affirm the documentation and agree with the treatment and plan.  The attached note describes my personal findings.            Anson Phillips MD  11/02/21 2005

## 2021-11-04 ENCOUNTER — ANESTHESIA EVENT (OUTPATIENT)
Dept: PERIOP | Facility: HOSPITAL | Age: 51
End: 2021-11-04

## 2021-11-04 ENCOUNTER — APPOINTMENT (OUTPATIENT)
Dept: GENERAL RADIOLOGY | Facility: HOSPITAL | Age: 51
End: 2021-11-04

## 2021-11-04 ENCOUNTER — HOSPITAL ENCOUNTER (OUTPATIENT)
Facility: HOSPITAL | Age: 51
Setting detail: HOSPITAL OUTPATIENT SURGERY
Discharge: HOME | End: 2021-11-04
Attending: SURGERY | Admitting: SURGERY

## 2021-11-04 ENCOUNTER — OFFICE VISIT (OUTPATIENT)
Dept: SURGERY | Facility: CLINIC | Age: 51
End: 2021-11-04

## 2021-11-04 ENCOUNTER — ANESTHESIA (OUTPATIENT)
Dept: PERIOP | Facility: HOSPITAL | Age: 51
End: 2021-11-04

## 2021-11-04 VITALS — WEIGHT: 207.8 LBS | BODY MASS INDEX: 38.24 KG/M2 | HEIGHT: 62 IN

## 2021-11-04 VITALS
HEART RATE: 67 BPM | OXYGEN SATURATION: 93 % | BODY MASS INDEX: 37.76 KG/M2 | RESPIRATION RATE: 16 BRPM | TEMPERATURE: 98.5 F | SYSTOLIC BLOOD PRESSURE: 150 MMHG | HEIGHT: 62 IN | DIASTOLIC BLOOD PRESSURE: 87 MMHG | WEIGHT: 205.2 LBS

## 2021-11-04 DIAGNOSIS — K82.8 BILIARY DYSKINESIA: Primary | ICD-10-CM

## 2021-11-04 DIAGNOSIS — K82.8 BILIARY DYSKINESIA: ICD-10-CM

## 2021-11-04 LAB
GLUCOSE BLDC GLUCOMTR-MCNC: 132 MG/DL (ref 70–130)
GLUCOSE BLDC GLUCOMTR-MCNC: 141 MG/DL (ref 70–130)
SARS-COV-2 RNA PNL SPEC NAA+PROBE: NOT DETECTED

## 2021-11-04 PROCEDURE — 25010000002 KETOROLAC TROMETHAMINE PER 15 MG: Performed by: NURSE ANESTHETIST, CERTIFIED REGISTERED

## 2021-11-04 PROCEDURE — 47563 LAPARO CHOLECYSTECTOMY/GRAPH: CPT | Performed by: SURGERY

## 2021-11-04 PROCEDURE — 25010000002 FENTANYL CITRATE (PF) 50 MCG/ML SOLUTION

## 2021-11-04 PROCEDURE — 47563 LAPARO CHOLECYSTECTOMY/GRAPH: CPT | Performed by: PHYSICIAN ASSISTANT

## 2021-11-04 PROCEDURE — 0 IOTHALAMATE 60 % SOLUTION: Performed by: SURGERY

## 2021-11-04 PROCEDURE — C1889 IMPLANT/INSERT DEVICE, NOC: HCPCS | Performed by: SURGERY

## 2021-11-04 PROCEDURE — C9803 HOPD COVID-19 SPEC COLLECT: HCPCS

## 2021-11-04 PROCEDURE — 25010000002 PHENYLEPHRINE 10 MG/ML SOLUTION

## 2021-11-04 PROCEDURE — 25010000002 PROPOFOL 10 MG/ML EMULSION

## 2021-11-04 PROCEDURE — 25010000002 DEXAMETHASONE PER 1 MG

## 2021-11-04 PROCEDURE — 25010000002 ONDANSETRON PER 1 MG

## 2021-11-04 PROCEDURE — 82962 GLUCOSE BLOOD TEST: CPT

## 2021-11-04 PROCEDURE — 88304 TISSUE EXAM BY PATHOLOGIST: CPT | Performed by: SURGERY

## 2021-11-04 PROCEDURE — 87635 SARS-COV-2 COVID-19 AMP PRB: CPT | Performed by: SURGERY

## 2021-11-04 PROCEDURE — 99214 OFFICE O/P EST MOD 30 MIN: CPT | Performed by: PHYSICIAN ASSISTANT

## 2021-11-04 PROCEDURE — 74300 X-RAY BILE DUCTS/PANCREAS: CPT

## 2021-11-04 DEVICE — SEAL HEMO SURG ARISTA/AH ABS/PWDR 3GM: Type: IMPLANTABLE DEVICE | Site: ABDOMEN | Status: FUNCTIONAL

## 2021-11-04 DEVICE — CLIP LIGAT VASC HORIZON TI MD/LG GRN 6CT: Type: IMPLANTABLE DEVICE | Site: ABDOMEN | Status: FUNCTIONAL

## 2021-11-04 RX ORDER — HYDROCODONE BITARTRATE AND ACETAMINOPHEN 5; 325 MG/1; MG/1
1-2 TABLET ORAL EVERY 4 HOURS PRN
Qty: 24 TABLET | Refills: 0 | Status: SHIPPED | OUTPATIENT
Start: 2021-11-04 | End: 2021-11-15

## 2021-11-04 RX ORDER — FLUMAZENIL 0.1 MG/ML
0.2 INJECTION INTRAVENOUS AS NEEDED
Status: DISCONTINUED | OUTPATIENT
Start: 2021-11-04 | End: 2021-11-04 | Stop reason: HOSPADM

## 2021-11-04 RX ORDER — LIDOCAINE HYDROCHLORIDE 20 MG/ML
INJECTION, SOLUTION INFILTRATION; PERINEURAL AS NEEDED
Status: DISCONTINUED | OUTPATIENT
Start: 2021-11-04 | End: 2021-11-04 | Stop reason: SURG

## 2021-11-04 RX ORDER — PHENYLEPHRINE HYDROCHLORIDE 10 MG/ML
INJECTION INTRAVENOUS AS NEEDED
Status: DISCONTINUED | OUTPATIENT
Start: 2021-11-04 | End: 2021-11-04 | Stop reason: SURG

## 2021-11-04 RX ORDER — ONDANSETRON 4 MG/1
4 TABLET, FILM COATED ORAL EVERY 6 HOURS PRN
Qty: 10 TABLET | Refills: 1 | Status: SHIPPED | OUTPATIENT
Start: 2021-11-04 | End: 2021-11-15

## 2021-11-04 RX ORDER — ROCURONIUM BROMIDE 10 MG/ML
INJECTION, SOLUTION INTRAVENOUS AS NEEDED
Status: DISCONTINUED | OUTPATIENT
Start: 2021-11-04 | End: 2021-11-04 | Stop reason: SURG

## 2021-11-04 RX ORDER — FENTANYL CITRATE 50 UG/ML
50 INJECTION, SOLUTION INTRAMUSCULAR; INTRAVENOUS
Status: DISCONTINUED | OUTPATIENT
Start: 2021-11-04 | End: 2021-11-04 | Stop reason: HOSPADM

## 2021-11-04 RX ORDER — KETOROLAC TROMETHAMINE 30 MG/ML
INJECTION, SOLUTION INTRAMUSCULAR; INTRAVENOUS AS NEEDED
Status: DISCONTINUED | OUTPATIENT
Start: 2021-11-04 | End: 2021-11-04 | Stop reason: SURG

## 2021-11-04 RX ORDER — IBUPROFEN 600 MG/1
600 TABLET ORAL ONCE AS NEEDED
Status: DISCONTINUED | OUTPATIENT
Start: 2021-11-04 | End: 2021-11-04 | Stop reason: HOSPADM

## 2021-11-04 RX ORDER — ONDANSETRON 2 MG/ML
4 INJECTION INTRAMUSCULAR; INTRAVENOUS ONCE AS NEEDED
Status: COMPLETED | OUTPATIENT
Start: 2021-11-04 | End: 2021-11-04

## 2021-11-04 RX ORDER — SODIUM CHLORIDE 0.9 % (FLUSH) 0.9 %
3-10 SYRINGE (ML) INJECTION AS NEEDED
Status: DISCONTINUED | OUTPATIENT
Start: 2021-11-04 | End: 2021-11-04 | Stop reason: HOSPADM

## 2021-11-04 RX ORDER — LABETALOL HYDROCHLORIDE 5 MG/ML
5 INJECTION, SOLUTION INTRAVENOUS
Status: DISCONTINUED | OUTPATIENT
Start: 2021-11-04 | End: 2021-11-04 | Stop reason: HOSPADM

## 2021-11-04 RX ORDER — ONDANSETRON 2 MG/ML
INJECTION INTRAMUSCULAR; INTRAVENOUS AS NEEDED
Status: DISCONTINUED | OUTPATIENT
Start: 2021-11-04 | End: 2021-11-04 | Stop reason: SURG

## 2021-11-04 RX ORDER — NALOXONE HCL 0.4 MG/ML
0.2 VIAL (ML) INJECTION AS NEEDED
Status: DISCONTINUED | OUTPATIENT
Start: 2021-11-04 | End: 2021-11-04 | Stop reason: HOSPADM

## 2021-11-04 RX ORDER — SODIUM CHLORIDE, SODIUM LACTATE, POTASSIUM CHLORIDE, CALCIUM CHLORIDE 600; 310; 30; 20 MG/100ML; MG/100ML; MG/100ML; MG/100ML
9 INJECTION, SOLUTION INTRAVENOUS CONTINUOUS
Status: DISCONTINUED | OUTPATIENT
Start: 2021-11-04 | End: 2021-11-04 | Stop reason: HOSPADM

## 2021-11-04 RX ORDER — SODIUM CHLORIDE 9 MG/ML
INJECTION, SOLUTION INTRAVENOUS AS NEEDED
Status: DISCONTINUED | OUTPATIENT
Start: 2021-11-04 | End: 2021-11-04 | Stop reason: HOSPADM

## 2021-11-04 RX ORDER — HYDROMORPHONE HYDROCHLORIDE 1 MG/ML
0.5 INJECTION, SOLUTION INTRAMUSCULAR; INTRAVENOUS; SUBCUTANEOUS
Status: DISCONTINUED | OUTPATIENT
Start: 2021-11-04 | End: 2021-11-04 | Stop reason: HOSPADM

## 2021-11-04 RX ORDER — EPHEDRINE SULFATE 50 MG/ML
5 INJECTION, SOLUTION INTRAVENOUS ONCE AS NEEDED
Status: DISCONTINUED | OUTPATIENT
Start: 2021-11-04 | End: 2021-11-04 | Stop reason: HOSPADM

## 2021-11-04 RX ORDER — PROPOFOL 10 MG/ML
VIAL (ML) INTRAVENOUS AS NEEDED
Status: DISCONTINUED | OUTPATIENT
Start: 2021-11-04 | End: 2021-11-04 | Stop reason: SURG

## 2021-11-04 RX ORDER — DIPHENHYDRAMINE HYDROCHLORIDE 50 MG/ML
12.5 INJECTION INTRAMUSCULAR; INTRAVENOUS
Status: DISCONTINUED | OUTPATIENT
Start: 2021-11-04 | End: 2021-11-04 | Stop reason: HOSPADM

## 2021-11-04 RX ORDER — OXYCODONE AND ACETAMINOPHEN 10; 325 MG/1; MG/1
1 TABLET ORAL EVERY 4 HOURS PRN
Status: DISCONTINUED | OUTPATIENT
Start: 2021-11-04 | End: 2021-11-04 | Stop reason: HOSPADM

## 2021-11-04 RX ORDER — DEXAMETHASONE SODIUM PHOSPHATE 10 MG/ML
INJECTION INTRAMUSCULAR; INTRAVENOUS AS NEEDED
Status: DISCONTINUED | OUTPATIENT
Start: 2021-11-04 | End: 2021-11-04 | Stop reason: SURG

## 2021-11-04 RX ORDER — PROMETHAZINE HYDROCHLORIDE 25 MG/1
25 SUPPOSITORY RECTAL ONCE AS NEEDED
Status: DISCONTINUED | OUTPATIENT
Start: 2021-11-04 | End: 2021-11-04 | Stop reason: HOSPADM

## 2021-11-04 RX ORDER — FAMOTIDINE 10 MG/ML
20 INJECTION, SOLUTION INTRAVENOUS ONCE
Status: DISCONTINUED | OUTPATIENT
Start: 2021-11-04 | End: 2021-11-04 | Stop reason: HOSPADM

## 2021-11-04 RX ORDER — MIDAZOLAM HYDROCHLORIDE 1 MG/ML
1 INJECTION INTRAMUSCULAR; INTRAVENOUS
Status: DISCONTINUED | OUTPATIENT
Start: 2021-11-04 | End: 2021-11-04 | Stop reason: HOSPADM

## 2021-11-04 RX ORDER — LIDOCAINE HYDROCHLORIDE 10 MG/ML
0.5 INJECTION, SOLUTION EPIDURAL; INFILTRATION; INTRACAUDAL; PERINEURAL ONCE AS NEEDED
Status: DISCONTINUED | OUTPATIENT
Start: 2021-11-04 | End: 2021-11-04 | Stop reason: HOSPADM

## 2021-11-04 RX ORDER — SODIUM CHLORIDE 0.9 % (FLUSH) 0.9 %
3 SYRINGE (ML) INJECTION EVERY 12 HOURS SCHEDULED
Status: DISCONTINUED | OUTPATIENT
Start: 2021-11-04 | End: 2021-11-04 | Stop reason: HOSPADM

## 2021-11-04 RX ORDER — MAGNESIUM HYDROXIDE 1200 MG/15ML
LIQUID ORAL AS NEEDED
Status: DISCONTINUED | OUTPATIENT
Start: 2021-11-04 | End: 2021-11-04 | Stop reason: HOSPADM

## 2021-11-04 RX ORDER — FENTANYL CITRATE 50 UG/ML
INJECTION, SOLUTION INTRAMUSCULAR; INTRAVENOUS AS NEEDED
Status: DISCONTINUED | OUTPATIENT
Start: 2021-11-04 | End: 2021-11-04 | Stop reason: SURG

## 2021-11-04 RX ORDER — HYDROCODONE BITARTRATE AND ACETAMINOPHEN 7.5; 325 MG/1; MG/1
1 TABLET ORAL ONCE AS NEEDED
Status: COMPLETED | OUTPATIENT
Start: 2021-11-04 | End: 2021-11-04

## 2021-11-04 RX ORDER — BUPIVACAINE HYDROCHLORIDE AND EPINEPHRINE 5; 5 MG/ML; UG/ML
INJECTION, SOLUTION EPIDURAL; INTRACAUDAL; PERINEURAL AS NEEDED
Status: DISCONTINUED | OUTPATIENT
Start: 2021-11-04 | End: 2021-11-04 | Stop reason: HOSPADM

## 2021-11-04 RX ORDER — HYDRALAZINE HYDROCHLORIDE 20 MG/ML
5 INJECTION INTRAMUSCULAR; INTRAVENOUS
Status: DISCONTINUED | OUTPATIENT
Start: 2021-11-04 | End: 2021-11-04 | Stop reason: HOSPADM

## 2021-11-04 RX ORDER — PROMETHAZINE HYDROCHLORIDE 25 MG/1
25 TABLET ORAL ONCE AS NEEDED
Status: DISCONTINUED | OUTPATIENT
Start: 2021-11-04 | End: 2021-11-04 | Stop reason: HOSPADM

## 2021-11-04 RX ORDER — DIPHENHYDRAMINE HCL 25 MG
25 CAPSULE ORAL
Status: DISCONTINUED | OUTPATIENT
Start: 2021-11-04 | End: 2021-11-04 | Stop reason: HOSPADM

## 2021-11-04 RX ADMIN — HYDROCODONE BITARTRATE AND ACETAMINOPHEN 1 TABLET: 7.5; 325 TABLET ORAL at 16:33

## 2021-11-04 RX ADMIN — ONDANSETRON 4 MG: 2 INJECTION INTRAMUSCULAR; INTRAVENOUS at 15:53

## 2021-11-04 RX ADMIN — FENTANYL CITRATE 25 MCG: 0.05 INJECTION, SOLUTION INTRAMUSCULAR; INTRAVENOUS at 14:54

## 2021-11-04 RX ADMIN — SODIUM CHLORIDE, POTASSIUM CHLORIDE, SODIUM LACTATE AND CALCIUM CHLORIDE: 600; 310; 30; 20 INJECTION, SOLUTION INTRAVENOUS at 14:24

## 2021-11-04 RX ADMIN — FENTANYL CITRATE 25 MCG: 0.05 INJECTION, SOLUTION INTRAMUSCULAR; INTRAVENOUS at 14:44

## 2021-11-04 RX ADMIN — KETOROLAC TROMETHAMINE 30 MG: 30 INJECTION, SOLUTION INTRAMUSCULAR at 15:05

## 2021-11-04 RX ADMIN — SUGAMMADEX 200 MG: 100 INJECTION, SOLUTION INTRAVENOUS at 15:07

## 2021-11-04 RX ADMIN — ROCURONIUM BROMIDE 40 MG: 50 INJECTION INTRAVENOUS at 14:30

## 2021-11-04 RX ADMIN — FENTANYL CITRATE 50 MCG: 50 INJECTION INTRAMUSCULAR; INTRAVENOUS at 16:18

## 2021-11-04 RX ADMIN — PHENYLEPHRINE HYDROCHLORIDE 150 MCG: 10 INJECTION, SOLUTION INTRAVENOUS at 14:40

## 2021-11-04 RX ADMIN — LIDOCAINE HYDROCHLORIDE 100 MG: 20 INJECTION, SOLUTION INFILTRATION; PERINEURAL at 14:30

## 2021-11-04 RX ADMIN — ONDANSETRON 4 MG: 2 INJECTION INTRAMUSCULAR; INTRAVENOUS at 14:49

## 2021-11-04 RX ADMIN — FENTANYL CITRATE 50 MCG: 0.05 INJECTION, SOLUTION INTRAMUSCULAR; INTRAVENOUS at 14:30

## 2021-11-04 RX ADMIN — PHENYLEPHRINE HYDROCHLORIDE 100 MCG: 10 INJECTION, SOLUTION INTRAVENOUS at 14:46

## 2021-11-04 RX ADMIN — DEXAMETHASONE SODIUM PHOSPHATE 4 MG: 10 INJECTION INTRAMUSCULAR; INTRAVENOUS at 14:49

## 2021-11-04 RX ADMIN — PROPOFOL 200 MG: 10 INJECTION, EMULSION INTRAVENOUS at 14:30

## 2021-11-04 RX ADMIN — FENTANYL CITRATE 50 MCG: 50 INJECTION INTRAMUSCULAR; INTRAVENOUS at 15:53

## 2021-11-04 NOTE — ANESTHESIA POSTPROCEDURE EVALUATION
Patient: Carina Zamudio    Procedure Summary     Date: 11/04/21 Room / Location: [facility] OR  / [facility] MAIN OR    Anesthesia Start: 1424 Anesthesia Stop: 1529    Procedure: CHOLECYSTECTOMY LAPAROSCOPIC INTRAOPERATIVE CHOLANGIOGRAM (N/A Abdomen) Diagnosis:       Biliary dyskinesia      (Biliary dyskinesia [K82.8])    Surgeons: Maulik Ashraf MD Provider: Gunnar Welch MD    Anesthesia Type: general ASA Status: 3          Anesthesia Type: general    Vitals  Vitals Value Taken Time   /64 11/04/21 1546   Temp     Pulse 67 11/04/21 1559   Resp 16 11/04/21 1545   SpO2 96 % 11/04/21 1559   Vitals shown include unvalidated device data.        Post Anesthesia Care and Evaluation    Patient location during evaluation: PACU  Patient participation: complete - patient participated  Level of consciousness: awake and alert  Pain management: adequate  Airway patency: patent  Anesthetic complications: No anesthetic complications    Cardiovascular status: acceptable  Respiratory status: acceptable  Hydration status: acceptable    Comments: --------------------            11/04/21               1545     --------------------   BP:       122/64     Pulse:      69       Resp:       16       Temp:                SpO2:      95%      --------------------

## 2021-11-04 NOTE — PROGRESS NOTES
"CC:    Biliary dyskinesia    HPI:    This is a 51-year-old lady presenting to the office today after having last seen Dr. Ashraf in August 2020 status post colonoscopy.  She presents today with 1 week of severe right upper quadrant abdominal pain that radiates around to her back.  She describes this is a constant stabbing pain that comes and goes in intensity.  She has had nausea and vomiting associated with this.  She denies having fever or chills.  Her symptoms were significant enough that she did go to the emergency room at which time she had a CT scan of the abdomen pelvis performed which noted an incidental hilar mass but no other abnormalities.  Right upper quadrant ultrasound was also negative.  Her laboratory values were normal.  She did end up having a HIDA scan which demonstrated an ejection fraction 24% and worsened her symptoms.  She is following up with Dr. Davila for the hilar mass.    PMH:    Reviewed and reconciled in [employer]; of note she has sleep apnea, history of a TIA, type 2 diabetes, history of MI, obesity, adrenal adenoma, coronary artery disease and hypertension    PSH:     Reviewed and reconciled in epic; of note her last colonoscopy was in 2020, stent placement 2015    SH:  Does smoke, does consume alcohol.  Discussed risks of surgery with patient and in particular increased risk of wound infection, poor wound healing, hernias (with abdominal surgery) and post-operative pulmonary complications associated with smoking.       FMH:  No colorectal cancer family history, no gallbladder family history    ALLERGIES:   No known drug allergies    MEDICATIONS:  Reviewed and reconciled in Epic.    ROS:    All other systems reviewed and negative other than presenting complaints.    PE:  Vitals: Weight 207 height 62\" BMI 38 Discussed with patient increased perioperative risks associated with obesity including increased risks of DVT, infection, seromas, poor wound healing and hernias (with abdominal " surgery).  Constitutional: Well-nourished, well-developed obese female who appears very uncomfortable but in no acute distress  Pulmonary: Clear auscultation bilaterally, normal respiratory effort, no wheezes, rales or rhonchi noted  Cardiac: Regular rate and rhythm, no murmur, gallops or rubs noted  Abdomen: Moderate tenderness to palpation in the right upper quadrant, all other quadrants nontender, normal bowel sounds are present    CLINICAL SUMMARY (A/P):    This is a 51-year-old lady presenting with biliary dyskinesia with an ejection fraction of 24% was experiencing significant right upper quadrant pain that is unrelenting for the last week.  As a result I did discuss surgical intervention with her and she understands the options and wishes to proceed with laparoscopic cholecystectomy.  Dr. Ashraf has offered to proceed with her surgery today as she has been n.p.o. They understand nature of the procedure and the risks including but not limited to bleeding, infection, conversion to open procedure, postoperative bile leak, and the bowel function changes which can accompany cholecystectomy.  She has held her Plavix for the last 3 days.  All questions were answered at the time of this visit and they were willing to proceed with all recommendations.      Nba Cooper PA-C

## 2021-11-04 NOTE — H&P (VIEW-ONLY)
"CC:    Biliary dyskinesia    HPI:    This is a 51-year-old lady presenting to the office today after having last seen Dr. Ashraf in August 2020 status post colonoscopy.  She presents today with 1 week of severe right upper quadrant abdominal pain that radiates around to her back.  She describes this is a constant stabbing pain that comes and goes in intensity.  She has had nausea and vomiting associated with this.  She denies having fever or chills.  Her symptoms were significant enough that she did go to the emergency room at which time she had a CT scan of the abdomen pelvis performed which noted an incidental hilar mass but no other abnormalities.  Right upper quadrant ultrasound was also negative.  Her laboratory values were normal.  She did end up having a HIDA scan which demonstrated an ejection fraction 24% and worsened her symptoms.  She is following up with Dr. Davila for the hilar mass.    PMH:    Reviewed and reconciled in [employer]; of note she has sleep apnea, history of a TIA, type 2 diabetes, history of MI, obesity, adrenal adenoma, coronary artery disease and hypertension    PSH:     Reviewed and reconciled in epic; of note her last colonoscopy was in 2020, stent placement 2015    SH:  Does smoke, does consume alcohol.  Discussed risks of surgery with patient and in particular increased risk of wound infection, poor wound healing, hernias (with abdominal surgery) and post-operative pulmonary complications associated with smoking.       FMH:  No colorectal cancer family history, no gallbladder family history    ALLERGIES:   No known drug allergies    MEDICATIONS:  Reviewed and reconciled in Epic.    ROS:    All other systems reviewed and negative other than presenting complaints.    PE:  Vitals: Weight 207 height 62\" BMI 38 Discussed with patient increased perioperative risks associated with obesity including increased risks of DVT, infection, seromas, poor wound healing and hernias (with abdominal " surgery).  Constitutional: Well-nourished, well-developed obese female who appears very uncomfortable but in no acute distress  Pulmonary: Clear auscultation bilaterally, normal respiratory effort, no wheezes, rales or rhonchi noted  Cardiac: Regular rate and rhythm, no murmur, gallops or rubs noted  Abdomen: Moderate tenderness to palpation in the right upper quadrant, all other quadrants nontender, normal bowel sounds are present    CLINICAL SUMMARY (A/P):    This is a 51-year-old lady presenting with biliary dyskinesia with an ejection fraction of 24% was experiencing significant right upper quadrant pain that is unrelenting for the last week.  As a result I did discuss surgical intervention with her and she understands the options and wishes to proceed with laparoscopic cholecystectomy.  Dr. Ashraf has offered to proceed with her surgery today as she has been n.p.o. They understand nature of the procedure and the risks including but not limited to bleeding, infection, conversion to open procedure, postoperative bile leak, and the bowel function changes which can accompany cholecystectomy.  She has held her Plavix for the last 3 days.  All questions were answered at the time of this visit and they were willing to proceed with all recommendations.      Nba Cooper PA-C

## 2021-11-04 NOTE — ANESTHESIA PROCEDURE NOTES
Airway  Date/Time: 11/4/2021 2:32 PM  Airway not difficult    General Information and Staff    Patient location during procedure: OR  Anesthesiologist: Gunnar Welch MD  CRNA: Joleen Marrufo CRNA    Indications and Patient Condition  Indications for airway management: airway protection    Preoxygenated: yes  MILS not maintained throughout  Mask difficulty assessment: 1 - vent by mask    Final Airway Details  Final airway type: endotracheal airway      Successful airway: ETT  Cuffed: yes   Successful intubation technique: direct laryngoscopy  Facilitating devices/methods: intubating stylet  Blade: Natasha  Blade size: 3  ETT size (mm): 7.0  Cormack-Lehane Classification: grade I - full view of glottis  Placement verified by: chest auscultation and capnometry   Measured from: lips  ETT/EBT  to lips (cm): 21  Number of attempts at approach: 1  Assessment: lips, teeth, and gum same as pre-op and atraumatic intubation

## 2021-11-04 NOTE — OP NOTE
PREOPERATIVE DIAGNOSIS:  Biliary dyskinesia    POSTOPERATIVE DIAGNOSIS (FINDINGS):  Same    PROCEDURE:  Laparoscopic cholecystectomy with cholangiogram    SURGEON:  Maulik Ashraf MD    ASSISTANT:  Yumiko Kiser, was responsible for performing the following activities: suction, irrigation, suturing, closing, retraction, camera holding, and placing dressing, and their skilled assistance was necessary for the success of this case.    ANESTHESIA:  General    EBL:  Minimal    SPECIMEN(S):  Gallbladder    DESCRIPTION:  Supine position. General anesthesia. Prepped and draped, usual sterile manner.    1/2 % marcaine with epinephrine infiltrated in all incision sites. Small periumbilical incision made, Veress needle inserted with upward traction on abdominal wall. Abdomen insufflated to 15 mm Hg pressure and 5 mm opti-view trocar inserted followed by 10 mm subxiphoid and 5 mm right subcostal trocars.    Gallbladder grasped and elevated. Cystic duct dissected and clipped once distally. Cholangiogram catheter inserted and cholangiogram demonstrated prompt filling of duodenum, no filling defects. Cystic duct clipped twice proximally and divided. Cystic artery clipped twice proximally, once distally and divided. Gallbladder removed from the liver bed with electrocautery and removed through subxiphoid trocar site. Liver bed copiously irrigated and aspirated with good hemostasis noted. CO2 released, fascia of subxiphoid trocar site closed with 0-vicryl, skin edges 5-0 vicryl subcuticular suture.    Tolerated well.  Stable to PACU.    Maulik Ashraf M.D.

## 2021-11-04 NOTE — DISCHARGE INSTRUCTIONS
Dr. Maulik Ashraf  [address]  [place], [place] [address] [phone]    Discharge Instructions for Gall Bladder Surgery    1. Go home, rest and take it easy today; however, you should get up and move about several times today to reduce the risk of developing a clot in your legs.      2. You may experience some dizziness or memory loss from the anesthesia.  This may last for the next 24 hours.  Someone should plan on staying with you for the first 24 hours for your safety.    3. Do not make any important legal decisions or sign any legal papers for the next 24 hours.      4. Eat and drink lightly today.  Start off with liquids, jello, soup, crackers or other bland foods at first. You may advance your diet tomorrow as tolerated as long as you do not experience any nausea or vomiting.     5. If skin glue (Dermabond) was used, your incisions are protected and covered.  The invisible glue will dissolve on its own as your incision heals. If you have dressings, you may remove your outer dressings in 3 days.  The white tapes called steri-strips should stay in place.  They will fall off on their own in 1-2 weeks.  Do not worry if they come off sooner.      6. If you have dressings, you may notice some bleeding/drainage on your outer dressings. A little bloody drainage is normal. If the bleeding/drainage is such that the bandage cannot absorb it, remove the dressing, apply clean gauze and apply firm pressure for a full 15 minutes.  If the bleeding continues, please call me.    7. You may shower tomorrow allowing water to run over the incisions; however, do not scrub the incisions.   No tub baths until your incisions are completely healed.         8. You have received a prescription for a narcotic pain medicine, as you will have some pain following surgery.   You will not be totally pain free, but your pain medicine should make the pain tolerable.  Please take your pain medicine as prescribed and always take  your pills with food to prevent nausea. If you are having severe pain that cannot be controlled by the pain medicine, please contact me.      9. You have also received a prescription for an anti-nausea medicine.  Please take this as prescribed for any nausea or vomiting.  Nausea could be a result of the anesthesia or a result of the narcotic pain medicine.  If you experience severe nausea and vomiting that cannot be controlled by the nausea medicine, please call me.      10. It is not unusual to experience pain/discomfort in your shoulders or under your ribs after surgery.  It is from the gas used during the laparoscopic procedure and usually lasts 1-3 days.  The prescription pain medicine is used to treat the surgical pain and does not typically alleviate this “gassy” pain.     11. No driving for 24 hours and for as long as you are taking your prescription pain medicine.      12. You will need to call the office at [phone] to schedule a follow-up appointment in 6-10 days.     13. Remember to contact me for any of the following:    • Fever > 101 degrees  • Severe pain that cannot be controlled by taking your pain pills  • Severe nausea or vomiting that cannot be controlled by taking your nausea pills  • Significant bleeding of your incisions  • Drainage that has a bad smell or is yellow or green in appearance  • Any other questions or concerns

## 2021-11-04 NOTE — ANESTHESIA PREPROCEDURE EVALUATION
Anesthesia Evaluation     Patient summary reviewed and Nursing notes reviewed   no history of anesthetic complications:  NPO Solid Status: > 8 hours  NPO Liquid Status: > 2 hours           Airway   Mallampati: II  Dental      Pulmonary - normal exam   (+) pneumonia , a smoker Current, sleep apnea on CPAP,   Cardiovascular - normal exam    (+) hypertension 2 medications or greater, past MI  >12 months, CAD, cardiac stents more than 12 months ago angina, hyperlipidemia,       Neuro/Psych  (+) TIA, numbness, psychiatric history,       ROS Comment: Cerebellar infarction  GI/Hepatic/Renal/Endo    (+) obesity, morbid obesity, GERD,  diabetes mellitus type 2,     ROS Comment: Adrenal adenoma    Musculoskeletal     (+) back pain, chronic pain, neck pain,   Abdominal    Substance History      OB/GYN negative ob/gyn ROS   (-)  Pregnant        Other   arthritis,        Other Comment: Raynaud phenomenon                    Anesthesia Plan    ASA 3     general     intravenous induction     Anesthetic plan, all risks, benefits, and alternatives have been provided, discussed and informed consent has been obtained with: patient.

## 2021-11-05 LAB
LAB AP CASE REPORT: NORMAL
PATH REPORT.FINAL DX SPEC: NORMAL
PATH REPORT.GROSS SPEC: NORMAL

## 2021-11-15 ENCOUNTER — OFFICE VISIT (OUTPATIENT)
Dept: SURGERY | Facility: CLINIC | Age: 51
End: 2021-11-15

## 2021-11-15 DIAGNOSIS — Z09 FOLLOW UP: Primary | ICD-10-CM

## 2021-11-15 PROCEDURE — 99024 POSTOP FOLLOW-UP VISIT: CPT | Performed by: SURGERY

## 2021-11-17 ENCOUNTER — OFFICE VISIT (OUTPATIENT)
Dept: OTHER | Facility: CLINIC | Age: 51
End: 2021-11-17

## 2021-11-17 VITALS
HEIGHT: 62 IN | SYSTOLIC BLOOD PRESSURE: 102 MMHG | DIASTOLIC BLOOD PRESSURE: 68 MMHG | BODY MASS INDEX: 36.8 KG/M2 | WEIGHT: 200 LBS | OXYGEN SATURATION: 96 % | RESPIRATION RATE: 22 BRPM | HEART RATE: 74 BPM

## 2021-11-17 DIAGNOSIS — R91.1 LUNG NODULE: Primary | ICD-10-CM

## 2021-11-17 PROCEDURE — 99204 OFFICE O/P NEW MOD 45 MIN: CPT | Performed by: THORACIC SURGERY (CARDIOTHORACIC VASCULAR SURGERY)

## 2021-11-17 NOTE — PROGRESS NOTES
Postoperative visit    Laparoscopic cholecystectomy with cholangiogram 11/4/2021    Pathology: Cholesterolosis and fundal adenomyoma  Cholangiogram: Normal    Office visit: Incisions are healing well and she is doing well, reporting problems from the surgery.  Activity restrictions discussed.  Follow-up as needed.

## 2021-11-18 PROBLEM — G54.0 THORACIC OUTLET SYNDROME: Status: ACTIVE | Noted: 2021-11-18

## 2021-11-24 ENCOUNTER — HOSPITAL ENCOUNTER (OUTPATIENT)
Dept: PET IMAGING | Facility: HOSPITAL | Age: 51
Discharge: HOME | End: 2021-11-24

## 2021-11-24 DIAGNOSIS — R91.1 LUNG NODULE: ICD-10-CM

## 2021-11-24 LAB — GLUCOSE BLDC GLUCOMTR-MCNC: 198 MG/DL (ref 70–130)

## 2021-11-24 PROCEDURE — 0 FLUDEOXYGLUCOSE F18 SOLUTION: Performed by: THORACIC SURGERY (CARDIOTHORACIC VASCULAR SURGERY)

## 2021-11-24 PROCEDURE — 78815 PET IMAGE W/CT SKULL-THIGH: CPT

## 2021-11-24 PROCEDURE — A9552 F18 FDG: HCPCS | Performed by: THORACIC SURGERY (CARDIOTHORACIC VASCULAR SURGERY)

## 2021-11-24 PROCEDURE — 82962 GLUCOSE BLOOD TEST: CPT

## 2021-11-24 RX ADMIN — FLUDEOXYGLUCOSE F18 1 DOSE: 300 INJECTION INTRAVENOUS at 07:28

## 2021-11-30 ENCOUNTER — DOCUMENTATION (OUTPATIENT)
Dept: INTERNAL MEDICINE | Facility: CLINIC | Age: 51
End: 2021-11-30

## 2021-11-30 ENCOUNTER — TRANSCRIBE ORDERS (OUTPATIENT)
Dept: ADMINISTRATIVE | Facility: HOSPITAL | Age: 51
End: 2021-11-30

## 2021-11-30 DIAGNOSIS — U07.1 CLINICAL DIAGNOSIS OF SEVERE ACUTE RESPIRATORY SYNDROME CORONAVIRUS 2 (SARS-COV-2) DISEASE: Primary | ICD-10-CM

## 2021-11-30 RX ORDER — DIPHENHYDRAMINE HCL 50 MG
50 CAPSULE ORAL ONCE AS NEEDED
Status: CANCELLED | OUTPATIENT
Start: 2021-12-01

## 2021-11-30 RX ORDER — METHYLPREDNISOLONE SODIUM SUCCINATE 125 MG/2ML
125 INJECTION, POWDER, LYOPHILIZED, FOR SOLUTION INTRAMUSCULAR; INTRAVENOUS AS NEEDED
Status: CANCELLED | OUTPATIENT
Start: 2021-12-01

## 2021-11-30 RX ORDER — SODIUM CHLORIDE 9 MG/ML
30 INJECTION, SOLUTION INTRAVENOUS ONCE
Status: CANCELLED | OUTPATIENT
Start: 2021-12-01

## 2021-11-30 RX ORDER — DIPHENHYDRAMINE HYDROCHLORIDE 50 MG/ML
50 INJECTION INTRAMUSCULAR; INTRAVENOUS ONCE AS NEEDED
Status: CANCELLED | OUTPATIENT
Start: 2021-12-01

## 2021-11-30 RX ORDER — EPINEPHRINE 1 MG/ML
0.3 INJECTION, SOLUTION, CONCENTRATE INTRAVENOUS AS NEEDED
Status: CANCELLED | OUTPATIENT
Start: 2021-12-01

## 2021-11-30 NOTE — PROGRESS NOTES
Rosa has developed symptoms of Covid 11/28/21 with positive Covid PCR test.  Orders are sent for monoclonal antibody infusion.

## 2021-12-01 ENCOUNTER — HOSPITAL ENCOUNTER (OUTPATIENT)
Dept: INFUSION THERAPY | Facility: HOSPITAL | Age: 51
Discharge: HOME | End: 2021-12-01
Admitting: FAMILY MEDICINE

## 2021-12-01 VITALS
TEMPERATURE: 96.6 F | SYSTOLIC BLOOD PRESSURE: 105 MMHG | OXYGEN SATURATION: 95 % | DIASTOLIC BLOOD PRESSURE: 67 MMHG | RESPIRATION RATE: 16 BRPM | HEART RATE: 70 BPM

## 2021-12-01 DIAGNOSIS — U07.1 COVID: Primary | ICD-10-CM

## 2021-12-01 PROCEDURE — 25010000002 INJECTION, CASIRIVIMAB AND IMDEVIMAB, 1200 MG: Performed by: FAMILY MEDICINE

## 2021-12-01 PROCEDURE — M0243 CASIRIVI AND IMDEVI INFUSION: HCPCS | Performed by: FAMILY MEDICINE

## 2021-12-01 PROCEDURE — 96365 THER/PROPH/DIAG IV INF INIT: CPT

## 2021-12-01 RX ORDER — SODIUM CHLORIDE 9 MG/ML
30 INJECTION, SOLUTION INTRAVENOUS ONCE
Status: COMPLETED | OUTPATIENT
Start: 2021-12-01 | End: 2021-12-01

## 2021-12-01 RX ORDER — DIPHENHYDRAMINE HCL 25 MG
50 CAPSULE ORAL ONCE AS NEEDED
Status: CANCELLED | OUTPATIENT
Start: 2021-12-01

## 2021-12-01 RX ORDER — SODIUM CHLORIDE 9 MG/ML
30 INJECTION, SOLUTION INTRAVENOUS ONCE
Status: CANCELLED | OUTPATIENT
Start: 2021-12-01

## 2021-12-01 RX ORDER — METHYLPREDNISOLONE SODIUM SUCCINATE 125 MG/2ML
125 INJECTION, POWDER, LYOPHILIZED, FOR SOLUTION INTRAMUSCULAR; INTRAVENOUS AS NEEDED
Status: DISCONTINUED | OUTPATIENT
Start: 2021-12-01 | End: 2021-12-03 | Stop reason: HOSPADM

## 2021-12-01 RX ORDER — DIPHENHYDRAMINE HYDROCHLORIDE 50 MG/ML
50 INJECTION INTRAMUSCULAR; INTRAVENOUS ONCE AS NEEDED
Status: CANCELLED | OUTPATIENT
Start: 2021-12-01

## 2021-12-01 RX ORDER — DIPHENHYDRAMINE HYDROCHLORIDE 50 MG/ML
50 INJECTION INTRAMUSCULAR; INTRAVENOUS ONCE AS NEEDED
Status: DISCONTINUED | OUTPATIENT
Start: 2021-12-01 | End: 2021-12-03 | Stop reason: HOSPADM

## 2021-12-01 RX ORDER — METHYLPREDNISOLONE SODIUM SUCCINATE 125 MG/2ML
125 INJECTION, POWDER, LYOPHILIZED, FOR SOLUTION INTRAMUSCULAR; INTRAVENOUS AS NEEDED
Status: CANCELLED | OUTPATIENT
Start: 2021-12-01

## 2021-12-01 RX ORDER — DIPHENHYDRAMINE HCL 25 MG
50 CAPSULE ORAL ONCE AS NEEDED
Status: DISCONTINUED | OUTPATIENT
Start: 2021-12-01 | End: 2021-12-03 | Stop reason: HOSPADM

## 2021-12-01 RX ADMIN — SODIUM CHLORIDE 30 ML: 9 INJECTION, SOLUTION INTRAVENOUS at 11:58

## 2021-12-01 RX ADMIN — IMDEVIMAB: 1332 INJECTION, SOLUTION, CONCENTRATE INTRAVENOUS at 11:58

## 2021-12-06 DIAGNOSIS — R91.1 LUNG NODULE: ICD-10-CM

## 2021-12-14 ENCOUNTER — TRANSCRIBE ORDERS (OUTPATIENT)
Dept: PULMONOLOGY | Facility: HOSPITAL | Age: 51
End: 2021-12-14

## 2021-12-14 DIAGNOSIS — Z01.818 OTHER SPECIFIED PRE-OPERATIVE EXAMINATION: Primary | ICD-10-CM

## 2021-12-15 ENCOUNTER — OFFICE VISIT (OUTPATIENT)
Dept: CARDIOLOGY | Facility: CLINIC | Age: 51
End: 2021-12-15

## 2021-12-15 VITALS
HEIGHT: 62 IN | WEIGHT: 203 LBS | BODY MASS INDEX: 37.36 KG/M2 | DIASTOLIC BLOOD PRESSURE: 62 MMHG | SYSTOLIC BLOOD PRESSURE: 106 MMHG | HEART RATE: 79 BPM

## 2021-12-15 DIAGNOSIS — I63.9 CEREBELLAR INFARCTION (HCC): ICD-10-CM

## 2021-12-15 DIAGNOSIS — I25.119 CORONARY ARTERY DISEASE INVOLVING NATIVE CORONARY ARTERY OF NATIVE HEART WITH ANGINA PECTORIS (HCC): Primary | ICD-10-CM

## 2021-12-15 DIAGNOSIS — E11.59 TYPE 2 DIABETES MELLITUS WITH OTHER CIRCULATORY COMPLICATION, WITHOUT LONG-TERM CURRENT USE OF INSULIN (HCC): Chronic | ICD-10-CM

## 2021-12-15 DIAGNOSIS — E66.01 CLASS 3 SEVERE OBESITY WITH SERIOUS COMORBIDITY AND BODY MASS INDEX (BMI) OF 40.0 TO 44.9 IN ADULT, UNSPECIFIED OBESITY TYPE (HCC): ICD-10-CM

## 2021-12-15 DIAGNOSIS — I10 ESSENTIAL HYPERTENSION: Chronic | ICD-10-CM

## 2021-12-15 DIAGNOSIS — F17.210 CIGARETTE SMOKER: ICD-10-CM

## 2021-12-15 DIAGNOSIS — Z78.9 STATIN INTOLERANCE: ICD-10-CM

## 2021-12-15 DIAGNOSIS — E78.2 MIXED HYPERLIPIDEMIA: ICD-10-CM

## 2021-12-15 PROCEDURE — 99214 OFFICE O/P EST MOD 30 MIN: CPT | Performed by: NURSE PRACTITIONER

## 2021-12-15 PROCEDURE — 93000 ELECTROCARDIOGRAM COMPLETE: CPT | Performed by: NURSE PRACTITIONER

## 2021-12-15 NOTE — PROGRESS NOTES
Date of Office Visit: 12/15/2021  Encounter Provider: VITOR Gallego  Place of Service: [facility] CARDIOLOGY  Patient Name: Carina Zamudio  :1970  Primary Cardiologist: Dr. Vaughn Tipton     Chief Complaint   Patient presents with   • Coronary Artery Disease   • Follow-up   :     HPI: Carina Zamudio is a pleasant 51 y.o. female who presents today for annual cardiac follow-up on coronary artery disease.  She is a new patient to me and I have reviewed her medical records.    In 2015, she presented with an inferior STEMI and underwent drug-eluting stent placement to the left circumflex.  She was noted to have nonocclusive coronary artery disease in the RCA and LAD.  She has been diagnosed with a previous stroke, diabetes, hypertension, hyperlipidemia (statin intolerant), and obstructive sleep apnea (compliant with CPAP machine).  She is a current cigarette smoker.    She presents today for follow-up visit.  She was recently hospitalized for gallbladder disease and COVID-19.  She started experiencing worsening palpitations her PCP recently increased her carvedilol.  She was found to have a hilar mass will be undergoing a bronchoscopy of her right lung in the near future.  She occasionally experiences the palpitations especially if she lays on her left side at nighttime.  She reports some mild dizziness since having the Covid infection.  She denies chest pain, shortness of air, edema, syncope, or bleeding.  She has not had any myalgias being on the Praluent.  She is due for her annual blood work.      Past Medical History:   Diagnosis Date   • Adrenal cortical adenoma    • Anemia    • Anxiety    • Benign essential hypertension    • Bronchitis    • CAD (coronary artery disease)    • Cerebellar infarct (HCC)    • Cervical radiculopathy due to degenerative joint disease of spine 2016   • Diabetes mellitus (HCC)    • Diverticulitis    • Diverticulosis    • DJD  (degenerative joint disease), cervical    • DJD (degenerative joint disease), lumbar    • Fracture, toe    • Gestational diabetes    • HPV (human papilloma virus) infection    • Hyperlipemia    • Hypertension    • Leg edema, right 2019   • Liver nodule    • Myocardial infarction (HCC)    • Osteoarthritis of spine 2016   • Other specified dorsopathies, site unspecified 2011   • Pneumonia    • Pregnancy      - stillbirth at the gestational age 7 months x1   • Proteinuria     CHRONIC   • Raynaud phenomenon    • S/P right knee arthroscopy 2019   • Sleep apnea    • TIA (transient ischemic attack)    • Type 2 diabetes mellitus with circulatory disorder, without long-term current use of insulin (HCC) 2017   • Venous tributary (branch) occlusion of retina 2012   • Vitamin D deficiency        Past Surgical History:   Procedure Laterality Date   • CATARACT EXTRACTION, BILATERAL     • CHOLECYSTECTOMY WITH INTRAOPERATIVE CHOLANGIOGRAM N/A 2021    Procedure: CHOLECYSTECTOMY LAPAROSCOPIC INTRAOPERATIVE CHOLANGIOGRAM;  Surgeon: Maulik Ashraf MD;  Location: [facility] MAIN OR;  Service: General;  Laterality: N/A;   • COLONOSCOPY N/A 2020    Procedure: COLONOSCOPY TO CECUM WITH HOT SNARE POLYPECTOMY;  Surgeon: Maulik Ashraf MD;  Location: [facility] ENDOSCOPY;  Service: General;  Laterality: N/A;  SCREENING  --POLYP   • CORONARY STENT PLACEMENT  2015    circ   • ENDOMETRIAL ABLATION     • KNEE ARTHROSCOPY Right 5/10/2019    Procedure: RIGHT KNEE ARTHROSCOPY BWITH PART. MEDIAL AND LATERAL MENISECTOMY, PATELLA, CHONDROPLASTY, AND DEBRIDMENT;  Surgeon: Ignacio Martinez MD;  Location: [facility] OR OSC;  Service: Orthopedics   • TUBAL ABDOMINAL LIGATION         Social History     Socioeconomic History   • Marital status:    Tobacco Use   • Smoking status: Current Every Day Smoker     Packs/day: 1.00     Start date:    • Smokeless tobacco: Never Used   Vaping Use   •  Vaping Use: Never used   Substance and Sexual Activity   • Alcohol use: Yes     Comment:  once monthly ...socially   • Drug use: No   • Sexual activity: Yes     Partners: Male     Birth control/protection: Surgical     Comment: ablation        Family History   Problem Relation Age of Onset   • Lymphoma Father    • Colon polyps Father    • Hypertension Father    • Lung cancer Maternal Grandfather    • Heart attack Maternal Grandfather    • Diabetes Maternal Grandfather    • Heart attack Paternal Grandfather    • Diabetes Paternal Grandfather    • Diabetes Paternal Grandmother    • Diabetes Maternal Grandmother    • Drug abuse Brother    • Breast cancer Neg Hx    • Ovarian cancer Neg Hx    • Uterine cancer Neg Hx    • Colon cancer Neg Hx    • Malig Hyperthermia Neg Hx        The following portion of the patient's history were reviewed and updated as appropriate: past medical history, past surgical history, past social history, past family history, allergies, current medications, and problem list.    Review of Systems   Constitutional: Negative.   Cardiovascular: Positive for palpitations.   Respiratory: Negative.    Hematologic/Lymphatic: Negative.    Neurological: Positive for dizziness.       No Known Allergies      Current Outpatient Medications:   •  Alirocumab 75 MG/ML solution auto-injector, Inject 1 mL under the skin into the appropriate area as directed Every 14 (Fourteen) Days., Disp: 2 mL, Rfl: 11  •  ALPRAZolam (XANAX) 0.5 MG tablet, Take 1 tablet by mouth Daily As Needed for Anxiety., Disp: 30 tablet, Rfl: 4  •  aspirin 81 MG tablet, Take 81 mg by mouth Daily. INSTRUCTED PT FOLLOW MD INSTRUCTIONS, Disp: , Rfl:   •  B Complex Vitamins (VITAMIN-B COMPLEX) tablet, Take 1 tablet by mouth Daily. HOLD PRIOR TO SURG, Disp: , Rfl:   •  carvedilol (Coreg) 6.25 MG tablet, Take 1 tablet by mouth 2 (Two) Times a Day With Meals., Disp: 180 tablet, Rfl: 3  •  clopidogrel (PLAVIX) 75 MG tablet, Take 1 tablet by mouth  "Daily., Disp: 90 tablet, Rfl: 3  •  fluticasone (FLONASE) 50 MCG/ACT nasal spray, Administer 2 sprays in each nostril once a day (Patient taking differently: 2 sprays into the nostril(s) as directed by provider Daily As Needed.), Disp: 16 g, Rfl: 11  •  hydroCHLOROthiazide (MICROZIDE) 12.5 MG capsule, Take 1 capsule by mouth Daily., Disp: 90 capsule, Rfl: 3  •  losartan (COZAAR) 100 MG tablet, Take 1 tablet by mouth Daily., Disp: 90 tablet, Rfl: 1  •  Multiple Vitamin tablet, Take 1 tablet by mouth Daily., Disp: , Rfl:   •  Probiotic Product (PRO-BIOTIC BLEND PO), Take  by mouth., Disp: , Rfl:   •  Semaglutide, 1 MG/DOSE, (OZEMPIC) 2 MG/1.5ML solution pen-injector, Inject 0.25 mg under the skin into the appropriate area as directed 1 (One) Time Per Week., Disp: , Rfl:   •  spironolactone (Aldactone) 25 MG tablet, Take 1 tablet by mouth Daily., Disp: 90 tablet, Rfl: 3  •  vitamin D (ERGOCALCIFEROL) 1.25 MG (62105 UT) capsule capsule, Take 1 capsule by mouth 2 (Two) Times a Week., Disp: 26 capsule, Rfl: 3        Objective:     Vitals:    12/15/21 1432 12/15/21 1500   BP: 110/70 106/62   BP Location: Left arm Right arm   Pulse: 79    Weight: 92.1 kg (203 lb)    Height: 157.5 cm (62\")      Body mass index is 37.13 kg/m².    PHYSICAL EXAM:    Vitals Reviewed.   General Appearance: No acute distress, well developed and well nourished. Obese.   Eyes: Conjunctiva and lids: No erythema, swelling, or discharge. Sclera non-icteric. Glasses.   HENT: Atraumatic, normocephalic. External eyes, ears, and nose normal. No hearing loss noted. Mucous membranes normal. Lips not cyanotic. Neck supple with no tenderness. Wearing mask.   Respiratory: No signs of respiratory distress. Respiration rhythm and depth normal.   Clear to auscultation. No rales, crackles, rhonchi, or wheezing auscultated.   Cardiovascular:  Jugular Venous Pressure: Normal  Heart Rate and Rhythm: Normal, Heart Sounds: Normal S1 and S2. No S3 or S4 noted.  Murmurs: " No murmurs noted. No rubs, thrills, or gallops.   Lower Extremities: No edema noted.  Gastrointestinal:  Abdomen soft, non-distended, non-tender.    Musculoskeletal: Normal movement of extremities.  Skin and Nails: General appearance normal. No pallor, cyanosis, diaphoresis. Skin temperature normal. No clubbing of fingernails.   Psychiatric: Patient alert and oriented to person, place, and time. Speech and behavior appropriate. Normal mood and affect.       ECG 12 Lead    Date/Time: 12/15/2021 2:49 PM  Performed by: Yaritza Curiel APRN  Authorized by: Yaritza Curiel APRN   Comparison: compared with previous ECG from 11/2/2021  Similar to previous ECG  Rhythm: sinus rhythm  Rate: normal  BPM: 79  Conduction: conduction normal  ST Segments: ST segments normal  T Waves: T waves normal  QRS axis: normal  Other: no other findings    Clinical impression: normal ECG              Assessment:       Diagnosis Plan   1. Coronary artery disease involving native coronary artery of native heart with angina pectoris (HCC)  ECG 12 Lead    Alirocumab 75 MG/ML solution auto-injector   2. Essential hypertension  Alirocumab 75 MG/ML solution auto-injector   3. Type 2 diabetes mellitus with other circulatory complication, without long-term current use of insulin (HCC)  Alirocumab 75 MG/ML solution auto-injector   4. Mixed hyperlipidemia  Alirocumab 75 MG/ML solution auto-injector   5. Statin intolerance  Alirocumab 75 MG/ML solution auto-injector   6. Cerebellar infarction (HCC)  Alirocumab 75 MG/ML solution auto-injector   7. Class 3 severe obesity with serious comorbidity and body mass index (BMI) of 40.0 to 44.9 in adult, unspecified obesity type (HCC)  Alirocumab 75 MG/ML solution auto-injector   8. Cigarette smoker            Plan:       1.  Coronary Artery Disease: Previous inferior infarct and left circumflex stent placement in 2015.  She denies anginal symptoms.  Continue aspirin, clopidogrel, and Praluent.    2.   Hypertension: Blood pressure goal of 120s/80s or less recommended.  Blood pressure well controlled today.    3.  Type II Diabetes: She may benefit from Januvia and I will defer to her PCP.    4/5.  Hyperlipidemia: Statin intolerant.  She is tolerating Praluent and she is due for repeat lipid panel.  She wants to have that done at her PCP office.    5.  Previous stroke: Continue aspirin and clopidogrel.    6.  Obesity: BMI is 37.1.  She would benefit from exercise, weight loss, and heart healthy/low-sodium diet.    7.  Tobacco Dependence: She would highly benefit from staying from cigarette smoking.    8.  I recommended a 1 year follow-up visit with Dr. Vaughn Tipton.    As always, it has been a pleasure to participate in your patient's care. Thank you.       Sincerely,         VITOR Fam  [facility] Cardiology      · Dictated utilizing Dragon Dictation  · COVID-19 Precautions - Patient was compliant in wearing a mask. When I saw the patient, I used appropriate personal protective equipment (PPE) including mask and eye shield (standard procedure).  Additionally, I used gown and gloves if indicated.  Hand hygiene was completed before and after seeing the patient.

## 2021-12-17 ENCOUNTER — LAB (OUTPATIENT)
Dept: LAB | Facility: HOSPITAL | Age: 51
End: 2021-12-17

## 2021-12-17 DIAGNOSIS — Z01.818 OTHER SPECIFIED PRE-OPERATIVE EXAMINATION: ICD-10-CM

## 2021-12-17 LAB — SARS-COV-2 ORF1AB RESP QL NAA+PROBE: DETECTED

## 2021-12-17 PROCEDURE — U0004 COV-19 TEST NON-CDC HGH THRU: HCPCS

## 2021-12-17 PROCEDURE — C9803 HOPD COVID-19 SPEC COLLECT: HCPCS

## 2022-01-06 RX ORDER — CARVEDILOL 6.25 MG/1
6.25 TABLET ORAL 2 TIMES DAILY WITH MEALS
Qty: 180 TABLET | Refills: 3 | Status: SHIPPED | OUTPATIENT
Start: 2022-01-06 | End: 2023-01-09 | Stop reason: SDUPTHER

## 2022-01-06 RX ORDER — CARVEDILOL 6.25 MG/1
6.25 TABLET ORAL 2 TIMES DAILY WITH MEALS
Qty: 180 TABLET | Refills: 3 | Status: CANCELLED | OUTPATIENT
Start: 2022-01-06

## 2022-01-07 ENCOUNTER — TRANSCRIBE ORDERS (OUTPATIENT)
Dept: ADMINISTRATIVE | Facility: HOSPITAL | Age: 52
End: 2022-01-07

## 2022-01-07 DIAGNOSIS — R59.1 LYMPHADENOPATHY: Primary | ICD-10-CM

## 2022-01-10 DIAGNOSIS — R00.2 PALPITATIONS: ICD-10-CM

## 2022-01-10 RX ORDER — ALPRAZOLAM 0.5 MG/1
0.5 TABLET ORAL DAILY PRN
Qty: 30 TABLET | Refills: 4 | Status: CANCELLED | OUTPATIENT
Start: 2022-01-10

## 2022-01-11 DIAGNOSIS — R00.2 PALPITATIONS: ICD-10-CM

## 2022-01-11 RX ORDER — CLOPIDOGREL BISULFATE 75 MG/1
75 TABLET ORAL DAILY
Qty: 90 TABLET | Refills: 3 | Status: SHIPPED | OUTPATIENT
Start: 2022-01-11 | End: 2023-01-16 | Stop reason: SDUPTHER

## 2022-01-11 RX ORDER — HYDROCHLOROTHIAZIDE 12.5 MG/1
12.5 CAPSULE, GELATIN COATED ORAL
Qty: 90 CAPSULE | Refills: 3 | Status: SHIPPED | OUTPATIENT
Start: 2022-01-11 | End: 2022-04-07

## 2022-01-13 RX ORDER — ALPRAZOLAM 0.5 MG/1
0.5 TABLET ORAL DAILY PRN
Qty: 30 TABLET | Refills: 4 | Status: SHIPPED | OUTPATIENT
Start: 2022-01-13 | End: 2022-11-14

## 2022-02-09 ENCOUNTER — HOSPITAL ENCOUNTER (OUTPATIENT)
Dept: CT IMAGING | Facility: HOSPITAL | Age: 52
Discharge: HOME | End: 2022-02-09
Admitting: INTERNAL MEDICINE

## 2022-02-09 DIAGNOSIS — R59.1 LYMPHADENOPATHY: ICD-10-CM

## 2022-02-09 LAB — CREAT BLDA-MCNC: 0.8 MG/DL (ref 0.6–1.3)

## 2022-02-09 PROCEDURE — 25010000002 IOPAMIDOL 61 % SOLUTION: Performed by: INTERNAL MEDICINE

## 2022-02-09 PROCEDURE — 82565 ASSAY OF CREATININE: CPT

## 2022-02-09 PROCEDURE — 71260 CT THORAX DX C+: CPT

## 2022-02-09 RX ADMIN — IOPAMIDOL 75 ML: 612 INJECTION, SOLUTION INTRAVENOUS at 14:50

## 2022-02-25 ENCOUNTER — TRANSCRIBE ORDERS (OUTPATIENT)
Dept: ADMINISTRATIVE | Facility: HOSPITAL | Age: 52
End: 2022-02-25

## 2022-02-25 DIAGNOSIS — R59.1 LYMPHADENOPATHY: Primary | ICD-10-CM

## 2022-03-03 RX ORDER — LOSARTAN POTASSIUM 100 MG/1
100 TABLET ORAL
Qty: 90 TABLET | Refills: 3 | Status: SHIPPED | OUTPATIENT
Start: 2022-03-03 | End: 2022-08-19

## 2022-03-03 RX ORDER — LOSARTAN POTASSIUM 100 MG/1
100 TABLET ORAL
Qty: 90 TABLET | Refills: 1 | Status: CANCELLED | OUTPATIENT
Start: 2022-03-03

## 2022-04-07 ENCOUNTER — OFFICE VISIT (OUTPATIENT)
Dept: INTERNAL MEDICINE | Facility: CLINIC | Age: 52
End: 2022-04-07

## 2022-04-07 VITALS
DIASTOLIC BLOOD PRESSURE: 90 MMHG | WEIGHT: 202 LBS | HEART RATE: 76 BPM | BODY MASS INDEX: 37.17 KG/M2 | HEIGHT: 62 IN | SYSTOLIC BLOOD PRESSURE: 134 MMHG | OXYGEN SATURATION: 97 %

## 2022-04-07 DIAGNOSIS — Z86.73 HISTORY OF CVA (CEREBROVASCULAR ACCIDENT): ICD-10-CM

## 2022-04-07 DIAGNOSIS — I10 ESSENTIAL HYPERTENSION: Primary | Chronic | ICD-10-CM

## 2022-04-07 DIAGNOSIS — I25.119 CORONARY ARTERY DISEASE INVOLVING NATIVE CORONARY ARTERY OF NATIVE HEART WITH ANGINA PECTORIS: Chronic | ICD-10-CM

## 2022-04-07 DIAGNOSIS — L98.9 SKIN LESION OF LEFT LEG: ICD-10-CM

## 2022-04-07 DIAGNOSIS — E55.9 VITAMIN D DEFICIENCY: Chronic | ICD-10-CM

## 2022-04-07 DIAGNOSIS — Z01.84 IMMUNITY STATUS TESTING: ICD-10-CM

## 2022-04-07 DIAGNOSIS — E11.59 TYPE 2 DIABETES MELLITUS WITH OTHER CIRCULATORY COMPLICATION, WITHOUT LONG-TERM CURRENT USE OF INSULIN: Chronic | ICD-10-CM

## 2022-04-07 DIAGNOSIS — F17.200 TOBACCO DEPENDENCE: Chronic | ICD-10-CM

## 2022-04-07 DIAGNOSIS — E78.2 MIXED HYPERLIPIDEMIA: Chronic | ICD-10-CM

## 2022-04-07 PROBLEM — L08.9 INFECTED SEBACEOUS CYST: Status: RESOLVED | Noted: 2019-08-06 | Resolved: 2022-04-07

## 2022-04-07 PROBLEM — K82.8 BILIARY DYSKINESIA: Status: RESOLVED | Noted: 2021-11-04 | Resolved: 2022-04-07

## 2022-04-07 PROBLEM — R00.2 PALPITATIONS: Status: RESOLVED | Noted: 2020-04-17 | Resolved: 2022-04-07

## 2022-04-07 PROBLEM — L72.3 INFECTED SEBACEOUS CYST: Status: RESOLVED | Noted: 2019-08-06 | Resolved: 2022-04-07

## 2022-04-07 PROCEDURE — 99214 OFFICE O/P EST MOD 30 MIN: CPT | Performed by: NURSE PRACTITIONER

## 2022-04-07 RX ORDER — SPIRONOLACTONE 50 MG/1
50 TABLET, FILM COATED ORAL DAILY
Qty: 90 TABLET | Refills: 1 | Status: SHIPPED | OUTPATIENT
Start: 2022-04-07 | End: 2022-10-26 | Stop reason: SDUPTHER

## 2022-04-07 NOTE — PROGRESS NOTES
"Chief Complaint  Hyperlipidemia and Hypertension    Subjective          Carina Zamudio presents to [facility]  History of Present Illness  This is a 50 y/o female presenting to office for f/u with HTN and HLD. Patient currently follows with Dr. Engel, PCP, Dr. Huff with pulmonary, Dr. Sullivan with OBGYN. Patient is currently partnered and works in healthcare.     DM2-- was on ozempic but has not been using it at this time. Patient reports she was intolerable when taking metformin. Patient is attempting to modify diet and increase activity. Patient reports she does not check blood sugar at home. Patient reports she will need to schedule eye exam at some point. Denies any foot wounds.     Patient reports recent development of rash over the past 6 months-- a soccer ball sized lesion/rash showed up to left hip area. Patient reports it does not itch or hurt. Patient reports she noticed this when getting out of the shower one day. Patient's pulmonologist recommended f/u with dermatology for possible biopsy to differentiate between sarcoidosis.     HTN-- continues on losartan-- discussed d/c'ing hctz and increase aldactone for side effects of hair protection-- patient agreeable to plan. Denies CP.     HLD-- will be getting labs today. Patient is going to attempt smoking cessation. Patient will be following up with cardiology as well for possible initiation for repatha.     Objective   Vital Signs:   /90   Pulse 76   Ht 157.5 cm (62\")   Wt 91.6 kg (202 lb)   SpO2 97%   BMI 36.95 kg/m²     BMI is above normal parameters. Recommendations: educational material discussed/shared in after visit summary, exercise counseling/recommendations and nutrition counseling/recommendations       Physical Exam  Vitals and nursing note reviewed.   Constitutional:       Appearance: Normal appearance.   HENT:      Head: Normocephalic and atraumatic.      Nose: Nose normal.      Mouth/Throat:      Mouth: " Mucous membranes are moist.   Eyes:      Extraocular Movements: Extraocular movements intact.      Conjunctiva/sclera: Conjunctivae normal.      Pupils: Pupils are equal, round, and reactive to light.   Cardiovascular:      Rate and Rhythm: Normal rate and regular rhythm.      Pulses: Normal pulses.   Pulmonary:      Effort: Pulmonary effort is normal. No respiratory distress.      Breath sounds: Normal breath sounds. No stridor. No wheezing, rhonchi or rales.   Abdominal:      General: There is no distension.      Palpations: Abdomen is soft.      Tenderness: There is no abdominal tenderness.   Musculoskeletal:         General: No swelling or deformity. Normal range of motion.      Cervical back: Normal range of motion and neck supple.   Skin:     General: Skin is warm and dry.      Findings: Lesion present.          Neurological:      Mental Status: She is alert and oriented to person, place, and time. Mental status is at baseline.   Psychiatric:         Mood and Affect: Mood normal.         Behavior: Behavior normal.         Thought Content: Thought content normal.         Judgment: Judgment normal.        Result Review :   The following data was reviewed by: VITOR Saenz on 04/07/2022:  Common labs    Common Labsle 10/31/21 10/31/21 11/2/21 11/2/21 2/9/22    1048 1048 1816 1816    Glucose  188 (A)  186 (A)    BUN  13  7    Creatinine  0.76  0.60 0.80   eGFR Non African Am  80  105    Sodium  137  137    Potassium  4.1  4.0    Chloride  100  101    Calcium  8.9  8.7    Albumin  4.20  4.30    Total Bilirubin  0.3  0.3    Alkaline Phosphatase  52  49    AST (SGOT)  13  12    ALT (SGPT)  15  14    WBC 7.35  10.77     Hemoglobin 15.5  15.7     Hematocrit 44.4  45.3     Platelets 273  285     (A) Abnormal value       Comments are available for some flowsheets but are not being displayed.                    Assessment and Plan    Diagnoses and all orders for this visit:    1. Essential hypertension  (Primary)  Assessment & Plan:  Hypertension is improving with treatment.  Continue current treatment regimen.  Dietary sodium restriction.  Weight loss.  Regular aerobic exercise.  Medication changes per orders.  Blood pressure will be reassessed at the next regular appointment.  D/c hctz.   Increase aldactone to 50mg daily.   Continue on coreg and losartan.     Orders:  -     spironolactone (Aldactone) 50 MG tablet; Take 1 tablet by mouth Daily.  Dispense: 90 tablet; Refill: 1  -     Comprehensive metabolic panel  -     CBC & Differential    2. Mixed hyperlipidemia  Assessment & Plan:  Labs today.   Will be pursuing repatha through cardiology.   Was taking alirocumab.     Orders:  -     Lipid panel    3. Coronary artery disease involving native coronary artery of native heart with angina pectoris (HCC)  Assessment & Plan:  Continues on plavix and aspirin.       4. Type 2 diabetes mellitus with other circulatory complication, without long-term current use of insulin (HCC)  -     Hemoglobin A1c    5. Vitamin D deficiency  Assessment & Plan:  Continues on vitamin D supplement.       6. History of CVA (cerebrovascular accident)  Assessment & Plan:  Continues on aspirin and plavix.       7. Tobacco dependence  Assessment & Plan:  Patient is formulating cessation plan. Planning to attempt quitting in 1-2 weeks.       8. Skin lesion of left leg  Assessment & Plan:  F/u with dermatology   Labs today.     Orders:  -     CAMELIA With / DsDNA, RNP, Sjogrens A / B, Bonilla  -     dsDNA Antibody by IFA, Galo muir, with Reflex to Titer  -     Ambulatory Referral to Dermatology    9. Immunity status testing  -     Measles / Mumps / Rubella Immunity  -     Varicella zoster antibody, IgG      Follow Up   Return if symptoms worsen or fail to improve.  Patient was given instructions and counseling regarding her condition or for health maintenance advice. Please see specific information pulled into the AVS if appropriate.

## 2022-04-07 NOTE — ASSESSMENT & PLAN NOTE
Hypertension is improving with treatment.  Continue current treatment regimen.  Dietary sodium restriction.  Weight loss.  Regular aerobic exercise.  Medication changes per orders.  Blood pressure will be reassessed at the next regular appointment.  D/c hctz.   Increase aldactone to 50mg daily.   Continue on coreg and losartan.

## 2022-04-08 LAB
ALBUMIN SERPL-MCNC: 4.6 G/DL (ref 3.8–4.9)
ALBUMIN/GLOB SERPL: 1.8 {RATIO} (ref 1.2–2.2)
ALP SERPL-CCNC: 58 IU/L (ref 44–121)
ALT SERPL-CCNC: 16 IU/L (ref 0–32)
ANA SER QL: NEGATIVE
AST SERPL-CCNC: 16 IU/L (ref 0–40)
BASOPHILS # BLD AUTO: 0.1 X10E3/UL (ref 0–0.2)
BASOPHILS NFR BLD AUTO: 1 %
BILIRUB SERPL-MCNC: 0.2 MG/DL (ref 0–1.2)
BUN SERPL-MCNC: 12 MG/DL (ref 6–24)
BUN/CREAT SERPL: 17 (ref 9–23)
CALCIUM SERPL-MCNC: 9.2 MG/DL (ref 8.7–10.2)
CHLORIDE SERPL-SCNC: 99 MMOL/L (ref 96–106)
CHOLEST SERPL-MCNC: 288 MG/DL (ref 100–199)
CO2 SERPL-SCNC: 24 MMOL/L (ref 20–29)
CREAT SERPL-MCNC: 0.7 MG/DL (ref 0.57–1)
DSDNA AB SER QL CLIF: NEGATIVE
EGFRCR SERPLBLD CKD-EPI 2021: 104 ML/MIN/1.73
EOSINOPHIL # BLD AUTO: 0.1 X10E3/UL (ref 0–0.4)
EOSINOPHIL NFR BLD AUTO: 1 %
ERYTHROCYTE [DISTWIDTH] IN BLOOD BY AUTOMATED COUNT: 11.8 % (ref 11.7–15.4)
GLOBULIN SER CALC-MCNC: 2.6 G/DL (ref 1.5–4.5)
GLUCOSE SERPL-MCNC: 150 MG/DL (ref 65–99)
HBA1C MFR BLD: 10.3 % (ref 4.8–5.6)
HCT VFR BLD AUTO: 46.5 % (ref 34–46.6)
HDLC SERPL-MCNC: 47 MG/DL
HGB BLD-MCNC: 16 G/DL (ref 11.1–15.9)
IMM GRANULOCYTES # BLD AUTO: 0 X10E3/UL (ref 0–0.1)
IMM GRANULOCYTES NFR BLD AUTO: 0 %
LDLC SERPL CALC-MCNC: 218 MG/DL (ref 0–99)
LYMPHOCYTES # BLD AUTO: 3.1 X10E3/UL (ref 0.7–3.1)
LYMPHOCYTES NFR BLD AUTO: 37 %
MCH RBC QN AUTO: 32.1 PG (ref 26.6–33)
MCHC RBC AUTO-ENTMCNC: 34.4 G/DL (ref 31.5–35.7)
MCV RBC AUTO: 93 FL (ref 79–97)
MEV IGG SER IA-ACNC: <13.5 AU/ML
MONOCYTES # BLD AUTO: 0.6 X10E3/UL (ref 0.1–0.9)
MONOCYTES NFR BLD AUTO: 7 %
MUV IGG SER IA-ACNC: 120 AU/ML
NEUTROPHILS # BLD AUTO: 4.6 X10E3/UL (ref 1.4–7)
NEUTROPHILS NFR BLD AUTO: 54 %
PLATELET # BLD AUTO: 279 X10E3/UL (ref 150–450)
POTASSIUM SERPL-SCNC: 4.2 MMOL/L (ref 3.5–5.2)
PROT SERPL-MCNC: 7.2 G/DL (ref 6–8.5)
RBC # BLD AUTO: 4.98 X10E6/UL (ref 3.77–5.28)
RUBV IGG SERPL IA-ACNC: 11.5 INDEX
SODIUM SERPL-SCNC: 138 MMOL/L (ref 134–144)
TRIGL SERPL-MCNC: 127 MG/DL (ref 0–149)
VLDLC SERPL CALC-MCNC: 23 MG/DL (ref 5–40)
VZV IGG SER IA-ACNC: 1980 INDEX
WBC # BLD AUTO: 8.4 X10E3/UL (ref 3.4–10.8)

## 2022-04-26 ENCOUNTER — PATIENT MESSAGE (OUTPATIENT)
Dept: INTERNAL MEDICINE | Facility: CLINIC | Age: 52
End: 2022-04-26

## 2022-04-27 NOTE — TELEPHONE ENCOUNTER
From: Carina Zamuido  To: VITOR Saenz  Sent: 4/26/2022 9:29 AM EDT  Subject: Follow up    Good Morning beautiful people. This message is for Shelly to follow up some info. I started back on Ozempic considering my A1C. Also, went to Derm. They did a punch biopsy on my arm because what is on my arms is the same as the left femoral artery area rash. 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 I had to look it all up. Interesting though. I always seem to get crap that really has no etiology. Yay. She will let me know results in a week or two. Just an Fyi. I also started the 50mg of spironaldactone vs. The HZTC which I discontinued. Miss you! Thank you.

## 2022-05-31 ENCOUNTER — TELEPHONE (OUTPATIENT)
Dept: CARDIOLOGY | Facility: CLINIC | Age: 52
End: 2022-05-31

## 2022-05-31 DIAGNOSIS — E78.2 MIXED HYPERLIPIDEMIA: Primary | ICD-10-CM

## 2022-05-31 NOTE — TELEPHONE ENCOUNTER
The Repatha prescription is in the chart.  Please send to the pharmacy of her choice.  Please explain to her that it is the same technique that which is switching her from the Praluent to Repatha.  Repatha will be 1 injection every 14 days.  She will need a repeat cholesterol panel in 3 months.  Orders have been placed.

## 2022-05-31 NOTE — TELEPHONE ENCOUNTER
Spoke with Rachel at the pharmacy, she states insurance is asking for repatha instead of praluent. Can we switch? Please advise.    dd

## 2022-06-20 ENCOUNTER — TELEPHONE (OUTPATIENT)
Dept: CARDIOLOGY | Facility: CLINIC | Age: 52
End: 2022-06-20

## 2022-06-20 NOTE — TELEPHONE ENCOUNTER
----- Message from Carina Zamudio sent at 6/20/2022 11:32 AM EDT -----  Regarding: Possibly come to clinic  Good Morning, Yaritza. I called the practice and wasn't getting routed the right place. Wanted to speak with one the RN's in your clinic about symptoms. Kind of feel like I need an EKG and just checked. I am in no distress. Just concerned a bit. I see you and Dr. Tipton. Heart Attack 7 years ago. Stent in Circumflex. Having some weird symptoms last 24 hours but may be more GI. Thank you! Rosa Zamudio [phone].

## 2022-06-20 NOTE — TELEPHONE ENCOUNTER
Sorry, just saw this, lets have her come into the [facility] in the morning.  If she has any discomfort again overnight she should go to the emergency room

## 2022-06-20 NOTE — TELEPHONE ENCOUNTER
"Dr. Tipton,    Pt sent a [employer] message this morning. Called and spoke with her this afternoon. She said she laid down to take a nap yesterday. When she laid down she started having chest and back pressure. When she got up she took Tums and Sudafed. Symptoms were mostly relieved until she went to sleep last night. She said the chest and back pressure came back. This was \"very uncomfortable\" for her. She said she was even worried last night.    Today she has taken her regular morning medications and she has not had to take any OTC meds for her sinuses or had to take Tums, but she is still experiencing mild discomfort in her diaphragm area that radiates mostly to the left. She was wondering if she may need to come in for an EKG or an appt.    Do you have any recommendations for her?    Thank you,    Jessica Kauffman RN  [place] [facility] [place]    "

## 2022-06-20 NOTE — TELEPHONE ENCOUNTER
Called and left VM. Will continue to try to reach patient.     Ivana Huang RN  Triage [facility] [place]

## 2022-06-21 ENCOUNTER — HOSPITAL ENCOUNTER (OUTPATIENT)
Dept: CARDIOLOGY | Facility: HOSPITAL | Age: 52
Discharge: HOME | End: 2022-06-21
Admitting: INTERNAL MEDICINE

## 2022-06-21 VITALS
RESPIRATION RATE: 20 BRPM | SYSTOLIC BLOOD PRESSURE: 110 MMHG | HEART RATE: 79 BPM | HEIGHT: 62 IN | OXYGEN SATURATION: 96 % | BODY MASS INDEX: 36.47 KG/M2 | WEIGHT: 198.17 LBS | DIASTOLIC BLOOD PRESSURE: 71 MMHG

## 2022-06-21 VITALS — BODY MASS INDEX: 36.47 KG/M2 | WEIGHT: 198.19 LBS | HEIGHT: 62 IN

## 2022-06-21 DIAGNOSIS — I25.119 CORONARY ARTERY DISEASE INVOLVING NATIVE CORONARY ARTERY OF NATIVE HEART WITH ANGINA PECTORIS: Primary | ICD-10-CM

## 2022-06-21 DIAGNOSIS — Z98.61 S/P CORONARY ANGIOPLASTY: ICD-10-CM

## 2022-06-21 DIAGNOSIS — R07.2 PRECORDIAL PAIN: ICD-10-CM

## 2022-06-21 DIAGNOSIS — R06.02 SHORTNESS OF BREATH: ICD-10-CM

## 2022-06-21 LAB
ALBUMIN SERPL-MCNC: 4.1 G/DL (ref 3.5–5.2)
ALBUMIN/GLOB SERPL: 1.3 G/DL
ALP SERPL-CCNC: 50 U/L (ref 39–117)
ALT SERPL W P-5'-P-CCNC: 15 U/L (ref 1–33)
ANION GAP SERPL CALCULATED.3IONS-SCNC: 13.1 MMOL/L (ref 5–15)
AST SERPL-CCNC: 15 U/L (ref 1–32)
BASOPHILS # BLD AUTO: 0.04 10*3/MM3 (ref 0–0.2)
BASOPHILS NFR BLD AUTO: 0.4 % (ref 0–1.5)
BH CV NUCLEAR PRIOR STUDY: 1
BH CV REST NUCLEAR ISOTOPE DOSE: 11.3 MCI
BH CV STRESS BP STAGE 1: NORMAL
BH CV STRESS BP STAGE 2: NORMAL
BH CV STRESS DURATION MIN STAGE 1: 3
BH CV STRESS DURATION MIN STAGE 2: 3
BH CV STRESS DURATION SEC STAGE 1: 0
BH CV STRESS DURATION SEC STAGE 2: 0
BH CV STRESS DURATION SEC STAGE 3: 30
BH CV STRESS GRADE STAGE 1: 10
BH CV STRESS GRADE STAGE 2: 12
BH CV STRESS GRADE STAGE 3: 14
BH CV STRESS HR STAGE 1: 111
BH CV STRESS HR STAGE 2: 122
BH CV STRESS HR STAGE 3: 129
BH CV STRESS METS STAGE 1: 5
BH CV STRESS METS STAGE 2: 7.5
BH CV STRESS METS STAGE 3: 10
BH CV STRESS NUCLEAR ISOTOPE DOSE: 34.6 MCI
BH CV STRESS PROTOCOL 1: NORMAL
BH CV STRESS PROTOCOL 2 BP STAGE 1: NORMAL
BH CV STRESS PROTOCOL 2 COMMENTS STAGE 1: NORMAL
BH CV STRESS PROTOCOL 2 DOSE REGADENOSON STAGE 1: 0.4
BH CV STRESS PROTOCOL 2 DURATION MIN STAGE 1: 0
BH CV STRESS PROTOCOL 2 DURATION SEC STAGE 1: 10
BH CV STRESS PROTOCOL 2 HR STAGE 1: 120
BH CV STRESS PROTOCOL 2 STAGE 1: 1
BH CV STRESS PROTOCOL 2: NORMAL
BH CV STRESS RECOVERY BP: NORMAL MMHG
BH CV STRESS RECOVERY HR: 94 BPM
BH CV STRESS SPEED STAGE 1: 1.7
BH CV STRESS SPEED STAGE 2: 2.5
BH CV STRESS SPEED STAGE 3: 3.4
BH CV STRESS STAGE 1: 1
BH CV STRESS STAGE 2: 2
BH CV STRESS STAGE 3: 3
BILIRUB SERPL-MCNC: 0.3 MG/DL (ref 0–1.2)
BUN SERPL-MCNC: 17 MG/DL (ref 6–20)
BUN/CREAT SERPL: 24.3 (ref 7–25)
CALCIUM SPEC-SCNC: 9 MG/DL (ref 8.6–10.5)
CHLORIDE SERPL-SCNC: 101 MMOL/L (ref 98–107)
CO2 SERPL-SCNC: 24.9 MMOL/L (ref 22–29)
CREAT SERPL-MCNC: 0.7 MG/DL (ref 0.57–1)
D DIMER PPP FEU-MCNC: 0.56 MCGFEU/ML (ref 0–0.49)
DEPRECATED RDW RBC AUTO: 40.6 FL (ref 37–54)
EGFRCR SERPLBLD CKD-EPI 2021: 103.6 ML/MIN/1.73
EOSINOPHIL # BLD AUTO: 0.13 10*3/MM3 (ref 0–0.4)
EOSINOPHIL NFR BLD AUTO: 1.4 % (ref 0.3–6.2)
ERYTHROCYTE [DISTWIDTH] IN BLOOD BY AUTOMATED COUNT: 12.5 % (ref 12.3–15.4)
GLOBULIN UR ELPH-MCNC: 3.1 GM/DL
GLUCOSE SERPL-MCNC: 134 MG/DL (ref 65–99)
HCT VFR BLD AUTO: 43.4 % (ref 34–46.6)
HGB BLD-MCNC: 15.4 G/DL (ref 12–15.9)
IMM GRANULOCYTES # BLD AUTO: 0.03 10*3/MM3 (ref 0–0.05)
IMM GRANULOCYTES NFR BLD AUTO: 0.3 % (ref 0–0.5)
LV EF NUC BP: 62 %
LYMPHOCYTES # BLD AUTO: 3.35 10*3/MM3 (ref 0.7–3.1)
LYMPHOCYTES NFR BLD AUTO: 35.9 % (ref 19.6–45.3)
MAXIMAL PREDICTED HEART RATE: 167 BPM
MCH RBC QN AUTO: 32.1 PG (ref 26.6–33)
MCHC RBC AUTO-ENTMCNC: 35.5 G/DL (ref 31.5–35.7)
MCV RBC AUTO: 90.4 FL (ref 79–97)
MONOCYTES # BLD AUTO: 0.61 10*3/MM3 (ref 0.1–0.9)
MONOCYTES NFR BLD AUTO: 6.5 % (ref 5–12)
NEUTROPHILS NFR BLD AUTO: 5.17 10*3/MM3 (ref 1.7–7)
NEUTROPHILS NFR BLD AUTO: 55.5 % (ref 42.7–76)
NRBC BLD AUTO-RTO: 0 /100 WBC (ref 0–0.2)
NT-PROBNP SERPL-MCNC: 84.3 PG/ML (ref 0–900)
PERCENT MAX PREDICTED HR: 71.86 %
PLATELET # BLD AUTO: 245 10*3/MM3 (ref 140–450)
PMV BLD AUTO: 9.7 FL (ref 6–12)
POTASSIUM SERPL-SCNC: 4.2 MMOL/L (ref 3.5–5.2)
PROT SERPL-MCNC: 7.2 G/DL (ref 6–8.5)
RBC # BLD AUTO: 4.8 10*6/MM3 (ref 3.77–5.28)
SODIUM SERPL-SCNC: 139 MMOL/L (ref 136–145)
STRESS BASELINE BP: NORMAL MMHG
STRESS BASELINE HR: 79 BPM
STRESS PERCENT HR: 85 %
STRESS POST EXERCISE DUR MIN: 7 MIN
STRESS POST PEAK BP: NORMAL MMHG
STRESS POST PEAK HR: 120 BPM
STRESS TARGET HR: 142 BPM
TROPONIN T SERPL-MCNC: <0.01 NG/ML (ref 0–0.03)
WBC NRBC COR # BLD: 9.33 10*3/MM3 (ref 3.4–10.8)

## 2022-06-21 PROCEDURE — 93017 CV STRESS TEST TRACING ONLY: CPT

## 2022-06-21 PROCEDURE — 99214 OFFICE O/P EST MOD 30 MIN: CPT | Performed by: INTERNAL MEDICINE

## 2022-06-21 PROCEDURE — 93016 CV STRESS TEST SUPVJ ONLY: CPT | Performed by: INTERNAL MEDICINE

## 2022-06-21 PROCEDURE — 78452 HT MUSCLE IMAGE SPECT MULT: CPT | Performed by: INTERNAL MEDICINE

## 2022-06-21 PROCEDURE — 0 TECHNETIUM TETROFOSMIN KIT: Performed by: INTERNAL MEDICINE

## 2022-06-21 PROCEDURE — 94760 N-INVAS EAR/PLS OXIMETRY 1: CPT

## 2022-06-21 PROCEDURE — 25010000002 REGADENOSON 0.4 MG/5ML SOLUTION: Performed by: INTERNAL MEDICINE

## 2022-06-21 PROCEDURE — 36415 COLL VENOUS BLD VENIPUNCTURE: CPT

## 2022-06-21 PROCEDURE — 85025 COMPLETE CBC W/AUTO DIFF WBC: CPT

## 2022-06-21 PROCEDURE — 85379 FIBRIN DEGRADATION QUANT: CPT | Performed by: INTERNAL MEDICINE

## 2022-06-21 PROCEDURE — 84484 ASSAY OF TROPONIN QUANT: CPT | Performed by: INTERNAL MEDICINE

## 2022-06-21 PROCEDURE — A9502 TC99M TETROFOSMIN: HCPCS | Performed by: INTERNAL MEDICINE

## 2022-06-21 PROCEDURE — 83880 ASSAY OF NATRIURETIC PEPTIDE: CPT | Performed by: INTERNAL MEDICINE

## 2022-06-21 PROCEDURE — 93018 CV STRESS TEST I&R ONLY: CPT | Performed by: INTERNAL MEDICINE

## 2022-06-21 PROCEDURE — 78452 HT MUSCLE IMAGE SPECT MULT: CPT

## 2022-06-21 PROCEDURE — 93005 ELECTROCARDIOGRAM TRACING: CPT | Performed by: INTERNAL MEDICINE

## 2022-06-21 PROCEDURE — 80053 COMPREHEN METABOLIC PANEL: CPT

## 2022-06-21 RX ORDER — SEMAGLUTIDE 1.34 MG/ML
0.5 INJECTION, SOLUTION SUBCUTANEOUS WEEKLY
COMMUNITY
End: 2022-12-24 | Stop reason: SDUPTHER

## 2022-06-21 RX ORDER — NITROGLYCERIN 0.4 MG/1
0.4 TABLET SUBLINGUAL
Status: DISCONTINUED | OUTPATIENT
Start: 2022-06-21 | End: 2022-11-14

## 2022-06-21 RX ORDER — SODIUM CHLORIDE 0.9 % (FLUSH) 0.9 %
10 SYRINGE (ML) INJECTION AS NEEDED
Status: DISCONTINUED | OUTPATIENT
Start: 2022-06-21 | End: 2022-11-14

## 2022-06-21 RX ADMIN — TETROFOSMIN 1 DOSE: 1.38 INJECTION, POWDER, LYOPHILIZED, FOR SOLUTION INTRAVENOUS at 14:04

## 2022-06-21 RX ADMIN — REGADENOSON 0.4 MG: 0.08 INJECTION, SOLUTION INTRAVENOUS at 14:04

## 2022-06-21 RX ADMIN — TETROFOSMIN 1 DOSE: 1.38 INJECTION, POWDER, LYOPHILIZED, FOR SOLUTION INTRAVENOUS at 13:02

## 2022-06-21 NOTE — PROGRESS NOTES
Subjective:     Encounter Date:06/21/22      Patient ID: Carina Zamudio is a 52 y.o. female.    Chief Complaint:  History of Present Illness    This is a 52-year-old female with a documented history of CAD, inferior STEMI in August 2015 with drug-eluting stent placement to the left circumflex as well as nonocclusive CAD in the RCA and LAD, history of CVA, diabetes mellitus with circulatory complication, obstructive sleep apnea on CPAP, hypertensive heart disease without heart failure, and history of tobacco abuse and dependence.    [facility].  She contacted the office yesterday late afternoon with symptoms of having some discomfort when she was laying down.  However it did been coming and going for a couple of days.  We told to go the emergency room she had more discomfort overnight, otherwise coming into the [facility] today, and she did so.    She has not had any correlation with activity or exercise.  No shortness of breath.  No nausea, vomiting, or diaphoresis.  No recent other illnesses.    She has statin intolerance.  We have had her on rosuvastatin and she has been intolerant of that.  She had to stop it, she did not work to really be careful with her diet, and she got blood work obtained by yourself that showed her LDL was back up to 254.  She has had problems with myalgia that was incapacitating previously when she was on atorvastatin and has been intolerant to pravastatin.    Stress test 11/2017 showed EF 64%, no ischemia.     Cardiac catheterization 2015:     Left heart catheterization: LV pressure was 120/20. There was normal LV  systolic function without segmental wall motion abnormality. No mitral  insufficiency or gradient across the aortic valve on pullback.      The left main coronary artery is normal.      The LAD has some mild luminal irregularities in the proximal portion that are  about 20%. The first diagonal has some mild luminal irregularities of  10% in  it.   The circumflex has a 99% blockage in the middle portion. There is an OM1 that is subtotally occluded right in this lesion and the flow beyond that is CAROL-1.     The RCA is a dominant vessel with 30% proximal and mid disease, diffuse  aneurysmal change distally. The MARCIA has diffuse 50% disease in the proximal  portion of it, and the PDA has a 30% origin lesion in it.      Drug-eluting stent placement 2015:  STEMI. Due to this circumflex lesion we are going to proceed on with  intervention in it.       Initially, we went up with a Voda 3.0 guiding catheter. I attempted to cross it with a BMW wire, but it would not,  switched to a whisper wire and it easily crossed and then brought a 2.5 x 15 Trek balloon down, inflated it at 14 atmospheres, predilating this and then we brought a 2.75 x 18 Xience Alpine stent and deployed it at 14 atmospheres. I postdilated the front end of that stent with a 3.0 x 8 NC Trek at 20  atmospheres. There was 0% residual and CAROL-3 flow. There was just a trickle of flow in the OM1, but it is too small to go after or do anything.     The following portions of the patient's history were reviewed and updated as appropriate: allergies, current medications, past family history, past medical history, past social history, past surgical history and problem list.    Past Medical History:   Diagnosis Date   • Adrenal cortical adenoma    • Anemia    • Anxiety    • Benign essential hypertension    • Bronchitis    • CAD (coronary artery disease)    • Cerebellar infarct (HCC)    • Cervical radiculopathy due to degenerative joint disease of spine 12/27/2016   • Diabetes mellitus (HCC)    • Diverticulitis    • Diverticulosis    • DJD (degenerative joint disease), cervical    • DJD (degenerative joint disease), lumbar    • Fracture, toe    • Gestational diabetes    • HPV (human papilloma virus) infection    • Hyperlipemia    • Hypertension    • Leg edema, right 2/25/2019   • Liver nodule    •  Myocardial infarction (HCC)    • Osteoarthritis of spine 2016   • Other specified dorsopathies, site unspecified 2011   • Pneumonia    • Pregnancy      - stillbirth at the gestational age 7 months x1   • Proteinuria     CHRONIC   • Raynaud phenomenon    • S/P right knee arthroscopy 2019   • Sleep apnea    • TIA (transient ischemic attack)    • Type 2 diabetes mellitus with circulatory disorder, without long-term current use of insulin ([facility]) 2017   • Venous tributary (branch) occlusion of retina 2012   • Vitamin D deficiency        Past Surgical History:   Procedure Laterality Date   • CATARACT EXTRACTION, BILATERAL     • CHOLECYSTECTOMY WITH INTRAOPERATIVE CHOLANGIOGRAM N/A 2021    Procedure: CHOLECYSTECTOMY LAPAROSCOPIC INTRAOPERATIVE CHOLANGIOGRAM;  Surgeon: Maulik Ashraf MD;  Location: [facility] OR;  Service: General;  Laterality: N/A;   • COLONOSCOPY N/A 2020    Procedure: COLONOSCOPY TO CECUM WITH HOT SNARE POLYPECTOMY;  Surgeon: Maulik Ashraf MD;  Location: [facility] ENDOSCOPY;  Service: General;  Laterality: N/A;  SCREENING  --POLYP   • CORONARY STENT PLACEMENT  2015    circ   • ENDOMETRIAL ABLATION     • KNEE ARTHROSCOPY Right 5/10/2019    Procedure: RIGHT KNEE ARTHROSCOPY BWITH PART. MEDIAL AND LATERAL MENISECTOMY, PATELLA, CHONDROPLASTY, AND DEBRIDMENT;  Surgeon: Ignacio Martinez MD;  Location: [facility];  Service: Orthopedics   • TUBAL ABDOMINAL LIGATION             ECG 12 Lead    Date/Time: 2022 12:37 PM  Performed by: Vaughn Tipton III, MD  Authorized by: Vaughn Tipton III, MD   Comparison: compared with previous ECG   Similar to previous ECG  Rhythm: sinus rhythm  Rate: normal  Conduction: conduction normal  ST Segments: ST segments normal  T Waves: T waves normal  QRS axis: normal  Other: no other findings    Clinical impression: normal ECG               Objective:     Vitals:    22 1131   BP: 110/71   BP Location: Left  "arm   Patient Position: Sitting   Pulse: 79   Resp: 20   SpO2: 96%   Weight: 89.9 kg (198 lb 2.7 oz)   Height: 157.5 cm (62.01\")         Vitals reviewed.   Constitutional:       General: Not in acute distress.     Appearance: Well-developed. Not diaphoretic.   Eyes:      General:         Right eye: No discharge.         Left eye: No discharge.      Conjunctiva/sclera: Conjunctivae normal.      Pupils: Pupils are equal, round, and reactive to light.   HENT:      Head: Normocephalic and atraumatic.      Nose: Nose normal.   Neck:      Thyroid: No thyromegaly.      Trachea: No tracheal deviation.      Lymphadenopathy: No cervical adenopathy.   Pulmonary:      Effort: Pulmonary effort is normal. No respiratory distress.      Breath sounds: Normal breath sounds. No stridor.   Chest:      Chest wall: Not tender to palpatation.   Cardiovascular:      Normal rate. Regular rhythm.      Murmurs: There is no murmur.      . No S3 gallop. No click. No rub.   Pulses:     Intact distal pulses.   Abdominal:      General: Bowel sounds are normal. There is no distension.      Palpations: Abdomen is soft. There is no abdominal mass.      Tenderness: There is no abdominal tenderness. There is no guarding or rebound.   Musculoskeletal: Normal range of motion.         General: No tenderness or deformity.      Cervical back: Normal range of motion and neck supple. Skin:     General: Skin is warm and dry.      Findings: No erythema or rash.   Neurological:      Mental Status: Alert and oriented to person, place, and time.      Deep Tendon Reflexes: Reflexes are normal and symmetric.   Psychiatric:         Thought Content: Thought content normal.         Lab Review:   Lab Results   Component Value Date    GLUCOSE 134 (H) 06/21/2022    BUN 17 06/21/2022    CREATININE 0.70 06/21/2022    EGFRIFNONA 105 11/02/2021    EGFRIFAFRI 118 06/24/2021    BCR 24.3 06/21/2022    K 4.2 06/21/2022    CO2 24.9 06/21/2022    CALCIUM 9.0 06/21/2022    " PROTENTOTREF 7.2 04/07/2022    ALBUMIN 4.10 06/21/2022    LABIL2 1.8 04/07/2022    AST 15 06/21/2022    ALT 15 06/21/2022     CBC    CBC 11/2/21 4/7/22 6/21/22   WBC 10.77 8.4 9.33   RBC 4.85 4.98 4.80   Hemoglobin 15.7 16.0 (A) 15.4   Hematocrit 45.3 46.5 43.4   MCV 93.4 93 90.4   MCH 32.4 32.1 32.1   MCHC 34.7 34.4 35.5   RDW 13.2 11.8 12.5   Platelets 285 279 245   (A) Abnormal value            Lab Results   Component Value Date    PROBNP 84.3 06/21/2022    PROBNP 189.5 11/07/2017     No components found for: TROP  Lab Results   Component Value Date    DDIMERQUANT 0.56 (H) 06/21/2022         Performed        Assessment:          Diagnosis Plan   1. Precordial pain  Cardiac Monitoring    Vital Signs - Once    Vital Signs - As Needed    Pulse Oximetry    Oxygen Therapy- Nasal Cannula; Titrate for SPO2: 92%, equal to or greater than    Insert Peripheral IV    sodium chloride 0.9 % flush 10 mL    nitroglycerin (NITROSTAT) SL tablet 0.4 mg    NPO Diet NPO Type: Strict NPO    Bathroom Privileges With Assistance    CBC & Differential    Comprehensive Metabolic Panel    Troponin T    D-Dimer    proBNP    ECG 12 Lead    Cardiac Monitoring    Vital Signs - Once    Insert Peripheral IV    NPO Diet NPO Type: Strict NPO    Bathroom Privileges With Assistance    Troponin T    Troponin T    D-Dimer    D-Dimer    proBNP    proBNP   2. Shortness of breath  Cardiac Monitoring    Vital Signs - Once    Vital Signs - As Needed    Pulse Oximetry    Oxygen Therapy- Nasal Cannula; Titrate for SPO2: 92%, equal to or greater than    Insert Peripheral IV    sodium chloride 0.9 % flush 10 mL    nitroglycerin (NITROSTAT) SL tablet 0.4 mg    NPO Diet NPO Type: Strict NPO    Bathroom Privileges With Assistance    CBC & Differential    Comprehensive Metabolic Panel    Troponin T    D-Dimer    proBNP    ECG 12 Lead    Cardiac Monitoring    Vital Signs - Once    Insert Peripheral IV    NPO Diet NPO Type: Strict NPO    Bathroom Privileges With  Assistance    Troponin T    Troponin T    D-Dimer    D-Dimer    proBNP    proBNP          Plan:       1.  Chest pain-with both typical and atypical components, troponin is negative, we arranged for a nuclear perfusion study today.  Nuclear perfusion study was obtained, reviewed, and shows no evidence of ischemia.  Ejection fraction 62%.  Patient will follow-up with Dr. Engel for noncardiac etiologies of discomfort.  Patient was scheduled to be seen in December, we will reset that appointment to follow-up in 1 year.  2.  Coronary artery disease as outlined above  3.  History of myocardial infarction with drug-eluting stent placement in the circumflex, continue guideline directed medical therapy  4.  Statin intolerance, patient remains on Praluent    Thank you very much for allowing us to participate in the care of this pleasant patient.  Please don't hesitate to call if I can be of assistance in any way.      Current Outpatient Medications:   •  ALPRAZolam (XANAX) 0.5 MG tablet, Take 1 tablet by mouth Daily As Needed for Anxiety., Disp: 30 tablet, Rfl: 4  •  aspirin 81 MG tablet, Take 81 mg by mouth Daily. INSTRUCTED PT FOLLOW MD INSTRUCTIONS, Disp: , Rfl:   •  B Complex Vitamins (VITAMIN-B COMPLEX) tablet, Take 1 tablet by mouth Daily. HOLD PRIOR TO SURG, Disp: , Rfl:   •  carvedilol (Coreg) 6.25 MG tablet, Take 1 tablet by mouth 2 (Two) Times a Day With Meals., Disp: 180 tablet, Rfl: 3  •  clobetasol (TEMOVATE) 0.05 % ointment, apply 1 application on the skin to affected area of elbow as directed twice daily for 2 weeks then stop for 1 week, then repeat, Disp: 30 g, Rfl: 0  •  clopidogrel (PLAVIX) 75 MG tablet, Take 1 tablet by mouth Daily., Disp: 90 tablet, Rfl: 3  •  Evolocumab (REPATHA) solution prefilled syringe injection, Inject 1 mL under the skin into the appropriate area as directed Every 14 (Fourteen) Days., Disp: 1 mL, Rfl: 6  •  fluticasone (FLONASE) 50 MCG/ACT nasal spray, Administer 2 sprays in each  nostril once a day (Patient taking differently: 2 sprays into the nostril(s) as directed by provider Daily As Needed.), Disp: 16 g, Rfl: 11  •  losartan (COZAAR) 100 MG tablet, Take 1 tablet by mouth Daily., Disp: 90 tablet, Rfl: 3  •  Multiple Vitamin tablet, Take 1 tablet by mouth Daily., Disp: , Rfl:   •  Probiotic Product (PRO-BIOTIC BLEND PO), Take  by mouth., Disp: , Rfl:   •  Semaglutide, 1 MG/DOSE, (Ozempic, 1 MG/DOSE,) 2 MG/1.5ML solution pen-injector, Inject 0.5 mg under the skin into the appropriate area as directed 1 (One) Time Per Week., Disp: , Rfl:   •  spironolactone (Aldactone) 50 MG tablet, Take 1 tablet by mouth Daily., Disp: 90 tablet, Rfl: 1  •  vitamin D (ERGOCALCIFEROL) 1.25 MG (15852 UT) capsule capsule, Take 1 capsule by mouth 2 (Two) Times a Week., Disp: 26 capsule, Rfl: 3    Current Facility-Administered Medications:   •  nitroglycerin (NITROSTAT) SL tablet 0.4 mg, 0.4 mg, Sublingual, Q5 Min PRN, Vaughn Tipton III, MD  •  sodium chloride 0.9 % flush 10 mL, 10 mL, Intravenous, PRN, Vaughn Tipton III, MD         No follow-ups on file.

## 2022-06-21 NOTE — TELEPHONE ENCOUNTER
Spoke to patient, she states she feels better today. She took a gas x last night and has not had any symptoms since. She is still willing to come to [facility] at this time. I told her to come here asap and nothing else to eat or drink in the mean time.    Ivana Huang RN  [place] [facility]

## 2022-07-20 DIAGNOSIS — R39.9 LOWER URINARY TRACT SYMPTOMS: ICD-10-CM

## 2022-07-20 RX ORDER — FLUCONAZOLE 150 MG/1
150 TABLET ORAL ONCE
Qty: 2 TABLET | Refills: 1 | Status: SHIPPED | OUTPATIENT
Start: 2022-07-20 | End: 2022-07-22

## 2022-07-20 RX ORDER — SULFAMETHOXAZOLE AND TRIMETHOPRIM 800; 160 MG/1; MG/1
1 TABLET ORAL 2 TIMES DAILY
Qty: 10 TABLET | Refills: 0 | Status: SHIPPED | OUTPATIENT
Start: 2022-07-20 | End: 2022-07-25

## 2022-08-04 ENCOUNTER — HOSPITAL ENCOUNTER (OUTPATIENT)
Dept: CT IMAGING | Facility: HOSPITAL | Age: 52
Discharge: HOME | End: 2022-08-04
Admitting: INTERNAL MEDICINE

## 2022-08-04 DIAGNOSIS — R59.1 LYMPHADENOPATHY: ICD-10-CM

## 2022-08-04 LAB — CREAT BLDA-MCNC: 0.7 MG/DL (ref 0.6–1.3)

## 2022-08-04 PROCEDURE — 82565 ASSAY OF CREATININE: CPT

## 2022-08-04 PROCEDURE — 25010000002 IOPAMIDOL 61 % SOLUTION: Performed by: INTERNAL MEDICINE

## 2022-08-04 PROCEDURE — 71260 CT THORAX DX C+: CPT

## 2022-08-04 RX ADMIN — IOPAMIDOL 75 ML: 612 INJECTION, SOLUTION INTRAVENOUS at 09:37

## 2022-08-08 ENCOUNTER — APPOINTMENT (OUTPATIENT)
Dept: WOMENS IMAGING | Facility: HOSPITAL | Age: 52
End: 2022-08-08

## 2022-08-08 ENCOUNTER — PROCEDURE VISIT (OUTPATIENT)
Dept: OBSTETRICS AND GYNECOLOGY | Age: 52
End: 2022-08-08

## 2022-08-08 DIAGNOSIS — Z12.31 VISIT FOR SCREENING MAMMOGRAM: Primary | ICD-10-CM

## 2022-08-08 PROCEDURE — 77067 SCR MAMMO BI INCL CAD: CPT | Performed by: OBSTETRICS & GYNECOLOGY

## 2022-08-08 PROCEDURE — 77063 BREAST TOMOSYNTHESIS BI: CPT | Performed by: OBSTETRICS & GYNECOLOGY

## 2022-08-08 PROCEDURE — 77067 SCR MAMMO BI INCL CAD: CPT | Performed by: RADIOLOGY

## 2022-08-08 PROCEDURE — 77063 BREAST TOMOSYNTHESIS BI: CPT | Performed by: RADIOLOGY

## 2022-08-11 DIAGNOSIS — R10.30 LOWER ABDOMINAL PAIN: ICD-10-CM

## 2022-08-11 RX ORDER — METRONIDAZOLE 500 MG/1
500 TABLET ORAL 3 TIMES DAILY
Qty: 21 TABLET | Refills: 1 | Status: SHIPPED | OUTPATIENT
Start: 2022-08-11 | End: 2022-08-19

## 2022-08-19 ENCOUNTER — OFFICE VISIT (OUTPATIENT)
Dept: INTERNAL MEDICINE | Facility: CLINIC | Age: 52
End: 2022-08-19

## 2022-08-19 VITALS
TEMPERATURE: 98.6 F | HEIGHT: 62 IN | BODY MASS INDEX: 36.95 KG/M2 | SYSTOLIC BLOOD PRESSURE: 120 MMHG | DIASTOLIC BLOOD PRESSURE: 75 MMHG | WEIGHT: 200.8 LBS

## 2022-08-19 DIAGNOSIS — F32.A MILD DEPRESSION: Primary | ICD-10-CM

## 2022-08-19 DIAGNOSIS — I10 ESSENTIAL HYPERTENSION: ICD-10-CM

## 2022-08-19 PROCEDURE — 99214 OFFICE O/P EST MOD 30 MIN: CPT | Performed by: NURSE PRACTITIONER

## 2022-08-19 RX ORDER — BUPROPION HYDROCHLORIDE 150 MG/1
150 TABLET ORAL DAILY
Qty: 30 TABLET | Refills: 2 | Status: SHIPPED | OUTPATIENT
Start: 2022-08-19 | End: 2022-11-13 | Stop reason: SDUPTHER

## 2022-08-19 RX ORDER — LOSARTAN POTASSIUM 50 MG/1
25 TABLET ORAL DAILY
Qty: 90 TABLET | Refills: 1 | Status: SHIPPED | OUTPATIENT
Start: 2022-08-19

## 2022-08-19 NOTE — PROGRESS NOTES
"Chief Complaint  HTN    Subjective        Carina Zamudio presents to [facility]  History of Present Illness  This is a 51 y/o female presenting to office for f/u with HTN. Patient continues on aldactone, losartan, and coreg. Patient reports low BP recently-- reports feeling more tired and dizzy on occasion.     Patient also reports depression-- patient reports feeling loss of motivation, blues, and having a hard time enjoying life. Patient reports fatigue and ongoing difficulty with finding happiness in life. Patient reports she has tried different things such as taking vacations, playing pool, etc. Which isn't helping. Patient denies any current plans for suicide or self harm.     Objective   Vital Signs:  /75 (BP Location: Left arm, Patient Position: Sitting, Cuff Size: Adult)   Temp 98.6 °F (37 °C)   Ht 157.5 cm (62\")   Wt 91.1 kg (200 lb 12.8 oz)   BMI 36.73 kg/m²   Estimated body mass index is 36.73 kg/m² as calculated from the following:    Height as of this encounter: 157.5 cm (62\").    Weight as of this encounter: 91.1 kg (200 lb 12.8 oz).          Physical Exam  Vitals and nursing note reviewed.   Constitutional:       Appearance: Normal appearance.   HENT:      Head: Normocephalic and atraumatic.   Eyes:      Pupils: Pupils are equal, round, and reactive to light.   Cardiovascular:      Rate and Rhythm: Normal rate and regular rhythm.      Pulses: Normal pulses.      Heart sounds: Normal heart sounds. No murmur heard.    No friction rub. No gallop.   Pulmonary:      Effort: Pulmonary effort is normal. No respiratory distress.      Breath sounds: Normal breath sounds. No wheezing or rales.   Musculoskeletal:      Cervical back: Normal range of motion and neck supple.   Skin:     General: Skin is warm and dry.   Neurological:      Mental Status: She is alert and oriented to person, place, and time. Mental status is at baseline.   Psychiatric:         Mood and " Affect: Mood is depressed. Affect is tearful.         Speech: Speech normal.         Behavior: Behavior normal.         Thought Content: Thought content normal.        Result Review :                Assessment and Plan   Diagnoses and all orders for this visit:    1. Mild depression (Primary)  Assessment & Plan:  Patient's depression is single episode and is mild without psychosis. Their depression is currently active and the condition is worsening. This will be reassessed at the next regular appointment. F/U as described:patient was prescribed an antidepressant medicine.  Start wellbutrin 150mg XL daily.     Orders:  -     buPROPion XL (Wellbutrin XL) 150 MG 24 hr tablet; Take 1 tablet by mouth Daily.  Dispense: 30 tablet; Refill: 2    2. Essential hypertension  Assessment & Plan:  Decrease losartan to 25mg daily as trial-- will send 50mg tablets; take 1/2 daily.   Okay to continue with aldactone and coreg.       Orders:  -     losartan (Cozaar) 50 MG tablet; Take 0.5 tablets by mouth Daily.  Dispense: 90 tablet; Refill: 1           Follow Up   No follow-ups on file.  Patient was given instructions and counseling regarding her condition or for health maintenance advice. Please see specific information pulled into the AVS if appropriate.

## 2022-08-19 NOTE — ASSESSMENT & PLAN NOTE
Patient's depression is single episode and is mild without psychosis. Their depression is currently active and the condition is worsening. This will be reassessed at the next regular appointment. F/U as described:patient was prescribed an antidepressant medicine.  Start wellbutrin 150mg XL daily.

## 2022-08-19 NOTE — ASSESSMENT & PLAN NOTE
Decrease losartan to 25mg daily as trial-- will send 50mg tablets; take 1/2 daily.   Okay to continue with aldactone and coreg.

## 2022-08-25 ENCOUNTER — TRANSCRIBE ORDERS (OUTPATIENT)
Dept: ADMINISTRATIVE | Facility: HOSPITAL | Age: 52
End: 2022-08-25

## 2022-08-25 DIAGNOSIS — R59.1 LYMPHADENOPATHY: Primary | ICD-10-CM

## 2022-09-06 ENCOUNTER — TELEPHONE (OUTPATIENT)
Dept: CARDIOLOGY | Facility: CLINIC | Age: 52
End: 2022-09-06

## 2022-09-06 DIAGNOSIS — E78.2 MIXED HYPERLIPIDEMIA: Primary | ICD-10-CM

## 2022-09-06 NOTE — TELEPHONE ENCOUNTER
In June 2022, she was started on repatha. I would like her to have a repeat lipid panel and CMP completed. Orders have been placed. Thanks.

## 2022-09-20 ENCOUNTER — OFFICE VISIT (OUTPATIENT)
Dept: ORTHOPEDIC SURGERY | Facility: CLINIC | Age: 52
End: 2022-09-20

## 2022-09-20 VITALS — RESPIRATION RATE: 16 BRPM | WEIGHT: 201.8 LBS | HEIGHT: 62 IN | BODY MASS INDEX: 37.13 KG/M2 | TEMPERATURE: 96.6 F

## 2022-09-20 DIAGNOSIS — R52 PAIN: Primary | ICD-10-CM

## 2022-09-20 DIAGNOSIS — M17.11 PRIMARY OSTEOARTHRITIS OF RIGHT KNEE: ICD-10-CM

## 2022-09-20 PROCEDURE — 20610 DRAIN/INJ JOINT/BURSA W/O US: CPT | Performed by: NURSE PRACTITIONER

## 2022-09-20 PROCEDURE — 73562 X-RAY EXAM OF KNEE 3: CPT | Performed by: NURSE PRACTITIONER

## 2022-09-20 PROCEDURE — 99214 OFFICE O/P EST MOD 30 MIN: CPT | Performed by: NURSE PRACTITIONER

## 2022-09-20 RX ORDER — MELOXICAM 15 MG/1
15 TABLET ORAL DAILY PRN
Qty: 30 TABLET | Refills: 0 | Status: SHIPPED | OUTPATIENT
Start: 2022-09-20 | End: 2022-11-14

## 2022-09-20 RX ORDER — METHYLPREDNISOLONE ACETATE 80 MG/ML
80 INJECTION, SUSPENSION INTRA-ARTICULAR; INTRALESIONAL; INTRAMUSCULAR; SOFT TISSUE
Status: COMPLETED | OUTPATIENT
Start: 2022-09-20 | End: 2022-09-20

## 2022-09-20 RX ADMIN — METHYLPREDNISOLONE ACETATE 80 MG: 80 INJECTION, SUSPENSION INTRA-ARTICULAR; INTRALESIONAL; INTRAMUSCULAR; SOFT TISSUE at 16:05

## 2022-09-20 NOTE — PROGRESS NOTES
Patient: Carina Zamudio  YOB: 1970 52 y.o. female  Medical Record Number: [id number]    Chief Complaints:   Chief Complaint   Patient presents with   • Right Knee - Initial Evaluation       History of Present Illness:Carina Zamudio is a 52 y.o. female who presents with right knee pain that is chronic in nature.  Patient had a previous knee injury back in 2018 in which she torn the meniscus and had to have a meniscectomy by Dr. Martinez.  Patient reports that her knee has not quite been the same ever since.  Patient reports that she is dealt with off and on achy pain ever since that surgery.  Over the past weekend the patient states that her pain progressively worsened to the point where she started having shooting type pain and difficulty with ambulation.  Patient states that she is even ambulating with a limp now due to the pain that she is having.  She also states it feels like with certain positional movements her knee is going to give out on her.  Patient denied any recent injury that occurred over the weekend.  She denies any signs or symptoms of infection, and she is without any other significant complaints today.    Allergies: No Known Allergies    Medications:   Current Outpatient Medications   Medication Sig Dispense Refill   • ALPRAZolam (XANAX) 0.5 MG tablet Take 1 tablet by mouth Daily As Needed for Anxiety. 30 tablet 4   • aspirin 81 MG tablet Take 81 mg by mouth Daily. INSTRUCTED PT FOLLOW MD INSTRUCTIONS     • B Complex Vitamins (VITAMIN-B COMPLEX) tablet Take 1 tablet by mouth Daily. HOLD PRIOR TO SURG     • buPROPion XL (Wellbutrin XL) 150 MG 24 hr tablet Take 1 tablet by mouth Daily. 30 tablet 2   • carvedilol (Coreg) 6.25 MG tablet Take 1 tablet by mouth 2 (Two) Times a Day With Meals. 180 tablet 3   • clobetasol (TEMOVATE) 0.05 % ointment apply 1 application on the skin to affected area of elbow as directed twice daily for 2 weeks then stop for 1 week, then repeat 30 g 0   •  clopidogrel (PLAVIX) 75 MG tablet Take 1 tablet by mouth Daily. 90 tablet 3   • Evolocumab (REPATHA) solution prefilled syringe injection Inject 1 mL under the skin into the appropriate area as directed Every 14 (Fourteen) Days. 1 mL 6   • fluticasone (FLONASE) 50 MCG/ACT nasal spray Administer 2 sprays in each nostril once a day (Patient taking differently: 2 sprays into the nostril(s) as directed by provider Daily As Needed.) 16 g 11   • losartan (Cozaar) 50 MG tablet Take 0.5 tablets by mouth Daily. 90 tablet 1   • Multiple Vitamin tablet Take 1 tablet by mouth Daily.     • Probiotic Product (PRO-BIOTIC BLEND PO) Take  by mouth.     • Semaglutide, 1 MG/DOSE, (Ozempic, 1 MG/DOSE,) 2 MG/1.5ML solution pen-injector Inject 0.5 mg under the skin into the appropriate area as directed 1 (One) Time Per Week.     • spironolactone (Aldactone) 50 MG tablet Take 1 tablet by mouth Daily. 90 tablet 1   • vitamin D (ERGOCALCIFEROL) 1.25 MG (77092 UT) capsule capsule Take 1 capsule by mouth 2 (Two) Times a Week. 26 capsule 3   • meloxicam (Mobic) 15 MG tablet Take 1 tablet by mouth Daily As Needed for Mild Pain or Moderate Pain. 30 tablet 0     Current Facility-Administered Medications   Medication Dose Route Frequency Provider Last Rate Last Admin   • nitroglycerin (NITROSTAT) SL tablet 0.4 mg  0.4 mg Sublingual Q5 Min PRN Vaughn Tipton III, MD       • sodium chloride 0.9 % flush 10 mL  10 mL Intravenous PRN Vaughn Tipton III, MD         •  nitroglycerin  •  sodium chloride    The following portions of the patient's history were reviewed and updated as appropriate: allergies, current medications, past family history, past medical history, past social history, past surgical history and problem list.    Review of Systems:   A 14 point review of systems was performed. All systems negative except pertinent positives/negative listed in HPI above    Physical Exam:   Vitals:    09/20/22 0820   Resp: 16   Temp: 96.6 °F (35.9 °C)  "  Temrc: Temporal   Weight: 91.5 kg (201 lb 12.8 oz)   Height: 157.5 cm (62.01\")   PainSc:   5       General: A and O x 3, ASA, NAD    SCLERA:    Normal    DENTITION:   Normal     Knee:  right    ALIGNMENT: Neutral  ,   Patella  tracks  midline    GAIT:    Antalgic    SKIN:    No abnormality    RANGE OF MOTION: 0  - 130 DEG; pain with full extention    STRENGTH:   4  / 5    LIGAMENTS:    No varus / valgus instability.   Negative  Lachman.    MENISCUS:     Negative   Mateo       DISTAL PULSES:    Paplable    DISTAL SENSATION :   Intact    LYMPHATICS:     No   lymphadenopathy    OTHER:          - Positive   effusion      - Crepitance with ROM       - Tenderness noted to palpation over hamstring tendon      Radiology:  Xrays 3views (ap,lateral, sunrise) were ordered and reviewed for evaluation of knee pain demonstrating arthritic findings of joint space narrowing with periarticular osteophytes present and loose bodies noted.  No other x-rays available for comparison purposes.    Assessment/Plan: Primary osteoarthritis right knee    Treatment options as well as imaging results were discussed in detail with the patient.  At this point in time we are going to proceed forward with conservative measures.  I am going to give her a prescription for meloxicam 15 mg p.o. daily to take as needed for inflammation and swelling.  I did educate her on GI disturbances.  I am also going to give her a prescription for physical therapy to work on hamstring stretches as well as quad strengthening exercises.  Were also going to do an intra-articular cortisone injection today of her right knee.  We will have her follow back up with me in 3 months for reevaluation.    Large Joint Arthrocentesis: R knee  Date/Time: 9/20/2022 4:05 PM  Consent given by: patient  Site marked: site marked  Timeout: Immediately prior to procedure a time out was called to verify the correct patient, procedure, equipment, support staff and site/side marked as " required   Supporting Documentation  Indications: pain and joint swelling   Procedure Details  Location: knee - R knee  Preparation: Patient was prepped and draped in the usual sterile fashion  Needle size: 22 G  Approach: anterolateral  Medications administered: 80 mg methylPREDNISolone acetate 80 MG/ML; 2 mL lidocaine (cardiac)  Patient tolerance: patient tolerated the procedure well with no immediate complications            Raymond Perez, VITOR  9/20/2022

## 2022-09-26 ENCOUNTER — OFFICE VISIT (OUTPATIENT)
Dept: OBSTETRICS AND GYNECOLOGY | Age: 52
End: 2022-09-26

## 2022-09-26 VITALS
SYSTOLIC BLOOD PRESSURE: 112 MMHG | WEIGHT: 201 LBS | HEIGHT: 62 IN | DIASTOLIC BLOOD PRESSURE: 70 MMHG | BODY MASS INDEX: 36.99 KG/M2

## 2022-09-26 DIAGNOSIS — Z12.4 SCREENING FOR MALIGNANT NEOPLASM OF THE CERVIX: ICD-10-CM

## 2022-09-26 DIAGNOSIS — R15.1 FECAL SMEARING: ICD-10-CM

## 2022-09-26 DIAGNOSIS — Z98.890 HISTORY OF ENDOMETRIAL ABLATION: ICD-10-CM

## 2022-09-26 DIAGNOSIS — Z01.419 ENCOUNTER FOR GYNECOLOGICAL EXAMINATION WITHOUT ABNORMAL FINDING: Primary | ICD-10-CM

## 2022-09-26 DIAGNOSIS — Z13.89 SCREENING FOR BLOOD OR PROTEIN IN URINE: ICD-10-CM

## 2022-09-26 LAB
BILIRUB BLD-MCNC: NEGATIVE MG/DL
GLUCOSE UR STRIP-MCNC: NEGATIVE MG/DL
KETONES UR QL: NEGATIVE
LEUKOCYTE EST, POC: NEGATIVE
NITRITE UR-MCNC: NEGATIVE MG/ML
PH UR: 6.5 [PH] (ref 5–8)
PROT UR STRIP-MCNC: NEGATIVE MG/DL
RBC # UR STRIP: NEGATIVE /UL
SP GR UR: 1.02 (ref 1–1.03)
UROBILINOGEN UR QL: NORMAL

## 2022-09-26 PROCEDURE — 99396 PREV VISIT EST AGE 40-64: CPT | Performed by: OBSTETRICS & GYNECOLOGY

## 2022-09-26 PROCEDURE — 81002 URINALYSIS NONAUTO W/O SCOPE: CPT | Performed by: OBSTETRICS & GYNECOLOGY

## 2022-09-26 NOTE — PROGRESS NOTES
"Subjective   Carina Zamudio is a 52 y.o. female presents for annual visit today  last pap 06/16/21 neg ,mammo 8/8/22 @ [facility]   h/o ablation in 2012 - no periods since    Colonoscopy 8/2020.  C/O fecal incontinence daily - will refer to [facility].     I last saw her last year for annual visit.  colonoscopy 8/2020 , no complaints. H/o endo ablation.  Denies hot flashes or night sweats but is starting to have trouble sleeping. Not a candidate for HRT    .History of Present Illness    The following portions of the patient's history were reviewed and updated as appropriate: allergies, current medications, past family history, past medical history, past social history, past surgical history and problem list.    Review of Systems   Constitutional: Negative for chills, fatigue and fever.   Gastrointestinal: Negative for abdominal distention and abdominal pain.   Genitourinary: Negative for dyspareunia, dysuria, pelvic pain, vaginal bleeding, vaginal discharge and vaginal pain.   All other systems reviewed and are negative.  /70   Ht 157.5 cm (62.01\")   Wt 91.2 kg (201 lb)   LMP  (LMP Unknown)   Breastfeeding No   BMI 36.75 kg/m²       Objective   Physical Exam  Vitals and nursing note reviewed.   Constitutional:       Appearance: Normal appearance. She is well-developed.   Neck:      Thyroid: No thyromegaly.   Pulmonary:      Effort: Pulmonary effort is normal.   Chest:   Breasts:      Right: No mass, nipple discharge, skin change or tenderness.      Left: No mass, nipple discharge, skin change or tenderness.       Abdominal:      General: There is no distension.      Palpations: Abdomen is soft.      Tenderness: There is no abdominal tenderness.   Genitourinary:     General: Normal vulva.      Exam position: Lithotomy position.      Labia:         Right: No rash or lesion.         Left: No rash or lesion.       Vagina: Normal. No vaginal discharge or bleeding.      Cervix: No friability, lesion or cervical " bleeding.      Uterus: Not enlarged and not tender.       Adnexa:         Right: No mass or tenderness.          Left: No mass or tenderness.     Musculoskeletal:         General: Normal range of motion.      Cervical back: Normal range of motion.   Skin:     General: Skin is warm and dry.      Findings: No rash.   Neurological:      Mental Status: She is alert and oriented to person, place, and time.   Psychiatric:         Mood and Affect: Mood normal.         Behavior: Behavior normal.           Assessment & Plan   Diagnoses and all orders for this visit:    1. Encounter for gynecological examination without abnormal finding (Primary)    2. Screening for malignant neoplasm of the cervix  -     IgP, Aptima HPV    3. Screening for blood or protein in urine  -     POC Urinalysis Dipstick    4. History of endometrial ablation    5. Fecal smearing  -     Ambulatory Referral to Colorectal Surgery      Counseling was given to patient for the following topics: instructions for management, importance of treatment compliance and self-breast exams .  Return in about 1 year (around 9/26/2023) for Annual physical w/ [facility] .

## 2022-10-26 DIAGNOSIS — I10 ESSENTIAL HYPERTENSION: Chronic | ICD-10-CM

## 2022-10-26 RX ORDER — SPIRONOLACTONE 50 MG/1
50 TABLET, FILM COATED ORAL DAILY
Qty: 90 TABLET | Refills: 1 | Status: CANCELLED | OUTPATIENT
Start: 2022-10-26

## 2022-10-27 RX ORDER — SPIRONOLACTONE 50 MG/1
50 TABLET, FILM COATED ORAL DAILY
Qty: 90 TABLET | Refills: 1 | Status: SHIPPED | OUTPATIENT
Start: 2022-10-27

## 2022-11-13 DIAGNOSIS — F32.A MILD DEPRESSION: ICD-10-CM

## 2022-11-14 ENCOUNTER — OFFICE VISIT (OUTPATIENT)
Dept: SURGERY | Facility: CLINIC | Age: 52
End: 2022-11-14

## 2022-11-14 VITALS
HEART RATE: 75 BPM | HEIGHT: 62 IN | DIASTOLIC BLOOD PRESSURE: 68 MMHG | OXYGEN SATURATION: 98 % | TEMPERATURE: 97.9 F | BODY MASS INDEX: 37.28 KG/M2 | SYSTOLIC BLOOD PRESSURE: 112 MMHG | WEIGHT: 202.6 LBS

## 2022-11-14 DIAGNOSIS — R15.1 FECAL SMEARING: Primary | ICD-10-CM

## 2022-11-14 DIAGNOSIS — K64.8 INTERNAL HEMORRHOIDS: ICD-10-CM

## 2022-11-14 PROCEDURE — 99214 OFFICE O/P EST MOD 30 MIN: CPT | Performed by: PHYSICIAN ASSISTANT

## 2022-11-14 PROCEDURE — 46600 DIAGNOSTIC ANOSCOPY SPX: CPT | Performed by: PHYSICIAN ASSISTANT

## 2022-11-14 RX ORDER — BUPROPION HYDROCHLORIDE 150 MG/1
150 TABLET ORAL DAILY
Qty: 30 TABLET | Refills: 2 | Status: SHIPPED | OUTPATIENT
Start: 2022-11-14 | End: 2023-01-18 | Stop reason: SDUPTHER

## 2022-11-14 RX ORDER — HYDROCORTISONE 25 MG/G
CREAM TOPICAL
Qty: 28 G | Refills: 1 | Status: SHIPPED | OUTPATIENT
Start: 2022-11-14

## 2022-11-14 NOTE — PROGRESS NOTES
Carina Zamudio is a 52 y.o. female who is seen as a consult at the request of Perla Sullivan MD for Fecal smearing.      HPI:  Pt presents today for evaluation of fecal smearing x5 years.  Symptoms occur daily. Notices small amount of stool when she wipes, even if she has not had a BM.  Symptoms have remained relatively stable since onset and has not become better or worse.  No known triggers.   Denies needing to wear a pad.     Pt is S/P vaginal deliveries x2 with an episiotomy with the second delivery.   Denies undergoing any surgeries near the anal sphincter muscle.   Denies any back injuries or surgical interventions.   Does experience urinary stress incontinence.   Pt is S/P cholecystectomy on 11/04/2021, but denies any change in symptoms after this procedure.     Pt states she developed an enlarged hemorrhoid when her son was born 18 years ago.  Denies any significant issues with this since then.   Denies any RB or prolapsing tissue through the anus.     Regular BM  1 BM Daily  [place]: Varies between a 2 & 5  Denies taking any fiber supplements, SS, laxatives, or Imodium.   Does take a probiotic daily    Pt with a Hx of CAD S/P PCI and is currently taking ASA 81 mg and Plavix.     Last colonoscopy 08/05/2020. Pt was found to have one hyperplastic polyp and scattered diverticula.   Father with Hx of colon polyps.     Past Medical History:   Diagnosis Date   • Adrenal cortical adenoma     ADRENAL CONGENITAL HYPERPLASIA   • Alopecia 06/2021   • Anemia    • Anxiety    • CAD (coronary artery disease)    • Cataract     BILATERAL, S/P EXTRACTION   • Cerebellar infarct (HCC) 2012   • Cervical radiculopathy due to degenerative joint disease of spine 12/27/2016   • Chronic cough    • Chronic thoracic back pain    • Colon polyps    • COVID 11/30/2021   • Depression    • Diabetes mellitus (HCC)     TYPE 2, NIDDM   • Diastolic heart failure (HCC)    • Displacement of lumbar intervertebral disc without myelopathy  12/28/2011   • Diverticulitis    • DJD (degenerative joint disease), cervical    • DJD (degenerative joint disease), lumbar    • Dysmetabolic syndrome X    • Fecal smearing 09/2022   • Fracture, toe    • GERD (gastroesophageal reflux disease)    • History of gestational diabetes    • HPV (human papilloma virus) infection    • Hyperlipemia     MIXED HLD   • Hypersomnia    • Hypertension    • Leg edema, right 02/25/2019   • Liver nodule    • Lumbar spondylosis    • Lung nodule    • Multinodular goiter    • Myocardial infarct (HCC)     STEMI OF RCA   • Myocardial infarction (HCC) 08/31/2015    POSTERIOR WALL, ADMITTED TO [facility]   • AMY (obstructive sleep apnea)     FOLLOWED BY DR. BANDAR CAPUTO   • Osteoarthritis of spine 04/04/2016   • Ovarian cyst 11/2020   • Palpitations 01/2022   • Paresthesia of both hands 03/2021   • Pneumonia    • Posterior tibial tendonitis 03/2019    LEFT   • Precordial pain 06/2022   • Proteinuria     CHRONIC, FOLLOWED BY DR. CRESPO   • Raynaud phenomenon    • Retinal hemorrhage 2011    LEFT EYE, S/P LASER CAUTERIZATION   • TIA (transient ischemic attack)    • Venous tributary (branch) occlusion of retina 09/21/2012   • Vitamin D deficiency        Past Surgical History:   Procedure Laterality Date   • CARDIAC CATHETERIZATION Right 07/09/2013    NO EVIDENCE OF PFO, NORMAL CARDIAC STRUCTURES, DR. NIMCO BROWN AT [facility]   • CATARACT EXTRACTION, BILATERAL     • CERVICAL BIOPSY N/A 06/10/2020    DR. KLAUS CANALES   • CHOLECYSTECTOMY WITH INTRAOPERATIVE CHOLANGIOGRAM N/A 11/04/2021    Procedure: CHOLECYSTECTOMY LAPAROSCOPIC INTRAOPERATIVE CHOLANGIOGRAM;  Surgeon: Carlos Ashraf MD;  Location: [facility];  Service: General;  Laterality: N/A;   • COLONOSCOPY N/A 08/05/2020    1 SMALL BENIGN POLYP IN SIGMOID, SCATTERED DIVERTICULA, DR. CARLOS ASHRAF AT [facility]   • CORONARY STENT PLACEMENT N/A 08/20/2015    99% BLOCKAGE IN MIDDLE CIRCUMFLEX, STENT INSERTED, DR. NEGRO MICHEL AT [facility]   • CYST REMOVAL   08/06/2019    SEBACEOUS CYST, DR. GLADYS MCKEON   • ENDOMETRIAL ABLATION N/A 06/11/2011    DR. PRIYANK KABA AT [facility]   • EYE SURGERY Left 12/2012    COLD LASER FOR CAUTERIZATION OF LEFT RETINAL HEMORRHAGE   • EYE SURGERY  09/2012    LASER SURGERY FOR CENTRAL SEROUS RETINOPATHY, DR. HUDSON   • KNEE ARTHROSCOPY Right 05/10/2019    Procedure: RIGHT KNEE ARTHROSCOPY BWITH PART. MEDIAL AND LATERAL MENISECTOMY, PATELLA, CHONDROPLASTY, AND DEBRIDMENT;  Surgeon: Ignacio Martinez MD;  Location: [facility];  Service: Orthopedics   • NEVUS EXCISION Right 12/28/2011    EXCISION OF LESION ON RIGHT NOSE, pATH: INTRADERMAL NEVUS, DR. SPENCER GARDNER AT [facility]   • TUBAL ABDOMINAL LIGATION Bilateral 06/11/2011    DR. PRIYANK KABA AT [facility]       Social History:   reports that she has been smoking cigarettes. She started smoking about 32 years ago. She has been smoking an average of 1 pack per day. She has been exposed to tobacco smoke. She has never used smokeless tobacco. She reports current alcohol use. She reports that she does not use drugs.      Marriage status:     Family History   Problem Relation Age of Onset   • Stroke Mother    • Sleep apnea Mother    • Cancer Father    • Lymphoma Father    • Colon polyps Father    • Hypertension Father    • Drug abuse Brother    • Diabetes Maternal Grandmother    • Lung cancer Maternal Grandfather    • Heart attack Maternal Grandfather    • Diabetes Maternal Grandfather    • Diabetes Paternal Grandmother    • Heart attack Paternal Grandfather    • Diabetes Paternal Grandfather    • Breast cancer Neg Hx    • Ovarian cancer Neg Hx    • Uterine cancer Neg Hx    • Colon cancer Neg Hx    • Malig Hyperthermia Neg Hx          Current Outpatient Medications:   •  aspirin 81 MG tablet, Take 81 mg by mouth Daily. INSTRUCTED PT FOLLOW MD INSTRUCTIONS, Disp: , Rfl:   •  B Complex Vitamins (VITAMIN-B COMPLEX) tablet, Take 1 tablet by mouth Daily. HOLD PRIOR TO SURG, Disp: , Rfl:   •   buPROPion XL (Wellbutrin XL) 150 MG 24 hr tablet, Take 1 tablet by mouth Daily., Disp: 30 tablet, Rfl: 2  •  carvedilol (Coreg) 6.25 MG tablet, Take 1 tablet by mouth 2 (Two) Times a Day With Meals., Disp: 180 tablet, Rfl: 3  •  clobetasol (TEMOVATE) 0.05 % ointment, apply 1 application on the skin to affected area of elbow as directed twice daily for 2 weeks then stop for 1 week, then repeat, Disp: 30 g, Rfl: 0  •  clopidogrel (PLAVIX) 75 MG tablet, Take 1 tablet by mouth Daily., Disp: 90 tablet, Rfl: 3  •  Evolocumab (REPATHA) solution prefilled syringe injection, Inject 1 mL under the skin into the appropriate area as directed Every 14 (Fourteen) Days., Disp: 1 mL, Rfl: 6  •  fluticasone (FLONASE) 50 MCG/ACT nasal spray, Administer 2 sprays in each nostril once a day (Patient taking differently: 2 sprays into the nostril(s) as directed by provider Daily As Needed.), Disp: 16 g, Rfl: 11  •  losartan (Cozaar) 50 MG tablet, Take 0.5 tablets by mouth Daily. (Patient taking differently: Take 25 mg by mouth Daily. 25 MG), Disp: 90 tablet, Rfl: 1  •  Multiple Vitamin tablet, Take 1 tablet by mouth Daily., Disp: , Rfl:   •  Probiotic Product (PRO-BIOTIC BLEND PO), Take  by mouth., Disp: , Rfl:   •  Semaglutide, 1 MG/DOSE, (Ozempic, 1 MG/DOSE,) 2 MG/1.5ML solution pen-injector, Inject 0.5 mg under the skin into the appropriate area as directed 1 (One) Time Per Week., Disp: , Rfl:   •  spironolactone (Aldactone) 50 MG tablet, Take 1 tablet by mouth Daily., Disp: 90 tablet, Rfl: 1  •  vitamin D (ERGOCALCIFEROL) 1.25 MG (22452 UT) capsule capsule, Take 1 capsule by mouth 2 (Two) Times a Week., Disp: 26 capsule, Rfl: 3  •  Hydrocortisone, Perianal, (Anusol-HC) 2.5 % rectal cream, Apply rectally 3 times daily.  Include applicator., Disp: 30 g, Rfl: 1  No current facility-administered medications for this visit.    Allergy  Patient has no known allergies.    Review of Systems   Constitutional: Negative for decreased appetite  and weight gain.   HENT: Negative for congestion, hearing loss and hoarse voice.    Eyes: Negative for blurred vision, discharge and visual disturbance.   Cardiovascular: Negative for chest pain, cyanosis and leg swelling.   Respiratory: Negative for cough, shortness of breath, sleep disturbances due to breathing and snoring.    Endocrine: Negative for cold intolerance and heat intolerance.   Hematologic/Lymphatic: Does not bruise/bleed easily.   Skin: Negative for itching, poor wound healing and skin cancer.   Musculoskeletal: Negative for arthritis, back pain, joint pain and joint swelling.   Gastrointestinal: Negative for abdominal pain, change in bowel habit, bowel incontinence and constipation.   Genitourinary: Negative for bladder incontinence, dysuria and hematuria.   Neurological: Negative for brief paralysis, excessive daytime sleepiness, dizziness, focal weakness, headaches, light-headedness and weakness.   Psychiatric/Behavioral: Negative for altered mental status and hallucinations. The patient does not have insomnia.    Allergic/Immunologic: Negative for HIV exposure and persistent infections.       Vitals:    11/14/22 1036   BP: 112/68   Pulse: 75   Temp: 97.9 °F (36.6 °C)   SpO2: 98%     Body mass index is 37.06 kg/m².    Physical Exam  Exam conducted with a chaperone present.   Constitutional:       General: She is not in acute distress.     Appearance: She is well-developed.   HENT:      Head: Normocephalic and atraumatic.      Nose: Nose normal.   Eyes:      Conjunctiva/sclera: Conjunctivae normal.      Pupils: Pupils are equal, round, and reactive to light.   Neck:      Trachea: No tracheal deviation.   Pulmonary:      Effort: Pulmonary effort is normal. No respiratory distress.      Breath sounds: Normal breath sounds.   Abdominal:      General: Bowel sounds are normal. There is no distension.      Palpations: Abdomen is soft.   Genitourinary:     Comments: Perianal exam: WNL. Small amount of  soft stool on perianal skin  TRAM- Adequate tone, no masses  Anoscopy performed:  Grade II x 3 internal hem   Musculoskeletal:         General: No deformity. Normal range of motion.      Cervical back: Normal range of motion.   Skin:     General: Skin is warm and dry.   Neurological:      Mental Status: She is alert and oriented to person, place, and time.      Cranial Nerves: No cranial nerve deficit.      Coordination: Coordination normal.      Gait: Gait normal.   Psychiatric:         Behavior: Behavior normal.         Judgment: Judgment normal.         Review of Medical Record:  I reviewed PMHx, Surgical Hx, FHx, and Current Medications    Colonoscopy 08/05/2020:  - Hyperplastic Polyp, Sigmoid Colon  - Scattered Diverticula  - Dr. Maulik Ashraf, [facility]    Assessment:  1. Fecal smearing    2. Internal hemorrhoids    - New    Plan:  - Recommended stool bulking agents such as Fiber. 30-40 grams of fiber recommended daily.  - Imodium PRN for loose stools  - Kegel exercises recommended for pelvic floor strengthening  - Discussed possible treatment options including physical therapy for pelvic floor strengthening and/or a sacral nerve stimulator. Pt states she would like to try fiber therapy and will consider PT in the future if symptoms do not improve.  - Rx for Anusol-HC 2.5% cream provided. Apply PRN for hemorrhoidal discomfort.   - Follow up in 3 months. Pt instructed to return sooner for any new or worsening symptoms.

## 2022-11-30 NOTE — TELEPHONE ENCOUNTER
----- Message from Maulik Ashraf MD sent at 8/10/2020  6:42 AM EDT -----  Please let her know that she had a single benign polyp.  As it was only hyperplastic, she does not need surveillance for 10 years-put in computer for reminder   1

## 2022-12-20 ENCOUNTER — OFFICE VISIT (OUTPATIENT)
Dept: CARDIOLOGY | Facility: CLINIC | Age: 52
End: 2022-12-20

## 2022-12-20 VITALS
BODY MASS INDEX: 37.17 KG/M2 | HEIGHT: 62 IN | DIASTOLIC BLOOD PRESSURE: 78 MMHG | WEIGHT: 202 LBS | HEART RATE: 80 BPM | OXYGEN SATURATION: 98 % | SYSTOLIC BLOOD PRESSURE: 134 MMHG

## 2022-12-20 DIAGNOSIS — E78.2 MIXED HYPERLIPIDEMIA: ICD-10-CM

## 2022-12-20 DIAGNOSIS — I25.2 HISTORY OF MYOCARDIAL INFARCTION: ICD-10-CM

## 2022-12-20 DIAGNOSIS — Z98.61 S/P CORONARY ANGIOPLASTY: ICD-10-CM

## 2022-12-20 DIAGNOSIS — I10 ESSENTIAL HYPERTENSION: Chronic | ICD-10-CM

## 2022-12-20 DIAGNOSIS — I25.119 CORONARY ARTERY DISEASE INVOLVING NATIVE CORONARY ARTERY OF NATIVE HEART WITH ANGINA PECTORIS: Primary | Chronic | ICD-10-CM

## 2022-12-20 PROCEDURE — 99213 OFFICE O/P EST LOW 20 MIN: CPT | Performed by: INTERNAL MEDICINE

## 2022-12-20 NOTE — PROGRESS NOTES
Subjective:     Encounter Date:12/20/22      Patient ID: Carina Zamudio is a 52 y.o. female.    Chief Complaint:  History of Present Illness    This is a 52-year-old female with a documented history of CAD, inferior STEMI in August 2015 with drug-eluting stent placement to the left circumflex as well as nonocclusive CAD in the RCA and LAD, history of CVA, diabetes mellitus with circulatory complication, obstructive sleep apnea on CPAP, hypertensive heart disease without heart failure, and history of tobacco abuse and dependence.    She came in today for her previously scheduled 1 year appointment.    She has not been having any complaints.  No issues no problems.  She is not currently on her Repatha.  She is scheduled get blood work done first and then get started on it.    Check she was seen here in June when she had discomfort.  She had a stress test performed at that time that was unremarkable, reviewed below.    No further chest discomfort.    She has statin intolerance.  We have had her on rosuvastatin and she has been intolerant of that.  She had to stop it, she did not work to really be careful with her diet, and she got blood work obtained by yourself that showed her LDL was back up to 254.  She has had problems with myalgia that was incapacitating previously when she was on atorvastatin and has been intolerant to pravastatin.    Stress test 11/2017 showed EF 64%, no ischemia.     Cardiac catheterization 2015:     Left heart catheterization: LV pressure was 120/20. There was normal LV  systolic function without segmental wall motion abnormality. No mitral  insufficiency or gradient across the aortic valve on pullback.      The left main coronary artery is normal.      The LAD has some mild luminal irregularities in the proximal portion that are  about 20%. The first diagonal has some mild luminal irregularities of 10% in  it.   The circumflex has a 99% blockage in the middle portion. There is an OM1  that is subtotally occluded right in this lesion and the flow beyond that is CAROL-1.     The RCA is a dominant vessel with 30% proximal and mid disease, diffuse  aneurysmal change distally. The MARCIA has diffuse 50% disease in the proximal  portion of it, and the PDA has a 30% origin lesion in it.      Drug-eluting stent placement 2015:  STEMI. Due to this circumflex lesion we are going to proceed on with  intervention in it.       Initially, we went up with a Voda 3.0 guiding catheter. I attempted to cross it with a BMW wire, but it would not,  switched to a whisper wire and it easily crossed and then brought a 2.5 x 15 Trek balloon down, inflated it at 14 atmospheres, predilating this and then we brought a 2.75 x 18 Xience Alpine stent and deployed it at 14 atmospheres. I postdilated the front end of that stent with a 3.0 x 8 NC Trek at 20  atmospheres. There was 0% residual and CAROL-3 flow. There was just a trickle of flow in the OM1, but it is too small to go after or do anything.     The following portions of the patient's history were reviewed and updated as appropriate: allergies, current medications, past family history, past medical history, past social history, past surgical history and problem list.    Past Medical History:   Diagnosis Date   • Adrenal cortical adenoma     ADRENAL CONGENITAL HYPERPLASIA   • Alopecia 06/2021   • Anemia    • Anxiety    • CAD (coronary artery disease)    • Cataract     BILATERAL, S/P EXTRACTION   • Cerebellar infarct (Formerly [facility]) 2012   • Cervical radiculopathy due to degenerative joint disease of spine 12/27/2016   • Chronic cough    • Chronic thoracic back pain    • Colon polyps    • COVID 11/30/2021   • Depression    • Diabetes mellitus (HCC)     TYPE 2, NIDDM   • Diastolic heart failure (Formerly [facility])    • Displacement of lumbar intervertebral disc without myelopathy 12/28/2011   • Diverticulitis    • DJD (degenerative joint disease), cervical    • DJD (degenerative joint disease), lumbar     • Dysmetabolic syndrome X    • Fecal smearing 09/2022   • Fracture, toe    • GERD (gastroesophageal reflux disease)    • History of gestational diabetes    • HPV (human papilloma virus) infection    • Hyperlipemia     MIXED HLD   • Hypersomnia    • Hypertension    • Leg edema, right 02/25/2019   • Liver nodule    • Lumbar spondylosis    • Lung nodule    • Multinodular goiter    • Myocardial infarct (HCC)     STEMI OF RCA   • Myocardial infarction (HCC) 08/31/2015    POSTERIOR WALL, ADMITTED TO [facility]   • AMY (obstructive sleep apnea)     FOLLOWED BY DR. BANDAR CAPUTO   • Osteoarthritis of spine 04/04/2016   • Ovarian cyst 11/2020   • Palpitations 01/2022   • Paresthesia of both hands 03/2021   • Pneumonia    • Posterior tibial tendonitis 03/2019    LEFT   • Precordial pain 06/2022   • Proteinuria     CHRONIC, FOLLOWED BY DR. CRESPO   • Raynaud phenomenon    • Retinal hemorrhage 2011    LEFT EYE, S/P LASER CAUTERIZATION   • TIA (transient ischemic attack)    • Venous tributary (branch) occlusion of retina 09/21/2012   • Vitamin D deficiency        Past Surgical History:   Procedure Laterality Date   • CARDIAC CATHETERIZATION Right 07/09/2013    NO EVIDENCE OF PFO, NORMAL CARDIAC STRUCTURES, DR. NIMCO BROWN AT [facility]   • CATARACT EXTRACTION, BILATERAL     • CERVICAL BIOPSY N/A 06/10/2020    DR. KLAUS CANALES   • CHOLECYSTECTOMY WITH INTRAOPERATIVE CHOLANGIOGRAM N/A 11/04/2021    Procedure: CHOLECYSTECTOMY LAPAROSCOPIC INTRAOPERATIVE CHOLANGIOGRAM;  Surgeon: Carlos Ashraf MD;  Location: [facility];  Service: General;  Laterality: N/A;   • COLONOSCOPY N/A 08/05/2020    1 SMALL BENIGN POLYP IN SIGMOID, SCATTERED DIVERTICULA, DR. CARLOS ASHRAF AT [facility]   • CORONARY STENT PLACEMENT N/A 08/20/2015    99% BLOCKAGE IN MIDDLE CIRCUMFLEX, STENT INSERTED, DR. NEGRO MICHEL AT [facility]   • CYST REMOVAL  08/06/2019    SEBACEOUS CYST, DR. GLADYS MCKEON   • ENDOMETRIAL ABLATION N/A 06/11/2011    DR. PRIYANK KABA AT [facility]   • EYE  "SURGERY Left 12/2012    COLD LASER FOR CAUTERIZATION OF LEFT RETINAL HEMORRHAGE   • EYE SURGERY  09/2012    LASER SURGERY FOR CENTRAL SEROUS RETINOPATHY, DR. HUDSON   • KNEE ARTHROSCOPY Right 05/10/2019    Procedure: RIGHT KNEE ARTHROSCOPY BWITH PART. MEDIAL AND LATERAL MENISECTOMY, PATELLA, CHONDROPLASTY, AND DEBRIDMENT;  Surgeon: Ignacio Martinez MD;  [place] [facility] [place]: [place]opedics   • NEVUS EXCISION Right 12/28/2011    EXCISION OF LESION ON RIGHT NOSE, pATH: INTRADERMAL NEVUS, DR. SPENCER GARDNER AT [facility]   • TUBAL ABDOMINAL LIGATION Bilateral 06/11/2011    DR. PRIYANK KABA AT [facility]         Procedures       Objective:     Vitals:    12/20/22 1452   BP: 134/78   Pulse: 80   SpO2: 98%   Weight: 91.6 kg (202 lb)   Height: 157.5 cm (62\")         Vitals reviewed.   Constitutional:       General: Not in acute distress.     Appearance: Well-developed. Not diaphoretic.   Eyes:      General:         Right eye: No discharge.         Left eye: No discharge.      Conjunctiva/sclera: Conjunctivae normal.      Pupils: Pupils are equal, round, and reactive to light.   HENT:      Head: Normocephalic and atraumatic.      Nose: Nose normal.   Neck:      Thyroid: No thyromegaly.      Trachea: No tracheal deviation.      Lymphadenopathy: No cervical adenopathy.   Pulmonary:      Effort: Pulmonary effort is normal. No respiratory distress.      Breath sounds: Normal breath sounds. No stridor.   Chest:      Chest wall: Not tender to palpatation.   Cardiovascular:      Normal rate. Regular rhythm.      Murmurs: There is no murmur.      . No S3 gallop. No click. No rub.   Pulses:     Intact distal pulses.   Abdominal:      General: Bowel sounds are normal. There is no distension.      Palpations: Abdomen is soft. There is no abdominal mass.      Tenderness: There is no abdominal tenderness. There is no guarding or rebound.   Musculoskeletal: Normal range of motion.         General: No tenderness or deformity.      Cervical " back: Normal range of motion and neck supple. Skin:     General: Skin is warm and dry.      Findings: No erythema or rash.   Neurological:      Mental Status: Alert and oriented to person, place, and time.      Deep Tendon Reflexes: Reflexes are normal and symmetric.   Psychiatric:         Thought Content: Thought content normal.         Lab Review:   Lab Results   Component Value Date    GLUCOSE 134 (H) 06/21/2022    BUN 17 06/21/2022    CREATININE 0.70 08/04/2022    EGFRIFNONA 105 11/02/2021    EGFRIFAFRI 118 06/24/2021    BCR 24.3 06/21/2022    K 4.2 06/21/2022    CO2 24.9 06/21/2022    CALCIUM 9.0 06/21/2022    PROTENTOTREF 7.2 04/07/2022    ALBUMIN 4.10 06/21/2022    LABIL2 1.8 04/07/2022    AST 15 06/21/2022    ALT 15 06/21/2022     CBC    CBC 4/7/22 6/21/22   WBC 8.4 9.33   RBC 4.98 4.80   Hemoglobin 16.0 (A) 15.4   Hematocrit 46.5 43.4   MCV 93 90.4   MCH 32.1 32.1   MCHC 34.4 35.5   RDW 11.8 12.5   Platelets 279 245   (A) Abnormal value            Lab Results   Component Value Date    PROBNP 84.3 06/21/2022    PROBNP 189.5 11/07/2017     No components found for: TROP  Lab Results   Component Value Date    DDIMERQUANT 0.56 (H) 06/21/2022     Results for orders placed during the hospital encounter of 11/07/17    STAT Adult Transthoracic Echo Complete W/ Cont if Necessary Per Protocol    Interpretation Summary  · Left ventricular wall thickness is consistent with borderline concentric hypertrophy.  · Left ventricular systolic function is normal. Estimated EF = 57%.  · All left ventricular wall segments contract normally. Left ventricular wall thickness is consistent with borderline concentric hypertrophy. Left ventricular diastolic function is normal.    Stress test June 2022:  Interpretation Summary    • Myocardial perfusion imaging indicates a normal myocardial perfusion study with no evidence of ischemia.  • Left ventricular ejection fraction is normal. (Calculated EF = 62%).  • Compared to the prior study  from 11/8/2017 the current study reveals no changes. Fixed small inferolateral defect visualized, and completely unchanged from prior study.  • Impressions are consistent with a low risk study.        Performed        Assessment:          Diagnosis Plan   1. Coronary artery disease involving native coronary artery of native heart with angina pectoris (HCC)        2. Essential hypertension        3. Mixed hyperlipidemia        4. History of myocardial infarction        5. S/P coronary angioplasty               Plan:       1.  Hyperlipidemia with statin intolerance, scheduled to get blood work soon and get restarted on the Praluent.  2.  Coronary artery disease as outlined above  3.  History of myocardial infarction with drug-eluting stent placement in the circumflex, continue guideline directed medical therapy      Thank you very much for allowing us to participate in the care of this pleasant patient.  Please don't hesitate to call if I can be of assistance in any way.      Current Outpatient Medications:   •  aspirin 81 MG tablet, Take 81 mg by mouth Daily. INSTRUCTED PT FOLLOW MD INSTRUCTIONS, Disp: , Rfl:   •  B Complex Vitamins (VITAMIN-B COMPLEX) tablet, Take 1 tablet by mouth Daily. HOLD PRIOR TO SURG, Disp: , Rfl:   •  buPROPion XL (Wellbutrin XL) 150 MG 24 hr tablet, Take 1 tablet by mouth Daily., Disp: 30 tablet, Rfl: 2  •  carvedilol (Coreg) 6.25 MG tablet, Take 1 tablet by mouth 2 (Two) Times a Day With Meals., Disp: 180 tablet, Rfl: 3  •  clobetasol (TEMOVATE) 0.05 % ointment, apply 1 application on the skin to affected area of elbow as directed twice daily for 2 weeks then stop for 1 week, then repeat, Disp: 30 g, Rfl: 0  •  clopidogrel (PLAVIX) 75 MG tablet, Take 1 tablet by mouth Daily., Disp: 90 tablet, Rfl: 3  •  Evolocumab (REPATHA) solution prefilled syringe injection, Inject 1 mL under the skin into the appropriate area as directed Every 14 (Fourteen) Days., Disp: 1 mL, Rfl: 6  •  fluticasone  (FLONASE) 50 MCG/ACT nasal spray, Administer 2 sprays in each nostril once a day (Patient taking differently: 2 sprays into the nostril(s) as directed by provider Daily As Needed.), Disp: 16 g, Rfl: 11  •  Hydrocortisone, Perianal, (Anusol-HC) 2.5 % rectal cream, Apply rectally 3 times daily as directed., Disp: 28 g, Rfl: 1  •  losartan (Cozaar) 50 MG tablet, Take 0.5 tablets by mouth Daily. (Patient taking differently: Take 25 mg by mouth Daily. 25 MG), Disp: 90 tablet, Rfl: 1  •  Multiple Vitamin tablet, Take 1 tablet by mouth Daily., Disp: , Rfl:   •  Probiotic Product (PRO-BIOTIC BLEND PO), Take  by mouth., Disp: , Rfl:   •  Semaglutide, 1 MG/DOSE, (Ozempic, 1 MG/DOSE,) 2 MG/1.5ML solution pen-injector, Inject 0.5 mg under the skin into the appropriate area as directed 1 (One) Time Per Week., Disp: , Rfl:   •  spironolactone (Aldactone) 50 MG tablet, Take 1 tablet by mouth Daily., Disp: 90 tablet, Rfl: 1  •  vitamin D (ERGOCALCIFEROL) 1.25 MG (88450 UT) capsule capsule, Take 1 capsule by mouth 2 (Two) Times a Week., Disp: 26 capsule, Rfl: 3         Return in about 1 year (around 12/20/2023).

## 2022-12-27 RX ORDER — SEMAGLUTIDE 0.68 MG/ML
0.5 INJECTION, SOLUTION SUBCUTANEOUS WEEKLY
Qty: 3 ML | Refills: 5 | Status: SHIPPED | OUTPATIENT
Start: 2022-12-27

## 2023-01-09 RX ORDER — CARVEDILOL 6.25 MG/1
6.25 TABLET ORAL 2 TIMES DAILY WITH MEALS
Qty: 180 TABLET | Refills: 3 | Status: SHIPPED | OUTPATIENT
Start: 2023-01-09

## 2023-01-16 RX ORDER — CLOPIDOGREL BISULFATE 75 MG/1
75 TABLET ORAL DAILY
Qty: 90 TABLET | Refills: 3 | Status: SHIPPED | OUTPATIENT
Start: 2023-01-16

## 2023-01-18 DIAGNOSIS — F32.A MILD DEPRESSION: ICD-10-CM

## 2023-01-18 RX ORDER — BUPROPION HYDROCHLORIDE 150 MG/1
150 TABLET ORAL DAILY
Qty: 90 TABLET | Refills: 2 | Status: SHIPPED | OUTPATIENT
Start: 2023-01-18

## 2023-01-18 NOTE — TELEPHONE ENCOUNTER
Rx Refill Note  Requested Prescriptions     Pending Prescriptions Disp Refills   • buPROPion XL (Wellbutrin XL) 150 MG 24 hr tablet 30 tablet 2     Sig: Take 1 tablet by mouth Daily.      Last office visit with prescribing clinician: 8/19/2022   Last telemedicine visit with prescribing clinician: 1/20/2023   Next office visit with prescribing clinician: 1/20/2023         Dominique Alcala MA  01/18/23, 08:23 EST

## 2023-01-20 ENCOUNTER — OFFICE VISIT (OUTPATIENT)
Dept: INTERNAL MEDICINE | Facility: CLINIC | Age: 53
End: 2023-01-20
Payer: COMMERCIAL

## 2023-01-20 VITALS
OXYGEN SATURATION: 96 % | HEART RATE: 84 BPM | WEIGHT: 206 LBS | TEMPERATURE: 98 F | DIASTOLIC BLOOD PRESSURE: 80 MMHG | SYSTOLIC BLOOD PRESSURE: 134 MMHG | HEIGHT: 62 IN | BODY MASS INDEX: 37.91 KG/M2

## 2023-01-20 DIAGNOSIS — E78.2 MIXED HYPERLIPIDEMIA: Primary | ICD-10-CM

## 2023-01-20 DIAGNOSIS — Z00.00 ROUTINE HEALTH MAINTENANCE: ICD-10-CM

## 2023-01-20 DIAGNOSIS — E55.9 VITAMIN D DEFICIENCY: ICD-10-CM

## 2023-01-20 DIAGNOSIS — E11.59 TYPE 2 DIABETES MELLITUS WITH OTHER CIRCULATORY COMPLICATION, WITHOUT LONG-TERM CURRENT USE OF INSULIN: Chronic | ICD-10-CM

## 2023-01-20 DIAGNOSIS — I10 ESSENTIAL HYPERTENSION: Chronic | ICD-10-CM

## 2023-01-20 LAB
25(OH)D3+25(OH)D2 SERPL-MCNC: 23 NG/ML (ref 30–100)
ALBUMIN SERPL-MCNC: 4.4 G/DL (ref 3.5–5.2)
ALBUMIN/GLOB SERPL: 1.7 G/DL
ALP SERPL-CCNC: 58 U/L (ref 39–117)
ALT SERPL-CCNC: 15 U/L (ref 1–33)
AST SERPL-CCNC: 11 U/L (ref 1–32)
BASOPHILS # BLD AUTO: 0.04 10*3/MM3 (ref 0–0.2)
BASOPHILS NFR BLD AUTO: 0.4 % (ref 0–1.5)
BILIRUB SERPL-MCNC: 0.2 MG/DL (ref 0–1.2)
BUN SERPL-MCNC: 16 MG/DL (ref 6–20)
BUN/CREAT SERPL: 18.8 (ref 7–25)
CALCIUM SERPL-MCNC: 9.6 MG/DL (ref 8.6–10.5)
CHLORIDE SERPL-SCNC: 100 MMOL/L (ref 98–107)
CHOLEST SERPL-MCNC: 306 MG/DL (ref 0–200)
CO2 SERPL-SCNC: 28.1 MMOL/L (ref 22–29)
CREAT SERPL-MCNC: 0.85 MG/DL (ref 0.57–1)
EGFRCR SERPLBLD CKD-EPI 2021: 82 ML/MIN/1.73
EOSINOPHIL # BLD AUTO: 0.15 10*3/MM3 (ref 0–0.4)
EOSINOPHIL NFR BLD AUTO: 1.5 % (ref 0.3–6.2)
ERYTHROCYTE [DISTWIDTH] IN BLOOD BY AUTOMATED COUNT: 11.7 % (ref 12.3–15.4)
GLOBULIN SER CALC-MCNC: 2.6 GM/DL
GLUCOSE SERPL-MCNC: 169 MG/DL (ref 65–99)
HBA1C MFR BLD: 8 % (ref 4.8–5.6)
HCT VFR BLD AUTO: 47.1 % (ref 34–46.6)
HDLC SERPL-MCNC: 49 MG/DL (ref 40–60)
HGB BLD-MCNC: 15.7 G/DL (ref 12–15.9)
IMM GRANULOCYTES # BLD AUTO: 0.03 10*3/MM3 (ref 0–0.05)
IMM GRANULOCYTES NFR BLD AUTO: 0.3 % (ref 0–0.5)
LDLC SERPL CALC-MCNC: 241 MG/DL (ref 0–100)
LYMPHOCYTES # BLD AUTO: 2.85 10*3/MM3 (ref 0.7–3.1)
LYMPHOCYTES NFR BLD AUTO: 29.4 % (ref 19.6–45.3)
MCH RBC QN AUTO: 30.9 PG (ref 26.6–33)
MCHC RBC AUTO-ENTMCNC: 33.3 G/DL (ref 31.5–35.7)
MCV RBC AUTO: 92.7 FL (ref 79–97)
MONOCYTES # BLD AUTO: 0.62 10*3/MM3 (ref 0.1–0.9)
MONOCYTES NFR BLD AUTO: 6.4 % (ref 5–12)
NEUTROPHILS # BLD AUTO: 6 10*3/MM3 (ref 1.7–7)
NEUTROPHILS NFR BLD AUTO: 62 % (ref 42.7–76)
NRBC BLD AUTO-RTO: 0 /100 WBC (ref 0–0.2)
PLATELET # BLD AUTO: 293 10*3/MM3 (ref 140–450)
POTASSIUM SERPL-SCNC: 4.9 MMOL/L (ref 3.5–5.2)
PROT SERPL-MCNC: 7 G/DL (ref 6–8.5)
RBC # BLD AUTO: 5.08 10*6/MM3 (ref 3.77–5.28)
SODIUM SERPL-SCNC: 137 MMOL/L (ref 136–145)
TRIGL SERPL-MCNC: 94 MG/DL (ref 0–150)
TSH SERPL DL<=0.005 MIU/L-ACNC: 1.12 UIU/ML (ref 0.27–4.2)
VLDLC SERPL CALC-MCNC: 16 MG/DL (ref 5–40)
WBC # BLD AUTO: 9.69 10*3/MM3 (ref 3.4–10.8)

## 2023-01-20 PROCEDURE — 99214 OFFICE O/P EST MOD 30 MIN: CPT | Performed by: NURSE PRACTITIONER

## 2023-01-20 NOTE — PROGRESS NOTES
"Chief Complaint  Hypertension and Depression    Subjective        Carina Zamudio presents to [facility]  History of Present Illness  This is a 53 y/o female presenting to office for f/u with HTN and depression.     Depression-- reports doing much better on wellbutrin. Reports this has helped tremendously with grief over son going through boot camp for armed services. Patient denies any SI.     HTN-- continues losartan, aldactone, coreg. Reports BP has remained stable. Denies any CP.     Patient is currently cutting back down on cigarettes. She reports the wellbutrin is helping.       Objective   Vital Signs:  /80 (BP Location: Right arm, Patient Position: Sitting, Cuff Size: Large Adult)   Pulse 84   Temp 98 °F (36.7 °C) (Infrared)   Ht 157.5 cm (62\")   Wt 93.4 kg (206 lb)   SpO2 96%   BMI 37.68 kg/m²   Estimated body mass index is 37.68 kg/m² as calculated from the following:    Height as of this encounter: 157.5 cm (62\").    Weight as of this encounter: 93.4 kg (206 lb).             Physical Exam  Constitutional:       Appearance: Normal appearance. She is obese.   HENT:      Head: Normocephalic and atraumatic.      Right Ear: External ear normal.      Left Ear: External ear normal.   Eyes:      Conjunctiva/sclera: Conjunctivae normal.      Pupils: Pupils are equal, round, and reactive to light.   Cardiovascular:      Rate and Rhythm: Normal rate and regular rhythm.      Pulses: Normal pulses.      Heart sounds: Normal heart sounds. No murmur heard.    No friction rub. No gallop.   Pulmonary:      Effort: Pulmonary effort is normal. No respiratory distress.      Breath sounds: Normal breath sounds. No stridor. No wheezing, rhonchi or rales.   Musculoskeletal:      Cervical back: Normal range of motion and neck supple.   Skin:     General: Skin is warm and dry.   Neurological:      General: No focal deficit present.      Mental Status: She is alert and oriented to " person, place, and time. Mental status is at baseline.   Psychiatric:         Mood and Affect: Mood normal.         Thought Content: Thought content normal.         Judgment: Judgment normal.        Result Review :  The following data was reviewed by: VITOR Saenz on 01/20/2023:  Common labs    Common Labs 4/7/22 4/7/22 4/7/22 4/7/22 6/21/22 6/21/22 8/4/22    1225 1225 1225 1225 1107 1107    Glucose 150 (A)     134 (A)    BUN 12     17    Creatinine 0.70     0.70 0.70   Sodium 138     139    Potassium 4.2     4.2    Chloride 99     101    Calcium 9.2     9.0    Total Protein 7.2         Albumin 4.6     4.10    Total Bilirubin 0.2     0.3    Alkaline Phosphatase 58     50    AST (SGOT) 16     15    ALT (SGPT) 16     15    WBC  8.4   9.33     Hemoglobin  16.0 (A)   15.4     Hematocrit  46.5   43.4     Platelets  279   245     Total Cholesterol   288 (A)       Triglycerides   127       HDL Cholesterol   47       LDL Cholesterol    218 (A)       Hemoglobin A1C    10.3 (A)      (A) Abnormal value       Comments are available for some flowsheets but are not being displayed.                        Assessment and Plan   Diagnoses and all orders for this visit:    1. Mixed hyperlipidemia (Primary)  Assessment & Plan:  Lab today.     Orders:  -     Lipid panel    2. Essential hypertension  Assessment & Plan:  Hypertension is improving with treatment.  Continue current treatment regimen.  Blood pressure will be reassessed at the next regular appointment.    Orders:  -     CBC & Differential  -     Comprehensive metabolic panel    3. Type 2 diabetes mellitus with other circulatory complication, without long-term current use of insulin (HCC)  Assessment & Plan:  Having trouble getting ozempic.   Will give sample in office today.     Orders:  -     Hemoglobin A1c    4. Vitamin D deficiency  -     Vitamin D 25 hydroxy    5. Routine health maintenance  -     TSH Rfx On Abnormal To Free T4           Follow Up   Return in  about 4 months (around 5/20/2023) for Recheck chronic conditions.  Patient was given instructions and counseling regarding her condition or for health maintenance advice. Please see specific information pulled into the AVS if appropriate.

## 2023-02-03 RX ORDER — AZITHROMYCIN 250 MG/1
TABLET, FILM COATED ORAL
Qty: 6 TABLET | Refills: 1 | Status: SHIPPED | OUTPATIENT
Start: 2023-02-03

## 2023-04-21 ENCOUNTER — CLINICAL SUPPORT (OUTPATIENT)
Dept: ORTHOPEDIC SURGERY | Facility: CLINIC | Age: 53
End: 2023-04-21
Payer: COMMERCIAL

## 2023-04-21 VITALS — TEMPERATURE: 98.6 F | WEIGHT: 202 LBS | BODY MASS INDEX: 37.17 KG/M2 | HEIGHT: 62 IN

## 2023-04-21 DIAGNOSIS — M17.11 PRIMARY OSTEOARTHRITIS OF RIGHT KNEE: Primary | ICD-10-CM

## 2023-04-21 RX ORDER — METHYLPREDNISOLONE ACETATE 80 MG/ML
80 INJECTION, SUSPENSION INTRA-ARTICULAR; INTRALESIONAL; INTRAMUSCULAR; SOFT TISSUE
Status: COMPLETED | OUTPATIENT
Start: 2023-04-21 | End: 2023-04-21

## 2023-04-21 RX ORDER — LIDOCAINE HYDROCHLORIDE 10 MG/ML
5 INJECTION, SOLUTION EPIDURAL; INFILTRATION; INTRACAUDAL; PERINEURAL
Status: COMPLETED | OUTPATIENT
Start: 2023-04-21 | End: 2023-04-21

## 2023-04-21 RX ADMIN — METHYLPREDNISOLONE ACETATE 80 MG: 80 INJECTION, SUSPENSION INTRA-ARTICULAR; INTRALESIONAL; INTRAMUSCULAR; SOFT TISSUE at 11:02

## 2023-04-21 RX ADMIN — LIDOCAINE HYDROCHLORIDE 5 ML: 10 INJECTION, SOLUTION EPIDURAL; INFILTRATION; INTRACAUDAL; PERINEURAL at 11:02

## 2023-04-21 NOTE — PROGRESS NOTES
4/21/2023    Carina Zamudio is here today for worsening knee pain. Pt has undergone injection of the knee in the past with good resolution of symptoms. Pt is requesting a repeat injection.     KNEE Injection Procedure Note:    Large Joint Arthrocentesis: R knee  Date/Time: 4/21/2023 11:02 AM  Consent given by: patient  Site marked: site marked  Timeout: Immediately prior to procedure a time out was called to verify the correct patient, procedure, equipment, support staff and site/side marked as required   Supporting Documentation  Indications: pain and joint swelling   Procedure Details  Location: knee - R knee  Preparation: Patient was prepped and draped in the usual sterile fashion  Needle size: 22 G  Approach: anterolateral  Medications administered: 80 mg methylPREDNISolone acetate 80 MG/ML; 5 mL lidocaine PF 1% 1 %  Patient tolerance: patient tolerated the procedure well with no immediate complications          At the conclusion of the injection I discussed the importance of continued quad strengthening exercises on a daily basis. I will see the patient back if the symptoms should fail to improve or worsen.    Raymond Perez, APRN  4/21/2023

## 2023-05-11 DIAGNOSIS — I10 ESSENTIAL HYPERTENSION: Chronic | ICD-10-CM

## 2023-05-11 RX ORDER — SPIRONOLACTONE 50 MG/1
50 TABLET, FILM COATED ORAL DAILY
Qty: 90 TABLET | Refills: 1 | Status: SHIPPED | OUTPATIENT
Start: 2023-05-11

## 2023-05-23 ENCOUNTER — OFFICE VISIT (OUTPATIENT)
Dept: INTERNAL MEDICINE | Facility: CLINIC | Age: 53
End: 2023-05-23
Payer: COMMERCIAL

## 2023-05-23 VITALS
BODY MASS INDEX: 36.39 KG/M2 | OXYGEN SATURATION: 96 % | WEIGHT: 199 LBS | DIASTOLIC BLOOD PRESSURE: 84 MMHG | HEART RATE: 86 BPM | SYSTOLIC BLOOD PRESSURE: 128 MMHG

## 2023-05-23 DIAGNOSIS — I10 ESSENTIAL HYPERTENSION: Primary | Chronic | ICD-10-CM

## 2023-05-23 DIAGNOSIS — E78.2 MIXED HYPERLIPIDEMIA: ICD-10-CM

## 2023-05-23 DIAGNOSIS — I25.119 CORONARY ARTERY DISEASE INVOLVING NATIVE CORONARY ARTERY OF NATIVE HEART WITH ANGINA PECTORIS: Chronic | ICD-10-CM

## 2023-05-23 DIAGNOSIS — E11.51 TYPE 2 DIABETES MELLITUS WITH DIABETIC PERIPHERAL ANGIOPATHY WITHOUT GANGRENE, WITHOUT LONG-TERM CURRENT USE OF INSULIN: Chronic | ICD-10-CM

## 2023-05-23 PROCEDURE — 99214 OFFICE O/P EST MOD 30 MIN: CPT | Performed by: FAMILY MEDICINE

## 2023-05-23 NOTE — PROGRESS NOTES
"Chief Complaint  Heartburn, Hyperlipidemia, Hypertension, Diabetes, and Vitamin D Deficiency    Subjective        Carina Zamudio presents to [facility]  History of Present Illness  Ricarda is a delightful lady who is very active.  Her watching cholesterol with Repatha and also diabetes type 2 as well as hypertension hyperlipidemia and history of some degree of CAD.  She has follow-up with cardiology as well as pulmonary for history oflymph node right lung which has been stable radiologically.        Heartburn    Hyperlipidemia    Hypertension    Diabetes        Objective   Vital Signs:  /84 (BP Location: Left arm, Patient Position: Sitting, Cuff Size: Adult)   Pulse 86   Wt 90.3 kg (199 lb)   SpO2 96%   BMI 36.39 kg/m²   Estimated body mass index is 36.39 kg/m² as calculated from the following:    Height as of 4/21/23: 157.5 cm (62.01\").    Weight as of this encounter: 90.3 kg (199 lb).             Physical Exam  Vitals reviewed.   Constitutional:       Appearance: She is well-developed.   HENT:      Head: Normocephalic and atraumatic.      Right Ear: Tympanic membrane and external ear normal.      Left Ear: Tympanic membrane and external ear normal.      Nose: Nose normal.   Eyes:      Conjunctiva/sclera: Conjunctivae normal.      Pupils: Pupils are equal, round, and reactive to light.   Neck:      Thyroid: No thyromegaly.      Vascular: No JVD.   Cardiovascular:      Rate and Rhythm: Normal rate and regular rhythm.      Heart sounds: Normal heart sounds.   Pulmonary:      Effort: Pulmonary effort is normal.      Breath sounds: Normal breath sounds.   Abdominal:      General: Bowel sounds are normal.      Palpations: Abdomen is soft.   Musculoskeletal:         General: Normal range of motion.      Cervical back: Normal range of motion and neck supple.   Lymphadenopathy:      Cervical: No cervical adenopathy.   Skin:     General: Skin is warm and dry.      Findings: No " rash.   Neurological:      Mental Status: She is alert and oriented to person, place, and time.      Cranial Nerves: No cranial nerve deficit.      Coordination: Coordination normal.   Psychiatric:         Behavior: Behavior normal.         Thought Content: Thought content normal.         Judgment: Judgment normal.        Result Review :                   Assessment and Plan   Diagnoses and all orders for this visit:    1. Essential hypertension (Primary)  Comments:  Carvedilol 6.25 twice daily losartan 50 mg half tablet daily  Orders:  -     Urinalysis With Microscopic If Indicated (No Culture) - Urine, Clean Catch; Future  -     TSH; Future  -     T4, Free; Future  -     T3, Free; Future  -     Lipid Panel With / Chol / HDL Ratio; Future  -     CBC & Differential; Future  -     Comprehensive Metabolic Panel; Future  -     Hemoglobin A1c; Future  -     Vitamin B12; Future  -     Folate; Future  -     MicroAlbumin, Urine, Random - Urine, Clean Catch; Future    2. Mixed hyperlipidemia  Comments:  Repatha every 2 weeks  Orders:  -     Urinalysis With Microscopic If Indicated (No Culture) - Urine, Clean Catch; Future  -     TSH; Future  -     T4, Free; Future  -     T3, Free; Future  -     Lipid Panel With / Chol / HDL Ratio; Future  -     CBC & Differential; Future  -     Comprehensive Metabolic Panel; Future  -     Hemoglobin A1c; Future  -     Vitamin B12; Future  -     Folate; Future  -     MicroAlbumin, Urine, Random - Urine, Clean Catch; Future    3. Coronary artery disease involving native coronary artery of native heart with angina pectoris (HCC)  -     Urinalysis With Microscopic If Indicated (No Culture) - Urine, Clean Catch; Future  -     TSH; Future  -     T4, Free; Future  -     T3, Free; Future  -     Lipid Panel With / Chol / HDL Ratio; Future  -     CBC & Differential; Future  -     Comprehensive Metabolic Panel; Future  -     Hemoglobin A1c; Future  -     Vitamin B12; Future  -     Folate; Future  -      MicroAlbumin, Urine, Random - Urine, Clean Catch; Future    4. Type 2 diabetes mellitus with diabetic peripheral angiopathy without gangrene, without long-term current use of insulin  Comments:  0.5 Ozempic weekly  Orders:  -     Urinalysis With Microscopic If Indicated (No Culture) - Urine, Clean Catch; Future  -     TSH; Future  -     T4, Free; Future  -     T3, Free; Future  -     Lipid Panel With / Chol / HDL Ratio; Future  -     CBC & Differential; Future  -     Comprehensive Metabolic Panel; Future  -     Hemoglobin A1c; Future  -     Vitamin B12; Future  -     Folate; Future  -     MicroAlbumin, Urine, Random - Urine, Clean Catch; Future             Follow Up   No follow-ups on file.  Patient was given instructions and counseling regarding her condition or for health maintenance advice. Please see specific information pulled into the AVS if appropriate.       Answers for HPI/ROS submitted by the patient on 5/23/2023  Please describe your symptoms.: Follow up  - labwork  Have you had these symptoms before?: No  How long have you been having these symptoms?: 1-4 days  What is the primary reason for your visit?: Other

## 2023-05-30 DIAGNOSIS — E11.51 TYPE 2 DIABETES MELLITUS WITH DIABETIC PERIPHERAL ANGIOPATHY WITHOUT GANGRENE, WITHOUT LONG-TERM CURRENT USE OF INSULIN: Chronic | ICD-10-CM

## 2023-05-30 DIAGNOSIS — I10 ESSENTIAL HYPERTENSION: Chronic | ICD-10-CM

## 2023-05-30 DIAGNOSIS — I25.119 CORONARY ARTERY DISEASE INVOLVING NATIVE CORONARY ARTERY OF NATIVE HEART WITH ANGINA PECTORIS: Chronic | ICD-10-CM

## 2023-05-30 DIAGNOSIS — E78.2 MIXED HYPERLIPIDEMIA: ICD-10-CM

## 2023-06-01 LAB
ALBUMIN SERPL-MCNC: 4.6 G/DL (ref 3.5–5.2)
ALBUMIN/GLOB SERPL: 1.6 G/DL
ALP SERPL-CCNC: 65 U/L (ref 39–117)
ALT SERPL-CCNC: 31 U/L (ref 1–33)
APPEARANCE UR: CLEAR
AST SERPL-CCNC: 17 U/L (ref 1–32)
BASOPHILS # BLD AUTO: 0.04 10*3/MM3 (ref 0–0.2)
BASOPHILS NFR BLD AUTO: 0.4 % (ref 0–1.5)
BILIRUB SERPL-MCNC: 0.3 MG/DL (ref 0–1.2)
BILIRUB UR QL STRIP: NEGATIVE
BUN SERPL-MCNC: 16 MG/DL (ref 6–20)
BUN/CREAT SERPL: 22.5 (ref 7–25)
CALCIUM SERPL-MCNC: 10.1 MG/DL (ref 8.6–10.5)
CHLORIDE SERPL-SCNC: 100 MMOL/L (ref 98–107)
CHOLEST SERPL-MCNC: 269 MG/DL (ref 0–200)
CHOLEST/HDLC SERPL: 5.27 {RATIO}
CO2 SERPL-SCNC: 25.1 MMOL/L (ref 22–29)
COLOR UR: YELLOW
CREAT SERPL-MCNC: 0.71 MG/DL (ref 0.57–1)
EGFRCR SERPLBLD CKD-EPI 2021: 101.8 ML/MIN/1.73
EOSINOPHIL # BLD AUTO: 0.13 10*3/MM3 (ref 0–0.4)
EOSINOPHIL NFR BLD AUTO: 1.2 % (ref 0.3–6.2)
ERYTHROCYTE [DISTWIDTH] IN BLOOD BY AUTOMATED COUNT: 12.7 % (ref 12.3–15.4)
FOLATE SERPL-MCNC: >20 NG/ML (ref 4.78–24.2)
GLOBULIN SER CALC-MCNC: 2.8 GM/DL
GLUCOSE SERPL-MCNC: 140 MG/DL (ref 65–99)
GLUCOSE UR QL STRIP: NEGATIVE
HBA1C MFR BLD: 7.5 % (ref 4.8–5.6)
HCT VFR BLD AUTO: 44.9 % (ref 34–46.6)
HDLC SERPL-MCNC: 51 MG/DL (ref 40–60)
HGB BLD-MCNC: 15.7 G/DL (ref 12–15.9)
HGB UR QL STRIP: NEGATIVE
IMM GRANULOCYTES # BLD AUTO: 0.04 10*3/MM3 (ref 0–0.05)
IMM GRANULOCYTES NFR BLD AUTO: 0.4 % (ref 0–0.5)
KETONES UR QL STRIP: NEGATIVE
LDLC SERPL CALC-MCNC: 192 MG/DL (ref 0–100)
LEUKOCYTE ESTERASE UR QL STRIP: NEGATIVE
LYMPHOCYTES # BLD AUTO: 3.5 10*3/MM3 (ref 0.7–3.1)
LYMPHOCYTES NFR BLD AUTO: 33.6 % (ref 19.6–45.3)
MCH RBC QN AUTO: 32.5 PG (ref 26.6–33)
MCHC RBC AUTO-ENTMCNC: 35 G/DL (ref 31.5–35.7)
MCV RBC AUTO: 93 FL (ref 79–97)
MONOCYTES # BLD AUTO: 0.62 10*3/MM3 (ref 0.1–0.9)
MONOCYTES NFR BLD AUTO: 5.9 % (ref 5–12)
NEUTROPHILS # BLD AUTO: 6.1 10*3/MM3 (ref 1.7–7)
NEUTROPHILS NFR BLD AUTO: 58.5 % (ref 42.7–76)
NITRITE UR QL STRIP: NEGATIVE
NRBC BLD AUTO-RTO: 0 /100 WBC (ref 0–0.2)
PH UR STRIP: 6.5 [PH] (ref 5–8)
PLATELET # BLD AUTO: 318 10*3/MM3 (ref 140–450)
POTASSIUM SERPL-SCNC: 4.6 MMOL/L (ref 3.5–5.2)
PROT SERPL-MCNC: 7.4 G/DL (ref 6–8.5)
PROT UR QL STRIP: NEGATIVE
RBC # BLD AUTO: 4.83 10*6/MM3 (ref 3.77–5.28)
SODIUM SERPL-SCNC: 141 MMOL/L (ref 136–145)
SP GR UR STRIP: 1.01 (ref 1–1.03)
T3FREE SERPL-MCNC: 3.2 PG/ML (ref 2–4.4)
T4 FREE SERPL-MCNC: 1.28 NG/DL (ref 0.93–1.7)
TRIGL SERPL-MCNC: 140 MG/DL (ref 0–150)
TSH SERPL DL<=0.005 MIU/L-ACNC: 1.47 UIU/ML (ref 0.27–4.2)
UROBILINOGEN UR STRIP-MCNC: NORMAL MG/DL
VIT B12 SERPL-MCNC: 561 PG/ML (ref 211–946)
VLDLC SERPL CALC-MCNC: 26 MG/DL (ref 5–40)
WBC # BLD AUTO: 10.43 10*3/MM3 (ref 3.4–10.8)

## 2023-06-08 RX ORDER — EVOLOCUMAB 140 MG/ML
140 INJECTION, SOLUTION SUBCUTANEOUS
Qty: 1 ML | Refills: 6 | Status: SHIPPED | OUTPATIENT
Start: 2023-06-08

## 2023-06-30 PROBLEM — M25.552 LEFT HIP PAIN: Status: ACTIVE | Noted: 2023-06-30

## 2023-07-31 DIAGNOSIS — Z12.31 SCREENING MAMMOGRAM FOR BREAST CANCER: Primary | ICD-10-CM

## 2023-08-17 ENCOUNTER — HOSPITAL ENCOUNTER (OUTPATIENT)
Dept: CT IMAGING | Facility: HOSPITAL | Age: 53
Discharge: HOME | End: 2023-08-17
Admitting: INTERNAL MEDICINE
Payer: COMMERCIAL

## 2023-08-17 DIAGNOSIS — R59.1 LYMPHADENOPATHY: ICD-10-CM

## 2023-08-17 LAB — CREAT BLDA-MCNC: 0.7 MG/DL (ref 0.6–1.3)

## 2023-08-17 PROCEDURE — 82565 ASSAY OF CREATININE: CPT

## 2023-08-17 PROCEDURE — 71260 CT THORAX DX C+: CPT

## 2023-08-17 PROCEDURE — 25510000001 IOPAMIDOL 61 % SOLUTION: Performed by: INTERNAL MEDICINE

## 2023-08-17 RX ADMIN — IOPAMIDOL 75 ML: 612 INJECTION, SOLUTION INTRAVENOUS at 13:08

## 2023-08-25 ENCOUNTER — TRANSCRIBE ORDERS (OUTPATIENT)
Dept: ADMINISTRATIVE | Facility: HOSPITAL | Age: 53
End: 2023-08-25
Payer: COMMERCIAL

## 2023-08-25 DIAGNOSIS — R59.1 LYMPHADENOPATHY: Primary | ICD-10-CM

## 2023-09-12 ENCOUNTER — CLINICAL SUPPORT (OUTPATIENT)
Dept: ORTHOPEDIC SURGERY | Facility: CLINIC | Age: 53
End: 2023-09-12
Payer: COMMERCIAL

## 2023-09-12 VITALS — WEIGHT: 194.6 LBS | BODY MASS INDEX: 35.81 KG/M2 | HEIGHT: 62 IN | TEMPERATURE: 97.3 F

## 2023-09-12 DIAGNOSIS — M17.11 PRIMARY OSTEOARTHRITIS OF RIGHT KNEE: Primary | ICD-10-CM

## 2023-09-12 RX ORDER — LIDOCAINE HYDROCHLORIDE 10 MG/ML
5 INJECTION, SOLUTION EPIDURAL; INFILTRATION; INTRACAUDAL; PERINEURAL
Status: COMPLETED | OUTPATIENT
Start: 2023-09-12 | End: 2023-09-12

## 2023-09-12 RX ORDER — METHYLPREDNISOLONE ACETATE 80 MG/ML
80 INJECTION, SUSPENSION INTRA-ARTICULAR; INTRALESIONAL; INTRAMUSCULAR; SOFT TISSUE
Status: COMPLETED | OUTPATIENT
Start: 2023-09-12 | End: 2023-09-12

## 2023-09-12 RX ADMIN — LIDOCAINE HYDROCHLORIDE 5 ML: 10 INJECTION, SOLUTION EPIDURAL; INFILTRATION; INTRACAUDAL; PERINEURAL at 09:02

## 2023-09-12 RX ADMIN — METHYLPREDNISOLONE ACETATE 80 MG: 80 INJECTION, SUSPENSION INTRA-ARTICULAR; INTRALESIONAL; INTRAMUSCULAR; SOFT TISSUE at 09:02

## 2023-09-12 NOTE — PROGRESS NOTES
9/12/2023    Carina Zamudio is here today for worsening knee pain. Pt has undergone injection of the knee in the past with good resolution of symptoms. Pt is requesting a repeat injection.     KNEE Injection Procedure Note:    Large Joint Arthrocentesis: R knee  Date/Time: 9/12/2023 9:02 AM  Consent given by: patient  Site marked: site marked  Timeout: Immediately prior to procedure a time out was called to verify the correct patient, procedure, equipment, support staff and site/side marked as required   Supporting Documentation  Indications: pain and joint swelling   Procedure Details  Location: knee - R knee  Preparation: Patient was prepped and draped in the usual sterile fashion  Needle size: 22 G  Approach: anterolateral  Medications administered: 80 mg methylPREDNISolone acetate 80 MG/ML; 5 mL lidocaine PF 1% 1 %  Patient tolerance: patient tolerated the procedure well with no immediate complications      (3 mL of lidocaine was used for anesthetic purposes)    At the conclusion of the injection I discussed the importance of continued quad strengthening exercises on a daily basis. I will see the patient back if the symptoms should fail to improve or worsen.    Raymond Perez, APRN  9/12/2023

## 2023-09-20 RX ORDER — SEMAGLUTIDE 1.34 MG/ML
1 INJECTION, SOLUTION SUBCUTANEOUS WEEKLY
Qty: 3 ML | Refills: 2 | Status: SHIPPED | OUTPATIENT
Start: 2023-09-20

## 2023-10-09 ENCOUNTER — OFFICE VISIT (OUTPATIENT)
Dept: OBSTETRICS AND GYNECOLOGY | Age: 53
End: 2023-10-09
Payer: COMMERCIAL

## 2023-10-09 ENCOUNTER — HOSPITAL ENCOUNTER (OUTPATIENT)
Facility: HOSPITAL | Age: 53
Discharge: HOME | End: 2023-10-09
Admitting: OBSTETRICS & GYNECOLOGY
Payer: COMMERCIAL

## 2023-10-09 VITALS
BODY MASS INDEX: 35.51 KG/M2 | WEIGHT: 193 LBS | SYSTOLIC BLOOD PRESSURE: 112 MMHG | DIASTOLIC BLOOD PRESSURE: 72 MMHG | HEIGHT: 62 IN

## 2023-10-09 DIAGNOSIS — Z12.31 BREAST CANCER SCREENING BY MAMMOGRAM: ICD-10-CM

## 2023-10-09 DIAGNOSIS — Z12.4 SCREENING FOR MALIGNANT NEOPLASM OF THE CERVIX: ICD-10-CM

## 2023-10-09 DIAGNOSIS — Z01.419 ENCOUNTER FOR GYNECOLOGICAL EXAMINATION WITHOUT ABNORMAL FINDING: Primary | ICD-10-CM

## 2023-10-09 DIAGNOSIS — Z12.31 SCREENING MAMMOGRAM FOR BREAST CANCER: ICD-10-CM

## 2023-10-09 DIAGNOSIS — Z98.890 HISTORY OF ENDOMETRIAL ABLATION: ICD-10-CM

## 2023-10-09 PROBLEM — Z12.11 SCREEN FOR COLON CANCER: Status: RESOLVED | Noted: 2020-02-25 | Resolved: 2023-10-09

## 2023-10-09 PROCEDURE — 77063 BREAST TOMOSYNTHESIS BI: CPT

## 2023-10-09 PROCEDURE — 77067 SCR MAMMO BI INCL CAD: CPT

## 2023-10-09 NOTE — PROGRESS NOTES
"Subjective   Carina Zamudio is a 53 y.o. female presents for annual visit today with mammogram downstairs.  Denies complaints today.  Patient inquiring about HRT.  Discussed with patient that she is not a candidate for HRT due to history of MI.  Patient did see colorectal surgery last year.  They recommended increased fiber.  She also plans to get a bidet.     I last saw her one year ago for her annual visit.  H/o ablation in 2012 - no periods since.  Colonoscopy 8/2020.  C/O fecal incontinence daily - will refer to CRS.   History of Present Illness    The following portions of the patient's history were reviewed and updated as appropriate: allergies, current medications, past family history, past medical history, past social history, past surgical history, and problem list.    Review of Systems   Constitutional:  Negative for chills, fatigue and fever.   Gastrointestinal:  Negative for abdominal distention and abdominal pain.   Genitourinary:  Negative for menstrual problem, pelvic pain, vaginal bleeding, vaginal discharge and vaginal pain.   All other systems reviewed and are negative.  /72   Ht 157.5 cm (62\")   Wt 87.5 kg (193 lb)   LMP  (LMP Unknown)   BMI 35.30 kg/mý       Objective   Physical Exam  Vitals and nursing note reviewed.   Constitutional:       Appearance: Normal appearance. She is well-developed.   Neck:      Thyroid: No thyromegaly.   Pulmonary:      Effort: Pulmonary effort is normal.   Chest:   Breasts:     Right: No mass, nipple discharge, skin change or tenderness.      Left: No mass, nipple discharge, skin change or tenderness.   Abdominal:      General: Bowel sounds are normal. There is no distension.      Palpations: Abdomen is soft.      Tenderness: There is no abdominal tenderness.   Genitourinary:     General: Normal vulva.      Exam position: Lithotomy position.      Labia:         Right: No rash or lesion.         Left: No rash or lesion.       Vagina: Normal. No vaginal " discharge or bleeding.      Cervix: No friability or lesion.      Uterus: Not enlarged and not tender.       Adnexa:         Right: No mass or tenderness.          Left: No mass or tenderness.     Musculoskeletal:         General: Normal range of motion.      Cervical back: Normal range of motion.   Skin:     General: Skin is warm and dry.      Findings: No rash.   Neurological:      Mental Status: She is alert and oriented to person, place, and time.   Psychiatric:         Mood and Affect: Mood normal.         Behavior: Behavior normal.           Assessment & Plan   Diagnoses and all orders for this visit:    1. Encounter for gynecological examination without abnormal finding (Primary)    2. Screening for malignant neoplasm of the cervix  -     IgP, Aptima HPV    3. History of endometrial ablation    4. Breast cancer screening by mammogram  -     Mammo screening digital tomosynthesis bilateral w CAD; Future        Counseling was given to patient for the following topics: instructions for management, importance of treatment compliance, and self-breast exams   . T  Return in about 1 year (around 10/9/2024) for Annual physical and MMG .

## 2023-10-11 LAB
CYTOLOGIST CVX/VAG CYTO: ABNORMAL
CYTOLOGY CVX/VAG DOC CYTO: ABNORMAL
CYTOLOGY CVX/VAG DOC THIN PREP: ABNORMAL
DX ICD CODE: ABNORMAL
HIV 1 & 2 AB SER-IMP: ABNORMAL
HPV I/H RISK 4 DNA CVX QL PROBE+SIG AMP: POSITIVE
OTHER STN SPEC: ABNORMAL
STAT OF ADQ CVX/VAG CYTO-IMP: ABNORMAL

## 2023-10-16 PROBLEM — B97.7 HPV IN FEMALE: Status: ACTIVE | Noted: 2023-10-16

## 2023-10-16 NOTE — PROGRESS NOTES
Call patient and notify of normal results of pap smear but HPV positive so needs repeat in one year - Cari is it possible to reflex this to 16/18? Or is it too late? Please let me know.

## 2023-10-18 ENCOUNTER — TELEPHONE (OUTPATIENT)
Dept: CARDIOLOGY | Facility: CLINIC | Age: 53
End: 2023-10-18
Payer: COMMERCIAL

## 2023-10-18 NOTE — TELEPHONE ENCOUNTER
Fax REQUEST for a  PA for Repatha.   PA has been approved from 10/18/23 to 10/18/24.   * copy of PA is scanned to serena [place]

## 2023-10-19 LAB
HPV16 DNA CVX QL PROBE+SIG AMP: NEGATIVE
HPV18+45 E6+E7 MRNA CVX QL NAA+PROBE: NEGATIVE
WRITTEN AUTHORIZATION: NORMAL

## 2023-11-20 DIAGNOSIS — I10 ESSENTIAL HYPERTENSION: Chronic | ICD-10-CM

## 2023-11-20 RX ORDER — SPIRONOLACTONE 50 MG/1
50 TABLET, FILM COATED ORAL DAILY
Qty: 90 TABLET | Refills: 1 | Status: SHIPPED | OUTPATIENT
Start: 2023-11-20

## 2023-12-22 ENCOUNTER — OFFICE VISIT (OUTPATIENT)
Dept: CARDIOLOGY | Facility: CLINIC | Age: 53
End: 2023-12-22
Payer: COMMERCIAL

## 2023-12-22 VITALS
BODY MASS INDEX: 35.7 KG/M2 | HEART RATE: 74 BPM | SYSTOLIC BLOOD PRESSURE: 118 MMHG | HEIGHT: 62 IN | DIASTOLIC BLOOD PRESSURE: 72 MMHG | WEIGHT: 194 LBS

## 2023-12-22 DIAGNOSIS — E11.51 TYPE 2 DIABETES MELLITUS WITH DIABETIC PERIPHERAL ANGIOPATHY WITHOUT GANGRENE, WITHOUT LONG-TERM CURRENT USE OF INSULIN: Chronic | ICD-10-CM

## 2023-12-22 DIAGNOSIS — I25.119 CORONARY ARTERY DISEASE INVOLVING NATIVE CORONARY ARTERY OF NATIVE HEART WITH ANGINA PECTORIS: Primary | Chronic | ICD-10-CM

## 2023-12-22 DIAGNOSIS — I10 ESSENTIAL HYPERTENSION: Chronic | ICD-10-CM

## 2023-12-22 DIAGNOSIS — Z86.73 HISTORY OF CVA (CEREBROVASCULAR ACCIDENT): ICD-10-CM

## 2023-12-22 DIAGNOSIS — I25.2 HISTORY OF MYOCARDIAL INFARCTION: ICD-10-CM

## 2023-12-22 DIAGNOSIS — Z98.61 S/P CORONARY ANGIOPLASTY: ICD-10-CM

## 2023-12-22 DIAGNOSIS — F17.200 TOBACCO DEPENDENCE: ICD-10-CM

## 2023-12-22 PROCEDURE — 99214 OFFICE O/P EST MOD 30 MIN: CPT | Performed by: INTERNAL MEDICINE

## 2023-12-22 PROCEDURE — 93000 ELECTROCARDIOGRAM COMPLETE: CPT | Performed by: INTERNAL MEDICINE

## 2023-12-22 NOTE — PROGRESS NOTES
Subjective:     Encounter Date:12/22/23      Patient ID: Carina Zamudio is a 53 y.o. female.    Chief Complaint:  History of Present Illness    This is a 52-year-old female with a documented history of CAD, inferior STEMI in August 2015 with drug-eluting stent placement to the left circumflex as well as nonocclusive CAD in the RCA and LAD, history of CVA, diabetes mellitus with circulatory complication, obstructive sleep apnea on CPAP, hypertensive heart disease without heart failure, and history of tobacco abuse and dependence.    She denies any chest pain, pressure, tightness, squeezing, or heartburn.  She has not experienced any feeling of palpitations, tachycardia or heart racing and no presyncope or syncope.  There has not been any problems with dizziness or lightheadedness.  There has not been any orthopnea or PND, and no problems with lower extremity edema.  She denies any shortness of breath at rest or with activity and has not had any wheezing.  She has continued to perform daily activities of living without any specific problem or change in the level of activity.    She has statin intolerance.  We have had her on rosuvastatin and she has been intolerant of that.  She had to stop it, she did not work to really be careful with her diet, and she got blood work obtained by yourself that showed her LDL was back up to 254.  She has had problems with myalgia that was incapacitating previously when she was on atorvastatin and has been intolerant to pravastatin.    Stress test 11/2017 showed EF 64%, no ischemia.     Cardiac catheterization 2015:     Left heart catheterization: LV pressure was 120/20. There was normal LV  systolic function without segmental wall motion abnormality. No mitral  insufficiency or gradient across the aortic valve on pullback.      The left main coronary artery is normal.      The LAD has some mild luminal irregularities in the proximal portion that are  about 20%. The first  diagonal has some mild luminal irregularities of 10% in  it.   The circumflex has a 99% blockage in the middle portion. There is an OM1 that is subtotally occluded right in this lesion and the flow beyond that is CAROL-1.     The RCA is a dominant vessel with 30% proximal and mid disease, diffuse  aneurysmal change distally. The MARCIA has diffuse 50% disease in the proximal  portion of it, and the PDA has a 30% origin lesion in it.      Drug-eluting stent placement 2015:  STEMI. Due to this circumflex lesion we are going to proceed on with  intervention in it.       Initially, we went up with a Voda 3.0 guiding catheter. I attempted to cross it with a BMW wire, but it would not,  switched to a whisper wire and it easily crossed and then brought a 2.5 x 15 Trek balloon down, inflated it at 14 atmospheres, predilating this and then we brought a 2.75 x 18 Xience Alpine stent and deployed it at 14 atmospheres. I postdilated the front end of that stent with a 3.0 x 8 NC Trek at 20  atmospheres. There was 0% residual and CAROL-3 flow. There was just a trickle of flow in the OM1, but it is too small to go after or do anything.     The following portions of the patient's history were reviewed and updated as appropriate: allergies, current medications, past family history, past medical history, past social history, past surgical history and problem list.    Past Medical History:   Diagnosis Date    Abnormal ECG     Adrenal cortical adenoma     ADRENAL CONGENITAL HYPERPLASIA    Alopecia 06/2021    Anemia     Anxiety     CAD (coronary artery disease)     Cataract     BILATERAL, S/P EXTRACTION    Cerebellar infarct 2012    Cervical radiculopathy due to degenerative joint disease of spine 12/27/2016    Cholelithiasis 11/2021    Gallbladder removed    Chronic cough     Chronic kidney disease 2001    Proteinuria    Chronic thoracic back pain     Colon polyps     COVID 11/30/2021    Deep vein thrombosis     Depression     Diabetes  mellitus     TYPE 2, NIDDM    Diastolic heart failure     Displacement of lumbar intervertebral disc without myelopathy 12/28/2011    Diverticulitis     Diverticulosis     DJD (degenerative joint disease), cervical     DJD (degenerative joint disease), lumbar     Dysmetabolic syndrome X     Fecal smearing 09/2022    Fracture, toe     GERD (gastroesophageal reflux disease)     History of gestational diabetes     HPV (human papilloma virus) infection     Hyperlipemia     MIXED HLD    Hypersomnia     Hypertension     Leg edema, right 02/25/2019    Liver nodule     Lumbar spondylosis     Lung nodule     Multinodular goiter     Myocardial infarct     STEMI OF RCA    Myocardial infarction 08/31/2015    POSTERIOR WALL, ADMITTED TO [facility]    AMY (obstructive sleep apnea)     FOLLOWED BY DR. BANDAR CAPUTO    Osteoarthritis of spine 04/04/2016    Ovarian cyst 11/2020    Palpitations 01/2022    Paresthesia of both hands 03/2021    Pneumonia     Posterior tibial tendonitis 03/2019    LEFT    Precordial pain 06/2022    Proteinuria     CHRONIC, FOLLOWED BY DR. 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 NIMCO BROWN AT [facility]    CATARACT EXTRACTION, BILATERAL      CERVICAL BIOPSY N/A 06/10/2020    DR. KLAUS CANALES    CHOLECYSTECTOMY  11/2021    CHOLECYSTECTOMY WITH INTRAOPERATIVE CHOLANGIOGRAM N/A 11/04/2021    Procedure: CHOLECYSTECTOMY LAPAROSCOPIC INTRAOPERATIVE CHOLANGIOGRAM;  Surgeon: Maulik Ashraf MD;  Location: [facility];  Service: General;  Laterality: N/A;    COLONOSCOPY N/A 08/05/2020    1 SMALL BENIGN POLYP IN SIGMOID, SCATTERED DIVERTICULA,   "CARLOS OVIEDO AT [facility]    CORONARY STENT PLACEMENT N/A 08/20/2015    99% BLOCKAGE IN MIDDLE CIRCUMFLEX, STENT INSERTED, DR. NEGRO MICHEL AT [facility]    CYST REMOVAL  08/06/2019    SEBACEOUS CYST, DR. GLADYS MCKEON    ENDOMETRIAL ABLATION N/A 06/11/2011    DR. PRIYANK KABA AT [facility]    EYE SURGERY Left 12/2012    COLD LASER FOR CAUTERIZATION OF LEFT RETINAL HEMORRHAGE    EYE SURGERY  09/2012    LASER SURGERY FOR CENTRAL SEROUS RETINOPATHY, DR. HUDSON    KNEE ARTHROSCOPY Right 05/10/2019    Procedure: RIGHT KNEE ARTHROSCOPY BWITH PART. MEDIAL AND LATERAL MENISECTOMY, PATELLA, CHONDROPLASTY, AND DEBRIDMENT;  Surgeon: Ignacio Martinez MD;  Location: [facility] OR [facility];  Service: Orthopedics    NEVUS EXCISION Right 12/28/2011    EXCISION OF LESION ON RIGHT NOSE, pATH: INTRADERMAL NEVUS, DR. SPENCER GARDNER AT [facility]    TUBAL ABDOMINAL LIGATION Bilateral 06/11/2011    DR. PRIYANK KABA AT [facility]           ECG 12 Lead    Date/Time: 12/22/2023 9:39 AM  Performed by: Vaughn Tipton III, MD    Authorized by: Vaughn Tipton III, MD  Comparison: compared with previous ECG   Similar to previous ECG  Rhythm: sinus rhythm  Rate: normal  Conduction: conduction normal  ST Segments: ST segments normal  T Waves: T waves normal  QRS axis: normal  Other: no other findings    Clinical impression: normal ECG             Objective:     Vitals:    12/22/23 0910   BP: 118/72   Pulse: 74   Weight: 88 kg (194 lb)   Height: 157.5 cm (62\")         Vitals reviewed.   Constitutional:       General: Not in acute distress.     Appearance: Well-developed. Not diaphoretic.   Eyes:      General:         Right eye: No discharge.         Left eye: No discharge.      Conjunctiva/sclera: Conjunctivae normal.      Pupils: Pupils are equal, round, and reactive to light.   HENT:      Head: Normocephalic and atraumatic.      Nose: Nose normal.   Neck:      Thyroid: No thyromegaly.      Trachea: No tracheal deviation.      Lymphadenopathy: No cervical adenopathy. " "  Pulmonary:      Effort: Pulmonary effort is normal. No respiratory distress.      Breath sounds: Normal breath sounds. No stridor.   Chest:      Chest wall: Not tender to palpatation.   Cardiovascular:      Normal rate. Regular rhythm.      Murmurs: There is no murmur.      . No S3 gallop. No click. No rub.   Pulses:     Intact distal pulses.   Abdominal:      General: Bowel sounds are normal. There is no distension.      Palpations: Abdomen is soft. There is no abdominal mass.      Tenderness: There is no abdominal tenderness. There is no guarding or rebound.   Musculoskeletal: Normal range of motion.         General: No tenderness or deformity.      Cervical back: Normal range of motion and neck supple. Skin:     General: Skin is warm and dry.      Findings: No erythema or rash.   Neurological:      Mental Status: Alert and oriented to person, place, and time.      Deep Tendon Reflexes: Reflexes are normal and symmetric.   Psychiatric:         Thought Content: Thought content normal.         Lab Review:   Lab Results   Component Value Date    GLUCOSE 140 (H) 05/31/2023    BUN 16 05/31/2023    CREATININE 0.70 08/17/2023    EGFRIFNONA 105 11/02/2021    EGFRIFAFRI 118 06/24/2021    BCR 22.5 05/31/2023    K 4.6 05/31/2023    CO2 25.1 05/31/2023    CALCIUM 10.1 05/31/2023    PROTENTOTREF 7.4 05/31/2023    ALBUMIN 4.6 05/31/2023    LABIL2 1.6 05/31/2023    AST 17 05/31/2023    ALT 31 05/31/2023     CBC          1/20/2023    10:26 5/31/2023    11:41   CBC   WBC 9.69  10.43    RBC 5.08  4.83    Hemoglobin 15.7  15.7    Hematocrit 47.1  44.9    MCV 92.7  93.0    MCH 30.9  32.5    MCHC 33.3  35.0    RDW 11.7  12.7    Platelets 293  318      Lab Results   Component Value Date    PROBNP 84.3 06/21/2022    PROBNP 189.5 11/07/2017     No components found for: \"TROP\"  Lab Results   Component Value Date    DDIMERQUANT 0.56 (H) 06/21/2022     Results for orders placed during the hospital encounter of 11/07/17    STAT Adult " Transthoracic Echo Complete W/ Cont if Necessary Per Protocol    Interpretation Summary  · Left ventricular wall thickness is consistent with borderline concentric hypertrophy.  · Left ventricular systolic function is normal. Estimated EF = 57%.  · All left ventricular wall segments contract normally. Left ventricular wall thickness is consistent with borderline concentric hypertrophy. Left ventricular diastolic function is normal.    Stress test June 2022:  Interpretation Summary    Myocardial perfusion imaging indicates a normal myocardial perfusion study with no evidence of ischemia.  Left ventricular ejection fraction is normal. (Calculated EF = 62%).  Compared to the prior study from 11/8/2017 the current study reveals no changes. Fixed small inferolateral defect visualized, and completely unchanged from prior study.  Impressions are consistent with a low risk study.        Performed        Assessment:          Diagnosis Plan   1. Coronary artery disease involving native coronary artery of native heart with angina pectoris                 Plan:       1.  Hyperlipidemia with statin intolerance, on Repatha  2.  Coronary artery disease as outlined above  3.  History of myocardial infarction with drug-eluting stent placement in the circumflex, continue guideline directed medical therapy. Discussed exercise recs  4. Nicotine dependence. Discussed cessation strategies      Thank you very much for allowing us to participate in the care of this pleasant patient.  Please don't hesitate to call if I can be of assistance in any way.      Current Outpatient Medications:     amoxicillin (AMOXIL) 875 MG tablet, Take 1 tablet by mouth 2 (Two) Times a Day for 10 days., Disp: 20 tablet, Rfl: 0    aspirin 81 MG tablet, Take 1 tablet by mouth Daily. INSTRUCTED PT FOLLOW MD INSTRUCTIONS, Disp: , Rfl:     buPROPion XL (Wellbutrin XL) 150 MG 24 hr tablet, Take 1 tablet by mouth Daily., Disp: 90 tablet, Rfl: 2    carvedilol (Coreg)  6.25 MG tablet, Take 1 tablet by mouth 2 (Two) Times a Day With Meals., Disp: 180 tablet, Rfl: 3    clobetasol (TEMOVATE) 0.05 % ointment, apply 1 application on the skin to affected area of elbow as directed twice daily for 2 weeks then stop for 1 week, then repeat, Disp: 30 g, Rfl: 0    clopidogrel (PLAVIX) 75 MG tablet, Take 1 tablet by mouth Daily., Disp: 90 tablet, Rfl: 3    Evolocumab (Repatha) solution prefilled syringe injection, Inject 1 mL under the skin into the appropriate area as directed Every 14 (Fourteen) Days., Disp: 1 mL, Rfl: 6    fluticasone (FLONASE) 50 MCG/ACT nasal spray, Administer 2 sprays in each nostril once a day (Patient taking differently: 2 sprays into the nostril(s) as directed by provider Daily As Needed.), Disp: 16 g, Rfl: 11    fluticasone (FLONASE) 50 MCG/ACT nasal spray, Use 2 sprays into each nostril as directed by provider Daily., Disp: 16 g, Rfl: 0    fluticasone (FLONASE) 50 MCG/ACT nasal spray, Use 2 sprays into each nostril as directed by provider Daily for 30 days., Disp: 16 g, Rfl: 0    influenza vac split quad (Fluzone Quadrivalent) 0.5 ML suspension prefilled syringe injection, Inject  into the appropriate muscle as directed by prescriber., Disp: 0.5 mL, Rfl: 0    losartan (Cozaar) 50 MG tablet, Take 1/2 tablets by mouth Daily., Disp: 90 tablet, Rfl: 1    montelukast (SINGULAIR) 10 MG tablet, Take 1 tablet by mouth Every Night for 30 days., Disp: 30 tablet, Rfl: 0    Multiple Vitamin tablet, Take 1 tablet by mouth Daily., Disp: , Rfl:     Probiotic Product (PRO-BIOTIC BLEND PO), Take  by mouth., Disp: , Rfl:     Semaglutide, 1 MG/DOSE, (Ozempic, 1 MG/DOSE,) 4 MG/3ML solution pen-injector, Inject 1 mg under the skin into the appropriate area as directed 1 (One) Time Per Week., Disp: 3 mL, Rfl: 2    spironolactone (Aldactone) 50 MG tablet, Take 1 tablet by mouth Daily., Disp: 90 tablet, Rfl: 1    vitamin D (ERGOCALCIFEROL) 1.25 MG (65253 UT) capsule capsule, Take 1  capsule by mouth 2 (Two) Times a Week., Disp: 26 capsule, Rfl: 3         No follow-ups on file.

## 2023-12-28 NOTE — TELEPHONE ENCOUNTER
Rx Refill Note  Requested Prescriptions     Pending Prescriptions Disp Refills    Semaglutide, 1 MG/DOSE, (Ozempic, 1 MG/DOSE,) 4 MG/3ML solution pen-injector 3 mL 2     Sig: Inject 1 mg under the skin into the appropriate area as directed 1 (One) Time Per Week.      Last office visit with prescribing clinician: 5/23/2023   Last telemedicine visit with prescribing clinician: Visit date not found   Next office visit with prescribing clinician: 1/23/2024

## 2023-12-29 RX ORDER — SEMAGLUTIDE 1.34 MG/ML
1 INJECTION, SOLUTION SUBCUTANEOUS WEEKLY
Qty: 3 ML | Refills: 2 | Status: SHIPPED | OUTPATIENT
Start: 2023-12-29

## 2024-01-12 RX ORDER — CARVEDILOL 6.25 MG/1
6.25 TABLET ORAL 2 TIMES DAILY WITH MEALS
Qty: 180 TABLET | Refills: 3 | Status: SHIPPED | OUTPATIENT
Start: 2024-01-12

## 2024-01-23 ENCOUNTER — OFFICE VISIT (OUTPATIENT)
Dept: INTERNAL MEDICINE | Facility: CLINIC | Age: 54
End: 2024-01-23
Payer: COMMERCIAL

## 2024-01-23 VITALS
OXYGEN SATURATION: 97 % | DIASTOLIC BLOOD PRESSURE: 68 MMHG | WEIGHT: 191 LBS | HEART RATE: 84 BPM | BODY MASS INDEX: 34.93 KG/M2 | SYSTOLIC BLOOD PRESSURE: 112 MMHG

## 2024-01-23 DIAGNOSIS — I10 ESSENTIAL HYPERTENSION: Chronic | ICD-10-CM

## 2024-01-23 DIAGNOSIS — E11.59 TYPE 2 DIABETES MELLITUS WITH OTHER CIRCULATORY COMPLICATION, WITHOUT LONG-TERM CURRENT USE OF INSULIN: Chronic | ICD-10-CM

## 2024-01-23 DIAGNOSIS — L65.9 ALOPECIA: ICD-10-CM

## 2024-01-23 DIAGNOSIS — E53.8 FOLATE DEFICIENCY: ICD-10-CM

## 2024-01-23 DIAGNOSIS — I25.119 CORONARY ARTERY DISEASE INVOLVING NATIVE CORONARY ARTERY OF NATIVE HEART WITH ANGINA PECTORIS: Primary | Chronic | ICD-10-CM

## 2024-01-23 DIAGNOSIS — G47.33 OBSTRUCTIVE SLEEP APNEA SYNDROME: ICD-10-CM

## 2024-01-23 DIAGNOSIS — N95.1 PERIMENOPAUSAL SYMPTOMS: ICD-10-CM

## 2024-01-23 DIAGNOSIS — E53.8 B12 DEFICIENCY: ICD-10-CM

## 2024-01-23 DIAGNOSIS — E55.9 VITAMIN D DEFICIENCY: ICD-10-CM

## 2024-01-23 DIAGNOSIS — E78.2 MIXED HYPERLIPIDEMIA: ICD-10-CM

## 2024-01-23 PROCEDURE — 99214 OFFICE O/P EST MOD 30 MIN: CPT | Performed by: FAMILY MEDICINE

## 2024-01-23 NOTE — PROGRESS NOTES
Answers submitted by the patient for this visit:  Primary Reason for Visit (Submitted on 1/22/2024)  What is the primary reason for your visit?: Diabetes  Chief Complaint  Hypertension, Diabetes, Heartburn, and Hyperlipidemia    Subjective        Carina Zamudio presents to [facility]  History of Present Illness  Ricarda is a delightful lady who works at [employer] and has prior history of a stent in circumflex artery with stable management medically of CAD with follow-up with Dr. Tipton.  Otherwise treating diabetes type 2 with Ozempic at 1 mg subcu weekly with good results.  Will check A1c today and is back on Repatha 140 mg injected every 14 days for cholesterol.    She is experienced some alopecia with fluctuating hair loss and will get labs to that degree check cortisol as well as pituitary and thyroid function.  There is a history of a adrenal nodule.  She has been followed by OB/GYN as well.  She had a prior ablation in is perimenopausal/menopausal.    History of CVA noted and she continues on aspirin plus Plavix.    Pulmonary is following a pulmonary nodule which has been stable with surveillance CT chest and she is reducing cigarette smoking.  Diabetes  She has type 2 diabetes mellitus. No MedicAlert identification noted. The initial diagnosis of diabetes was made 10 years ago. Pertinent negatives for hypoglycemia include no confusion, headaches, speech difficulty, sweats or tremors. Symptoms are improving. She is following a generally healthy diet. When asked about meal planning, she reported none. She participates in exercise intermittently. She does not see a podiatrist.Eye exam is not current.       Objective   Vital Signs:  /68 (BP Location: Left arm, Patient Position: Sitting, Cuff Size: Adult)   Pulse 84   Wt 86.6 kg (191 lb)   SpO2 97%   BMI 34.93 kg/m²   Estimated body mass index is 34.93 kg/m² as calculated from the following:    Height as of 12/22/23: 157.5  "cm (62\").    Weight as of this encounter: 86.6 kg (191 lb).               Physical Exam  Vitals reviewed.   Constitutional:       Appearance: She is well-developed.   HENT:      Head: Normocephalic and atraumatic.      Right Ear: Tympanic membrane and external ear normal.      Left Ear: Tympanic membrane and external ear normal.      Nose: Nose normal.   Eyes:      Conjunctiva/sclera: Conjunctivae normal.      Pupils: Pupils are equal, round, and reactive to light.   Neck:      Thyroid: No thyromegaly.      Vascular: No JVD.   Cardiovascular:      Rate and Rhythm: Normal rate and regular rhythm.      Heart sounds: Normal heart sounds.   Pulmonary:      Effort: Pulmonary effort is normal.      Breath sounds: Normal breath sounds.   Abdominal:      General: Bowel sounds are normal.      Palpations: Abdomen is soft.   Musculoskeletal:         General: Normal range of motion.      Cervical back: Normal range of motion and neck supple.      Lumbar back: Decreased range of motion.   Lymphadenopathy:      Cervical: No cervical adenopathy.   Skin:     General: Skin is warm and dry.      Findings: No rash.   Neurological:      Mental Status: She is alert and oriented to person, place, and time.      Cranial Nerves: No cranial nerve deficit.      Coordination: Coordination normal.   Psychiatric:         Behavior: Behavior normal.         Thought Content: Thought content normal.         Judgment: Judgment normal.        Result Review :    The following data was reviewed by: Rowdy Engel MD on 01/23/2024:  Common labs          5/31/2023    11:41 5/31/2023    12:00 8/17/2023    12:59   Common Labs   Glucose 140      BUN 16      Creatinine 0.71   0.70    Sodium 141      Potassium 4.6      Chloride 100      Calcium 10.1      Total Protein 7.4      Albumin 4.6      Total Bilirubin 0.3      Alkaline Phosphatase 65      AST (SGOT) 17      ALT (SGPT) 31      WBC 10.43      Hemoglobin 15.7      Hematocrit 44.9      Platelets 318    "   Total Cholesterol 269      Triglycerides 140      HDL Cholesterol 51      LDL Cholesterol  192      Hemoglobin A1C 7.50      Microalbumin, Urine  29.9       Data reviewed : Radiologic studies CT chest             Assessment and Plan     Diagnoses and all orders for this visit:    1. Coronary artery disease involving native coronary artery of native heart with angina pectoris (Primary)  Comments:  1 stent in circumflex continue carvedilol 6.25 twice daily Plavix 75 mg daily aspirin 81 mg daily losartan 50 mg half tablet daily spironolactone 50 mg daily  Orders:  -     Urinalysis With Microscopic If Indicated (No Culture) - Urine, Clean Catch  -     TSH  -     T4, Free  -     T3, Free  -     Lipid Panel With / Chol / HDL Ratio  -     CBC & Differential  -     Comprehensive Metabolic Panel  -     Hemoglobin A1c  -     Vitamin B12  -     Folate  -     Sedimentation Rate  -     DHEA-sulfate  -     Follicle stimulating hormone  -     Luteinizing hormone  -     Prolactin  -     Testosterone Free Direct  -     MicroAlbumin, Urine, Random - Urine, Clean Catch  -     Vitamin D,25-Hydroxy  -     Cortisol - AM    2. Essential hypertension  Comments:  1 stent in circumflex continue carvedilol 6.25 twice daily Plavix 75 mg daily aspirin 81 mg daily losartan 50 mg half tablet daily spironolactone 50 mg jayden  Orders:  -     Urinalysis With Microscopic If Indicated (No Culture) - Urine, Clean Catch  -     TSH  -     T4, Free  -     T3, Free  -     Lipid Panel With / Chol / HDL Ratio  -     CBC & Differential  -     Comprehensive Metabolic Panel  -     Hemoglobin A1c  -     Vitamin B12  -     Folate  -     Sedimentation Rate  -     DHEA-sulfate  -     Follicle stimulating hormone  -     Luteinizing hormone  -     Prolactin  -     Testosterone Free Direct  -     MicroAlbumin, Urine, Random - Urine, Clean Catch  -     Vitamin D,25-Hydroxy  -     Cortisol - AM    3. Mixed hyperlipidemia  Comments:  Repatha 140 mg subcu every 14  days  Orders:  -     Urinalysis With Microscopic If Indicated (No Culture) - Urine, Clean Catch  -     TSH  -     T4, Free  -     T3, Free  -     Lipid Panel With / Chol / HDL Ratio  -     CBC & Differential  -     Comprehensive Metabolic Panel  -     Hemoglobin A1c  -     Vitamin B12  -     Folate  -     Sedimentation Rate  -     DHEA-sulfate  -     Follicle stimulating hormone  -     Luteinizing hormone  -     Prolactin  -     Testosterone Free Direct  -     MicroAlbumin, Urine, Random - Urine, Clean Catch  -     Vitamin D,25-Hydroxy  -     Cortisol - AM    4. Type 2 diabetes mellitus with other circulatory complication, without long-term current use of insulin  Comments:  Ozempic 1 mg subcu weekly  Orders:  -     Urinalysis With Microscopic If Indicated (No Culture) - Urine, Clean Catch  -     TSH  -     T4, Free  -     T3, Free  -     Lipid Panel With / Chol / HDL Ratio  -     CBC & Differential  -     Comprehensive Metabolic Panel  -     Hemoglobin A1c  -     Vitamin B12  -     Folate  -     Sedimentation Rate  -     DHEA-sulfate  -     Follicle stimulating hormone  -     Luteinizing hormone  -     Prolactin  -     Testosterone Free Direct  -     MicroAlbumin, Urine, Random - Urine, Clean Catch  -     Vitamin D,25-Hydroxy  -     Cortisol - AM    5. Alopecia  Comments:  Pending labs for alopecia  Orders:  -     Urinalysis With Microscopic If Indicated (No Culture) - Urine, Clean Catch  -     TSH  -     T4, Free  -     T3, Free  -     Lipid Panel With / Chol / HDL Ratio  -     CBC & Differential  -     Comprehensive Metabolic Panel  -     Hemoglobin A1c  -     Vitamin B12  -     Folate  -     Sedimentation Rate  -     DHEA-sulfate  -     Follicle stimulating hormone  -     Luteinizing hormone  -     Prolactin  -     Testosterone Free Direct  -     MicroAlbumin, Urine, Random - Urine, Clean Catch  -     Vitamin D,25-Hydroxy  -     Cortisol - AM    6. Obstructive sleep apnea syndrome  Comments:  On CPAP    7. B12  deficiency  -     Urinalysis With Microscopic If Indicated (No Culture) - Urine, Clean Catch  -     TSH  -     T4, Free  -     T3, Free  -     Lipid Panel With / Chol / HDL Ratio  -     CBC & Differential  -     Comprehensive Metabolic Panel  -     Hemoglobin A1c  -     Vitamin B12  -     Folate  -     Sedimentation Rate  -     DHEA-sulfate  -     Follicle stimulating hormone  -     Luteinizing hormone  -     Prolactin  -     Testosterone Free Direct  -     MicroAlbumin, Urine, Random - Urine, Clean Catch  -     Vitamin D,25-Hydroxy  -     Cortisol - AM    8. Folate deficiency  -     Urinalysis With Microscopic If Indicated (No Culture) - Urine, Clean Catch  -     TSH  -     T4, Free  -     T3, Free  -     Lipid Panel With / Chol / HDL Ratio  -     CBC & Differential  -     Comprehensive Metabolic Panel  -     Hemoglobin A1c  -     Vitamin B12  -     Folate  -     Sedimentation Rate  -     DHEA-sulfate  -     Follicle stimulating hormone  -     Luteinizing hormone  -     Prolactin  -     Testosterone Free Direct  -     MicroAlbumin, Urine, Random - Urine, Clean Catch  -     Vitamin D,25-Hydroxy  -     Cortisol - AM    9. Vitamin D deficiency  -     Urinalysis With Microscopic If Indicated (No Culture) - Urine, Clean Catch  -     TSH  -     T4, Free  -     T3, Free  -     Lipid Panel With / Chol / HDL Ratio  -     CBC & Differential  -     Comprehensive Metabolic Panel  -     Hemoglobin A1c  -     Vitamin B12  -     Folate  -     Sedimentation Rate  -     DHEA-sulfate  -     Follicle stimulating hormone  -     Luteinizing hormone  -     Prolactin  -     Testosterone Free Direct  -     MicroAlbumin, Urine, Random - Urine, Clean Catch  -     Vitamin D,25-Hydroxy  -     Cortisol - AM    10. Perimenopausal symptoms  -     Urinalysis With Microscopic If Indicated (No Culture) - Urine, Clean Catch  -     TSH  -     T4, Free  -     T3, Free  -     Lipid Panel With / Chol / HDL Ratio  -     CBC & Differential  -      Comprehensive Metabolic Panel  -     Hemoglobin A1c  -     Vitamin B12  -     Folate  -     Sedimentation Rate  -     DHEA-sulfate  -     Follicle stimulating hormone  -     Luteinizing hormone  -     Prolactin  -     Testosterone Free Direct  -     MicroAlbumin, Urine, Random - Urine, Clean Catch  -     Vitamin D,25-Hydroxy  -     Cortisol - AM             Follow Up     Return in about 6 months (around 7/23/2024) for Annual physical.  Patient was given instructions and counseling regarding her condition or for health maintenance advice. Please see specific information pulled into the AVS if appropriate.

## 2024-01-24 LAB
25(OH)D3+25(OH)D2 SERPL-MCNC: 51 NG/ML (ref 30–100)
ALBUMIN SERPL-MCNC: 4.3 G/DL (ref 3.5–5.2)
ALBUMIN/GLOB SERPL: 1.5 G/DL
ALP SERPL-CCNC: 69 U/L (ref 39–117)
ALT SERPL-CCNC: 27 U/L (ref 1–33)
APPEARANCE UR: CLEAR
AST SERPL-CCNC: 15 U/L (ref 1–32)
BACTERIA #/AREA URNS HPF: ABNORMAL /HPF
BASOPHILS # BLD AUTO: 0.04 10*3/MM3 (ref 0–0.2)
BASOPHILS NFR BLD AUTO: 0.4 % (ref 0–1.5)
BILIRUB SERPL-MCNC: 0.2 MG/DL (ref 0–1.2)
BILIRUB UR QL STRIP: NEGATIVE
BUN SERPL-MCNC: 14 MG/DL (ref 6–20)
BUN/CREAT SERPL: 20 (ref 7–25)
CALCIUM SERPL-MCNC: 9.2 MG/DL (ref 8.6–10.5)
CASTS URNS MICRO: ABNORMAL
CHLORIDE SERPL-SCNC: 98 MMOL/L (ref 98–107)
CHOLEST SERPL-MCNC: 246 MG/DL (ref 0–200)
CHOLEST/HDLC SERPL: 6.15 {RATIO}
CO2 SERPL-SCNC: 24.1 MMOL/L (ref 22–29)
COLOR UR: YELLOW
CORTIS AM PEAK SERPL-MCNC: 9.1 UG/DL (ref 6.2–19.4)
CREAT SERPL-MCNC: 0.7 MG/DL (ref 0.57–1)
DHEA-S SERPL-MCNC: 50.4 UG/DL (ref 41.2–243.7)
EGFRCR SERPLBLD CKD-EPI 2021: 102.9 ML/MIN/1.73
EOSINOPHIL # BLD AUTO: 0.08 10*3/MM3 (ref 0–0.4)
EOSINOPHIL NFR BLD AUTO: 0.8 % (ref 0.3–6.2)
EPI CELLS #/AREA URNS HPF: ABNORMAL /HPF
ERYTHROCYTE [DISTWIDTH] IN BLOOD BY AUTOMATED COUNT: 12 % (ref 12.3–15.4)
ERYTHROCYTE [SEDIMENTATION RATE] IN BLOOD BY WESTERGREN METHOD: 47 MM/HR (ref 0–30)
FOLATE SERPL-MCNC: 6.12 NG/ML (ref 4.78–24.2)
FSH SERPL-ACNC: 57.2 MIU/ML
GLOBULIN SER CALC-MCNC: 2.8 GM/DL
GLUCOSE SERPL-MCNC: 134 MG/DL (ref 65–99)
GLUCOSE UR QL STRIP: NEGATIVE
HBA1C MFR BLD: 7.4 % (ref 4.8–5.6)
HCT VFR BLD AUTO: 47.1 % (ref 34–46.6)
HDLC SERPL-MCNC: 40 MG/DL (ref 40–60)
HGB BLD-MCNC: 16.4 G/DL (ref 12–15.9)
HGB UR QL STRIP: ABNORMAL
IMM GRANULOCYTES # BLD AUTO: 0.06 10*3/MM3 (ref 0–0.05)
IMM GRANULOCYTES NFR BLD AUTO: 0.6 % (ref 0–0.5)
KETONES UR QL STRIP: NEGATIVE
LDLC SERPL CALC-MCNC: 186 MG/DL (ref 0–100)
LEUKOCYTE ESTERASE UR QL STRIP: ABNORMAL
LH SERPL-ACNC: 32.7 MIU/ML
LYMPHOCYTES # BLD AUTO: 3.04 10*3/MM3 (ref 0.7–3.1)
LYMPHOCYTES NFR BLD AUTO: 30.2 % (ref 19.6–45.3)
MCH RBC QN AUTO: 31.4 PG (ref 26.6–33)
MCHC RBC AUTO-ENTMCNC: 34.8 G/DL (ref 31.5–35.7)
MCV RBC AUTO: 90.1 FL (ref 79–97)
MICROALBUMIN UR-MCNC: 45.8 UG/ML
MONOCYTES # BLD AUTO: 0.56 10*3/MM3 (ref 0.1–0.9)
MONOCYTES NFR BLD AUTO: 5.6 % (ref 5–12)
NEUTROPHILS # BLD AUTO: 6.3 10*3/MM3 (ref 1.7–7)
NEUTROPHILS NFR BLD AUTO: 62.4 % (ref 42.7–76)
NITRITE UR QL STRIP: NEGATIVE
NRBC BLD AUTO-RTO: 0 /100 WBC (ref 0–0.2)
PH UR STRIP: 6 [PH] (ref 5–8)
PLATELET # BLD AUTO: 345 10*3/MM3 (ref 140–450)
POTASSIUM SERPL-SCNC: 4.4 MMOL/L (ref 3.5–5.2)
PROLACTIN SERPL-MCNC: 8.2 NG/ML (ref 3.6–25.2)
PROT SERPL-MCNC: 7.1 G/DL (ref 6–8.5)
PROT UR QL STRIP: ABNORMAL
RBC # BLD AUTO: 5.23 10*6/MM3 (ref 3.77–5.28)
RBC #/AREA URNS HPF: ABNORMAL /HPF
SODIUM SERPL-SCNC: 137 MMOL/L (ref 136–145)
SP GR UR STRIP: 1.02 (ref 1–1.03)
T3FREE SERPL-MCNC: 3 PG/ML (ref 2–4.4)
T4 FREE SERPL-MCNC: 1.5 NG/DL (ref 0.93–1.7)
TESTOST FREE SERPL-MCNC: 1.2 PG/ML (ref 0–4.2)
TRIGL SERPL-MCNC: 109 MG/DL (ref 0–150)
TSH SERPL DL<=0.005 MIU/L-ACNC: 0.93 UIU/ML (ref 0.27–4.2)
UROBILINOGEN UR STRIP-MCNC: ABNORMAL MG/DL
VIT B12 SERPL-MCNC: 544 PG/ML (ref 211–946)
VLDLC SERPL CALC-MCNC: 20 MG/DL (ref 5–40)
WBC # BLD AUTO: 10.08 10*3/MM3 (ref 3.4–10.8)
WBC #/AREA URNS HPF: ABNORMAL /HPF

## 2024-01-25 NOTE — PROGRESS NOTES
Cholesterol way too high.  A1C fair.  Sed rate shows ongoing inflammation. There is some red cells in the urine.  Will need to repeat that or  have GYN repeat it.  Let me know if questions and I am happy to call her and go over it.

## 2024-02-15 RX ORDER — SCOLOPAMINE TRANSDERMAL SYSTEM 1 MG/1
1 PATCH, EXTENDED RELEASE TRANSDERMAL
Qty: 10 PATCH | Refills: 1 | Status: SHIPPED | OUTPATIENT
Start: 2024-02-15

## 2024-02-15 RX ORDER — CEPHALEXIN 500 MG/1
500 CAPSULE ORAL 3 TIMES DAILY
Qty: 30 CAPSULE | Refills: 0 | Status: SHIPPED | OUTPATIENT
Start: 2024-02-15

## 2024-02-19 RX ORDER — CLOPIDOGREL BISULFATE 75 MG/1
75 TABLET ORAL DAILY
Qty: 90 TABLET | Refills: 3 | Status: SHIPPED | OUTPATIENT
Start: 2024-02-19

## 2024-02-20 ENCOUNTER — TELEPHONE (OUTPATIENT)
Dept: CARDIOLOGY | Facility: CLINIC | Age: 54
End: 2024-02-20
Payer: COMMERCIAL

## 2024-03-13 ENCOUNTER — PATIENT MESSAGE (OUTPATIENT)
Dept: CARDIOLOGY | Facility: CLINIC | Age: 54
End: 2024-03-13
Payer: COMMERCIAL

## 2024-03-14 RX ORDER — EVOLOCUMAB 140 MG/ML
140 INJECTION, SOLUTION SUBCUTANEOUS
Qty: 2 ML | Refills: 6 | Status: SHIPPED | OUTPATIENT
Start: 2024-03-14

## 2024-03-14 NOTE — TELEPHONE ENCOUNTER
"Dominique Ch, [facility] 3/13/2024 2:43 PM EDT    Kemal Draper,    When we try to run this medication at the pharmacy it says the Prior Auth has not been completed. Could we try to do a new PA? Sometimes after January 1st we have to start new prior auths again.  ----- Message -----  From: Bonny Davis [facility]  Sent: 3/13/2024 11:59 AM EDT  To: [facility]  Subject: FW: Repatha Problem      Can someone please look into this  A PA was completed on 10/18/23 and was approved until 10/18/24  ----- Message -----  From: Carina Zamudio \"Rosa\"  Sent: 3/13/2024 11:25 AM EDT  To: Shae Singh [facility]  Subject: Repatha Problem     Good Morning! I am having a significant problem with our [facility] getting my Repatha. I have been going through this for nearly 2 months and of course have none.  pharmacy keeps saying it needs an authorization but I think that was done. Talita was a huge help with a similar problem last year. I need your help expediting this as my recent cholesterol results were not good. Thank you so much!     Rosa Zamudio"

## 2024-03-14 NOTE — TELEPHONE ENCOUNTER
I have spoke with [facility] pharmacy in regards to this matter.     Pharmacy was unsure to why the Repatha would not go through for a refill even though PA was approved until 10/2024.    I have spoken with [facility] and verified PA approval.     The formulary of this medication being sent in was for a prefilled syringe and the PA approval is for a auto injector. The only difference between the two are the method of Delivery.     I have spoken with ESTELA Diaz - prescribers MA and Ms Ha EMELYN ADLER is okay with the auto injector being refilled.        Called back [facility] pharmacy to relay correct information  NDC : [id number]    This is the formulary approved on PA.     Pharmacists refilled with new information.     CHESTER Betancourt

## 2024-03-21 DIAGNOSIS — E11.59 TYPE 2 DIABETES MELLITUS WITH OTHER CIRCULATORY COMPLICATION, WITHOUT LONG-TERM CURRENT USE OF INSULIN: ICD-10-CM

## 2024-03-21 DIAGNOSIS — R31.29 MICROSCOPIC HEMATURIA: ICD-10-CM

## 2024-03-21 DIAGNOSIS — N30.01 ACUTE CYSTITIS WITH HEMATURIA: Primary | ICD-10-CM

## 2024-03-21 DIAGNOSIS — R82.998 DARK URINE: ICD-10-CM

## 2024-03-24 LAB
APPEARANCE UR: ABNORMAL
BACTERIA #/AREA URNS HPF: NORMAL /HPF
BACTERIA UR CULT: ABNORMAL
BACTERIA UR CULT: ABNORMAL
BASOPHILS # BLD AUTO: 0.04 10*3/MM3 (ref 0–0.2)
BASOPHILS NFR BLD AUTO: 0.3 % (ref 0–1.5)
BILIRUB UR QL STRIP: ABNORMAL
BUN SERPL-MCNC: 17 MG/DL (ref 6–20)
BUN/CREAT SERPL: 23.3 (ref 7–25)
CALCIUM SERPL-MCNC: 9.1 MG/DL (ref 8.6–10.5)
CASTS URNS QL MICRO: NORMAL /LPF
CHLORIDE SERPL-SCNC: 98 MMOL/L (ref 98–107)
CO2 SERPL-SCNC: 25.5 MMOL/L (ref 22–29)
COLOR UR: YELLOW
CREAT SERPL-MCNC: 0.73 MG/DL (ref 0.57–1)
EGFRCR SERPLBLD CKD-EPI 2021: 97.9 ML/MIN/1.73
EOSINOPHIL # BLD AUTO: 0.09 10*3/MM3 (ref 0–0.4)
EOSINOPHIL NFR BLD AUTO: 0.7 % (ref 0.3–6.2)
EPI CELLS #/AREA URNS HPF: NORMAL /HPF (ref 0–10)
ERYTHROCYTE [DISTWIDTH] IN BLOOD BY AUTOMATED COUNT: 12.7 % (ref 12.3–15.4)
GLUCOSE SERPL-MCNC: 165 MG/DL (ref 65–99)
GLUCOSE UR QL STRIP: NEGATIVE
HCT VFR BLD AUTO: 46 % (ref 34–46.6)
HGB BLD-MCNC: 15.6 G/DL (ref 12–15.9)
HGB UR QL STRIP: NEGATIVE
IMM GRANULOCYTES # BLD AUTO: 0.04 10*3/MM3 (ref 0–0.05)
IMM GRANULOCYTES NFR BLD AUTO: 0.3 % (ref 0–0.5)
KETONES UR QL STRIP: ABNORMAL
LEUKOCYTE ESTERASE UR QL STRIP: ABNORMAL
LYMPHOCYTES # BLD AUTO: 2.94 10*3/MM3 (ref 0.7–3.1)
LYMPHOCYTES NFR BLD AUTO: 22.2 % (ref 19.6–45.3)
MCH RBC QN AUTO: 31.1 PG (ref 26.6–33)
MCHC RBC AUTO-ENTMCNC: 33.9 G/DL (ref 31.5–35.7)
MCV RBC AUTO: 91.8 FL (ref 79–97)
MICRO URNS: ABNORMAL
MONOCYTES # BLD AUTO: 0.74 10*3/MM3 (ref 0.1–0.9)
MONOCYTES NFR BLD AUTO: 5.6 % (ref 5–12)
MUCOUS THREADS URNS QL MICRO: PRESENT /HPF
NEUTROPHILS # BLD AUTO: 9.39 10*3/MM3 (ref 1.7–7)
NEUTROPHILS NFR BLD AUTO: 70.9 % (ref 42.7–76)
NITRITE UR QL STRIP: NEGATIVE
NRBC BLD AUTO-RTO: 0 /100 WBC (ref 0–0.2)
OTHER ANTIBIOTIC SUSC ISLT: ABNORMAL
PH UR STRIP: 5.5 [PH] (ref 5–7.5)
PLATELET # BLD AUTO: 292 10*3/MM3 (ref 140–450)
POTASSIUM SERPL-SCNC: 4.1 MMOL/L (ref 3.5–5.2)
PROT UR QL STRIP: ABNORMAL
RBC # BLD AUTO: 5.01 10*6/MM3 (ref 3.77–5.28)
RBC #/AREA URNS HPF: NORMAL /HPF (ref 0–2)
SODIUM SERPL-SCNC: 136 MMOL/L (ref 136–145)
SP GR UR STRIP: >=1.03 (ref 1–1.03)
URINALYSIS REFLEX: ABNORMAL
UROBILINOGEN UR STRIP-MCNC: 0.2 MG/DL (ref 0.2–1)
WBC # BLD AUTO: 13.24 10*3/MM3 (ref 3.4–10.8)
WBC #/AREA URNS HPF: NORMAL /HPF (ref 0–5)

## 2024-03-25 RX ORDER — NITROFURANTOIN 25; 75 MG/1; MG/1
100 CAPSULE ORAL 2 TIMES DAILY
Qty: 14 CAPSULE | Refills: 1 | Status: SHIPPED | OUTPATIENT
Start: 2024-03-25

## 2024-05-19 DIAGNOSIS — I10 ESSENTIAL HYPERTENSION: Chronic | ICD-10-CM

## 2024-05-19 RX ORDER — SPIRONOLACTONE 50 MG/1
50 TABLET, FILM COATED ORAL DAILY
Qty: 90 TABLET | Refills: 1 | Status: CANCELLED | OUTPATIENT
Start: 2024-05-19

## 2024-05-20 RX ORDER — SPIRONOLACTONE 50 MG/1
50 TABLET, FILM COATED ORAL DAILY
Qty: 90 TABLET | Refills: 1 | Status: SHIPPED | OUTPATIENT
Start: 2024-05-20

## 2024-06-03 ENCOUNTER — TELEPHONE (OUTPATIENT)
Dept: OBSTETRICS AND GYNECOLOGY | Age: 54
End: 2024-06-03
Payer: COMMERCIAL

## 2024-07-23 ENCOUNTER — OFFICE VISIT (OUTPATIENT)
Dept: INTERNAL MEDICINE | Facility: CLINIC | Age: 54
End: 2024-07-23
Payer: COMMERCIAL

## 2024-07-23 VITALS
OXYGEN SATURATION: 97 % | HEART RATE: 80 BPM | WEIGHT: 183 LBS | BODY MASS INDEX: 33.47 KG/M2 | SYSTOLIC BLOOD PRESSURE: 112 MMHG | DIASTOLIC BLOOD PRESSURE: 62 MMHG

## 2024-07-23 DIAGNOSIS — I10 ESSENTIAL HYPERTENSION: Primary | ICD-10-CM

## 2024-07-23 DIAGNOSIS — E53.8 B12 DEFICIENCY: ICD-10-CM

## 2024-07-23 DIAGNOSIS — Z00.00 ANNUAL PHYSICAL EXAM: ICD-10-CM

## 2024-07-23 DIAGNOSIS — I25.119 CORONARY ARTERY DISEASE INVOLVING NATIVE CORONARY ARTERY OF NATIVE HEART WITH ANGINA PECTORIS: ICD-10-CM

## 2024-07-23 DIAGNOSIS — E78.2 MIXED HYPERLIPIDEMIA: ICD-10-CM

## 2024-07-23 DIAGNOSIS — E11.59 TYPE 2 DIABETES MELLITUS WITH OTHER CIRCULATORY COMPLICATION, WITHOUT LONG-TERM CURRENT USE OF INSULIN: ICD-10-CM

## 2024-07-23 PROCEDURE — 99396 PREV VISIT EST AGE 40-64: CPT | Performed by: FAMILY MEDICINE

## 2024-07-23 PROCEDURE — 90471 IMMUNIZATION ADMIN: CPT | Performed by: FAMILY MEDICINE

## 2024-07-23 PROCEDURE — 99214 OFFICE O/P EST MOD 30 MIN: CPT | Performed by: FAMILY MEDICINE

## 2024-07-23 PROCEDURE — 90677 PCV20 VACCINE IM: CPT | Performed by: FAMILY MEDICINE

## 2024-07-23 RX ORDER — FLUCONAZOLE 100 MG/1
100 TABLET ORAL DAILY
Qty: 10 TABLET | Refills: 10 | Status: SHIPPED | OUTPATIENT
Start: 2024-07-23

## 2024-07-23 RX ORDER — CEPHALEXIN 500 MG/1
500 CAPSULE ORAL 3 TIMES DAILY
Qty: 30 CAPSULE | Refills: 5 | Status: SHIPPED | OUTPATIENT
Start: 2024-07-23

## 2024-07-23 NOTE — ASSESSMENT & PLAN NOTE
spironolactone 50 mg daily losartan 50 mg half tablet daily given weight loss and reduction in blood pressure carvedilol 6.25 twice daily

## 2024-07-23 NOTE — PROGRESS NOTES
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  Issues reviewed include treatment of hypertension history of CAD which has been stable as well as treatment of hyperlipidemia coronary stent x 1 with follow-up with cardiology.    Also treatment of type 2 diabetes without insulin.  Currently she cannot do statins and the cost of Repatha is cost prohibitive.      Objective   Vital Signs:  /62 (BP Location: Left arm, Patient Position: Sitting, Cuff Size: Large Adult)   Pulse 80   Wt 83 kg (183 lb)   SpO2 97%   BMI 33.47 kg/m²   Estimated body mass index is 33.47 kg/m² as calculated from the following:    Height as of 12/22/23: 157.5 cm (62\").    Weight as of this encounter: 83 kg (183 lb).               Physical Exam  Vitals reviewed.   Constitutional:       Appearance: She is well-developed.   HENT:      Head: Normocephalic and atraumatic.      Right Ear: Tympanic membrane and external ear normal.      Left Ear: Tympanic membrane and external ear normal.      Nose: Nose normal.   Eyes:      Conjunctiva/sclera: Conjunctivae normal.      Pupils: Pupils are equal, round, and reactive to light.   Neck:      Thyroid: No thyromegaly.      Vascular: No JVD.   Cardiovascular:      Rate and Rhythm: Normal rate and regular rhythm.      Heart sounds: Normal heart sounds.   Pulmonary:      Effort: Pulmonary effort is normal.      Breath sounds: Normal breath sounds.   Abdominal:      General: Bowel sounds are normal.      Palpations: Abdomen is soft.   Musculoskeletal:         General: Normal range of motion.      Cervical back: Normal range of motion and neck supple.   Lymphadenopathy:      Cervical: No cervical adenopathy.   Skin:     General: Skin is warm and dry.      Findings: No rash.   Neurological:      Mental Status: She is alert and " oriented to person, place, and time.      Cranial Nerves: No cranial nerve deficit.      Coordination: Coordination normal.   Psychiatric:         Behavior: Behavior normal.         Thought Content: Thought content normal.         Judgment: Judgment normal.        Result Review :    The following data was reviewed by: Rowdy Engel MD on 07/23/2024:  Common labs          8/17/2023    12:59 1/23/2024    10:49 3/21/2024    14:27   Common Labs   Glucose  134  165    BUN  14  17    Creatinine 0.70  0.70  0.73    Sodium  137  136    Potassium  4.4  4.1    Chloride  98  98    Calcium  9.2  9.1    Total Protein  7.1     Albumin  4.3     Total Bilirubin  0.2     Alkaline Phosphatase  69     AST (SGOT)  15     ALT (SGPT)  27     WBC  10.08  13.24    Hemoglobin  16.4  15.6    Hematocrit  47.1  46.0    Platelets  345  292    Total Cholesterol  246     Triglycerides  109     HDL Cholesterol  40     LDL Cholesterol   186     Hemoglobin A1C  7.40     Microalbumin, Urine  45.8                    Assessment and Plan     Assessment & Plan  Essential hypertension   spironolactone 50 mg daily losartan 50 mg half tablet daily given weight loss and reduction in blood pressure carvedilol 6.25 twice daily  Type 2 diabetes mellitus with other circulatory complication, without long-term current use of insulin   Ozempic 2 mg subcu weekly  Coronary artery disease involving native coronary artery of native heart with angina pectoris   continue current medical treatment including Plavix plus aspirin 81 mg daily  Mixed hyperlipidemia    check lipid panel consider Nexletol or Zetia  B12 deficiency  Monitor B12  Annual physical exam  Counseled on healthy lifestyle disease prevention surveillance and treatment of hypertension diabetes type 2 CAD hyperlipidemia.  She has follow-up with OB/GYN as well.                  Follow Up     No follow-ups on file.  Patient was given instructions and counseling regarding her condition or for health maintenance  advice. Please see specific information pulled into the AVS if appropriate.

## 2024-07-24 DIAGNOSIS — I10 ESSENTIAL HYPERTENSION: ICD-10-CM

## 2024-07-25 RX ORDER — LOSARTAN POTASSIUM 50 MG/1
25 TABLET ORAL DAILY
Qty: 90 TABLET | Refills: 1 | Status: SHIPPED | OUTPATIENT
Start: 2024-07-25

## 2024-07-26 LAB
ALBUMIN SERPL-MCNC: 4.3 G/DL (ref 3.8–4.9)
ALP SERPL-CCNC: 63 IU/L (ref 44–121)
ALT SERPL-CCNC: 15 IU/L (ref 0–32)
APPEARANCE UR: CLEAR
AST SERPL-CCNC: 18 IU/L (ref 0–40)
BASOPHILS # BLD AUTO: NORMAL X10E3/UL
BASOPHILS NFR BLD AUTO: NORMAL %
BILIRUB SERPL-MCNC: 0.4 MG/DL (ref 0–1.2)
BILIRUB UR QL STRIP: NEGATIVE
BUN SERPL-MCNC: 14 MG/DL (ref 6–24)
BUN/CREAT SERPL: 21 (ref 9–23)
CALCIUM SERPL-MCNC: 9.3 MG/DL (ref 8.7–10.2)
CHLORIDE SERPL-SCNC: 99 MMOL/L (ref 96–106)
CHOLEST SERPL-MCNC: 252 MG/DL (ref 100–199)
CHOLEST/HDLC SERPL: 6.1 RATIO (ref 0–4.4)
CO2 SERPL-SCNC: 23 MMOL/L (ref 20–29)
COLOR UR: YELLOW
CREAT SERPL-MCNC: 0.67 MG/DL (ref 0.57–1)
CRP SERPL-MCNC: 10 MG/L (ref 0–10)
EGFRCR SERPLBLD CKD-EPI 2021: 103 ML/MIN/1.73
EOSINOPHIL # BLD AUTO: NORMAL X10E3/UL
EOSINOPHIL NFR BLD AUTO: NORMAL %
ERYTHROCYTE [DISTWIDTH] IN BLOOD BY AUTOMATED COUNT: NORMAL %
ERYTHROCYTE [SEDIMENTATION RATE] IN BLOOD BY WESTERGREN METHOD: NORMAL MM/HR
GLOBULIN SER CALC-MCNC: 2.8 G/DL (ref 1.5–4.5)
GLUCOSE SERPL-MCNC: 114 MG/DL (ref 70–99)
GLUCOSE UR QL STRIP: NEGATIVE
HBA1C MFR BLD: 6.8 % (ref 4.8–5.6)
HCT VFR BLD AUTO: NORMAL %
HDLC SERPL-MCNC: 41 MG/DL
HGB BLD-MCNC: NORMAL G/DL
HGB UR QL STRIP: NEGATIVE
IMM GRANULOCYTES # BLD AUTO: NORMAL X10E3/UL
IMM GRANULOCYTES NFR BLD AUTO: NORMAL %
KETONES UR QL STRIP: NEGATIVE
LDL CALC COMMENT:: ABNORMAL
LDLC SERPL CALC-MCNC: 193 MG/DL (ref 0–99)
LEUKOCYTE ESTERASE UR QL STRIP: NEGATIVE
LYMPHOCYTES # BLD AUTO: NORMAL X10E3/UL
LYMPHOCYTES NFR BLD AUTO: NORMAL %
MCH RBC QN AUTO: NORMAL PG
MCHC RBC AUTO-ENTMCNC: NORMAL G/DL
MCV RBC AUTO: NORMAL FL
MICRO URNS: NORMAL
MICROALBUMIN UR-MCNC: 18.3 UG/ML
MONOCYTES # BLD AUTO: NORMAL X10E3/UL
MONOCYTES NFR BLD AUTO: NORMAL %
NEUTROPHILS # BLD AUTO: NORMAL X10E3/UL
NEUTROPHILS NFR BLD AUTO: NORMAL %
NITRITE UR QL STRIP: NEGATIVE
PH UR STRIP: 6 [PH] (ref 5–7.5)
PLATELET # BLD AUTO: NORMAL X10E3/UL
POTASSIUM SERPL-SCNC: 4.4 MMOL/L (ref 3.5–5.2)
PROT SERPL-MCNC: 7.1 G/DL (ref 6–8.5)
PROT UR QL STRIP: NEGATIVE
RBC # BLD AUTO: NORMAL X10E6/UL
REQUEST PROBLEM: NORMAL
SODIUM SERPL-SCNC: 137 MMOL/L (ref 134–144)
SP GR UR STRIP: 1.01 (ref 1–1.03)
T3FREE SERPL-MCNC: 3.1 PG/ML (ref 2–4.4)
T4 FREE SERPL-MCNC: 1.51 NG/DL (ref 0.82–1.77)
TRIGL SERPL-MCNC: 103 MG/DL (ref 0–149)
TSH SERPL DL<=0.005 MIU/L-ACNC: 1.06 UIU/ML (ref 0.45–4.5)
UROBILINOGEN UR STRIP-MCNC: 0.2 MG/DL (ref 0.2–1)
VLDLC SERPL CALC-MCNC: 18 MG/DL (ref 5–40)
WBC # BLD AUTO: NORMAL X10E3/UL

## 2024-08-05 ENCOUNTER — HOSPITAL ENCOUNTER (OUTPATIENT)
Dept: CT IMAGING | Facility: HOSPITAL | Age: 54
Discharge: HOME | End: 2024-08-05
Admitting: INTERNAL MEDICINE
Payer: COMMERCIAL

## 2024-08-05 DIAGNOSIS — R59.1 LYMPHADENOPATHY: ICD-10-CM

## 2024-08-05 PROCEDURE — 25510000001 IOPAMIDOL 61 % SOLUTION: Performed by: INTERNAL MEDICINE

## 2024-08-05 PROCEDURE — 71260 CT THORAX DX C+: CPT

## 2024-08-05 RX ADMIN — IOPAMIDOL 75 ML: 612 INJECTION, SOLUTION INTRAVENOUS at 14:56

## 2024-08-16 ENCOUNTER — TRANSCRIBE ORDERS (OUTPATIENT)
Dept: ADMINISTRATIVE | Facility: HOSPITAL | Age: 54
End: 2024-08-16
Payer: COMMERCIAL

## 2024-08-16 DIAGNOSIS — R59.1 LYMPHADENOPATHY: Primary | ICD-10-CM

## 2024-08-21 RX ORDER — SEMAGLUTIDE 2.68 MG/ML
2 INJECTION, SOLUTION SUBCUTANEOUS WEEKLY
Qty: 3 ML | Refills: 5 | Status: SHIPPED | OUTPATIENT
Start: 2024-08-21

## 2024-08-21 NOTE — TELEPHONE ENCOUNTER
Rx Refill Note  Requested Prescriptions     Pending Prescriptions Disp Refills    Semaglutide, 2 MG/DOSE, (Ozempic, 2 MG/DOSE,) 8 MG/3ML solution pen-injector 3 mL 5     Sig: Inject 2 mg under the skin into the appropriate area as directed 1 (One) Time Per Week.      Last office visit with prescribing clinician: 7/23/2024   Last telemedicine visit with prescribing clinician: Visit date not found   Next office visit with prescribing clinician: 1/23/2025       Estefani Vidal MA  08/21/24, 08:07 EDT

## 2024-10-25 ENCOUNTER — OFFICE VISIT (OUTPATIENT)
Dept: OBSTETRICS AND GYNECOLOGY | Age: 54
End: 2024-10-25
Payer: COMMERCIAL

## 2024-10-25 ENCOUNTER — HOSPITAL ENCOUNTER (OUTPATIENT)
Facility: HOSPITAL | Age: 54
Discharge: HOME | End: 2024-10-25
Admitting: OBSTETRICS & GYNECOLOGY
Payer: COMMERCIAL

## 2024-10-25 VITALS
WEIGHT: 183 LBS | HEIGHT: 62 IN | BODY MASS INDEX: 33.68 KG/M2 | DIASTOLIC BLOOD PRESSURE: 64 MMHG | SYSTOLIC BLOOD PRESSURE: 112 MMHG

## 2024-10-25 DIAGNOSIS — Z12.31 BREAST CANCER SCREENING BY MAMMOGRAM: ICD-10-CM

## 2024-10-25 DIAGNOSIS — Z11.51 SPECIAL SCREENING EXAMINATION FOR HUMAN PAPILLOMAVIRUS (HPV): ICD-10-CM

## 2024-10-25 DIAGNOSIS — Z01.419 ROUTINE GYNECOLOGICAL EXAMINATION: ICD-10-CM

## 2024-10-25 DIAGNOSIS — Z12.4 SCREENING FOR MALIGNANT NEOPLASM OF THE CERVIX: ICD-10-CM

## 2024-10-25 DIAGNOSIS — Z12.31 BREAST CANCER SCREENING BY MAMMOGRAM: Primary | ICD-10-CM

## 2024-10-25 PROCEDURE — 77067 SCR MAMMO BI INCL CAD: CPT

## 2024-10-25 PROCEDURE — 77063 BREAST TOMOSYNTHESIS BI: CPT

## 2024-10-25 NOTE — PROGRESS NOTES
"Gildardo Zamudio is a 54 y.o. female presents for annual visit today with mammogram at [facility] prior to the appointment with you ,  last pap 10/9/23  neg , last mammo 10/9/23 benign,  h/o ablation  no periods ,  Colonoscopy 8/2020 due 2030 no problems today.  She and Collin garner planning to get  next year, maybe in [place]. 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 Her mom is dong well.       History of Present Illness    The following portions of the patient's history were reviewed and updated as appropriate: allergies, current medications, past family history, past medical history, past social history, past surgical history, and problem list.    Review of Systems   Constitutional:  Negative for chills, fatigue and fever.   Gastrointestinal:  Negative for abdominal pain.   Genitourinary:  Negative for dysuria, pelvic pain, vaginal bleeding, vaginal discharge and vaginal pain.   All other systems reviewed and are negative.  /64   Ht 157.5 cm (62\")   Wt 83 kg (183 lb)   LMP  (LMP Unknown)   BMI 33.47 kg/m²       Objective   Physical Exam  Vitals and nursing note reviewed.   Constitutional:       Appearance: Normal appearance. She is well-developed.   Neck:      Thyroid: No thyromegaly.   Pulmonary:      Effort: Pulmonary effort is normal.   Chest:   Breasts:     Right: No mass, nipple discharge, skin change or tenderness.      Left: No mass, nipple discharge, skin change or tenderness.   Abdominal:      General: There is no distension.      Palpations: Abdomen is soft.      Tenderness: There is no abdominal tenderness.   Genitourinary:     General: Normal vulva.      Exam position: Lithotomy position.      Labia:         Right: No rash or lesion.         Left: No rash or lesion.       Vagina: Normal. No vaginal discharge or bleeding.      Cervix: No friability or lesion.      Uterus: Not tender.       Adnexa:         Right: No mass or tenderness.          Left: No mass or " tenderness.     Musculoskeletal:         General: Normal range of motion.      Cervical back: Normal range of motion.   Skin:     General: Skin is warm and dry.      Findings: No rash.   Neurological:      Mental Status: She is alert and oriented to person, place, and time.   Psychiatric:         Mood and Affect: Mood normal.         Behavior: Behavior normal.           Assessment & Plan   Diagnoses and all orders for this visit:    1. Breast cancer screening by mammogram (Primary)  -     Mammo Screening Digital Tomosynthesis Bilateral With CAD; Future    2. Routine gynecological examination  -     IgP, Aptima HPV    3. Special screening examination for human papillomavirus (HPV)  -     IgP, Aptima HPV    4. Screening for malignant neoplasm of the cervix  -     IgP, Aptima HPV        Counseling was given to patient for the following topics: instructions for management, importance of treatment compliance, and self breast exams  .   Return in about 1 year (around 10/25/2025) for Annual physical w/ MMG .

## 2024-11-01 LAB
CYTOLOGIST CVX/VAG CYTO: NORMAL
CYTOLOGY CVX/VAG DOC CYTO: NORMAL
CYTOLOGY CVX/VAG DOC THIN PREP: NORMAL
DX ICD CODE: NORMAL
HPV I/H RISK 4 DNA CVX QL PROBE+SIG AMP: NEGATIVE
Lab: NORMAL
OTHER STN SPEC: NORMAL
STAT OF ADQ CVX/VAG CYTO-IMP: NORMAL

## 2024-11-18 DIAGNOSIS — I10 ESSENTIAL HYPERTENSION: Chronic | ICD-10-CM

## 2024-11-19 RX ORDER — SPIRONOLACTONE 50 MG/1
50 TABLET, FILM COATED ORAL DAILY
Qty: 90 TABLET | Refills: 3 | Status: SHIPPED | OUTPATIENT
Start: 2024-11-19

## 2025-01-08 RX ORDER — CLOPIDOGREL BISULFATE 75 MG/1
75 TABLET ORAL DAILY
Qty: 14 TABLET | Refills: 0 | Status: SHIPPED | OUTPATIENT
Start: 2025-01-08

## 2025-01-08 RX ORDER — CLOPIDOGREL BISULFATE 75 MG/1
75 TABLET ORAL DAILY
Qty: 90 TABLET | Refills: 3 | Status: SHIPPED | OUTPATIENT
Start: 2025-01-08

## 2025-01-08 RX ORDER — CLOPIDOGREL BISULFATE 75 MG/1
75 TABLET ORAL DAILY
Qty: 14 TABLET | Refills: 0 | Status: CANCELLED | OUTPATIENT
Start: 2025-01-08

## 2025-01-14 ENCOUNTER — SPECIALTY PHARMACY (OUTPATIENT)
Dept: PHARMACY | Facility: TELEHEALTH | Age: 55
End: 2025-01-14
Payer: COMMERCIAL

## 2025-01-14 ENCOUNTER — OFFICE VISIT (OUTPATIENT)
Dept: CARDIOLOGY | Facility: CLINIC | Age: 55
End: 2025-01-14
Payer: COMMERCIAL

## 2025-01-14 VITALS
OXYGEN SATURATION: 96 % | HEART RATE: 77 BPM | SYSTOLIC BLOOD PRESSURE: 102 MMHG | HEIGHT: 62 IN | WEIGHT: 183 LBS | BODY MASS INDEX: 33.68 KG/M2 | DIASTOLIC BLOOD PRESSURE: 78 MMHG

## 2025-01-14 DIAGNOSIS — E11.51 TYPE 2 DIABETES MELLITUS WITH DIABETIC PERIPHERAL ANGIOPATHY WITHOUT GANGRENE, WITHOUT LONG-TERM CURRENT USE OF INSULIN: Chronic | ICD-10-CM

## 2025-01-14 DIAGNOSIS — R00.2 PALPITATIONS: ICD-10-CM

## 2025-01-14 DIAGNOSIS — G47.33 OBSTRUCTIVE SLEEP APNEA: ICD-10-CM

## 2025-01-14 DIAGNOSIS — E66.811 CLASS 1 OBESITY WITH SERIOUS COMORBIDITY AND BODY MASS INDEX (BMI) OF 33.0 TO 33.9 IN ADULT, UNSPECIFIED OBESITY TYPE: ICD-10-CM

## 2025-01-14 DIAGNOSIS — Z86.73 HISTORY OF CVA (CEREBROVASCULAR ACCIDENT): ICD-10-CM

## 2025-01-14 DIAGNOSIS — I10 ESSENTIAL HYPERTENSION: Chronic | ICD-10-CM

## 2025-01-14 DIAGNOSIS — E78.2 MIXED HYPERLIPIDEMIA: ICD-10-CM

## 2025-01-14 DIAGNOSIS — F17.200 TOBACCO DEPENDENCE: ICD-10-CM

## 2025-01-14 DIAGNOSIS — I25.119 CORONARY ARTERY DISEASE INVOLVING NATIVE CORONARY ARTERY OF NATIVE HEART WITH ANGINA PECTORIS: Primary | Chronic | ICD-10-CM

## 2025-01-14 PROBLEM — E66.01 CLASS 3 SEVERE OBESITY WITH SERIOUS COMORBIDITY IN ADULT: Status: RESOLVED | Noted: 2018-10-26 | Resolved: 2025-01-14

## 2025-01-14 PROBLEM — E66.813 CLASS 3 SEVERE OBESITY WITH SERIOUS COMORBIDITY IN ADULT: Status: RESOLVED | Noted: 2018-10-26 | Resolved: 2025-01-14

## 2025-01-14 PROCEDURE — 93000 ELECTROCARDIOGRAM COMPLETE: CPT | Performed by: NURSE PRACTITIONER

## 2025-01-14 PROCEDURE — 99214 OFFICE O/P EST MOD 30 MIN: CPT | Performed by: NURSE PRACTITIONER

## 2025-01-14 RX ORDER — EVOLOCUMAB 140 MG/ML
140 INJECTION, SOLUTION SUBCUTANEOUS
Qty: 2 ML | Refills: 12 | Status: SHIPPED | OUTPATIENT
Start: 2025-01-14

## 2025-01-14 NOTE — PROGRESS NOTES
Date of Office Visit: 2025  Encounter Provider: VITOR Gallego  Place of Service: [facility] CARDIOLOGY  Patient Name: Carina Zamudio  :1970  Primary Cardiologist: Dr. Vaughn Tipton    Chief Complaint   Patient presents with    Coronary Artery Disease    Annual Exam   :     HPI: Carina Zamudio is a 54 y.o. female who presents today for annual cardiac follow-up visit.  I reviewed her medical records.    She has known hypertension, adrenal adenoma, type 2 diabetes mellitus, hyperlipidemia (statin intolerant), and obstructive sleep apnea on CPAP.  She has been a longtime cigarette smoker.  She has been diagnosed with a previous cerebellar stroke in .    In 2015, she presented with an inferior STEMI and underwent drug-eluting stent placement to the left circumflex.  She was noted to have nonocclusive CAD of the RCA and LAD with medical treatment recommended.    In 2017, echocardiogram showed normal LVEF, borderline concentric hypertrophy, and trace mitral/tricuspid/pulmonic regurgitation.  Exercise myocardial perfusion stress test showed a fixed defect in the lateral wall which was suspicious for artifact and EKG stress test normal.    In 2022, Lexiscan myocardial perfusion study was normal.    She presents today for follow-up visit.  This past weekend she started noticing palpitations which felt like skipped heartbeats.  She has had this happen 2 or 3 times in the past year and when it does she typically will just take an extra carvedilol.  She is no longer taking Repatha because it was too expensive in  and she is going to look into this again for this belkis year.  She denies chest pain, shortness of air, PND, orthopnea, edema, dizziness, syncope, or bleeding.  Blood pressure and heart rate are normal.  She is still smoking less than a half a pack of cigarettes daily and really wants to quit.      Past Medical History:   Diagnosis Date     Adrenal cortical adenoma     ADRENAL CONGENITAL HYPERPLASIA    Alopecia 06/2021    Anemia     Anxiety     CAD (coronary artery disease)     Cataract     BILATERAL, S/P EXTRACTION    Cerebellar infarct 2012    Cervical radiculopathy due to degenerative joint disease of spine 12/27/2016    Cholelithiasis 11/2021    Gallbladder removed    Chronic cough     Chronic thoracic back pain     Colon polyps     COVID 11/30/2021    Deep vein thrombosis     Depression     Displacement of lumbar intervertebral disc without myelopathy 12/28/2011    Diverticulitis     Diverticulosis     DJD (degenerative joint disease), cervical     DJD (degenerative joint disease), lumbar     Dysmetabolic syndrome X     Fecal smearing 09/2022    Fracture, toe     GERD (gastroesophageal reflux disease)     History of gestational diabetes     HPV (human papilloma virus) infection     Hyperlipidemia     Hypersomnia     Hypertension     Leg edema, right 02/25/2019    Liver nodule     Lumbar spondylosis     Lung nodule     Multinodular goiter     AMY (obstructive sleep apnea)     FOLLOWED BY DR. BANDAR CAPUTO    Osteoarthritis of spine 04/04/2016    Ovarian cyst 11/2020    Paresthesia of both hands 03/2021    Pneumonia     Posterior tibial tendonitis 03/2019    LEFT    Proteinuria     CHRONIC, FOLLOWED BY DR. 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 NIMCO BROWN AT [facility]    CATARACT EXTRACTION, BILATERAL      CERVICAL BIOPSY N/A 06/10/2020    DR. KLAUS CANALES    CHOLECYSTECTOMY  11/2021    CHOLECYSTECTOMY WITH INTRAOPERATIVE CHOLANGIOGRAM N/A 11/04/2021    Procedure:  CHOLECYSTECTOMY LAPAROSCOPIC INTRAOPERATIVE CHOLANGIOGRAM;  Surgeon: Carlos Ashraf MD;  Location: [facility] OR;  Service: General;  Laterality: N/A;    COLONOSCOPY N/A 08/05/2020    1 SMALL BENIGN POLYP IN SIGMOID, SCATTERED DIVERTICULA, DR. CAROLS ASHRAF AT [facility]    CORONARY STENT PLACEMENT N/A 08/20/2015    99% BLOCKAGE IN MIDDLE CIRCUMFLEX, STENT INSERTED, DR. NEGRO MICHEL AT [facility]    CYST REMOVAL  08/06/2019    SEBACEOUS CYST, DR. GRAHAM LEGLONG    ENDOMETRIAL ABLATION N/A 06/11/2011    DR. PRIYANK KABA AT [facility]    EYE SURGERY Left 12/2012    COLD LASER FOR CAUTERIZATION OF LEFT RETINAL HEMORRHAGE    EYE SURGERY  09/2012    LASER SURGERY FOR CENTRAL SEROUS RETINOPATHY, DR. HUDSON    KNEE ARTHROSCOPY Right 05/10/2019    Procedure: RIGHT KNEE ARTHROSCOPY BWITH PART. MEDIAL AND LATERAL MENISECTOMY, PATELLA, CHONDROPLASTY, AND DEBRIDMENT;  Surgeon: Ignacio Martinez MD;  Location: [facility] OR [facility];  Service: Orthopedics    NEVUS EXCISION Right 12/28/2011    EXCISION OF LESION ON RIGHT NOSE, pATH: INTRADERMAL NEVUS, DR. SPENCER GARDNER AT [facility]    TUBAL ABDOMINAL LIGATION Bilateral 06/11/2011    DR. PRIYANK KABA AT [facility]       Social History     Socioeconomic History    Marital status:    Tobacco Use    Smoking status: Every Day     Current packs/day: 0.50     Average packs/day: 0.5 packs/day for 39.0 years (19.5 ttl pk-yrs)     Types: Cigarettes     Start date: 1986     Passive exposure: Current    Smokeless tobacco: Never   Vaping Use    Vaping status: Never Used    Passive vaping exposure: Yes   Substance and Sexual Activity    Alcohol use: Not Currently     Comment: Social    Drug use: Never    Sexual activity: Yes     Partners: Male     Birth control/protection: Post-menopausal, Tubal ligation, Surgical     Comment: .       Family History   Problem Relation Age of Onset    Stroke Mother     Sleep apnea Mother     Cancer Father     Lymphoma Father     Colon polyps Father     Hypertension  Father     Drug abuse Brother     Diabetes Maternal Grandmother     Lung cancer Maternal Grandfather     Heart attack Maternal Grandfather     Diabetes Maternal Grandfather     Diabetes Paternal Grandmother     Heart attack Paternal Grandfather     Diabetes Paternal Grandfather     Breast cancer Neg Hx     Ovarian cancer Neg Hx     Uterine cancer Neg Hx     Colon cancer Neg Hx     Malig Hyperthermia Neg Hx        The following portion of the patient's history were reviewed and updated as appropriate: past medical history, past surgical history, past social history, past family history, allergies, current medications, and problem list.    Review of Systems   Constitutional: Negative.   Cardiovascular:  Positive for palpitations.   Respiratory: Negative.     Hematologic/Lymphatic: Negative.    Neurological: Negative.        No Known Allergies      Current Outpatient Medications:     aspirin 81 MG tablet, Take 1 tablet by mouth Daily. INSTRUCTED PT FOLLOW MD INSTRUCTIONS, Disp: , Rfl:     carvedilol (Coreg) 6.25 MG tablet, Take 1 tablet by mouth 2 (Two) Times a Day With Meals., Disp: 180 tablet, Rfl: 3    chlorhexidine (PERIDEX) 0.12 % solution, Rinse mouth with 15 mL as directed 2 (Two) Times a Day. Use morning and night. Do not eat or drink for 30 minutes after use., Disp: 473 mL, Rfl: 1    clopidogrel (PLAVIX) 75 MG tablet, Take 1 tablet by mouth Daily., Disp: 90 tablet, Rfl: 3    fluconazole (Diflucan) 100 MG tablet, Take 1 tablet by mouth Daily., Disp: 10 tablet, Rfl: 10    losartan (Cozaar) 50 MG tablet, Take 0.5 tablets by mouth Daily., Disp: 90 tablet, Rfl: 1    Multiple Vitamin tablet, Take 1 tablet by mouth Daily., Disp: , Rfl:     Probiotic Product (PRO-BIOTIC BLEND PO), Take  by mouth., Disp: , Rfl:     Semaglutide, 2 MG/DOSE, (Ozempic, 2 MG/DOSE,) 8 MG/3ML solution pen-injector, Inject 2 mg under the skin into the appropriate area as directed 1 (One) Time Per Week., Disp: 3 mL, Rfl: 5    spironolactone  "(Aldactone) 50 MG tablet, Take 1 tablet by mouth Daily., Disp: 90 tablet, Rfl: 3    vitamin D (ERGOCALCIFEROL) 1.25 MG (91069 UT) capsule capsule, Take 1 capsule by mouth 2 (Two) Times a Week., Disp: 26 capsule, Rfl: 3         Objective:     Vitals:    01/14/25 0927   BP: 102/78   BP Location: Left arm   Patient Position: Sitting   Cuff Size: Adult   Pulse: 77   SpO2: 96%   Weight: 83 kg (183 lb)   Height: 157.5 cm (62.01\")     Body mass index is 33.46 kg/m².    PHYSICAL EXAM:    Vitals Reviewed.   General Appearance: No acute distress, well developed and well nourished. Obese.   HENT: No hearing loss noted.    Respiratory: No signs of respiratory distress. Respiration rhythm and depth normal.  Clear to auscultation.   Cardiovascular:  Jugular Venous Pressure: Normal  Heart Rate and Rhythm: Normal, Heart Sounds: Normal S1 and S2. No S3 or S4 noted.  Murmurs: No murmurs noted. No rubs, thrills, or gallops.   Lower Extremities: No edema noted.  Musculoskeletal: Normal movement of extremities.  Skin: General appearance normal.    Psychiatric: Patient alert and oriented to person, place, and time. Speech and behavior appropriate. Normal mood and affect.       ECG 12 Lead    Date/Time: 1/14/2025 9:24 AM  Performed by: Yaritza Curiel APRN    Authorized by: Yaritza Curiel APRN  Comparison: compared with previous ECG from 12/22/2023  Similar to previous ECG  Rhythm: sinus rhythm  Rate: normal  BPM: 77  Conduction: conduction normal  ST Segments: ST segments normal  T Waves: T waves normal  QRS axis: normal    Clinical impression: normal ECG            Assessment:       Diagnosis Plan   1. Coronary artery disease involving native coronary artery of native heart with angina pectoris        2. Palpitations        3. Essential hypertension        4. Mixed hyperlipidemia        5. Type 2 diabetes mellitus with diabetic peripheral angiopathy without gangrene, without long-term current use of insulin        6. Obstructive " sleep apnea        7. Tobacco dependence        8. Class 1 obesity with serious comorbidity and body mass index (BMI) of 33.0 to 33.9 in adult, unspecified obesity type        9. History of CVA (cerebrovascular accident)               Plan:       1.  Coronary Artery Disease: History of inferior STEMI and drug-eluting stent placement to the left circumflex in August 2015.  Also noted to have nonocclusive disease of the RCA and LAD with medical treatment recommended.  Risk factor modification discussed.  Continue aspirin, clopidogrel, and restart Repatha.    2.  Palpitations: This past weekend she started noticing palpitations and took an extra dose of carvedilol.  We decided we will just continue to monitor the palpitations.  If they worsen, she will call the office we will place a Holter monitor and possibly increase her carvedilol dosage.  EKG was normal today.    3.  Hypertension: Blood pressure normal today.    4.  Hyperlipidemia: She is going to restart Repatha if its affordable.  Prescription sent to [facility] pharmacy.  She will need to recheck lipid panel 3 months after restarting the Repatha and will discuss with her PCP.    5.  Type 2 diabetes mellitus: Non-insulin-dependent.    6.  Obstructive sleep apnea on CPAP.    7.  She smoking less than a half a pack of cigarettes daily and really wants to quit.  Her quit date is Thursday of this week.  She plans to further discuss with Dr. DUNAWAY next week.    8.  Obesity.  She is lost a lot of weight taking Ozempic.    9.  History of stroke.    10.  She will call with any cardiac concerns.  Follow-up with Dr. Tipton in 1 year.    As always, it has been a pleasure to participate in your patient's care. Thank you.         Sincerely,         VITOR Fam  [facility] Cardiology      Dictated utilizing Dragon Dictation  I spent 33 minutes reviewing her medical records/testing/previous office notes/labs, face-to-face interaction with patient,  physical examination, formulating the plan of care, and discussion of plan of care with patient.

## 2025-01-22 ENCOUNTER — SPECIALTY PHARMACY (OUTPATIENT)
Dept: PHARMACY | Facility: TELEHEALTH | Age: 55
End: 2025-01-22
Payer: COMMERCIAL

## 2025-01-23 ENCOUNTER — OFFICE VISIT (OUTPATIENT)
Dept: INTERNAL MEDICINE | Facility: CLINIC | Age: 55
End: 2025-01-23
Payer: COMMERCIAL

## 2025-01-23 VITALS
WEIGHT: 185 LBS | HEART RATE: 83 BPM | SYSTOLIC BLOOD PRESSURE: 108 MMHG | BODY MASS INDEX: 33.83 KG/M2 | DIASTOLIC BLOOD PRESSURE: 56 MMHG | OXYGEN SATURATION: 98 %

## 2025-01-23 DIAGNOSIS — I25.119 CORONARY ARTERY DISEASE INVOLVING NATIVE CORONARY ARTERY OF NATIVE HEART WITH ANGINA PECTORIS: ICD-10-CM

## 2025-01-23 DIAGNOSIS — I10 ESSENTIAL HYPERTENSION: Primary | ICD-10-CM

## 2025-01-23 DIAGNOSIS — E55.9 VITAMIN D DEFICIENCY: ICD-10-CM

## 2025-01-23 DIAGNOSIS — E78.2 MIXED HYPERLIPIDEMIA: ICD-10-CM

## 2025-01-23 DIAGNOSIS — E53.8 B12 DEFICIENCY: ICD-10-CM

## 2025-01-23 DIAGNOSIS — E53.8 FOLATE DEFICIENCY: ICD-10-CM

## 2025-01-23 DIAGNOSIS — E11.59 TYPE 2 DIABETES MELLITUS WITH OTHER CIRCULATORY COMPLICATION, WITHOUT LONG-TERM CURRENT USE OF INSULIN: ICD-10-CM

## 2025-01-23 PROCEDURE — 99214 OFFICE O/P EST MOD 30 MIN: CPT | Performed by: FAMILY MEDICINE

## 2025-01-23 RX ORDER — BUPROPION HYDROCHLORIDE 150 MG/1
150 TABLET ORAL DAILY
Qty: 90 TABLET | Refills: 3 | Status: SHIPPED | OUTPATIENT
Start: 2025-01-23

## 2025-01-23 NOTE — ASSESSMENT & PLAN NOTE
Orders:    Urinalysis With Microscopic If Indicated (No Culture) - Urine, Clean Catch    TSH    T4, Free    T3, Free    Lipid Panel With / Chol / HDL Ratio    CBC & Differential    Comprehensive Metabolic Panel    Hemoglobin A1c    Vitamin B12    Vitamin D,25-Hydroxy    Folate

## 2025-01-23 NOTE — ASSESSMENT & PLAN NOTE
trying to get approved for Repatha    Orders:    Urinalysis With Microscopic If Indicated (No Culture) - Urine, Clean Catch    TSH    T4, Free    T3, Free    Lipid Panel With / Chol / HDL Ratio    CBC & Differential    Comprehensive Metabolic Panel    Hemoglobin A1c    Vitamin B12    Vitamin D,25-Hydroxy    Folate

## 2025-01-23 NOTE — ASSESSMENT & PLAN NOTE
Ozempic 2 mg subcu weekly    Orders:    Urinalysis With Microscopic If Indicated (No Culture) - Urine, Clean Catch    TSH    T4, Free    T3, Free    Lipid Panel With / Chol / HDL Ratio    CBC & Differential    Comprehensive Metabolic Panel    Hemoglobin A1c    Vitamin B12    Vitamin D,25-Hydroxy    Folate

## 2025-01-23 NOTE — PROGRESS NOTES
"Chief Complaint  Hypertension, Diabetes, Hyperlipidemia, and Coronary Artery Disease    Subjective        Carina Zamudio presents to [facility]  History of Present Illness  Ricarda is a delightful lady who seeing cardiology and will try to get Repatha approved for cholesterol.  She is try to cut back on smoking.  Otherwise for menopausal symptoms we will restart Wellbutrin  mg at night.  Other medications continue for diabetes type 2 and hypertension.    Follow up meds and labs, type 2, BP  Pertinent negative symptoms include no abdominal pain, no anorexia, no joint pain, no change in stool, no chest pain, no chills, no congestion, no cough, no diaphoresis, no fatigue, no fever, no headaches, no joint swelling, no myalgias, no nausea, no neck pain, no numbness, no rash, no sore throat, no swollen glands, no dysuria, no vertigo, no visual change, no vomiting and no weakness.       Objective   Vital Signs:  /56 (BP Location: Left arm, Patient Position: Sitting, Cuff Size: Adult)   Pulse 83   Wt 83.9 kg (185 lb)   SpO2 98%   BMI 33.83 kg/m²   Estimated body mass index is 33.83 kg/m² as calculated from the following:    Height as of 1/14/25: 157.5 cm (62.01\").    Weight as of this encounter: 83.9 kg (185 lb).               Physical Exam  Vitals reviewed.   Constitutional:       Appearance: She is well-developed.   HENT:      Head: Normocephalic and atraumatic.      Right Ear: Tympanic membrane and external ear normal.      Left Ear: Tympanic membrane and external ear normal.      Nose: Nose normal.   Eyes:      Conjunctiva/sclera: Conjunctivae normal.      Pupils: Pupils are equal, round, and reactive to light.   Neck:      Thyroid: No thyromegaly.      Vascular: No JVD.   Cardiovascular:      Rate and Rhythm: Normal rate and regular rhythm.      Heart sounds: Normal heart sounds.   Pulmonary:      Effort: Pulmonary effort is normal.      Breath sounds: Normal breath " sounds.   Abdominal:      General: Bowel sounds are normal.      Palpations: Abdomen is soft.   Musculoskeletal:         General: Normal range of motion.      Cervical back: Normal range of motion and neck supple.   Lymphadenopathy:      Cervical: No cervical adenopathy.   Skin:     General: Skin is warm and dry.      Findings: No rash.   Neurological:      Mental Status: She is alert and oriented to person, place, and time.      Cranial Nerves: No cranial nerve deficit.      Coordination: Coordination normal.   Psychiatric:         Behavior: Behavior normal.         Thought Content: Thought content normal.         Judgment: Judgment normal.        Result Review :    The following data was reviewed by: Rowdy Engel MD on 01/23/2025:  Common labs          3/21/2024    14:27 7/23/2024    12:29   Common Labs   Glucose 165  114    BUN 17  14    Creatinine 0.73  0.67    Sodium 136  137    Potassium 4.1  4.4    Chloride 98  99    Calcium 9.1  9.3    Total Protein  7.1    Albumin  4.3    Total Bilirubin  0.4    Alkaline Phosphatase  63    AST (SGOT)  18    ALT (SGPT)  15    WBC 13.24  CANCELED    Hemoglobin 15.6  CANCELED    Hematocrit 46.0  CANCELED    Platelets 292  CANCELED    Total Cholesterol  252    Triglycerides  103    HDL Cholesterol  41    LDL Cholesterol   193    Hemoglobin A1C  6.8    Microalbumin, Urine  18.3      Data reviewed : Consultant notes cardiology consult reviewed             Assessment and Plan     Assessment & Plan  Essential hypertension   losartan 50 mg daily spironolactone 50 mg daily carvedilol 6.25 twice daily    Orders:    Urinalysis With Microscopic If Indicated (No Culture) - Urine, Clean Catch    TSH    T4, Free    T3, Free    Lipid Panel With / Chol / HDL Ratio    CBC & Differential    Comprehensive Metabolic Panel    Hemoglobin A1c    Vitamin B12    Vitamin D,25-Hydroxy    Folate    Type 2 diabetes mellitus with other circulatory complication, without long-term current use of insulin    Ozempic 2 mg subcu weekly    Orders:    Urinalysis With Microscopic If Indicated (No Culture) - Urine, Clean Catch    TSH    T4, Free    T3, Free    Lipid Panel With / Chol / HDL Ratio    CBC & Differential    Comprehensive Metabolic Panel    Hemoglobin A1c    Vitamin B12    Vitamin D,25-Hydroxy    Folate    Coronary artery disease involving native coronary artery of native heart with angina pectoris      Orders:    Urinalysis With Microscopic If Indicated (No Culture) - Urine, Clean Catch    TSH    T4, Free    T3, Free    Lipid Panel With / Chol / HDL Ratio    CBC & Differential    Comprehensive Metabolic Panel    Hemoglobin A1c    Vitamin B12    Vitamin D,25-Hydroxy    Folate    Mixed hyperlipidemia    trying to get approved for Repatha    Orders:    Urinalysis With Microscopic If Indicated (No Culture) - Urine, Clean Catch    TSH    T4, Free    T3, Free    Lipid Panel With / Chol / HDL Ratio    CBC & Differential    Comprehensive Metabolic Panel    Hemoglobin A1c    Vitamin B12    Vitamin D,25-Hydroxy    Folate    B12 deficiency    Orders:    Urinalysis With Microscopic If Indicated (No Culture) - Urine, Clean Catch    TSH    T4, Free    T3, Free    Lipid Panel With / Chol / HDL Ratio    CBC & Differential    Comprehensive Metabolic Panel    Hemoglobin A1c    Vitamin B12    Vitamin D,25-Hydroxy    Folate    Folate deficiency    Orders:    Urinalysis With Microscopic If Indicated (No Culture) - Urine, Clean Catch    TSH    T4, Free    T3, Free    Lipid Panel With / Chol / HDL Ratio    CBC & Differential    Comprehensive Metabolic Panel    Hemoglobin A1c    Vitamin B12    Vitamin D,25-Hydroxy    Folate    Vitamin D deficiency    Orders:    Vitamin D,25-Hydroxy              Follow Up     No follow-ups on file.  Patient was given instructions and counseling regarding her condition or for health maintenance advice. Please see specific information pulled into the AVS if appropriate.

## 2025-01-23 NOTE — ASSESSMENT & PLAN NOTE
losartan 50 mg daily spironolactone 50 mg daily carvedilol 6.25 twice daily    Orders:    Urinalysis With Microscopic If Indicated (No Culture) - Urine, Clean Catch    TSH    T4, Free    T3, Free    Lipid Panel With / Chol / HDL Ratio    CBC & Differential    Comprehensive Metabolic Panel    Hemoglobin A1c    Vitamin B12    Vitamin D,25-Hydroxy    Folate

## 2025-01-24 ENCOUNTER — SPECIALTY PHARMACY (OUTPATIENT)
Dept: PHARMACY | Facility: TELEHEALTH | Age: 55
End: 2025-01-24
Payer: COMMERCIAL

## 2025-01-24 LAB
25(OH)D3+25(OH)D2 SERPL-MCNC: 37.8 NG/ML (ref 30–100)
ALBUMIN SERPL-MCNC: 4.1 G/DL (ref 3.5–5.2)
ALBUMIN/GLOB SERPL: 1.4 G/DL
ALP SERPL-CCNC: 60 U/L (ref 39–117)
ALT SERPL-CCNC: 18 U/L (ref 1–33)
APPEARANCE UR: CLEAR
AST SERPL-CCNC: 16 U/L (ref 1–32)
BASOPHILS # BLD AUTO: 0.04 10*3/MM3 (ref 0–0.2)
BASOPHILS NFR BLD AUTO: 0.4 % (ref 0–1.5)
BILIRUB SERPL-MCNC: <0.2 MG/DL (ref 0–1.2)
BILIRUB UR QL STRIP: NEGATIVE
BUN SERPL-MCNC: 16 MG/DL (ref 6–20)
BUN/CREAT SERPL: 20.5 (ref 7–25)
CALCIUM SERPL-MCNC: 9.3 MG/DL (ref 8.6–10.5)
CHLORIDE SERPL-SCNC: 98 MMOL/L (ref 98–107)
CHOLEST SERPL-MCNC: 271 MG/DL (ref 0–200)
CHOLEST/HDLC SERPL: 5.42 {RATIO}
CO2 SERPL-SCNC: 28.1 MMOL/L (ref 22–29)
COLOR UR: YELLOW
CREAT SERPL-MCNC: 0.78 MG/DL (ref 0.57–1)
EGFRCR SERPLBLD CKD-EPI 2021: 89.8 ML/MIN/1.73
EOSINOPHIL # BLD AUTO: 0.16 10*3/MM3 (ref 0–0.4)
EOSINOPHIL NFR BLD AUTO: 1.6 % (ref 0.3–6.2)
ERYTHROCYTE [DISTWIDTH] IN BLOOD BY AUTOMATED COUNT: 12 % (ref 12.3–15.4)
FOLATE SERPL-MCNC: 16.8 NG/ML (ref 4.78–24.2)
GLOBULIN SER CALC-MCNC: 2.9 GM/DL
GLUCOSE SERPL-MCNC: 115 MG/DL (ref 65–99)
GLUCOSE UR QL STRIP: NEGATIVE
HBA1C MFR BLD: 6.4 % (ref 4.8–5.6)
HCT VFR BLD AUTO: 46.6 % (ref 34–46.6)
HDLC SERPL-MCNC: 50 MG/DL (ref 40–60)
HGB BLD-MCNC: 16.2 G/DL (ref 12–15.9)
HGB UR QL STRIP: NEGATIVE
IMM GRANULOCYTES # BLD AUTO: 0.03 10*3/MM3 (ref 0–0.05)
IMM GRANULOCYTES NFR BLD AUTO: 0.3 % (ref 0–0.5)
KETONES UR QL STRIP: NEGATIVE
LDLC SERPL CALC-MCNC: 201 MG/DL (ref 0–100)
LEUKOCYTE ESTERASE UR QL STRIP: NEGATIVE
LYMPHOCYTES # BLD AUTO: 2.68 10*3/MM3 (ref 0.7–3.1)
LYMPHOCYTES NFR BLD AUTO: 27 % (ref 19.6–45.3)
MCH RBC QN AUTO: 32.1 PG (ref 26.6–33)
MCHC RBC AUTO-ENTMCNC: 34.8 G/DL (ref 31.5–35.7)
MCV RBC AUTO: 92.3 FL (ref 79–97)
MONOCYTES # BLD AUTO: 0.61 10*3/MM3 (ref 0.1–0.9)
MONOCYTES NFR BLD AUTO: 6.1 % (ref 5–12)
NEUTROPHILS # BLD AUTO: 6.42 10*3/MM3 (ref 1.7–7)
NEUTROPHILS NFR BLD AUTO: 64.6 % (ref 42.7–76)
NITRITE UR QL STRIP: NEGATIVE
NRBC BLD AUTO-RTO: 0 /100 WBC (ref 0–0.2)
PH UR STRIP: 6.5 [PH] (ref 5–8)
PLATELET # BLD AUTO: 330 10*3/MM3 (ref 140–450)
POTASSIUM SERPL-SCNC: 4.5 MMOL/L (ref 3.5–5.2)
PROT SERPL-MCNC: 7 G/DL (ref 6–8.5)
PROT UR QL STRIP: NEGATIVE
RBC # BLD AUTO: 5.05 10*6/MM3 (ref 3.77–5.28)
SODIUM SERPL-SCNC: 139 MMOL/L (ref 136–145)
SP GR UR STRIP: 1.02 (ref 1–1.03)
T3FREE SERPL-MCNC: 3 PG/ML (ref 2–4.4)
T4 FREE SERPL-MCNC: 1.18 NG/DL (ref 0.92–1.68)
TRIGL SERPL-MCNC: 112 MG/DL (ref 0–150)
TSH SERPL DL<=0.005 MIU/L-ACNC: 1.16 UIU/ML (ref 0.27–4.2)
UROBILINOGEN UR STRIP-MCNC: NORMAL MG/DL
VIT B12 SERPL-MCNC: 633 PG/ML (ref 211–946)
VLDLC SERPL CALC-MCNC: 20 MG/DL (ref 5–40)
WBC # BLD AUTO: 9.94 10*3/MM3 (ref 3.4–10.8)

## 2025-01-24 NOTE — PROGRESS NOTES
Specialty Pharmacy Patient Management Program  Initial Assessment     Carina Zamudio is a 54 y.o. female with hyperlipidemia and enrolled in the Patient Management program offered by [facility]. An initial outreach was conducted, including assessment of therapy appropriateness and specialty medication education for Repatha. The patient was introduced to services offered by [facility], including: regular assessments, refill coordination, curbside pick-up or mail order delivery options, prior authorization maintenance, and financial assistance programs as applicable. The patient was also provided with contact information for the pharmacy team.     Insurance Coverage & Financial Support  [employer] + copay card      Relevant Past Medical History and Comorbidities  Relevant medical history and concomitant health conditions were discussed with the patient. The patient's chart has been reviewed for relevant past medical history and comorbid health conditions and updated as necessary.   Past Medical History:   Diagnosis Date    Adrenal cortical adenoma     ADRENAL CONGENITAL HYPERPLASIA    Alopecia 06/2021    Anemia     Anxiety     CAD (coronary artery disease)     Cataract     BILATERAL, S/P EXTRACTION    Cerebellar infarct 2012    Cervical radiculopathy due to degenerative joint disease of spine 12/27/2016    Cholelithiasis 11/2021    Gallbladder removed    Chronic cough     Chronic thoracic back pain     Colon polyps     COVID 11/30/2021    Deep vein thrombosis     Depression     Displacement of lumbar intervertebral disc without myelopathy 12/28/2011    Diverticulitis     Diverticulosis     DJD (degenerative joint disease), cervical     DJD (degenerative joint disease), lumbar     Dysmetabolic syndrome X     Fecal smearing 09/2022    Fracture, toe     GERD (gastroesophageal reflux disease)     History of gestational diabetes     HPV (human papilloma virus) infection      Hyperlipidemia     Hypersomnia     Hypertension     Leg edema, right 02/25/2019    Liver nodule     Lumbar spondylosis     Lung nodule     Multinodular goiter     AMY (obstructive sleep apnea)     FOLLOWED BY DR. BANDAR CAPUTO    Osteoarthritis of spine 04/04/2016    Ovarian cyst 11/2020    Paresthesia of both hands 03/2021    Pneumonia     Posterior tibial tendonitis 03/2019    LEFT    Proteinuria     CHRONIC, FOLLOWED BY DR. 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    Vitamin D deficiency      Social History     Socioeconomic History    Marital status:    Tobacco Use    Smoking status: Every Day     Current packs/day: 0.50     Average packs/day: 0.5 packs/day for 39.1 years (19.5 ttl pk-yrs)     Types: Cigarettes     Start date: 1986     Passive exposure: Current    Smokeless tobacco: Never   Vaping Use    Vaping status: Never Used    Passive vaping exposure: Yes   Substance and Sexual Activity    Alcohol use: Not Currently     Comment: Social    Drug use: Never    Sexual activity: Yes     Partners: Male     Birth control/protection: Post-menopausal, Tubal ligation, Surgical     Comment: .     Problem list reviewed by Arlet Naranjo RPH on 1/24/2025 at  8:57 AM    Allergies  Known allergies and reactions were discussed with the patient. The patient's chart has been reviewed for allergy information and updated as necessary.   Patient has no known allergies.  Allergies reviewed by Arlet Naranjo RPH on 1/24/2025 at  8:57 AM    Current Medication List  This medication list has been reviewed with the patient and evaluated for any interactions or necessary modifications/recommendations, and updated to include all prescription medications, OTC medications, and supplements the patient is currently  taking. This list reflects what is contained in the patient's profile, which has also been marked as reviewed to communicate to other providers it is the most up to date version of the patient's current medication therapy.     Current Outpatient Medications:     aspirin 81 MG tablet, Take 1 tablet by mouth Daily. INSTRUCTED PT FOLLOW MD INSTRUCTIONS, Disp: , Rfl:     buPROPion XL (Wellbutrin XL) 150 MG 24 hr tablet, Take 1 tablet by mouth Daily., Disp: 90 tablet, Rfl: 3    carvedilol (Coreg) 6.25 MG tablet, Take 1 tablet by mouth 2 (Two) Times a Day With Meals., Disp: 180 tablet, Rfl: 3    chlorhexidine (PERIDEX) 0.12 % solution, Rinse mouth with 15 mL as directed 2 (Two) Times a Day. Use morning and night. Do not eat or drink for 30 minutes after use., Disp: 473 mL, Rfl: 1    clopidogrel (PLAVIX) 75 MG tablet, Take 1 tablet by mouth Daily., Disp: 90 tablet, Rfl: 3    Evolocumab (Repatha SureClick) solution auto-injector SureClick injection, Inject 1 mL under the skin into the appropriate area as directed Every 14 (Fourteen) Days., Disp: 2 mL, Rfl: 12    losartan (Cozaar) 50 MG tablet, Take 0.5 tablets by mouth Daily., Disp: 90 tablet, Rfl: 1    Multiple Vitamin tablet, Take 1 tablet by mouth Daily., Disp: , Rfl:     Probiotic Product (PRO-BIOTIC BLEND PO), Take  by mouth., Disp: , Rfl:     Semaglutide, 2 MG/DOSE, (Ozempic, 2 MG/DOSE,) 8 MG/3ML solution pen-injector, Inject 2 mg under the skin into the appropriate area as directed 1 (One) Time Per Week., Disp: 3 mL, Rfl: 5    spironolactone (Aldactone) 50 MG tablet, Take 1 tablet by mouth Daily., Disp: 90 tablet, Rfl: 3    vitamin D (ERGOCALCIFEROL) 1.25 MG (24326 UT) capsule capsule, Take 1 capsule by mouth 2 (Two) Times a Week., Disp: 26 capsule, Rfl: 3  Medicines reviewed by Arlet Naranjo [facility] on 1/24/2025 at  8:57 AM    Drug Interactions  none     Relevant Laboratory Values  Lab Results   Component Value Date    GLUCOSE 115 (H) 01/23/2025    CALCIUM 9.3  01/23/2025     01/23/2025    K 4.5 01/23/2025    CO2 28.1 01/23/2025    CL 98 01/23/2025    BUN 16 01/23/2025    CREATININE 0.78 01/23/2025    EGFRRESULT 89.8 01/23/2025    BCR 20.5 01/23/2025    ANIONGAP 13.1 06/21/2022     Lab Results   Component Value Date    WBC 9.94 01/23/2025    HGB 16.2 (H) 01/23/2025    HCT 46.6 01/23/2025    MCV 92.3 01/23/2025     01/23/2025    INR 0.98 11/07/2017     Lab Value Review  The above lab values have been reviewed; the following specialty medication(s) dose adjustment(s) are recommended: none.    Initial Education Provided for Specialty Medication  The patient has been provided with the following education and any applicable administration techniques (i.e. self-injection) have been demonstrated for the therapies indicated. All questions and concerns have been addressed prior to the patient receiving the medication, and the patient has verbalized understanding of the education and any materials provided. Additional patient education shall be provided and documented upon request by the patient, provider or payer.      Repatha® (evolocumab)    Medication Expectations   Why am I taking this medication? You are taking Repatha to lower your “bad” cholesterol (LDL-C). This medication can be used in adults with high blood cholesterol including primary hyperlipidemia and familial hypercholesterolemia. Repatha can also be used to reduce the risk of heart attack, stroke, and certain chest pain conditions requiring hospitalization if you have a history of cardiovascular disease.     What should I expect while on this medication? You should expect to see your cholesterol improve over time. Specifically, you should see your LDL-C decrease.    How does the medication work? Repatha works by blocking a protein called PCSK9 that contributes to high levels of bad cholesterol and it helps increase your liver's ability to remove bad cholesterol from your blood.     How long will I be on  this medication for? The amount of time you will be on this medication will be determined by your doctor based on your cholesterol and/or your risk of having a cardiac event. You will most likely be on this medication or another cholesterol medication throughout your lifetime. Do not abruptly stop this medication without talking to your doctor first.    How do I take this medication? Take as directed on your prescription label. Repatha is injected under the skin (subcutaneously) of your stomach, thigh, or upper arm. This medication is usually given one or twice a month. Use a different injection site in the same body region with each dose.    What are some possible side effects? The most common side effects of Repatha include redness, itching, swelling, or pain/tenderness at the injection site, symptoms of the common cold, and flu or flu-like symptoms.    What happens if I miss a dose? If a dose is missed, and it is within 7 days from the missed dose, administer evolocumab and resume the original dosing schedule. If it has been more than 7 days from the missed dose, for every 2 week dosing, wait until the next dose on the original schedule and, for once monthly dosing, administer the dose and start a new schedule based on this date.     Medication Safety   What are things I should warn my doctor immediately about? Talk to your doctor if you are pregnant, planning to become pregnant, or breastfeeding. Stop the medication and tell your doctor or seek emergency medical help if you notice any signs/symptoms of an allergic reaction (severe rash, redness, hives, severe itching, trouble breathing, or swelling of the face, lips, or tongue). Do not take Repatha if you have an allergy to evolocumab or any of the ingredients in Repatha.    What are things that I should be cautious of? Be cautious of any side effects from this medication. Talk to your doctor if any new ones develop or aren't getting better.   What are some  medications that can interact with this one? There are no known significant drug interactions with Repatha. Always tell your doctor or pharmacist immediately if you start taking any new medications, including over-the-counter medications, vitamins, and herbal supplements.      Medication Storage/Handling   How should I handle this medication? Do not shake or expose the pen to extreme heat or direct sunlight. Keep this medication out of reach of pets/children.    How does this medication need to be stored? Store unused pens in the refrigerator in the original carton to protect from light. Allow medication to warm at room temperature prior to administration. If needed, Repatha may be kept at room temperature in the original carton for up to 30 days. Do not freeze.    How should I dispose of this medication? The device is a single-dose disposable pen and should be discarded in a sharps container after use. If you do not own a sharps container, you may use a household container made of heavy-duty plastic with a tight-fitting lid that is leak resistant (e.g., heavty-duty plastic laundry detergent bottle).  If your doctor decides to stop this medication, take to your local police station for proper disposal. Some pharmacies also have take-back bins for medication drop-off.      Resources/Support   How can I remind myself to take this medication? You can download reminder apps to help you manage your refills. You may also set an alarm on your phone to remind you to take your dose.    Is financial support available?  [employer] can provide co-pay cards if you have commercial insurance or patient assistance if you have Medicare or no insurance.    Which vaccines are recommended for me? Talk to your doctor about these vaccines: Flu, Coronavirus (COVID-19), Pneumococcal (pneumonia), Tdap, Zoster (shingles)          Adherence, Self-Administration, and Current Therapy Problems  Adherence related to the patient's specialty therapy was  discussed with the patient. The Adherence segment of this outreach has been reviewed and updated.          Additional Barriers to Patient Self-Administration: none identified  Methods for Supporting Patient Self-Administration: n/a    Open Medication Therapy Problems  No medication therapy recommendations to display    Goals of Therapy   Goals Addressed Today        Specialty Pharmacy General Goal      LDL Goal < 70 mg/dL    Lab Results   Component Value Date     (H) 01/23/2025     (H) 07/23/2024     (H) 01/23/2024     (H) 05/31/2023     (H) 01/20/2023                     Reassessment Plan & Follow-Up  Medication Therapy Changes: Patient is continuing Repatha, beginning services through [facility]  Additional Plans, Therapy Recommendations, or Therapy Problems to Be Addressed: none   Pharmacist to perform regular reassessments no more than (6) months from the previous assessment.  Welcome information and patient satisfaction survey to be sent by retail team with patient's initial fill.  Care Coordinator to set up future refill outreaches, coordinate prescription delivery, and escalate clinical questions to pharmacist.     Attestation  I attest the patient was actively involved in and has agreed to the above plan of care. I attest that the initiated specialty medication(s) are appropriate for the patient based on my assessment. If the prescribed therapy is at any point deemed not appropriate based on the current or future assessments, a consultation will be initiated with the patient's specialty care provider to determine the best course of action. The revised plan of therapy will be documented along with any reassessments and/or additional patient education provided.     Electronically signed by Arlet Naranjo RPH, 01/24/25, 8:59 AM EST.

## 2025-02-06 ENCOUNTER — TELEPHONE (OUTPATIENT)
Dept: CARDIOLOGY | Facility: CLINIC | Age: 55
End: 2025-02-06
Payer: COMMERCIAL

## 2025-02-20 ENCOUNTER — SPECIALTY PHARMACY (OUTPATIENT)
Dept: PHARMACY | Facility: TELEHEALTH | Age: 55
End: 2025-02-20
Payer: COMMERCIAL

## 2025-02-20 RX ORDER — SEMAGLUTIDE 2.68 MG/ML
2 INJECTION, SOLUTION SUBCUTANEOUS WEEKLY
Qty: 3 ML | Refills: 5 | Status: SHIPPED | OUTPATIENT
Start: 2025-02-20

## 2025-02-20 NOTE — PROGRESS NOTES
"   Specialty Pharmacy Patient Management Program  Refill Outreach     Carina \"Rosa\" was contacted today regarding refills of their medication(s).    Refill Questions      Flowsheet Row Most Recent Value   Changes to allergies? No   Changes to medications? No   New conditions or infections since last clinic visit No   Unplanned office visit, urgent care, ED, or hospital admission in the last 4 weeks  No   How does patient/caregiver feel medication is working? Very good   Financial problems or insurance changes  No   Since the previous refill, were any specialty medication doses or scheduled injections missed or delayed?  No   Does this patient require a clinical escalation to a pharmacist? No            Delivery Questions      Flowsheet Row Most Recent Value   Delivery method UPS   Delivery address verified with patient/caregiver? Yes   Delivery address Home   Number of medications in delivery 1   Medication(s) being filled and delivered Evolocumab (Repatha SureClick)   Doses left of specialty medications 1-injecting 2/23   Copay verified? Yes   Copay amount [id number]   Copay form of payment Credit/debit on file   Delivery Date Selection 02/25/25   Signature Required No                 Follow-up: 70 day(s)     Marie Engel, Pharmacy Technician  2/20/2025  16:23 EST    "

## 2025-02-25 RX ORDER — CARVEDILOL 6.25 MG/1
6.25 TABLET ORAL 2 TIMES DAILY WITH MEALS
Qty: 180 TABLET | Refills: 1 | Status: SHIPPED | OUTPATIENT
Start: 2025-02-25

## 2025-02-26 ENCOUNTER — PRIOR AUTHORIZATION (OUTPATIENT)
Dept: INTERNAL MEDICINE | Facility: CLINIC | Age: 55
End: 2025-02-26
Payer: COMMERCIAL

## 2025-03-25 ENCOUNTER — TELEPHONE (OUTPATIENT)
Dept: INTERNAL MEDICINE | Facility: CLINIC | Age: 55
End: 2025-03-25
Payer: COMMERCIAL

## 2025-03-25 NOTE — TELEPHONE ENCOUNTER
Perla with [facility] called regarding the pt's denial on her Ozempic.  They requested that I send the previous A1C level that shows the pt is Diabetic.  I faxed her previous A1C results to [employer]  with a note stating this was additional information on Order ID# [id number]

## (undated) DEVICE — GOWN ,SIRUS,NONREINFORCED SMALL: Brand: MEDLINE

## (undated) DEVICE — DRSNG PAD ABD 8X10IN STRL

## (undated) DEVICE — DRAPE,REIN 53X77,STERILE: Brand: MEDLINE

## (undated) DEVICE — STPCK 3WY D201 DISCOFIX

## (undated) DEVICE — ENCORE® LATEX ORTHO SIZE 8.5, STERILE LATEX POWDER-FREE SURGICAL GLOVE: Brand: ENCORE

## (undated) DEVICE — BNDG ESMARK STRL 6INX12FT LF

## (undated) DEVICE — SUT ETHLN 3/0 PS1 18IN 1663H

## (undated) DEVICE — LAPAROSCOPIC SMOKE ELIMINATION DEVICE: Brand: PNEUVIEW XE

## (undated) DEVICE — SPNG GZ STRL 2S 4X4 12PLY

## (undated) DEVICE — PAD,ABDOMINAL,8"X10",ST,LF: Brand: MEDLINE

## (undated) DEVICE — ENDOPATH PNEUMONEEDLE INSUFFLATION NEEDLES WITH LUER LOCK CONNECTORS 120MM: Brand: ENDOPATH

## (undated) DEVICE — ADHS SKIN SURG TISS VISC PREMIERPRO EXOFIN HI/VISC FAST/DRY

## (undated) DEVICE — ENDOPATH XCEL BLADELESS TROCARS WITH STABILITY SLEEVES: Brand: ENDOPATH XCEL

## (undated) DEVICE — APPL HEMO SURG ARISTA/AH/FLEXITIP XL 38CM

## (undated) DEVICE — LN SMPL CO2 SHTRM SD STREAM W/M LUER

## (undated) DEVICE — SNAR POLYP SENSATION STDOVL 27 240 BX40

## (undated) DEVICE — SOL NACL 0.9PCT 1000ML

## (undated) DEVICE — SKIN PREP TRAY W/CHG: Brand: MEDLINE INDUSTRIES, INC.

## (undated) DEVICE — TUBING, SUCTION, 1/4" X 10', STRAIGHT: Brand: MEDLINE

## (undated) DEVICE — ADAPT CLN BIOGUARD AIR/H2O DISP

## (undated) DEVICE — PK ARTHSCP 40

## (undated) DEVICE — UNDERCAST PADDING: Brand: DEROYAL

## (undated) DEVICE — GLV SURG SENSICARE GREEN W/ALOE PF LF 9 STRL

## (undated) DEVICE — THE SINGLE USE ETRAP – POLYP TRAP IS USED FOR SUCTION RETRIEVAL OF ENDOSCOPICALLY REMOVED POLYPS.: Brand: ETRAP

## (undated) DEVICE — ABL ASP APOLLO RF XL 90D

## (undated) DEVICE — GLV SURG SENSICARE MICRO PF LF 8 STRL

## (undated) DEVICE — KT ORCA ORCAPOD DISP STRL

## (undated) DEVICE — BLD DISSCT COOL CUT SJ CRVD 4MM 13CM

## (undated) DEVICE — GLV SURG BIOGEL LTX PF 6

## (undated) DEVICE — APPL CHLORAPREP W/TINT 26ML ORNG

## (undated) DEVICE — GLV SURG PREMIERPRO ORTHO LTX PF SZ7.5 BRN

## (undated) DEVICE — THE TORRENT IRRIGATION SCOPE CONNECTOR IS USED WITH THE TORRENT IRRIGATION TUBING TO PROVIDE IRRIGATION FLUIDS SUCH AS STERILE WATER DURING GASTROINTESTINAL ENDOSCOPIC PROCEDURES WHEN USED IN CONJUNCTION WITH AN IRRIGATION PUMP (OR ELECTROSURGICAL UNIT).: Brand: TORRENT

## (undated) DEVICE — DISPOSABLE TOURNIQUET CUFF SINGLE BLADDER, SINGLE PORT AND QUICK CONNECT CONNECTOR: Brand: COLOR CUFF

## (undated) DEVICE — BNDG ELAS ELITE V/CLOSE 6IN 5YD LF STRL

## (undated) DEVICE — EXTENSION SET, MALE LUER LOCK ADAPTER WITH RETRACTABLE COLLAR

## (undated) DEVICE — CATH IV INSYTE AUTOGARD 14G 1 1/2IN ORNG

## (undated) DEVICE — ENDOCUT SCISSOR TIP, DISPOSABLE: Brand: RENEW

## (undated) DEVICE — SUT VIC 0 TN 27IN DYED JTN0G

## (undated) DEVICE — CONTAINER,SPECIMEN,OR STERILE,4OZ: Brand: MEDLINE

## (undated) DEVICE — PATIENT RETURN ELECTRODE, SINGLE-USE, CONTACT QUALITY MONITORING, ADULT, WITH 9FT CORD, FOR PATIENTS WEIGING OVER 33LBS. (15KG): Brand: MEGADYNE

## (undated) DEVICE — LOU LAP CHOLE: Brand: MEDLINE INDUSTRIES, INC.

## (undated) DEVICE — DRSNG GZ PETROLTM XEROFORM CURAD 1X8IN STRL

## (undated) DEVICE — OCCLUSIVE GAUZE STRIP,3% BISMUTH TRIBROMOPHENATE IN PETROLATUM BLEND: Brand: XEROFORM

## (undated) DEVICE — TBG PUMP ARTHSCP MAIN AR6400 16FT

## (undated) DEVICE — CANN O2 ETCO2 FITS ALL CONN CO2 SMPL A/ 7IN DISP LF

## (undated) DEVICE — SUT VIC 5/0 PS2 18IN J495H

## (undated) DEVICE — ENDOPOUCH RETRIEVER SPECIMEN RETRIEVAL BAGS: Brand: ENDOPOUCH RETRIEVER

## (undated) DEVICE — SENSR O2 OXIMAX FNGR A/ 18IN NONSTR

## (undated) DEVICE — CATH CHOLANG 4.5F18IN BRGNDY